# Patient Record
Sex: FEMALE | Race: ASIAN | NOT HISPANIC OR LATINO
[De-identification: names, ages, dates, MRNs, and addresses within clinical notes are randomized per-mention and may not be internally consistent; named-entity substitution may affect disease eponyms.]

---

## 2016-02-07 RX ORDER — ATORVASTATIN CALCIUM 80 MG/1
1 TABLET, FILM COATED ORAL
Qty: 0 | Refills: 0 | DISCHARGE
Start: 2016-02-07

## 2016-02-07 RX ORDER — VERAPAMIL HCL 240 MG
1 CAPSULE, EXTENDED RELEASE PELLETS 24 HR ORAL
Qty: 0 | Refills: 0 | COMMUNITY
Start: 2016-02-07

## 2016-02-07 RX ORDER — WARFARIN SODIUM 2.5 MG/1
0 TABLET ORAL
Qty: 0 | Refills: 0 | COMMUNITY
Start: 2016-02-07

## 2017-01-03 ENCOUNTER — MEDICATION RENEWAL (OUTPATIENT)
Age: 47
End: 2017-01-03

## 2017-01-03 ENCOUNTER — RX RENEWAL (OUTPATIENT)
Age: 47
End: 2017-01-03

## 2017-01-04 ENCOUNTER — OUTPATIENT (OUTPATIENT)
Dept: OUTPATIENT SERVICES | Facility: HOSPITAL | Age: 47
LOS: 1 days | End: 2017-01-04

## 2017-01-04 ENCOUNTER — LABORATORY RESULT (OUTPATIENT)
Age: 47
End: 2017-01-04

## 2017-01-04 ENCOUNTER — APPOINTMENT (OUTPATIENT)
Dept: INTERNAL MEDICINE | Facility: HOSPITAL | Age: 47
End: 2017-01-04

## 2017-01-04 DIAGNOSIS — Z98.89 OTHER SPECIFIED POSTPROCEDURAL STATES: Chronic | ICD-10-CM

## 2017-01-04 DIAGNOSIS — Z95.0 PRESENCE OF CARDIAC PACEMAKER: Chronic | ICD-10-CM

## 2017-01-04 LAB
APTT BLD: 46.6 SEC — HIGH (ref 27.5–37.4)
INR BLD: 3.51 — HIGH (ref 0.87–1.18)
PROTHROM AB SERPL-ACNC: 40.5 SEC — HIGH (ref 10–13.1)

## 2017-01-05 ENCOUNTER — OTHER (OUTPATIENT)
Age: 47
End: 2017-01-05

## 2017-01-09 ENCOUNTER — OTHER (OUTPATIENT)
Age: 47
End: 2017-01-09

## 2017-01-09 DIAGNOSIS — Z79.01 LONG TERM (CURRENT) USE OF ANTICOAGULANTS: ICD-10-CM

## 2017-01-10 ENCOUNTER — APPOINTMENT (OUTPATIENT)
Dept: CARDIOLOGY | Facility: HOSPITAL | Age: 47
End: 2017-01-10

## 2017-01-11 ENCOUNTER — APPOINTMENT (OUTPATIENT)
Dept: INTERNAL MEDICINE | Facility: HOSPITAL | Age: 47
End: 2017-01-11

## 2017-01-11 ENCOUNTER — OUTPATIENT (OUTPATIENT)
Dept: OUTPATIENT SERVICES | Facility: HOSPITAL | Age: 47
LOS: 1 days | End: 2017-01-11

## 2017-01-11 DIAGNOSIS — Z98.89 OTHER SPECIFIED POSTPROCEDURAL STATES: Chronic | ICD-10-CM

## 2017-01-11 DIAGNOSIS — Z95.0 PRESENCE OF CARDIAC PACEMAKER: Chronic | ICD-10-CM

## 2017-01-11 LAB — INR PPP: 1.2 RATIO

## 2017-01-12 DIAGNOSIS — Z79.01 LONG TERM (CURRENT) USE OF ANTICOAGULANTS: ICD-10-CM

## 2017-01-12 DIAGNOSIS — I48.91 UNSPECIFIED ATRIAL FIBRILLATION: ICD-10-CM

## 2017-01-18 ENCOUNTER — OUTPATIENT (OUTPATIENT)
Dept: OUTPATIENT SERVICES | Facility: HOSPITAL | Age: 47
LOS: 1 days | End: 2017-01-18

## 2017-01-18 ENCOUNTER — LABORATORY RESULT (OUTPATIENT)
Age: 47
End: 2017-01-18

## 2017-01-18 ENCOUNTER — APPOINTMENT (OUTPATIENT)
Dept: INTERNAL MEDICINE | Facility: HOSPITAL | Age: 47
End: 2017-01-18

## 2017-01-18 DIAGNOSIS — Z95.0 PRESENCE OF CARDIAC PACEMAKER: Chronic | ICD-10-CM

## 2017-01-18 DIAGNOSIS — Z98.89 OTHER SPECIFIED POSTPROCEDURAL STATES: Chronic | ICD-10-CM

## 2017-01-18 DIAGNOSIS — Z79.01 LONG TERM (CURRENT) USE OF ANTICOAGULANTS: ICD-10-CM

## 2017-01-18 LAB
INR BLD: 1.19 — HIGH (ref 0.87–1.18)
PROTHROM AB SERPL-ACNC: 13.6 SEC — HIGH (ref 10–13.1)

## 2017-01-19 ENCOUNTER — MEDICATION RENEWAL (OUTPATIENT)
Age: 47
End: 2017-01-19

## 2017-01-20 DIAGNOSIS — I48.91 UNSPECIFIED ATRIAL FIBRILLATION: ICD-10-CM

## 2017-01-25 ENCOUNTER — APPOINTMENT (OUTPATIENT)
Dept: INTERNAL MEDICINE | Facility: HOSPITAL | Age: 47
End: 2017-01-25

## 2017-01-25 ENCOUNTER — OUTPATIENT (OUTPATIENT)
Dept: OUTPATIENT SERVICES | Facility: HOSPITAL | Age: 47
LOS: 1 days | End: 2017-01-25

## 2017-01-25 DIAGNOSIS — Z79.01 LONG TERM (CURRENT) USE OF ANTICOAGULANTS: ICD-10-CM

## 2017-01-25 DIAGNOSIS — Z98.89 OTHER SPECIFIED POSTPROCEDURAL STATES: Chronic | ICD-10-CM

## 2017-01-25 DIAGNOSIS — Z95.0 PRESENCE OF CARDIAC PACEMAKER: Chronic | ICD-10-CM

## 2017-01-30 ENCOUNTER — APPOINTMENT (OUTPATIENT)
Dept: INTERNAL MEDICINE | Facility: HOSPITAL | Age: 47
End: 2017-01-30

## 2017-01-30 ENCOUNTER — OUTPATIENT (OUTPATIENT)
Dept: OUTPATIENT SERVICES | Facility: HOSPITAL | Age: 47
LOS: 1 days | End: 2017-01-30

## 2017-01-30 ENCOUNTER — APPOINTMENT (OUTPATIENT)
Dept: RHEUMATOLOGY | Facility: HOSPITAL | Age: 47
End: 2017-01-30

## 2017-01-30 VITALS
HEIGHT: 62 IN | WEIGHT: 106 LBS | SYSTOLIC BLOOD PRESSURE: 108 MMHG | BODY MASS INDEX: 19.51 KG/M2 | DIASTOLIC BLOOD PRESSURE: 43 MMHG | HEART RATE: 50 BPM

## 2017-01-30 DIAGNOSIS — Z95.0 PRESENCE OF CARDIAC PACEMAKER: Chronic | ICD-10-CM

## 2017-01-30 DIAGNOSIS — Z98.89 OTHER SPECIFIED POSTPROCEDURAL STATES: Chronic | ICD-10-CM

## 2017-01-31 ENCOUNTER — MEDICATION RENEWAL (OUTPATIENT)
Age: 47
End: 2017-01-31

## 2017-01-31 ENCOUNTER — RX RENEWAL (OUTPATIENT)
Age: 47
End: 2017-01-31

## 2017-01-31 DIAGNOSIS — Z79.01 LONG TERM (CURRENT) USE OF ANTICOAGULANTS: ICD-10-CM

## 2017-01-31 LAB
INR PPP: 1.8 RATIO
QUALITY CONTROL: YES

## 2017-02-01 DIAGNOSIS — M31.4 AORTIC ARCH SYNDROME [TAKAYASU]: ICD-10-CM

## 2017-02-01 DIAGNOSIS — I48.91 UNSPECIFIED ATRIAL FIBRILLATION: ICD-10-CM

## 2017-02-01 LAB — INR PPP: 1.7 RATIO

## 2017-02-06 ENCOUNTER — APPOINTMENT (OUTPATIENT)
Dept: INTERNAL MEDICINE | Facility: HOSPITAL | Age: 47
End: 2017-02-06

## 2017-02-08 ENCOUNTER — OUTPATIENT (OUTPATIENT)
Dept: OUTPATIENT SERVICES | Facility: HOSPITAL | Age: 47
LOS: 1 days | End: 2017-02-08

## 2017-02-08 ENCOUNTER — APPOINTMENT (OUTPATIENT)
Dept: INTERNAL MEDICINE | Facility: HOSPITAL | Age: 47
End: 2017-02-08

## 2017-02-08 DIAGNOSIS — Z98.89 OTHER SPECIFIED POSTPROCEDURAL STATES: Chronic | ICD-10-CM

## 2017-02-08 DIAGNOSIS — Z95.0 PRESENCE OF CARDIAC PACEMAKER: Chronic | ICD-10-CM

## 2017-02-08 DIAGNOSIS — Z79.899 OTHER LONG TERM (CURRENT) DRUG THERAPY: ICD-10-CM

## 2017-02-08 DIAGNOSIS — I48.91 UNSPECIFIED ATRIAL FIBRILLATION: ICD-10-CM

## 2017-02-08 LAB — INR PPP: 3.2 RATIO

## 2017-02-14 ENCOUNTER — OTHER (OUTPATIENT)
Age: 47
End: 2017-02-14

## 2017-02-15 ENCOUNTER — APPOINTMENT (OUTPATIENT)
Dept: INTERNAL MEDICINE | Facility: HOSPITAL | Age: 47
End: 2017-02-15

## 2017-02-15 ENCOUNTER — OUTPATIENT (OUTPATIENT)
Dept: OUTPATIENT SERVICES | Facility: HOSPITAL | Age: 47
LOS: 1 days | End: 2017-02-15

## 2017-02-15 DIAGNOSIS — Z98.89 OTHER SPECIFIED POSTPROCEDURAL STATES: Chronic | ICD-10-CM

## 2017-02-15 DIAGNOSIS — Z79.01 LONG TERM (CURRENT) USE OF ANTICOAGULANTS: ICD-10-CM

## 2017-02-15 DIAGNOSIS — Z95.0 PRESENCE OF CARDIAC PACEMAKER: Chronic | ICD-10-CM

## 2017-02-15 DIAGNOSIS — I48.91 UNSPECIFIED ATRIAL FIBRILLATION: ICD-10-CM

## 2017-02-16 LAB — POCT-PROTHROMBIN TIME: 3.2 SECS

## 2017-02-22 ENCOUNTER — APPOINTMENT (OUTPATIENT)
Dept: INTERNAL MEDICINE | Facility: HOSPITAL | Age: 47
End: 2017-02-22

## 2017-02-22 ENCOUNTER — OUTPATIENT (OUTPATIENT)
Dept: OUTPATIENT SERVICES | Facility: HOSPITAL | Age: 47
LOS: 1 days | End: 2017-02-22

## 2017-02-22 DIAGNOSIS — Z95.0 PRESENCE OF CARDIAC PACEMAKER: Chronic | ICD-10-CM

## 2017-02-22 DIAGNOSIS — Z98.89 OTHER SPECIFIED POSTPROCEDURAL STATES: Chronic | ICD-10-CM

## 2017-02-22 DIAGNOSIS — Z79.01 LONG TERM (CURRENT) USE OF ANTICOAGULANTS: ICD-10-CM

## 2017-02-24 LAB — INR PPP: 2.8 RATIO

## 2017-02-27 ENCOUNTER — OTHER (OUTPATIENT)
Age: 47
End: 2017-02-27

## 2017-03-01 ENCOUNTER — RX RENEWAL (OUTPATIENT)
Age: 47
End: 2017-03-01

## 2017-03-08 ENCOUNTER — RESULT CHARGE (OUTPATIENT)
Age: 47
End: 2017-03-08

## 2017-03-08 ENCOUNTER — APPOINTMENT (OUTPATIENT)
Dept: CARDIOLOGY | Facility: HOSPITAL | Age: 47
End: 2017-03-08

## 2017-03-08 ENCOUNTER — NON-APPOINTMENT (OUTPATIENT)
Age: 47
End: 2017-03-08

## 2017-03-08 ENCOUNTER — RX RENEWAL (OUTPATIENT)
Age: 47
End: 2017-03-08

## 2017-03-08 ENCOUNTER — APPOINTMENT (OUTPATIENT)
Dept: INTERNAL MEDICINE | Facility: HOSPITAL | Age: 47
End: 2017-03-08

## 2017-03-08 ENCOUNTER — OUTPATIENT (OUTPATIENT)
Dept: OUTPATIENT SERVICES | Facility: HOSPITAL | Age: 47
LOS: 1 days | End: 2017-03-08

## 2017-03-08 VITALS
SYSTOLIC BLOOD PRESSURE: 124 MMHG | HEART RATE: 57 BPM | WEIGHT: 114 LBS | OXYGEN SATURATION: 99 % | DIASTOLIC BLOOD PRESSURE: 69 MMHG | RESPIRATION RATE: 14 BRPM | BODY MASS INDEX: 20.85 KG/M2

## 2017-03-08 DIAGNOSIS — Z98.89 OTHER SPECIFIED POSTPROCEDURAL STATES: Chronic | ICD-10-CM

## 2017-03-08 DIAGNOSIS — Z95.0 PRESENCE OF CARDIAC PACEMAKER: Chronic | ICD-10-CM

## 2017-03-09 ENCOUNTER — RX RENEWAL (OUTPATIENT)
Age: 47
End: 2017-03-09

## 2017-03-09 LAB — INR PPP: 5.4 RATIO

## 2017-03-15 DIAGNOSIS — Z79.01 LONG TERM (CURRENT) USE OF ANTICOAGULANTS: ICD-10-CM

## 2017-03-22 ENCOUNTER — RX RENEWAL (OUTPATIENT)
Age: 47
End: 2017-03-22

## 2017-03-27 ENCOUNTER — APPOINTMENT (OUTPATIENT)
Dept: RHEUMATOLOGY | Facility: HOSPITAL | Age: 47
End: 2017-03-27

## 2017-03-28 ENCOUNTER — OUTPATIENT (OUTPATIENT)
Dept: OUTPATIENT SERVICES | Facility: HOSPITAL | Age: 47
LOS: 1 days | End: 2017-03-28

## 2017-03-28 ENCOUNTER — APPOINTMENT (OUTPATIENT)
Dept: INTERNAL MEDICINE | Facility: HOSPITAL | Age: 47
End: 2017-03-28

## 2017-03-28 ENCOUNTER — APPOINTMENT (OUTPATIENT)
Dept: CV DIAGNOSITCS | Facility: HOSPITAL | Age: 47
End: 2017-03-28

## 2017-03-28 ENCOUNTER — RESULT CHARGE (OUTPATIENT)
Age: 47
End: 2017-03-28

## 2017-03-28 DIAGNOSIS — Z98.89 OTHER SPECIFIED POSTPROCEDURAL STATES: Chronic | ICD-10-CM

## 2017-03-28 DIAGNOSIS — I34.0 NONRHEUMATIC MITRAL (VALVE) INSUFFICIENCY: ICD-10-CM

## 2017-03-28 DIAGNOSIS — Z95.0 PRESENCE OF CARDIAC PACEMAKER: Chronic | ICD-10-CM

## 2017-03-28 DIAGNOSIS — I42.2 OTHER HYPERTROPHIC CARDIOMYOPATHY: ICD-10-CM

## 2017-03-30 DIAGNOSIS — Z79.01 LONG TERM (CURRENT) USE OF ANTICOAGULANTS: ICD-10-CM

## 2017-04-05 ENCOUNTER — APPOINTMENT (OUTPATIENT)
Dept: INTERNAL MEDICINE | Facility: HOSPITAL | Age: 47
End: 2017-04-05

## 2017-04-05 ENCOUNTER — LABORATORY RESULT (OUTPATIENT)
Age: 47
End: 2017-04-05

## 2017-04-05 ENCOUNTER — OUTPATIENT (OUTPATIENT)
Dept: OUTPATIENT SERVICES | Facility: HOSPITAL | Age: 47
LOS: 1 days | End: 2017-04-05

## 2017-04-05 ENCOUNTER — APPOINTMENT (OUTPATIENT)
Dept: ELECTROPHYSIOLOGY | Facility: CLINIC | Age: 47
End: 2017-04-05

## 2017-04-05 DIAGNOSIS — Z95.0 PRESENCE OF CARDIAC PACEMAKER: Chronic | ICD-10-CM

## 2017-04-05 DIAGNOSIS — Z98.89 OTHER SPECIFIED POSTPROCEDURAL STATES: Chronic | ICD-10-CM

## 2017-04-05 LAB
INR BLD: 2.27 — HIGH (ref 0.88–1.17)
PROTHROM AB SERPL-ACNC: 25.8 SEC — HIGH (ref 9.8–13.1)

## 2017-04-10 DIAGNOSIS — Z79.01 LONG TERM (CURRENT) USE OF ANTICOAGULANTS: ICD-10-CM

## 2017-04-12 ENCOUNTER — APPOINTMENT (OUTPATIENT)
Dept: CARDIOLOGY | Facility: HOSPITAL | Age: 47
End: 2017-04-12

## 2017-04-12 ENCOUNTER — APPOINTMENT (OUTPATIENT)
Dept: INTERNAL MEDICINE | Facility: HOSPITAL | Age: 47
End: 2017-04-12

## 2017-04-12 ENCOUNTER — OUTPATIENT (OUTPATIENT)
Dept: OUTPATIENT SERVICES | Facility: HOSPITAL | Age: 47
LOS: 1 days | End: 2017-04-12

## 2017-04-12 VITALS
RESPIRATION RATE: 15 BRPM | SYSTOLIC BLOOD PRESSURE: 143 MMHG | HEART RATE: 54 BPM | OXYGEN SATURATION: 99 % | WEIGHT: 114 LBS | BODY MASS INDEX: 20.85 KG/M2 | DIASTOLIC BLOOD PRESSURE: 67 MMHG

## 2017-04-12 DIAGNOSIS — Z79.01 LONG TERM (CURRENT) USE OF ANTICOAGULANTS: ICD-10-CM

## 2017-04-12 DIAGNOSIS — Z98.89 OTHER SPECIFIED POSTPROCEDURAL STATES: Chronic | ICD-10-CM

## 2017-04-12 DIAGNOSIS — Z95.0 PRESENCE OF CARDIAC PACEMAKER: Chronic | ICD-10-CM

## 2017-04-12 DIAGNOSIS — Z00.00 ENCOUNTER FOR GENERAL ADULT MEDICAL EXAMINATION WITHOUT ABNORMAL FINDINGS: ICD-10-CM

## 2017-04-14 LAB — INR PPP: 1.9 RATIO

## 2017-04-17 ENCOUNTER — APPOINTMENT (OUTPATIENT)
Dept: INTERNAL MEDICINE | Facility: HOSPITAL | Age: 47
End: 2017-04-17

## 2017-04-17 ENCOUNTER — OUTPATIENT (OUTPATIENT)
Dept: OUTPATIENT SERVICES | Facility: HOSPITAL | Age: 47
LOS: 1 days | End: 2017-04-17

## 2017-04-17 DIAGNOSIS — Z95.0 PRESENCE OF CARDIAC PACEMAKER: Chronic | ICD-10-CM

## 2017-04-17 DIAGNOSIS — Z98.89 OTHER SPECIFIED POSTPROCEDURAL STATES: Chronic | ICD-10-CM

## 2017-04-17 LAB
INR PPP: 1.6 RATIO
QUALITY CONTROL: YES

## 2017-04-19 ENCOUNTER — APPOINTMENT (OUTPATIENT)
Dept: INTERNAL MEDICINE | Facility: HOSPITAL | Age: 47
End: 2017-04-19

## 2017-04-19 DIAGNOSIS — I48.91 UNSPECIFIED ATRIAL FIBRILLATION: ICD-10-CM

## 2017-04-19 DIAGNOSIS — Z79.01 LONG TERM (CURRENT) USE OF ANTICOAGULANTS: ICD-10-CM

## 2017-04-24 ENCOUNTER — APPOINTMENT (OUTPATIENT)
Dept: INTERNAL MEDICINE | Facility: HOSPITAL | Age: 47
End: 2017-04-24

## 2017-04-24 ENCOUNTER — APPOINTMENT (OUTPATIENT)
Dept: CV DIAGNOSITCS | Facility: HOSPITAL | Age: 47
End: 2017-04-24

## 2017-04-27 ENCOUNTER — RX RENEWAL (OUTPATIENT)
Age: 47
End: 2017-04-27

## 2017-05-02 ENCOUNTER — APPOINTMENT (OUTPATIENT)
Dept: INTERNAL MEDICINE | Facility: HOSPITAL | Age: 47
End: 2017-05-02

## 2017-05-02 ENCOUNTER — OUTPATIENT (OUTPATIENT)
Dept: OUTPATIENT SERVICES | Facility: HOSPITAL | Age: 47
LOS: 1 days | End: 2017-05-02

## 2017-05-02 DIAGNOSIS — Z79.01 LONG TERM (CURRENT) USE OF ANTICOAGULANTS: ICD-10-CM

## 2017-05-02 DIAGNOSIS — Z98.89 OTHER SPECIFIED POSTPROCEDURAL STATES: Chronic | ICD-10-CM

## 2017-05-02 DIAGNOSIS — Z95.0 PRESENCE OF CARDIAC PACEMAKER: Chronic | ICD-10-CM

## 2017-05-02 DIAGNOSIS — Z00.00 ENCOUNTER FOR GENERAL ADULT MEDICAL EXAMINATION WITHOUT ABNORMAL FINDINGS: ICD-10-CM

## 2017-05-03 LAB — INR PPP: 1.9 RATIO

## 2017-05-04 ENCOUNTER — RX RENEWAL (OUTPATIENT)
Age: 47
End: 2017-05-04

## 2017-05-09 ENCOUNTER — APPOINTMENT (OUTPATIENT)
Dept: INTERNAL MEDICINE | Facility: HOSPITAL | Age: 47
End: 2017-05-09

## 2017-05-10 ENCOUNTER — APPOINTMENT (OUTPATIENT)
Dept: CARDIOLOGY | Facility: HOSPITAL | Age: 47
End: 2017-05-10

## 2017-05-10 VITALS
BODY MASS INDEX: 20.85 KG/M2 | DIASTOLIC BLOOD PRESSURE: 67 MMHG | WEIGHT: 114 LBS | RESPIRATION RATE: 16 BRPM | HEART RATE: 53 BPM | SYSTOLIC BLOOD PRESSURE: 136 MMHG | OXYGEN SATURATION: 99 %

## 2017-05-10 VITALS — DIASTOLIC BLOOD PRESSURE: 67 MMHG | SYSTOLIC BLOOD PRESSURE: 149 MMHG

## 2017-05-10 VITALS — SYSTOLIC BLOOD PRESSURE: 155 MMHG | DIASTOLIC BLOOD PRESSURE: 65 MMHG

## 2017-05-10 VITALS — DIASTOLIC BLOOD PRESSURE: 64 MMHG | SYSTOLIC BLOOD PRESSURE: 154 MMHG

## 2017-05-10 VITALS — SYSTOLIC BLOOD PRESSURE: 133 MMHG | DIASTOLIC BLOOD PRESSURE: 68 MMHG

## 2017-05-15 ENCOUNTER — OUTPATIENT (OUTPATIENT)
Dept: OUTPATIENT SERVICES | Facility: HOSPITAL | Age: 47
LOS: 1 days | End: 2017-05-15

## 2017-05-15 ENCOUNTER — INPATIENT (INPATIENT)
Facility: HOSPITAL | Age: 47
LOS: 0 days | Discharge: HOME | End: 2017-05-16
Attending: INTERNAL MEDICINE | Admitting: INTERNAL MEDICINE
Payer: MEDICAID

## 2017-05-15 ENCOUNTER — RESULT CHARGE (OUTPATIENT)
Age: 47
End: 2017-05-15

## 2017-05-15 ENCOUNTER — OTHER (OUTPATIENT)
Age: 47
End: 2017-05-15

## 2017-05-15 ENCOUNTER — APPOINTMENT (OUTPATIENT)
Dept: RHEUMATOLOGY | Facility: HOSPITAL | Age: 47
End: 2017-05-15

## 2017-05-15 VITALS — DIASTOLIC BLOOD PRESSURE: 55 MMHG | SYSTOLIC BLOOD PRESSURE: 125 MMHG

## 2017-05-15 VITALS
DIASTOLIC BLOOD PRESSURE: 49 MMHG | SYSTOLIC BLOOD PRESSURE: 136 MMHG | HEIGHT: 62 IN | WEIGHT: 112 LBS | HEART RATE: 50 BPM | BODY MASS INDEX: 20.61 KG/M2

## 2017-05-15 VITALS
DIASTOLIC BLOOD PRESSURE: 54 MMHG | TEMPERATURE: 98 F | HEART RATE: 50 BPM | SYSTOLIC BLOOD PRESSURE: 145 MMHG | RESPIRATION RATE: 15 BRPM | OXYGEN SATURATION: 100 %

## 2017-05-15 DIAGNOSIS — R55 SYNCOPE AND COLLAPSE: ICD-10-CM

## 2017-05-15 DIAGNOSIS — Z98.89 OTHER SPECIFIED POSTPROCEDURAL STATES: Chronic | ICD-10-CM

## 2017-05-15 DIAGNOSIS — I48.91 UNSPECIFIED ATRIAL FIBRILLATION: ICD-10-CM

## 2017-05-15 DIAGNOSIS — M31.4 AORTIC ARCH SYNDROME [TAKAYASU]: ICD-10-CM

## 2017-05-15 DIAGNOSIS — Z95.0 PRESENCE OF CARDIAC PACEMAKER: Chronic | ICD-10-CM

## 2017-05-15 DIAGNOSIS — R42 DIZZINESS AND GIDDINESS: ICD-10-CM

## 2017-05-15 LAB
ALBUMIN SERPL ELPH-MCNC: 4.1 G/DL — SIGNIFICANT CHANGE UP (ref 3.3–5)
ALP SERPL-CCNC: 36 U/L — LOW (ref 40–120)
ALT FLD-CCNC: 26 U/L — SIGNIFICANT CHANGE UP (ref 4–33)
APTT BLD: 37.7 SEC — HIGH (ref 27.5–37.4)
AST SERPL-CCNC: 36 U/L — HIGH (ref 4–32)
BASOPHILS # BLD AUTO: 0.05 K/UL — SIGNIFICANT CHANGE UP (ref 0–0.2)
BASOPHILS NFR BLD AUTO: 0.5 % — SIGNIFICANT CHANGE UP (ref 0–2)
BILIRUB SERPL-MCNC: 0.7 MG/DL — SIGNIFICANT CHANGE UP (ref 0.2–1.2)
BUN SERPL-MCNC: 12 MG/DL — SIGNIFICANT CHANGE UP (ref 7–23)
CALCIUM SERPL-MCNC: 9 MG/DL — SIGNIFICANT CHANGE UP (ref 8.4–10.5)
CHLORIDE SERPL-SCNC: 102 MMOL/L — SIGNIFICANT CHANGE UP (ref 98–107)
CK MB BLD-MCNC: 2.8 NG/ML — SIGNIFICANT CHANGE UP (ref 1–4.7)
CK MB BLD-MCNC: 3.46 NG/ML — SIGNIFICANT CHANGE UP (ref 1–4.7)
CK MB BLD-MCNC: SIGNIFICANT CHANGE UP (ref 0–2.5)
CK SERPL-CCNC: 50 U/L — SIGNIFICANT CHANGE UP (ref 25–170)
CK SERPL-CCNC: 63 U/L — SIGNIFICANT CHANGE UP (ref 25–170)
CO2 SERPL-SCNC: 20 MMOL/L — LOW (ref 22–31)
CREAT SERPL-MCNC: 0.88 MG/DL — SIGNIFICANT CHANGE UP (ref 0.5–1.3)
CRP SERPL-MCNC: 0.4 MG/L — SIGNIFICANT CHANGE UP (ref 0.3–5)
EOSINOPHIL # BLD AUTO: 0.07 K/UL — SIGNIFICANT CHANGE UP (ref 0–0.5)
EOSINOPHIL NFR BLD AUTO: 0.8 % — SIGNIFICANT CHANGE UP (ref 0–6)
ERYTHROCYTE [SEDIMENTATION RATE] IN BLOOD: 8 MM/HR — SIGNIFICANT CHANGE UP (ref 4–25)
GLUCOSE SERPL-MCNC: 92 MG/DL — SIGNIFICANT CHANGE UP (ref 70–99)
HCG SERPL-ACNC: < 5 MIU/ML — SIGNIFICANT CHANGE UP
HCT VFR BLD CALC: 40.9 % — SIGNIFICANT CHANGE UP (ref 34.5–45)
HGB BLD-MCNC: 13.6 G/DL — SIGNIFICANT CHANGE UP (ref 11.5–15.5)
IMM GRANULOCYTES NFR BLD AUTO: 0.2 % — SIGNIFICANT CHANGE UP (ref 0–1.5)
INR BLD: 1.94 — HIGH (ref 0.88–1.17)
LYMPHOCYTES # BLD AUTO: 2.45 K/UL — SIGNIFICANT CHANGE UP (ref 1–3.3)
LYMPHOCYTES # BLD AUTO: 26.7 % — SIGNIFICANT CHANGE UP (ref 13–44)
MAGNESIUM SERPL-MCNC: 2.2 MG/DL — SIGNIFICANT CHANGE UP (ref 1.6–2.6)
MCHC RBC-ENTMCNC: 33.3 % — SIGNIFICANT CHANGE UP (ref 32–36)
MCHC RBC-ENTMCNC: 35.2 PG — HIGH (ref 27–34)
MCV RBC AUTO: 106 FL — HIGH (ref 80–100)
MONOCYTES # BLD AUTO: 0.52 K/UL — SIGNIFICANT CHANGE UP (ref 0–0.9)
MONOCYTES NFR BLD AUTO: 5.7 % — SIGNIFICANT CHANGE UP (ref 2–14)
NEUTROPHILS # BLD AUTO: 6.05 K/UL — SIGNIFICANT CHANGE UP (ref 1.8–7.4)
NEUTROPHILS NFR BLD AUTO: 66.1 % — SIGNIFICANT CHANGE UP (ref 43–77)
PHOSPHATE SERPL-MCNC: 3.7 MG/DL — SIGNIFICANT CHANGE UP (ref 2.5–4.5)
PLATELET # BLD AUTO: 315 K/UL — SIGNIFICANT CHANGE UP (ref 150–400)
PMV BLD: 10.6 FL — SIGNIFICANT CHANGE UP (ref 7–13)
POTASSIUM SERPL-MCNC: 4.1 MMOL/L — SIGNIFICANT CHANGE UP (ref 3.5–5.3)
POTASSIUM SERPL-SCNC: 4.1 MMOL/L — SIGNIFICANT CHANGE UP (ref 3.5–5.3)
PROT SERPL-MCNC: 7 G/DL — SIGNIFICANT CHANGE UP (ref 6–8.3)
PROTHROM AB SERPL-ACNC: 22 SEC — HIGH (ref 9.8–13.1)
RBC # BLD: 3.86 M/UL — SIGNIFICANT CHANGE UP (ref 3.8–5.2)
RBC # FLD: 14.1 % — SIGNIFICANT CHANGE UP (ref 10.3–14.5)
SODIUM SERPL-SCNC: 140 MMOL/L — SIGNIFICANT CHANGE UP (ref 135–145)
TROPONIN T SERPL-MCNC: < 0.06 NG/ML — SIGNIFICANT CHANGE UP (ref 0–0.06)
TROPONIN T SERPL-MCNC: < 0.06 NG/ML — SIGNIFICANT CHANGE UP (ref 0–0.06)
WBC # BLD: 9.16 K/UL — SIGNIFICANT CHANGE UP (ref 3.8–10.5)
WBC # FLD AUTO: 9.16 K/UL — SIGNIFICANT CHANGE UP (ref 3.8–10.5)

## 2017-05-15 PROCEDURE — 71020: CPT | Mod: 26

## 2017-05-15 PROCEDURE — 74174 CTA ABD&PLVS W/CONTRAST: CPT | Mod: 26

## 2017-05-15 PROCEDURE — 93282 PRGRMG EVAL IMPLANTABLE DFB: CPT | Mod: 26

## 2017-05-15 PROCEDURE — 71275 CT ANGIOGRAPHY CHEST: CPT | Mod: 26

## 2017-05-15 RX ORDER — VERAPAMIL HCL 240 MG
40 CAPSULE, EXTENDED RELEASE PELLETS 24 HR ORAL THREE TIMES A DAY
Qty: 0 | Refills: 0 | Status: DISCONTINUED | OUTPATIENT
Start: 2017-05-15 | End: 2017-05-16

## 2017-05-15 RX ORDER — PANTOPRAZOLE SODIUM 20 MG/1
40 TABLET, DELAYED RELEASE ORAL
Qty: 0 | Refills: 0 | Status: DISCONTINUED | OUTPATIENT
Start: 2017-05-15 | End: 2017-05-16

## 2017-05-15 RX ORDER — METOPROLOL TARTRATE 50 MG
50 TABLET ORAL
Qty: 0 | Refills: 0 | Status: DISCONTINUED | OUTPATIENT
Start: 2017-05-15 | End: 2017-05-16

## 2017-05-15 RX ORDER — FOLIC ACID 0.8 MG
1 TABLET ORAL DAILY
Qty: 0 | Refills: 0 | Status: DISCONTINUED | OUTPATIENT
Start: 2017-05-15 | End: 2017-05-16

## 2017-05-15 RX ORDER — WARFARIN SODIUM 2.5 MG/1
3 TABLET ORAL ONCE
Qty: 0 | Refills: 0 | Status: DISCONTINUED | OUTPATIENT
Start: 2017-05-15 | End: 2017-05-16

## 2017-05-15 RX ORDER — ATORVASTATIN CALCIUM 80 MG/1
20 TABLET, FILM COATED ORAL AT BEDTIME
Qty: 0 | Refills: 0 | Status: DISCONTINUED | OUTPATIENT
Start: 2017-05-15 | End: 2017-05-16

## 2017-05-15 RX ORDER — FERROUS SULFATE 325(65) MG
325 TABLET ORAL DAILY
Qty: 0 | Refills: 0 | Status: DISCONTINUED | OUTPATIENT
Start: 2017-05-15 | End: 2017-05-16

## 2017-05-15 RX ORDER — LOSARTAN POTASSIUM 100 MG/1
25 TABLET, FILM COATED ORAL DAILY
Qty: 0 | Refills: 0 | Status: DISCONTINUED | OUTPATIENT
Start: 2017-05-15 | End: 2017-05-16

## 2017-05-15 RX ORDER — METHOTREXATE 2.5 MG/1
15 TABLET ORAL
Qty: 0 | Refills: 0 | Status: DISCONTINUED | OUTPATIENT
Start: 2017-05-15 | End: 2017-05-16

## 2017-05-15 RX ORDER — METHOTREXATE 2.5 MG/1
7 TABLET ORAL
Qty: 0 | Refills: 0 | COMMUNITY

## 2017-05-15 RX ORDER — SOTALOL HCL 120 MG
120 TABLET ORAL
Qty: 0 | Refills: 0 | Status: DISCONTINUED | OUTPATIENT
Start: 2017-05-15 | End: 2017-05-16

## 2017-05-15 NOTE — ED PROVIDER NOTE - PMH
AICD (automatic cardioverter/defibrillator) present    Atrial fibrillation, chronic    Hypertension    Hypertrophic cardiomyopathy    Non-sustained ventricular tachycardia    Takayasu's arteritis

## 2017-05-15 NOTE — ED PROVIDER NOTE - OBJECTIVE STATEMENT
[place]ian  #[id number]  46 yo woman w/ h/o htn, afib, hypertrophic cardiomyopathy (s/p aicd), Takayasu's arteritis p/w head fullness. Symptoms started 1 wk ago, worsening. Describes as feeling that she is upside down w/ blood rushing to head. Also states that she feels as though she has been shocked multiple times over the past week. Device placed 05/2015, does not know brand nor have card. States she went to rheumatologist today for routine appointment and was sent to ED for "emergency cat scan" because her arrythmia is getting worse. Has been told since January that she needed CT, but has been unable to because of insurance issues. Denies chest pain, sob, vertigo, visual changes, weakness/sensory deficits.  Card: Quinton Santana Albanian  #[id number]  48 yo woman w/ h/o htn, afib, severe diastolic CHF, sustained VT (s/p [employer] aicd), hypertrophic cardiomyopathy, Takayasu's arteritis p/w head fullness. Symptoms started 1 wk ago, worsening. Describes as feeling that she is upside down w/ blood rushing to head. Also states that she feels as though she has been shocked multiple times over the past week. Device placed 05/2015, does not know brand nor have card. States she went to rheumatologist today for routine appointment and was sent to ED for "emergency cat scan" because her arrythmia is getting worse. Has been told since January that she needed CT, but has been unable to because of insurance issues. Denies chest pain, sob, vertigo, visual changes, weakness/sensory deficits.  Card: Quinton Santana

## 2017-05-15 NOTE — ED ADULT TRIAGE NOTE - CHIEF COMPLAINT QUOTE
Frisian  #[id number]  seen by PMD, and sent in for eval of RA and CTpa  PMH arrythmia 2Y AGO, leaky heart valve  LMP 5/1/17

## 2017-05-15 NOTE — ED CLERICAL - NS ED CLERK NOTE PRE-ARRIVAL INFORMATION; ADDITIONAL PRE-ARRIVAL INFORMATION
takayasu arteritis with c/o fullness in chest and head needs CTA of chest abdomen & pelvis labs CBC CMP ESR and CRP

## 2017-05-15 NOTE — ED PROVIDER NOTE - PROGRESS NOTE DETAILS
Gabe Lee MD PGY3: Labs, imaging reviewed. Interrogated by EP w/ 20% pacing, sinus елена. Seen by cardiology, accepted to service. Telemetry PA signout @[id number]

## 2017-05-15 NOTE — ED PROVIDER NOTE - MEDICAL DECISION MAKING DETAILS
48 yo woman w/ head fullness and sensation of being shocked. 48 yo woman w/ head fullness and sensation of being shocked. Likely near syncope in setting of aicd dysfunction. Will check labs, cxr, consult EP and rheumatology. Josy: 46 yo woman w/ head fullness and sensation of being shocked. Likely near syncope in setting of aicd dysfunction. Will monitor, check labs, cxr, consult EP for possible AICD dysfunction and rheumatology requesting CTA of chest/abdomen/pelvis to assess for any dilatation of her large vessels, TBA to tele for further monitoring.

## 2017-05-16 ENCOUNTER — TRANSCRIPTION ENCOUNTER (OUTPATIENT)
Age: 47
End: 2017-05-16

## 2017-05-16 VITALS — WEIGHT: 130.07 LBS

## 2017-05-16 DIAGNOSIS — I10 ESSENTIAL (PRIMARY) HYPERTENSION: ICD-10-CM

## 2017-05-16 DIAGNOSIS — Z29.9 ENCOUNTER FOR PROPHYLACTIC MEASURES, UNSPECIFIED: ICD-10-CM

## 2017-05-16 DIAGNOSIS — Z95.810 PRESENCE OF AUTOMATIC (IMPLANTABLE) CARDIAC DEFIBRILLATOR: ICD-10-CM

## 2017-05-16 DIAGNOSIS — M31.4 AORTIC ARCH SYNDROME [TAKAYASU]: ICD-10-CM

## 2017-05-16 DIAGNOSIS — R55 SYNCOPE AND COLLAPSE: ICD-10-CM

## 2017-05-16 DIAGNOSIS — I48.2 CHRONIC ATRIAL FIBRILLATION: ICD-10-CM

## 2017-05-16 LAB
APTT BLD: 37.1 SEC — SIGNIFICANT CHANGE UP (ref 27.5–37.4)
BUN SERPL-MCNC: 12 MG/DL — SIGNIFICANT CHANGE UP (ref 7–23)
CALCIUM SERPL-MCNC: 8.6 MG/DL — SIGNIFICANT CHANGE UP (ref 8.4–10.5)
CHLORIDE SERPL-SCNC: 104 MMOL/L — SIGNIFICANT CHANGE UP (ref 98–107)
CHOLEST SERPL-MCNC: 188 MG/DL — SIGNIFICANT CHANGE UP (ref 120–199)
CO2 SERPL-SCNC: 23 MMOL/L — SIGNIFICANT CHANGE UP (ref 22–31)
CREAT SERPL-MCNC: 0.76 MG/DL — SIGNIFICANT CHANGE UP (ref 0.5–1.3)
GLUCOSE SERPL-MCNC: 74 MG/DL — SIGNIFICANT CHANGE UP (ref 70–99)
HBA1C BLD-MCNC: 5.7 % — HIGH (ref 4–5.6)
HCT VFR BLD CALC: 38.5 % — SIGNIFICANT CHANGE UP (ref 34.5–45)
HDLC SERPL-MCNC: 71 MG/DL — HIGH (ref 45–65)
HGB BLD-MCNC: 12.7 G/DL — SIGNIFICANT CHANGE UP (ref 11.5–15.5)
INR BLD: 1.96 — HIGH (ref 0.88–1.17)
INR PPP: 1.9 RATIO
LIPID PNL WITH DIRECT LDL SERPL: 98 MG/DL — SIGNIFICANT CHANGE UP
MAGNESIUM SERPL-MCNC: 2 MG/DL — SIGNIFICANT CHANGE UP (ref 1.6–2.6)
MCHC RBC-ENTMCNC: 33 % — SIGNIFICANT CHANGE UP (ref 32–36)
MCHC RBC-ENTMCNC: 35.1 PG — HIGH (ref 27–34)
MCV RBC AUTO: 106.4 FL — HIGH (ref 80–100)
PHOSPHATE SERPL-MCNC: 3.3 MG/DL — SIGNIFICANT CHANGE UP (ref 2.5–4.5)
PLATELET # BLD AUTO: 288 K/UL — SIGNIFICANT CHANGE UP (ref 150–400)
PMV BLD: 10.3 FL — SIGNIFICANT CHANGE UP (ref 7–13)
POTASSIUM SERPL-MCNC: 4 MMOL/L — SIGNIFICANT CHANGE UP (ref 3.5–5.3)
POTASSIUM SERPL-SCNC: 4 MMOL/L — SIGNIFICANT CHANGE UP (ref 3.5–5.3)
PROTHROM AB SERPL-ACNC: 22.2 SEC — HIGH (ref 9.8–13.1)
RBC # BLD: 3.62 M/UL — LOW (ref 3.8–5.2)
RBC # FLD: 14 % — SIGNIFICANT CHANGE UP (ref 10.3–14.5)
SODIUM SERPL-SCNC: 140 MMOL/L — SIGNIFICANT CHANGE UP (ref 135–145)
TRIGL SERPL-MCNC: 112 MG/DL — SIGNIFICANT CHANGE UP (ref 10–149)
TSH SERPL-MCNC: 1.66 UIU/ML — SIGNIFICANT CHANGE UP (ref 0.27–4.2)
WBC # BLD: 5 K/UL — SIGNIFICANT CHANGE UP (ref 3.8–10.5)
WBC # FLD AUTO: 5 K/UL — SIGNIFICANT CHANGE UP (ref 3.8–10.5)

## 2017-05-16 PROCEDURE — 99236 HOSP IP/OBS SAME DATE HI 85: CPT

## 2017-05-16 RX ORDER — WARFARIN SODIUM 2.5 MG/1
3 TABLET ORAL ONCE
Qty: 0 | Refills: 0 | Status: COMPLETED | OUTPATIENT
Start: 2017-05-16 | End: 2017-05-16

## 2017-05-16 RX ORDER — METHOTREXATE 2.5 MG/1
7 TABLET ORAL
Qty: 0 | Refills: 0 | COMMUNITY

## 2017-05-16 RX ORDER — METHOTREXATE 2.5 MG/1
17.5 TABLET ORAL
Qty: 0 | Refills: 0 | Status: DISCONTINUED | OUTPATIENT
Start: 2017-05-16 | End: 2017-05-16

## 2017-05-16 RX ADMIN — LOSARTAN POTASSIUM 25 MILLIGRAM(S): 100 TABLET, FILM COATED ORAL at 06:44

## 2017-05-16 RX ADMIN — WARFARIN SODIUM 3 MILLIGRAM(S): 2.5 TABLET ORAL at 01:17

## 2017-05-16 RX ADMIN — PANTOPRAZOLE SODIUM 40 MILLIGRAM(S): 20 TABLET, DELAYED RELEASE ORAL at 06:43

## 2017-05-16 RX ADMIN — Medication 40 MILLIGRAM(S): at 06:43

## 2017-05-16 RX ADMIN — Medication 5 MILLIGRAM(S): at 06:43

## 2017-05-16 NOTE — DISCHARGE NOTE ADULT - CARE PLAN
Principal Discharge DX:	Near syncope  Goal:	Prevent presyncopal episodes  Instructions for follow-up, activity and diet:	continue present medications  ct head and neck as an out patient  f/u with pmd in 1 week  Secondary Diagnosis:	Hypertension  Instructions for follow-up, activity and diet:	continue present medications  Secondary Diagnosis:	Atrial fibrillation, chronic  Instructions for follow-up, activity and diet:	continue present medications  Secondary Diagnosis:	AICD (automatic cardioverter/defibrillator) present  Instructions for follow-up, activity and diet:	continue present medications

## 2017-05-16 NOTE — H&P ADULT. - RS GEN PE MLT RESP DETAILS PC
respirations non-labored/good air movement/no wheezes/no rhonchi/normal/airway patent/breath sounds equal/no chest wall tenderness/no rales/clear to auscultation bilaterally

## 2017-05-16 NOTE — H&P ADULT. - GASTROINTESTINAL DETAILS
no masses palpable/no guarding/no rebound tenderness/normal/soft/no rigidity/nontender/bowel sounds normal/no distention

## 2017-05-16 NOTE — H&P ADULT. - NEGATIVE OPHTHALMOLOGIC SYMPTOMS
no pain R/no blurred vision R/no discharge R/no lacrimation L/no lacrimation R/no diplopia/no photophobia/no pain L/no irritation R/no loss of vision R/no loss of vision L/no discharge L/no irritation L/no scleral injection L/no scleral injection R/no blurred vision L

## 2017-05-16 NOTE — H&P ADULT. - NEGATIVE MUSCULOSKELETAL SYMPTOMS
no stiffness/no neck pain/no back pain/no joint swelling/no arthralgia/no muscle cramps/no arthritis/no myalgia/no muscle weakness/no leg pain L/no leg pain R/no arm pain L/no arm pain R

## 2017-05-16 NOTE — H&P ADULT. - HISTORY OF PRESENT ILLNESS
46 y/o F Wolof speaking with h/o of Takayasu arteritis, paroxysmal a. fib on sotalol and warfarin / sustain V tach on coumadin s/p ICD placement, nonobstructive CAD (20% LAC and RCA in 2015), HCM, GERD, H. pylori, mod-severe and bileaflet MVP with mod-severe MR presents to the ED for head fullness.  Pt is primarily Wolof speaking and  service # [id number]. Pt states she feels like the blood is rushing to her head when she is upside down. PT  reports she sometimes feel dizzy. Pt reports she felt her ICD fire a couple of time in the past week. Pt was seen by rheumatologist today and was sent to the ED due to her symptoms. Pt was instructed to have a CAT scan done which hasn't been done in the past due to insurance issues, Pt is currently asymptomatic and feeling much better. Pt denies LOC, syncope, fever, chills, chest pain, shortness of breath, N/V/D/C, numbness, tingling, dysuria, urinary/bowel incontinence or any other complaints.     In the ED, patient was seen by cardiology and ICD was interrogated. Vitals are currently stable. Pt is admitted to telemetry.

## 2017-05-16 NOTE — H&P ADULT. - NEGATIVE NEUROLOGICAL SYMPTOMS
no confusion/no syncope/no vertigo/no generalized seizures/no headache/no loss of sensation/no tremors/no hemiparesis/no loss of consciousness/no weakness/no paresthesias/no difficulty walking/no focal seizures

## 2017-05-16 NOTE — DISCHARGE NOTE ADULT - PATIENT PORTAL LINK FT
“You can access the [employer] Patient Portal, offered by [facility], by registering with the following website: [email or web address]”

## 2017-05-16 NOTE — H&P ADULT. - PROBLEM SELECTOR PLAN 1
Admit to telemetry.   Cards consult appreciated- Will discuss with rheum regarding need for head and neck CT, eventual CT surg eval, continue with home medications  check CBC, tsh, lipid, hemoglobin a1c, bmp with mag and phos.   Trend CE.   f/u MD note

## 2017-05-16 NOTE — DISCHARGE NOTE ADULT - HOSPITAL COURSE
48 y/o F Georgian speaking with h/o of Takayasu arteritis, paroxysmal a. fib on sotalol and warfarin / sustain V tach on coumadin s/p ICD placement, nonobstructive CAD (20% LAC and RCA in 2015), HCM, GERD, H. pylori, mod-severe and bileaflet MVP with mod-severe MR presents to the ED for head fullness.  Pt is primarily Georgian speaking and  service # [id number]. Pt states she feels like the blood is rushing to her head when she is upside down. PT  reports she sometimes feel dizzy. Pt reports she felt her ICD fire a couple of time in the past week. Pt was seen by rheumatologist today and was sent to the ED due to her symptoms. Pt was instructed to have a CAT scan done which hasn't been done in the past due to insurance issues, Pt is currently asymptomatic and feeling much better. Pt denies LOC, syncope, fever, chills, chest pain, shortness of breath, N/V/D/C, numbness, tingling, dysuria, urinary/bowel incontinence or any other complaints.     In the ED, patient was seen by cardiology and ICD was interrogated. Vitals are currently stable. Pt is admitted to telemetry.     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  CXR: clear lungs   Cards c/s: Will discuss with rheum regarding need for head and neck CT, eventual CT surg eval, continue with home medications  ICD interrogation: Normal sensing pacing. No reprogramming.

## 2017-05-16 NOTE — H&P ADULT. - PROBLEM SELECTOR PLAN 5
[id number] score of 3 . Patient is currently rate controlled. INR is subtherapeutic, patient didn't take the night dose. Will dose according and recheck in the AM.

## 2017-05-16 NOTE — H&P ADULT. - NEGATIVE CARDIOVASCULAR SYMPTOMS
no dyspnea on exertion/no peripheral edema/no paroxysmal nocturnal dyspnea/no chest pain/no orthopnea/no palpitations

## 2017-05-16 NOTE — DISCHARGE NOTE ADULT - PLAN OF CARE
Prevent presyncopal episodes continue present medications  ct head and neck as an out patient  f/u with pmd in 1 week continue present medications

## 2017-05-16 NOTE — DISCHARGE NOTE ADULT - MEDICATION SUMMARY - MEDICATIONS TO TAKE
I will START or STAY ON the medications listed below when I get home from the hospital:    predniSONE 5 mg oral tablet  -- 1 tab(s) by mouth once a day  -- Indication: For Takayasu's arteritis    losartan 25 mg oral tablet  -- 1 tab(s) by mouth once a day  -- Indication: For Hypertension    verapamil 40 mg oral tablet  -- 1 tab(s) by mouth 2 times a day  -- Indication: For Atrial fibrillation    sotalol 120 mg oral tablet  -- 1 tab(s) by mouth 2 times a day  -- Indication: For Atrial fibrillation    Coumadin 5 mg oral tablet  -- 3 mg on Mon 4.5 mg daily on Tuesday-Sun.   -- Indication: For Atrial fibrillation    atorvastatin 20 mg oral tablet  -- 1 tab(s) by mouth once a day (at bedtime)  -- Indication: For CAD    methotrexate 2.5 mg oral tablet  -- 7 tab(s) by mouth once a week,Friday  -- Indication: For Takayasu's arteritis    metoprolol tartrate 50 mg oral tablet  -- 1 tab(s) by mouth 2 times a day  -- Indication: For Hypertension    ferrous sulfate 325 mg (65 mg elemental iron) oral tablet  -- 1 tab(s) by mouth once a day  -- Indication: For ANEMIA    omeprazole 40 mg oral delayed release capsule  -- 1 cap(s) by mouth once a day  -- Indication: For GERD    folic acid 1 mg oral tablet  -- 1 tab(s) by mouth once a day  -- Indication: For Need for prophylactic measure I will START or STAY ON the medications listed below when I get home from the hospital:    predniSONE 5 mg oral tablet  -- 1 tab(s) by mouth once a day  -- Indication: For Takayasu's arteritis    losartan 25 mg oral tablet  -- 1 tab(s) by mouth once a day  -- Indication: For Hypertension    verapamil 40 mg oral tablet  -- 1 tab(s) by mouth 2 times a day  -- Indication: For Hypertension    sotalol 120 mg oral tablet  -- 1 tab(s) by mouth 2 times a day  -- Indication: For Atrial fibrillation    Coumadin 5 mg oral tablet  -- 3 mg on Mon 4.5 mg daily on Tuesday-Sun.   -- Indication: For Atrial fibrillation    atorvastatin 20 mg oral tablet  -- 1 tab(s) by mouth once a day (at bedtime)  -- Indication: For Atrial fibrillation    methotrexate 2.5 mg oral tablet  -- 7 tab(s) by mouth once a week,Friday  -- Indication: For Takayasu's arteritis    metoprolol tartrate 50 mg oral tablet  -- 1 tab(s) by mouth 2 times a day  -- Indication: For Hypertension    ferrous sulfate 325 mg (65 mg elemental iron) oral tablet  -- 1 tab(s) by mouth once a day  -- Indication: For Anemia    omeprazole 40 mg oral delayed release capsule  -- 1 cap(s) by mouth once a day  -- Indication: For gerd    folic acid 1 mg oral tablet  -- 1 tab(s) by mouth once a day  -- Indication: For Need for prophylactic measure

## 2017-05-23 ENCOUNTER — APPOINTMENT (OUTPATIENT)
Dept: INTERNAL MEDICINE | Facility: HOSPITAL | Age: 47
End: 2017-05-23

## 2017-05-23 ENCOUNTER — OUTPATIENT (OUTPATIENT)
Dept: OUTPATIENT SERVICES | Facility: HOSPITAL | Age: 47
LOS: 1 days | End: 2017-05-23

## 2017-05-23 VITALS
BODY MASS INDEX: 20.61 KG/M2 | HEIGHT: 62 IN | HEART RATE: 72 BPM | DIASTOLIC BLOOD PRESSURE: 53 MMHG | SYSTOLIC BLOOD PRESSURE: 132 MMHG | WEIGHT: 112 LBS

## 2017-05-23 DIAGNOSIS — M31.4 AORTIC ARCH SYNDROME [TAKAYASU]: ICD-10-CM

## 2017-05-23 DIAGNOSIS — Z98.89 OTHER SPECIFIED POSTPROCEDURAL STATES: Chronic | ICD-10-CM

## 2017-05-23 DIAGNOSIS — I15.9 SECONDARY HYPERTENSION, UNSPECIFIED: ICD-10-CM

## 2017-05-23 DIAGNOSIS — Z95.0 PRESENCE OF CARDIAC PACEMAKER: Chronic | ICD-10-CM

## 2017-05-23 DIAGNOSIS — I42.2 OTHER HYPERTROPHIC CARDIOMYOPATHY: ICD-10-CM

## 2017-05-23 DIAGNOSIS — I48.91 UNSPECIFIED ATRIAL FIBRILLATION: ICD-10-CM

## 2017-05-23 LAB — INR PPP: 3.3 RATIO

## 2017-05-31 ENCOUNTER — MEDICATION RENEWAL (OUTPATIENT)
Age: 47
End: 2017-05-31

## 2017-06-06 ENCOUNTER — OUTPATIENT (OUTPATIENT)
Dept: OUTPATIENT SERVICES | Facility: HOSPITAL | Age: 47
LOS: 1 days | End: 2017-06-06

## 2017-06-06 ENCOUNTER — APPOINTMENT (OUTPATIENT)
Dept: INTERNAL MEDICINE | Facility: HOSPITAL | Age: 47
End: 2017-06-06

## 2017-06-06 DIAGNOSIS — Z95.0 PRESENCE OF CARDIAC PACEMAKER: Chronic | ICD-10-CM

## 2017-06-06 DIAGNOSIS — Z98.89 OTHER SPECIFIED POSTPROCEDURAL STATES: Chronic | ICD-10-CM

## 2017-06-07 DIAGNOSIS — I48.91 UNSPECIFIED ATRIAL FIBRILLATION: ICD-10-CM

## 2017-06-07 DIAGNOSIS — Z79.01 LONG TERM (CURRENT) USE OF ANTICOAGULANTS: ICD-10-CM

## 2017-06-13 ENCOUNTER — LABORATORY RESULT (OUTPATIENT)
Age: 47
End: 2017-06-13

## 2017-06-13 ENCOUNTER — APPOINTMENT (OUTPATIENT)
Dept: CARDIOLOGY | Facility: HOSPITAL | Age: 47
End: 2017-06-13

## 2017-06-13 ENCOUNTER — OUTPATIENT (OUTPATIENT)
Dept: OUTPATIENT SERVICES | Facility: HOSPITAL | Age: 47
LOS: 1 days | End: 2017-06-13

## 2017-06-13 ENCOUNTER — APPOINTMENT (OUTPATIENT)
Dept: INTERNAL MEDICINE | Facility: HOSPITAL | Age: 47
End: 2017-06-13

## 2017-06-13 DIAGNOSIS — I48.91 UNSPECIFIED ATRIAL FIBRILLATION: ICD-10-CM

## 2017-06-13 DIAGNOSIS — Z79.01 LONG TERM (CURRENT) USE OF ANTICOAGULANTS: ICD-10-CM

## 2017-06-13 DIAGNOSIS — Z98.89 OTHER SPECIFIED POSTPROCEDURAL STATES: Chronic | ICD-10-CM

## 2017-06-13 DIAGNOSIS — Z95.0 PRESENCE OF CARDIAC PACEMAKER: Chronic | ICD-10-CM

## 2017-06-13 LAB
INR BLD: 4.61 — HIGH (ref 0.88–1.17)
PROTHROM AB SERPL-ACNC: 53.3 SEC — HIGH (ref 9.8–13.1)

## 2017-06-16 ENCOUNTER — RX RENEWAL (OUTPATIENT)
Age: 47
End: 2017-06-16

## 2017-06-19 ENCOUNTER — APPOINTMENT (OUTPATIENT)
Dept: INTERNAL MEDICINE | Facility: HOSPITAL | Age: 47
End: 2017-06-19

## 2017-06-19 ENCOUNTER — APPOINTMENT (OUTPATIENT)
Dept: RHEUMATOLOGY | Facility: HOSPITAL | Age: 47
End: 2017-06-19

## 2017-06-19 ENCOUNTER — OUTPATIENT (OUTPATIENT)
Dept: OUTPATIENT SERVICES | Facility: HOSPITAL | Age: 47
LOS: 1 days | End: 2017-06-19

## 2017-06-19 VITALS
HEART RATE: 51 BPM | BODY MASS INDEX: 20.61 KG/M2 | DIASTOLIC BLOOD PRESSURE: 40 MMHG | WEIGHT: 112 LBS | HEIGHT: 62 IN | SYSTOLIC BLOOD PRESSURE: 135 MMHG

## 2017-06-19 VITALS — SYSTOLIC BLOOD PRESSURE: 120 MMHG | DIASTOLIC BLOOD PRESSURE: 50 MMHG

## 2017-06-19 DIAGNOSIS — Z95.0 PRESENCE OF CARDIAC PACEMAKER: Chronic | ICD-10-CM

## 2017-06-19 DIAGNOSIS — M31.4 AORTIC ARCH SYNDROME [TAKAYASU]: ICD-10-CM

## 2017-06-19 DIAGNOSIS — I34.0 NONRHEUMATIC MITRAL (VALVE) INSUFFICIENCY: ICD-10-CM

## 2017-06-19 DIAGNOSIS — Z95.810 PRESENCE OF AUTOMATIC (IMPLANTABLE) CARDIAC DEFIBRILLATOR: ICD-10-CM

## 2017-06-19 DIAGNOSIS — Z98.89 OTHER SPECIFIED POSTPROCEDURAL STATES: Chronic | ICD-10-CM

## 2017-06-19 DIAGNOSIS — Z79.01 LONG TERM (CURRENT) USE OF ANTICOAGULANTS: ICD-10-CM

## 2017-06-20 DIAGNOSIS — Z95.810 PRESENCE OF AUTOMATIC (IMPLANTABLE) CARDIAC DEFIBRILLATOR: ICD-10-CM

## 2017-06-20 DIAGNOSIS — M31.4 AORTIC ARCH SYNDROME [TAKAYASU]: ICD-10-CM

## 2017-06-20 DIAGNOSIS — I34.0 NONRHEUMATIC MITRAL (VALVE) INSUFFICIENCY: ICD-10-CM

## 2017-06-22 ENCOUNTER — OUTPATIENT (OUTPATIENT)
Dept: OUTPATIENT SERVICES | Facility: HOSPITAL | Age: 47
LOS: 1 days | End: 2017-06-22

## 2017-06-22 ENCOUNTER — APPOINTMENT (OUTPATIENT)
Dept: INTERNAL MEDICINE | Facility: HOSPITAL | Age: 47
End: 2017-06-22

## 2017-06-22 ENCOUNTER — LABORATORY RESULT (OUTPATIENT)
Age: 47
End: 2017-06-22

## 2017-06-22 VITALS — SYSTOLIC BLOOD PRESSURE: 110 MMHG | DIASTOLIC BLOOD PRESSURE: 50 MMHG

## 2017-06-22 VITALS — HEIGHT: 62 IN | WEIGHT: 113 LBS | BODY MASS INDEX: 20.8 KG/M2

## 2017-06-22 DIAGNOSIS — Z98.89 OTHER SPECIFIED POSTPROCEDURAL STATES: Chronic | ICD-10-CM

## 2017-06-22 DIAGNOSIS — Z95.0 PRESENCE OF CARDIAC PACEMAKER: Chronic | ICD-10-CM

## 2017-06-22 DIAGNOSIS — Z79.899 OTHER LONG TERM (CURRENT) DRUG THERAPY: ICD-10-CM

## 2017-06-22 LAB
ALBUMIN SERPL ELPH-MCNC: 4 G/DL — SIGNIFICANT CHANGE UP (ref 3.3–5)
ALP SERPL-CCNC: 38 U/L — LOW (ref 40–120)
ALT FLD-CCNC: 17 U/L — SIGNIFICANT CHANGE UP (ref 4–33)
AST SERPL-CCNC: 22 U/L — SIGNIFICANT CHANGE UP (ref 4–32)
BILIRUB SERPL-MCNC: 0.5 MG/DL — SIGNIFICANT CHANGE UP (ref 0.2–1.2)
BUN SERPL-MCNC: 16 MG/DL — SIGNIFICANT CHANGE UP (ref 7–23)
CALCIUM SERPL-MCNC: 9 MG/DL — SIGNIFICANT CHANGE UP (ref 8.4–10.5)
CHLORIDE SERPL-SCNC: 104 MMOL/L — SIGNIFICANT CHANGE UP (ref 98–107)
CHOLEST SERPL-MCNC: 219 MG/DL — HIGH (ref 120–199)
CO2 SERPL-SCNC: 24 MMOL/L — SIGNIFICANT CHANGE UP (ref 22–31)
CREAT SERPL-MCNC: 0.99 MG/DL — SIGNIFICANT CHANGE UP (ref 0.5–1.3)
GLUCOSE SERPL-MCNC: 83 MG/DL — SIGNIFICANT CHANGE UP (ref 70–99)
HDLC SERPL-MCNC: 72 MG/DL — HIGH (ref 45–65)
LACTATE SERPL-SCNC: 1.4 MMOL/L — SIGNIFICANT CHANGE UP (ref 0.5–2)
LIPID PNL WITH DIRECT LDL SERPL: 122 MG/DL — SIGNIFICANT CHANGE UP
POTASSIUM SERPL-MCNC: 4.3 MMOL/L — SIGNIFICANT CHANGE UP (ref 3.5–5.3)
POTASSIUM SERPL-SCNC: 4.3 MMOL/L — SIGNIFICANT CHANGE UP (ref 3.5–5.3)
PROT SERPL-MCNC: 6.6 G/DL — SIGNIFICANT CHANGE UP (ref 6–8.3)
SODIUM SERPL-SCNC: 141 MMOL/L — SIGNIFICANT CHANGE UP (ref 135–145)
TRIGL SERPL-MCNC: 178 MG/DL — HIGH (ref 10–149)

## 2017-06-26 ENCOUNTER — CHART COPY (OUTPATIENT)
Age: 47
End: 2017-06-26

## 2017-06-29 DIAGNOSIS — I34.0 NONRHEUMATIC MITRAL (VALVE) INSUFFICIENCY: ICD-10-CM

## 2017-06-29 DIAGNOSIS — I42.2 OTHER HYPERTROPHIC CARDIOMYOPATHY: ICD-10-CM

## 2017-06-29 DIAGNOSIS — I48.91 UNSPECIFIED ATRIAL FIBRILLATION: ICD-10-CM

## 2017-06-29 DIAGNOSIS — M31.4 AORTIC ARCH SYNDROME [TAKAYASU]: ICD-10-CM

## 2017-06-29 DIAGNOSIS — R10.9 UNSPECIFIED ABDOMINAL PAIN: ICD-10-CM

## 2017-07-10 ENCOUNTER — APPOINTMENT (OUTPATIENT)
Dept: INTERNAL MEDICINE | Facility: HOSPITAL | Age: 47
End: 2017-07-10

## 2017-07-10 ENCOUNTER — APPOINTMENT (OUTPATIENT)
Dept: ELECTROPHYSIOLOGY | Facility: CLINIC | Age: 47
End: 2017-07-10

## 2017-07-10 ENCOUNTER — OUTPATIENT (OUTPATIENT)
Dept: OUTPATIENT SERVICES | Facility: HOSPITAL | Age: 47
LOS: 1 days | End: 2017-07-10

## 2017-07-10 DIAGNOSIS — Z95.0 PRESENCE OF CARDIAC PACEMAKER: Chronic | ICD-10-CM

## 2017-07-10 DIAGNOSIS — Z98.89 OTHER SPECIFIED POSTPROCEDURAL STATES: Chronic | ICD-10-CM

## 2017-07-11 ENCOUNTER — APPOINTMENT (OUTPATIENT)
Dept: CARDIOLOGY | Facility: HOSPITAL | Age: 47
End: 2017-07-11

## 2017-07-11 VITALS
HEART RATE: 54 BPM | BODY MASS INDEX: 20.85 KG/M2 | OXYGEN SATURATION: 99 % | DIASTOLIC BLOOD PRESSURE: 72 MMHG | WEIGHT: 114 LBS | SYSTOLIC BLOOD PRESSURE: 149 MMHG | RESPIRATION RATE: 15 BRPM

## 2017-07-11 DIAGNOSIS — I48.91 UNSPECIFIED ATRIAL FIBRILLATION: ICD-10-CM

## 2017-07-17 ENCOUNTER — APPOINTMENT (OUTPATIENT)
Dept: INTERNAL MEDICINE | Facility: HOSPITAL | Age: 47
End: 2017-07-17

## 2017-07-17 ENCOUNTER — RX RENEWAL (OUTPATIENT)
Age: 47
End: 2017-07-17

## 2017-07-28 ENCOUNTER — APPOINTMENT (OUTPATIENT)
Dept: CARDIOTHORACIC SURGERY | Facility: CLINIC | Age: 47
End: 2017-07-28
Payer: MEDICAID

## 2017-07-28 VITALS
SYSTOLIC BLOOD PRESSURE: 134 MMHG | OXYGEN SATURATION: 99 % | DIASTOLIC BLOOD PRESSURE: 75 MMHG | HEART RATE: 88 BPM | RESPIRATION RATE: 14 BRPM | WEIGHT: 112 LBS | HEIGHT: 62 IN | TEMPERATURE: 97.5 F | BODY MASS INDEX: 20.61 KG/M2

## 2017-07-28 PROCEDURE — 99205 OFFICE O/P NEW HI 60 MIN: CPT

## 2017-08-06 ENCOUNTER — INPATIENT (INPATIENT)
Facility: HOSPITAL | Age: 47
LOS: 13 days | Discharge: HOME | DRG: 217 | End: 2017-08-20
Attending: THORACIC SURGERY (CARDIOTHORACIC VASCULAR SURGERY) | Admitting: HOSPITALIST
Payer: MEDICAID

## 2017-08-06 VITALS
SYSTOLIC BLOOD PRESSURE: 146 MMHG | TEMPERATURE: 98 F | HEART RATE: 60 BPM | OXYGEN SATURATION: 97 % | DIASTOLIC BLOOD PRESSURE: 78 MMHG | RESPIRATION RATE: 20 BRPM

## 2017-08-06 DIAGNOSIS — I47.2 VENTRICULAR TACHYCARDIA: ICD-10-CM

## 2017-08-06 DIAGNOSIS — M31.4 AORTIC ARCH SYNDROME [TAKAYASU]: ICD-10-CM

## 2017-08-06 DIAGNOSIS — Z98.89 OTHER SPECIFIED POSTPROCEDURAL STATES: Chronic | ICD-10-CM

## 2017-08-06 DIAGNOSIS — Z98.891 HISTORY OF UTERINE SCAR FROM PREVIOUS SURGERY: Chronic | ICD-10-CM

## 2017-08-06 DIAGNOSIS — I50.9 HEART FAILURE, UNSPECIFIED: ICD-10-CM

## 2017-08-06 DIAGNOSIS — Z29.9 ENCOUNTER FOR PROPHYLACTIC MEASURES, UNSPECIFIED: ICD-10-CM

## 2017-08-06 DIAGNOSIS — I34.0 NONRHEUMATIC MITRAL (VALVE) INSUFFICIENCY: ICD-10-CM

## 2017-08-06 DIAGNOSIS — Z95.0 PRESENCE OF CARDIAC PACEMAKER: Chronic | ICD-10-CM

## 2017-08-06 DIAGNOSIS — I50.1 LEFT VENTRICULAR FAILURE, UNSPECIFIED: ICD-10-CM

## 2017-08-06 DIAGNOSIS — R10.9 UNSPECIFIED ABDOMINAL PAIN: ICD-10-CM

## 2017-08-06 LAB
ALBUMIN SERPL ELPH-MCNC: 3.9 G/DL — SIGNIFICANT CHANGE UP (ref 3.3–5)
ALP SERPL-CCNC: 58 U/L — SIGNIFICANT CHANGE UP (ref 40–120)
ALT FLD-CCNC: 46 U/L RC — HIGH (ref 10–45)
ANION GAP SERPL CALC-SCNC: 13 MMOL/L — SIGNIFICANT CHANGE UP (ref 5–17)
APPEARANCE UR: ABNORMAL
APTT BLD: 42.9 SEC — HIGH (ref 27.5–37.4)
AST SERPL-CCNC: 53 U/L — HIGH (ref 10–40)
BACTERIA # UR AUTO: ABNORMAL
BASE EXCESS BLDV CALC-SCNC: -2.7 MMOL/L — LOW (ref -2–2)
BASOPHILS # BLD AUTO: 0.1 K/UL — SIGNIFICANT CHANGE UP (ref 0–0.2)
BASOPHILS NFR BLD AUTO: 0.8 % — SIGNIFICANT CHANGE UP (ref 0–2)
BILIRUB SERPL-MCNC: 0.5 MG/DL — SIGNIFICANT CHANGE UP (ref 0.2–1.2)
BILIRUB UR-MCNC: NEGATIVE — SIGNIFICANT CHANGE UP
BUN SERPL-MCNC: 10 MG/DL — SIGNIFICANT CHANGE UP (ref 7–23)
CA-I SERPL-SCNC: 1.18 MMOL/L — SIGNIFICANT CHANGE UP (ref 1.12–1.3)
CALCIUM SERPL-MCNC: 8.3 MG/DL — LOW (ref 8.4–10.5)
CHLORIDE BLDV-SCNC: 111 MMOL/L — HIGH (ref 96–108)
CHLORIDE SERPL-SCNC: 107 MMOL/L — SIGNIFICANT CHANGE UP (ref 96–108)
CK MB BLD-MCNC: 2.9 % — SIGNIFICANT CHANGE UP (ref 0–3.5)
CK MB BLD-MCNC: 3.1 % — SIGNIFICANT CHANGE UP (ref 0–3.5)
CK MB CFR SERPL CALC: 2.4 NG/ML — SIGNIFICANT CHANGE UP (ref 0–3.8)
CK MB CFR SERPL CALC: 2.5 NG/ML — SIGNIFICANT CHANGE UP (ref 0–3.8)
CK MB CFR SERPL CALC: 2.6 NG/ML — SIGNIFICANT CHANGE UP (ref 0–3.8)
CK SERPL-CCNC: 81 U/L — SIGNIFICANT CHANGE UP (ref 25–170)
CK SERPL-CCNC: 81 U/L — SIGNIFICANT CHANGE UP (ref 25–170)
CK SERPL-CCNC: 91 U/L — SIGNIFICANT CHANGE UP (ref 25–170)
CO2 BLDV-SCNC: 24 MMOL/L — SIGNIFICANT CHANGE UP (ref 22–30)
CO2 SERPL-SCNC: 21 MMOL/L — LOW (ref 22–31)
COLOR SPEC: YELLOW — SIGNIFICANT CHANGE UP
CREAT SERPL-MCNC: 0.95 MG/DL — SIGNIFICANT CHANGE UP (ref 0.5–1.3)
DIFF PNL FLD: ABNORMAL
EOSINOPHIL # BLD AUTO: 0.1 K/UL — SIGNIFICANT CHANGE UP (ref 0–0.5)
EOSINOPHIL NFR BLD AUTO: 1.4 % — SIGNIFICANT CHANGE UP (ref 0–6)
EPI CELLS # UR: 1 /HPF — SIGNIFICANT CHANGE UP (ref 0–5)
GAS PNL BLDV: 136 MMOL/L — SIGNIFICANT CHANGE UP (ref 136–145)
GAS PNL BLDV: SIGNIFICANT CHANGE UP
GAS PNL BLDV: SIGNIFICANT CHANGE UP
GLUCOSE BLDV-MCNC: 127 MG/DL — HIGH (ref 70–99)
GLUCOSE SERPL-MCNC: 130 MG/DL — HIGH (ref 70–99)
GLUCOSE UR QL: NEGATIVE MG/DL — SIGNIFICANT CHANGE UP
HCG SERPL QL: NEGATIVE — SIGNIFICANT CHANGE UP
HCO3 BLDV-SCNC: 22 MMOL/L — SIGNIFICANT CHANGE UP (ref 21–29)
HCT VFR BLD CALC: 39.1 % — SIGNIFICANT CHANGE UP (ref 34.5–45)
HCT VFR BLDA CALC: 40 % — SIGNIFICANT CHANGE UP (ref 39–50)
HGB BLD CALC-MCNC: 13.2 G/DL — SIGNIFICANT CHANGE UP (ref 11.5–15.5)
HGB BLD-MCNC: 12.8 G/DL — SIGNIFICANT CHANGE UP (ref 11.5–15.5)
HYALINE CASTS # UR AUTO: 0 /LPF — SIGNIFICANT CHANGE UP (ref 0–7)
INR BLD: 2.69 RATIO — HIGH (ref 0.88–1.16)
KETONES UR-MCNC: NEGATIVE — SIGNIFICANT CHANGE UP
LACTATE BLDV-MCNC: 2.1 MMOL/L — HIGH (ref 0.7–2)
LEUKOCYTE ESTERASE UR-ACNC: ABNORMAL
LIDOCAIN IGE QN: 32 U/L — SIGNIFICANT CHANGE UP (ref 7–60)
LYMPHOCYTES # BLD AUTO: 1.8 K/UL — SIGNIFICANT CHANGE UP (ref 1–3.3)
LYMPHOCYTES # BLD AUTO: 19.2 % — SIGNIFICANT CHANGE UP (ref 13–44)
MACROCYTES BLD QL: SLIGHT — SIGNIFICANT CHANGE UP
MCHC RBC-ENTMCNC: 32.7 GM/DL — SIGNIFICANT CHANGE UP (ref 32–36)
MCHC RBC-ENTMCNC: 35.9 PG — HIGH (ref 27–34)
MCV RBC AUTO: 110 FL — HIGH (ref 80–100)
MONOCYTES # BLD AUTO: 0.5 K/UL — SIGNIFICANT CHANGE UP (ref 0–0.9)
MONOCYTES NFR BLD AUTO: 5.2 % — SIGNIFICANT CHANGE UP (ref 2–14)
NEUTROPHILS # BLD AUTO: 7.1 K/UL — SIGNIFICANT CHANGE UP (ref 1.8–7.4)
NEUTROPHILS NFR BLD AUTO: 73.5 % — SIGNIFICANT CHANGE UP (ref 43–77)
NITRITE UR-MCNC: POSITIVE
NT-PROBNP SERPL-SCNC: 4597 PG/ML — HIGH (ref 0–300)
PCO2 BLDV: 43 MMHG — SIGNIFICANT CHANGE UP (ref 35–50)
PH BLDV: 7.34 — LOW (ref 7.35–7.45)
PH UR: 6.5 — SIGNIFICANT CHANGE UP (ref 5–8)
PLAT MORPH BLD: NORMAL — SIGNIFICANT CHANGE UP
PLATELET # BLD AUTO: 292 K/UL — SIGNIFICANT CHANGE UP (ref 150–400)
PO2 BLDV: 18 MMHG — LOW (ref 25–45)
POLYCHROMASIA BLD QL SMEAR: SLIGHT — SIGNIFICANT CHANGE UP
POTASSIUM BLDV-SCNC: 4.5 MMOL/L — SIGNIFICANT CHANGE UP (ref 3.5–5)
POTASSIUM SERPL-MCNC: 4.9 MMOL/L — SIGNIFICANT CHANGE UP (ref 3.5–5.3)
POTASSIUM SERPL-SCNC: 4.9 MMOL/L — SIGNIFICANT CHANGE UP (ref 3.5–5.3)
PROT SERPL-MCNC: 6.6 G/DL — SIGNIFICANT CHANGE UP (ref 6–8.3)
PROT UR-MCNC: NEGATIVE MG/DL — SIGNIFICANT CHANGE UP
PROTHROM AB SERPL-ACNC: 29.7 SEC — HIGH (ref 9.8–12.7)
RBC # BLD: 3.56 M/UL — LOW (ref 3.8–5.2)
RBC # FLD: 13.9 % — SIGNIFICANT CHANGE UP (ref 10.3–14.5)
RBC BLD AUTO: ABNORMAL
RBC CASTS # UR COMP ASSIST: 4 /HPF — SIGNIFICANT CHANGE UP (ref 0–4)
SAO2 % BLDV: 17 % — LOW (ref 67–88)
SODIUM SERPL-SCNC: 141 MMOL/L — SIGNIFICANT CHANGE UP (ref 135–145)
SP GR SPEC: 1.01 — SIGNIFICANT CHANGE UP (ref 1.01–1.02)
SPHEROCYTES BLD QL SMEAR: SLIGHT — SIGNIFICANT CHANGE UP
TROPONIN T SERPL-MCNC: <0.01 NG/ML — SIGNIFICANT CHANGE UP (ref 0–0.06)
TROPONIN T SERPL-MCNC: <0.01 NG/ML — SIGNIFICANT CHANGE UP (ref 0–0.06)
UROBILINOGEN FLD QL: NEGATIVE MG/DL — SIGNIFICANT CHANGE UP
WBC # BLD: 9.7 K/UL — SIGNIFICANT CHANGE UP (ref 3.8–10.5)
WBC # FLD AUTO: 9.7 K/UL — SIGNIFICANT CHANGE UP (ref 3.8–10.5)
WBC UR QL: 188 /HPF — HIGH (ref 0–5)

## 2017-08-06 PROCEDURE — 71020: CPT | Mod: 26

## 2017-08-06 PROCEDURE — 71250 CT THORAX DX C-: CPT | Mod: 26

## 2017-08-06 PROCEDURE — 99223 1ST HOSP IP/OBS HIGH 75: CPT | Mod: GC

## 2017-08-06 PROCEDURE — 93308 TTE F-UP OR LMTD: CPT | Mod: 26

## 2017-08-06 PROCEDURE — 76705 ECHO EXAM OF ABDOMEN: CPT | Mod: 26

## 2017-08-06 PROCEDURE — 93010 ELECTROCARDIOGRAM REPORT: CPT

## 2017-08-06 PROCEDURE — 99285 EMERGENCY DEPT VISIT HI MDM: CPT | Mod: 25

## 2017-08-06 RX ORDER — FERROUS SULFATE 325(65) MG
325 TABLET ORAL DAILY
Qty: 0 | Refills: 0 | Status: DISCONTINUED | OUTPATIENT
Start: 2017-08-06 | End: 2017-08-11

## 2017-08-06 RX ORDER — FUROSEMIDE 40 MG
40 TABLET ORAL ONCE
Qty: 0 | Refills: 0 | Status: COMPLETED | OUTPATIENT
Start: 2017-08-06 | End: 2017-08-06

## 2017-08-06 RX ORDER — ATORVASTATIN CALCIUM 80 MG/1
20 TABLET, FILM COATED ORAL AT BEDTIME
Qty: 0 | Refills: 0 | Status: DISCONTINUED | OUTPATIENT
Start: 2017-08-06 | End: 2017-08-11

## 2017-08-06 RX ORDER — METOPROLOL TARTRATE 50 MG
50 TABLET ORAL
Qty: 0 | Refills: 0 | Status: DISCONTINUED | OUTPATIENT
Start: 2017-08-06 | End: 2017-08-11

## 2017-08-06 RX ORDER — VERAPAMIL HCL 240 MG
40 CAPSULE, EXTENDED RELEASE PELLETS 24 HR ORAL THREE TIMES A DAY
Qty: 0 | Refills: 0 | Status: DISCONTINUED | OUTPATIENT
Start: 2017-08-06 | End: 2017-08-11

## 2017-08-06 RX ORDER — SOTALOL HCL 120 MG
120 TABLET ORAL
Qty: 0 | Refills: 0 | Status: DISCONTINUED | OUTPATIENT
Start: 2017-08-06 | End: 2017-08-11

## 2017-08-06 RX ORDER — PANTOPRAZOLE SODIUM 20 MG/1
40 TABLET, DELAYED RELEASE ORAL
Qty: 0 | Refills: 0 | Status: DISCONTINUED | OUTPATIENT
Start: 2017-08-06 | End: 2017-08-11

## 2017-08-06 RX ORDER — ONDANSETRON 8 MG/1
4 TABLET, FILM COATED ORAL EVERY 6 HOURS
Qty: 0 | Refills: 0 | Status: DISCONTINUED | OUTPATIENT
Start: 2017-08-06 | End: 2017-08-11

## 2017-08-06 RX ORDER — FOLIC ACID 0.8 MG
1 TABLET ORAL DAILY
Qty: 0 | Refills: 0 | Status: DISCONTINUED | OUTPATIENT
Start: 2017-08-06 | End: 2017-08-11

## 2017-08-06 RX ORDER — FUROSEMIDE 40 MG
40 TABLET ORAL DAILY
Qty: 0 | Refills: 0 | Status: DISCONTINUED | OUTPATIENT
Start: 2017-08-07 | End: 2017-08-11

## 2017-08-06 RX ORDER — LOSARTAN POTASSIUM 100 MG/1
25 TABLET, FILM COATED ORAL DAILY
Qty: 0 | Refills: 0 | Status: DISCONTINUED | OUTPATIENT
Start: 2017-08-06 | End: 2017-08-11

## 2017-08-06 RX ADMIN — Medication 40 MILLIGRAM(S): at 18:35

## 2017-08-06 RX ADMIN — Medication 40 MILLIGRAM(S): at 06:58

## 2017-08-06 RX ADMIN — Medication 40 MILLIGRAM(S): at 21:00

## 2017-08-06 RX ADMIN — ATORVASTATIN CALCIUM 20 MILLIGRAM(S): 80 TABLET, FILM COATED ORAL at 21:04

## 2017-08-06 RX ADMIN — Medication 120 MILLIGRAM(S): at 21:00

## 2017-08-06 RX ADMIN — Medication 50 MILLIGRAM(S): at 18:34

## 2017-08-06 NOTE — H&P ADULT - FAMILY HISTORY
Child  Still living? Yes, Estimated age: 19  Family history of hypertension, Age at diagnosis: Age Unknown

## 2017-08-06 NOTE — H&P ADULT - NSHPPHYSICALEXAM_GEN_ALL_CORE
HEENT: Normocephalic, atraumatic, MMM, throat nonerythematous, PERRL, EOMI  CV: HR irregular. b/l JVD. L carotid bruit. Suprasternal pulsations visible. Murmurs not appreciated. 2+ R radial pulse, L radial pulse non-palpable. Dorsalis pedis and posterior tibialis nonpalpable b/l.  Pulm: Lungs clear to auscultation bilaterally  Abd: Normoactive bowel sounds, moderate distension, tenderness in epigastric region and RUQ, ?positive stanford's sign, nontympanitic, no guarding, no rigidity, no rebound tenderness. Spleen tip palpable. Liver not enlarged.   Neuro: PERRL, EOMI, CNII-XII intact grossly, strength 5/5 b/l UE and b/l LE General: NAD, lying in bed at 30 degrees  Head: Normocephalic, atraumatic  HEENT:: MMM, throat nonerythematous, PERRL, EOMI  Neck: trachea midline, arterial pulsation in central midline neck at manubrium  CV: HR irregular. b/l JVD. L carotid bruit. Suprasternal pulsations visible. systolic murmur  Pulm: normal work of breathing, Lungs clear to auscultation bilaterally  Abd: Normoactive bowel sounds, moderate distension, tenderness in epigastric region and RUQ, , nontympanitic, no guarding, no rigidity, no rebound tenderness. Spleen tip palpable. Liver not enlarged.   Neuro: A&Ox3,  PERRL, EOMI, CNII-XII intact grossly, strength 5/5 b/l UE and b/l LE  Derm: warm and dry, no visible rash  Extremities: no appreciable LE pitting edema & no cyanosis in the extremities. 2+ R radial pulse, L radial pulse non-palpable. Dorsalis pedis and posterior tibialis nonpalpable b/l. General: NAD, lying in bed at 30 degrees  Head: Normocephalic, atraumatic  HEENT:: MMM, throat nonerythematous, PERRL, EOMI  Neck: trachea midline, arterial pulsation in central midline neck at manubrium  CV: HR irregular. b/l JVD. L carotid bruit. Suprasternal pulsations visible. systolic murmur  Pulm: normal work of breathing, Lungs clear to auscultation bilaterally  Abd: Normoactive bowel sounds, moderate distension, tenderness in epigastric region and RUQ, , nontympanitic, no guarding, no rigidity, no rebound tenderness. Spleen tip palpable. Liver not enlarged.   Neuro: A&Ox3,  PERRL, EOMI, CNII-XII intact grossly, strength 5/5 b/l UE and b/l LE  Derm: warm and dry, no visible rashes  Extremities: no appreciable LE pitting edema & no cyanosis in the extremities. 2+ R radial pulse, L radial pulse non-palpable. Dorsalis pedis and posterior tibialis nonpalpable b/l.

## 2017-08-06 NOTE — H&P ADULT - HISTORY OF PRESENT ILLNESS
Mrs. Karimi is a 47 year old woman with a history of takayasu arteritis, afib (on coumadin and sotalol), CAD, HCOM, VTach (s/p IACD), MR/AR (planning for valve replacement), here for shortness of breath for the past 8 days. She says that the shortness of breath began 8 days ago, and has been progressive since then. She is now short of breath when walking room to room from her house, she reports inability to life flat, and occasional dry cough. She denies chest pain. She denies any lower extremity edema. Along with her shortness of breath she has been experiencing abdominal pain and fullness along with decreased appetite, but has not had any weight loss. She says the fullness has come on in the past week, and feels like a general bloating in her abdomen. She also reports epigastric, and RUQ pain. She describes the nature of the pain as indigestion type pain, not associated with food, came on about 8 days ago and has been increasing in severity. No clear exacerbating or relieving factors. She reports normal bowel movements with no diarrhea or constipation. Mrs. Karimi is a 47 year old woman with a history of takayasu arteritis (on weekly methotrexate, chronic prednisone 4mg/d), aortic insufficiency, mitral regurgitation, paroxysmal afib (on coumadin and sotalol), VTach (s/p IACD), severe concentric LVH (no obstruction), last cardiac catheterization in 2015 w/ 20% stenosis of pLAD and pRCA, normal LVEF in Mar 2017, who presents from home w/ complaints of progressive shortness of breath for the past 8 days that now limits her functional capacity, both on exertion and at rest, w/ associated orthopnea, dry cough, abdominal distention and discomfort. She denies chest pain and lower extremity swelling. She also reports 8 days of worsening abdominal pain (epigastric and RUQ pain, qualified as indigestion, not associated with food, no clear exacerbating or relieving factors) along with fullness and decreased appetite (but without weight loss). She reports normal bowel movements with no diarrhea or constipation.    Of note, pt had been scheduled for elective bioprosthetic AVR and MVR w/ pre-procedure cardiac catheterization this week, prior to symptom onset and subsequent progression.

## 2017-08-06 NOTE — ED ADULT NURSE NOTE - NS ED NURSE LEVEL OF CONSCIOUSNESS ORIENTATION
no chest pain, no cough, and no shortness of breath.
Oriented - self; Oriented - place; Oriented - time

## 2017-08-06 NOTE — H&P ADULT - NSHPREVIEWOFSYSTEMS_GEN_ALL_CORE
HEENT: Headache yesterday, improved now  Cardiac: +palpitations, not a new symptom, this is a chronic condition. Denies chest pain.  Pulm: Per HPI  Abd: Per HPI  : No change in urination, no dysuria  LE: No edema Head: Headache yesterday, improved now  Eyes: No change in vision  Throat: No throat pain  Cardiac: +palpitations, not a new symptom, this is a chronic condition. Denies chest pain.  Pulm: Dyspnea, nonproductive cough, not a new symptom  Abd: abdominal pain, epigastric and ruq, no change in BMs, no constipation, no diarrhea  : No change in urination, no dysuria  LE: No edema, no lower extremity pain  Endocrine: Decreased energy levels, decreased appetite General: No constitutional symptoms.  Head: Headache yesterday, improved now.  Eyes: No change in vision.  Throat: No throat pain.  CV: +palpitations, not a new symptom, this is a chronic condition. Denies chest pain.  Respi/Thorax: Dyspnea, nonproductive cough, not a new symptom.  GI: abdominal pain, epigastric and ruq, no change in BMs, no constipation, no diarrhea.  : No change in urination, no dysuria.  LE: No edema, no lower extremity pain.  Endocrine: Decreased energy levels, decreased appetite.

## 2017-08-06 NOTE — H&P ADULT - ASSESSMENT
Mrs. Karimi is a 47 year old woman with a history of takayasu arteritis, MR, AR (planned valvular replacement this week), hypertrophic obstructive cardiomyopathy, Afib (on coumadin), VT (AICD in place), with progressive shortness of breath for 8 days, elevated BNP, pulmonary edema, consistent with acute cardiac failure secondary to worsening aortic/mitral valvular insufficiency.

## 2017-08-06 NOTE — CONSULT NOTE ADULT - SUBJECTIVE AND OBJECTIVE BOX
[facility]iology Initial Consult  Consult Spectra [id number]    CHIEF COMPLAINT:  NORRIS, abd fullness    HISTORY OF PRESENT ILLNESS:  47F hx of Takayasu arteritis, pAF on coumadin, HCM, VT s/p AICD, MVP/MVR, AI, with 8 days of NORRIS, abdominal fullness. Pt was planned for elevtive bioprosthetic AVR/MVR on 2017. Patient has a prior history of this presentation in  but the patient does not remember the circumstances. No identifiable etiologies of this exacerbation. s/p IV lasix in the ED with some improvement.    Allergies  No Known Allergies  	  MEDICATIONS:  sotalol 120 milliGRAM(s) Oral two times a day  losartan 25 milliGRAM(s) Oral daily  verapamil 40 milliGRAM(s) Oral three times a day  metoprolol 50 milliGRAM(s) Oral two times a day  ondansetron Injectable 4 milliGRAM(s) IV Push every 6 hours PRN  pantoprazole    Tablet 40 milliGRAM(s) Oral before breakfast  predniSONE   Tablet 4 milliGRAM(s) Oral daily  atorvastatin 20 milliGRAM(s) Oral at bedtime  ferrous    sulfate 325 milliGRAM(s) Oral daily  folic acid 1 milliGRAM(s) Oral daily    PAST MEDICAL & SURGICAL HISTORY:  Hypertrophic cardiomyopathy  Takayasu's arteritis  AICD (automatic cardioverter/defibrillator) present  Hypertension  Non-sustained ventricular tachycardia  Atrial fibrillation, chronic  H/O  section: x2  H/O cardiac pacemaker  History of appendectomy    FAMILY HISTORY:  Family history of hypertension (Child)    SOCIAL HISTORY:    [x] Non-smoker  [ ] Smoker  [ ] Alcohol    Review of Systems:  Constitutional: [- ] Fever [- ] Chills [x] Fatigue [ ] Weight change   HEENT: [ ] Blurred vision [ ] Eye Pain [ ] Headache [ ] Runny nose [ ] Sore Throat   Respiratory: [ -] Cough [ ] Wheezing [x ] Shortness of breath  Cardiovascular: [- ] Chest Pain [- ] Palpitations [ x] NORRIS [ ] PND [x ] Orthopnea  Gastrointestinal: [x ] Abdominal Pain [ ] Diarrhea [ ] Constipation [ ] Hemorrhoids [ x] Nausea [- ] Vomiting  Genitourinary: [ ] Nocturia [- ] Dysuria [ ] Incontinence  Extremities: [ ] Swelling [ -] Joint Pain  Neurologic: [ ] Focal deficit [ ] Paresthesias [- ] Syncope  Skin: [- ] Rash [ ] Ecchymoses [ ] Wounds [ ] Lesions  Psychiatry: [- ] Depression [ ] Suicidal/Homicidal Ideation [ ] Anxiety [ ] Sleep Disturbances  [x ] 10 point review of systems is otherwise negative except as mentioned above            [ ]Unable to obtain    PHYSICAL EXAM:  T(C): 36.9 (17 @ 11:20), Max: 36.9 (17 @ 11:20)  HR: 56 (17 @ 11:20) (52 - 60)  BP: 105/51 (17 @ 11:20) (105/51 - 146/78)  RR: 16 (17 @ 11:20) (16 - 20)  SpO2: 99% (17 @ 11:20) (97% - 100%)  Wt(kg): --  I&O's Summary    Appearance: No distress  HEENT:   mmm. prominent suprasternal pulsation  Cardiovascular: soft s1, loud s2? RRR, pronounced elevated JVP with large V waves  Respiratory: Lungs clear to auscultation	  Psychiatry: A & O x 3, Mood & affect appropriate  Gastrointestinal:  soft nt nd normoactive bs	  Skin: No rashes, No ecchymoses, No cyanosis	  Neurologic: grossly non-focal  Extremities: no edema  Vascular: Peripheral pulses palpable 2+ bilaterally    LABS:	 	  CBC Full  -  ( 06 Aug 2017 03:34 )  WBC Count : 9.7 K/uL  Hemoglobin : 12.8 g/dL  Hematocrit : 39.1 %  Platelet Count - Automated : 292 K/uL      141  |  107  |  10  ----------------------------<  130<H>  4.9   |  21<L>  |  0.95    Ca    8.3<L>      06 Aug 2017 03:34    TPro  6.6  /  Alb  3.9  /  TBili  0.5  /  DBili  x   /  AST  53<H>  /  ALT  46<H>  /  AlkPhos  58      proBNP: Serum Pro-Brain Natriuretic Peptide: 4597 pg/mL ( @ 03:34)    Lipid Profile:   HgA1c: Hemoglobin A1C, Whole Blood: 5.7 % ( @ 06:00)    CARDIAC MARKERS:  Troponin T, Serum: <0.01 ng/mL ( @ 10:03)  Troponin T, Serum: <0.01 ng/mL ( @ 03:34)    ECG:  	SR 64 with demand V pacing, RBBB, LPFB, ?blocked PAC. ya cute ischemic ST changes, sgarbossa negative  RADIOLOGY: < from: CT Chest No Cont (17 @ 06:26) >  IMPRESSION:  1.  Pulmonary edema  2.  Small bilateral pleural effusions.  3.  Small pericardial effusion.    OTHER: 	    PREVIOUS DIAGNOSTIC TESTING:    [x] Echocardiogram: < from: ROBERTA w/o TTE (w/3D Echo) (17 @ 07:36) >  1. Bileaflet mitral valve prolapse. Moderate-severe mitral  regurgitation.  Vena contracta width about  0.6-0.7 cm.  Effective regurgitant orifice area about  0.3 cm2 (by the  PISA method).  2. Trileaflet aortic valve with mildly thickened leaflets  and normal opening. Moderate-severe aortic regurgitation.  Vena contracta width about  0.5 cm.  3. Normal aortic root (about  3.6 cm at the sinuses of  Valsalva).  Mild non-mobile atherosclerotic plaque in the  aortic arch and descending thoracic aorta.  4. Dilated left atrium.  No left atrium or left atrial  appendage thrombus.  5. Concentric left ventricular hypertrophy.  6. Normal left ventricular systolic function. No segmental  wall motion abnormalities.  7. Normal right ventricular size and function.  A device  wire is noted in the right heart.  8. Contrast injection demonstrates no evidence of a patent  foramen ovale.    < from: Transthoracic Echocardiogram (17 @ 11:18) >  CONCLUSIONS:  1. Bileaflet mitral valve prolapse. Moderate-severe mitral  regurgitation.  2. Normal trileaflet aortic valve. Moderate-severe aortic  regurgitation.  3. Severely dilated left atrium.  LA volume index = 88  cc/m2.  4. Severe concentric left ventricular hypertrophy.  5. Normal left ventricular systolic function. No segmental  wall motion abnormalities.  6. Normal right ventricular size and systolic function.  A  device wire is noted in the right heart.    [x] Catheterization: < from: Cardiac Cath Lab - Adult (04.20.15 @ 15:05) >  CORONARY VESSELS: The coronary circulation is right dominant.  LM:   --  LM: Normal.  LAD:   --  LAD: Angiography showed minor luminal irregularities with no  flow limiting lesions.  --  Proximal LAD: There was a discrete 20 % stenosis.  CX:   --  Circumflex: Normal.  RCA:   --  Proximal RCA: Angiography showed minor luminal irregularities  with no flow limiting lesions. There was a tubular 20 % stenosis.  COMPLICATIONS: There were no complications.    [ ] Stress Test:

## 2017-08-06 NOTE — H&P ADULT - ATTENDING COMMENTS
47yoF w/ PMHx significant for Takayasu Arteritis (on weekly Methotrexate, chronic Prednisone 4mg/d), aortic insufficiency, mitral regurgitation, paroxysmal Afib (on Coumadin and Sotalol), VTach (s/p IACD), severe concentric LVH (no obstruction), last cardiac catheterization in 2015 w/ 20% stenosis of pLAD and pRCA, normal LVEF in Mar 2017, who presents from home w/ complaints of progressive shortness of breath for the past 8 days, exertional and at rest, w/ associated orthopnea, dry cough, abdominal distention, worsening abdominal pain (epigastric and RUQ pain) along with fullness and decreased appetite (but without weight loss). S/P diuresis in ED. Will continue to monitor patient on telemetry. Carefully consider necessity for further diuresis in the setting of known severe concentric LVH. Cardiology consulted, their recommendations have been appreciated. AC on hold for operative procedure. Pending repeat TTE. Cardiothoracic surgery consulted, awaiting their recommendations. Continue to monitor abdominal symptoms.

## 2017-08-06 NOTE — ED ADULT NURSE NOTE - OBJECTIVE STATEMENT
48 yo Croatian speaking female presents to ed complaining of abd pain/sob for eight days. as per pt "I can't breath, my head hurts, and so does my stomach". pt states she has a cardiac cath scheduled for 8/9/17 and she is on coumadin for an arrythmia. no increased wob. no nasal flaring. no costal retractions. pt appears to be in no respiratory distress. breath sounds clear and equal bilaterally. skin warm dry and intact. abd nontender and nondistended. pt denies chest pain/n/v/left arm pain/back pain.

## 2017-08-06 NOTE — H&P ADULT - PROBLEM SELECTOR PLAN 1
Long history of AR and MR with acute SOB, pulmonary edema, elevated BNP, splenomegaly, gallbladder wall thickening, transaminitis, abdominal fullness consistent with acute heart failure secondary to valvular disease.  -TTE  -Received 40IV lasix in ED; holding further diuresis for now because of underlying HCOM and requirement for preload maintenance  -Trend LFTs  -Trend Lactate Acute HF due to Aortic insufficiency & Mitral regurgitation  -Long history of AR and MR with acute SOB, pulmonary edema, elevated BNP, splenomegaly, gallbladder wall thickening, transaminitis, abdominal fullness consistent with acute heart failure secondary to valvular disease.  -TTE  -Received 40IV lasix in ED; holding further diuresis for now because of underlying HCM and requirement for preload maintenance  -Trend LFTs  -Trend Lactate

## 2017-08-06 NOTE — ED PROVIDER NOTE - PROGRESS NOTE DETAILS
George Holloway MD (resident): called Cardiology fellow, awaiting consult George Holloway MD (resident): called Cardiology fellow again, still awaiting consult. Spoke with Cardiology, will see patient as a consult.  - Anthony Benítez MD

## 2017-08-06 NOTE — ED PROVIDER NOTE - PHYSICAL EXAMINATION
Physical Exam: NAD, AAOx3, NCAT, MMM, neck is supple, PERRL, CTAB, normal rate and regular rhythm, abdomen is soft and NTND, No edema, No deformity of extremities, No rashes, CN grossly intact, No focal motor or sensory deficits. ~ George Holloway MD

## 2017-08-06 NOTE — H&P ADULT - NSHPLABSRESULTS_GEN_ALL_CORE
Troponins: <0.01 x2, CK: 81, ESR: 8,  WBC: 9.7, Hgb: 12.8, Hct: 39.1, MCV: 110  INR: 2.69  AST/ALT: 53/46  BNP: 4597  Lactate: 2.1  ESR: 8  EKG: V paced, complete heart block  Chest CT: Pulmonary edema, small b/l pulmonary effusions, pericardial effusion  US Gallbladder: Severe gallbladder wall thickening to 1.2cm, no cholelithiasis 12.8   9.7   )-----------( 292      ( 06 Aug 2017 03:34 )             39.1       08-06    141  |  107  |  10  ----------------------------<  130<H>  4.9   |  21<L>  |  0.95    Ca    8.3<L>      06 Aug 2017 03:34    TPro  6.6  /  Alb  3.9  /  TBili  0.5  /  DBili  x   /  AST  53<H>  /  ALT  46<H>  /  AlkPhos  58  08-06        PT/INR - ( 06 Aug 2017 03:34 )   PT: 29.7 sec;   INR: 2.69 ratio    PTT - ( 06 Aug 2017 03:34 )  PTT:42.9 sec    Lactate Trend    CARDIAC MARKERS ( 06 Aug 2017 10:03 )  x     / <0.01 ng/mL / 81 U/L / x     / 2.4 ng/mL  CARDIAC MARKERS ( 06 Aug 2017 03:34 )  x     / <0.01 ng/mL / 91 U/L / x     / 2.6 ng/mL  CAPILLARY BLOOD GLUCOSE    Troponins: <0.01 x2, CK: 81, ESR: 8,  WBC: 9.7, Hgb: 12.8, Hct: 39.1, MCV: 110  INR: 2.69  AST/ALT: 53/46  BNP: 4597  Lactate: 2.1  ESR: 8  EKG appreciated and c/w sinus rhythm w/ Right axis deviation w/ intermittent sinus pause/ variable v-pacing w/ RBBB morphology w/o PEDRO LUIS or twave inversion appreciated  Chest CT reviewed and agree w/ Pulmonary edema, small b/l pulmonary effusions, pericardial effusion  US Gallbladder reviewed and agree w/ severe gallbladder wall thickening to 1.2cm, no cholelithiasis Labs and diagnostic data personally reviewed. Pertinent findings include:     12.8   9.7   )-----------( 292      ( 06 Aug 2017 03:34 )             39.1   08-06    141  |  107  |  10  ----------------------------<  130<H>  4.9   |  21<L>  |  0.95    Ca    8.3<L>      06 Aug 2017 03:34    TPro  6.6  /  Alb  3.9  /  TBili  0.5  /  DBili  x   /  AST  53<H>  /  ALT  46<H>  /  AlkPhos  58  08-06        PT/INR - ( 06 Aug 2017 03:34 )   PT: 29.7 sec;   INR: 2.69 ratio    PTT - ( 06 Aug 2017 03:34 )  PTT:42.9 sec    CARDIAC MARKERS ( 06 Aug 2017 10:03 )  x     / <0.01 ng/mL / 81 U/L / x     / 2.4 ng/mL  CARDIAC MARKERS ( 06 Aug 2017 03:34 )  x     / <0.01 ng/mL / 91 U/L / x     / 2.6 ng/mL  CAPILLARY BLOOD GLUCOSE    Troponins: <0.01 x2, CK: 81, ESR: 8,  WBC: 9.7, Hgb: 12.8, Hct: 39.1, MCV: 110  INR: 2.69  AST/ALT: 53/46  BNP: 4597  Lactate: 2.1  ESR: 8    EKG appreciated and c/w sinus rhythm w/ Right axis deviation w/ intermittent sinus pause/ variable v-pacing w/ RBBB morphology w/o PEDRO LUIS or twave inversion appreciated    Chest CT reviewed and agree w/ Pulmonary edema, small b/l pulmonary effusions, pericardial effusion    US Gallbladder reviewed and agree w/ severe gallbladder wall thickening to 1.2cm, no cholelithiasis

## 2017-08-06 NOTE — ED PROCEDURE NOTE - PROCEDURE ADDITIONAL DETAILS
POCUS: trace pericardial effusion, b/l pleural effusion, plethoric ivc, b/l B lines, dilated left atrium, MR and Aortic regurgitation seen

## 2017-08-06 NOTE — H&P ADULT - PROBLEM SELECTOR PLAN 4
-AICD; sinus rhythm, irregular, r. axis deviation, sinus pauses, IM demand v pacing, RBBB, L. posterior fascicular block, no ischemic changes  -Consult cardiology

## 2017-08-06 NOTE — ED ADULT NURSE REASSESSMENT NOTE - NS ED NURSE REASSESS COMMENT FT1
pt resting comfortably in bed. safety maintained. family at bedside. pt given blankets. waiting for ultrasound. will continue to monitor.

## 2017-08-06 NOTE — ED PROVIDER NOTE - OBJECTIVE STATEMENT
SOB for 8 days. Abd pain and H/A. AFib on coumadin. Scheduled for cath on 8/9/17. Abd pain that is generalized with bloating. SOB for 8 days that has been intermittent, worse w/ exertion and walking. AFib on coumadin. Scheduled for cath on 8/9/17.    PMD: [place]  Card: Anay Hx of Takayasu arteritis, paroxysmal AFib on sotalol and coumadin, VT s/p AICD, CAD, MVP and MR. Abd pain that is generalized with bloating. SOB for 8 days that has been intermittent, worse w/ exertion and walking. AFib on coumadin. Scheduled for cath on 8/9/17.    PMD: [place]  Card: Anay

## 2017-08-06 NOTE — ED PROVIDER NOTE - ATTENDING CONTRIBUTION TO CARE
47 year old female taku-tsubo w aicd / atrial fibrillation now w week of chest pain and abdominal pain. abdominal exam significant for ruq ttp, otherwise exam benign. will admit for acs workup, and check us gallbladder, labs, ekg. 47 year old female Takayasu arteritis w aicd / atrial fibrillation now w week of chest pain and abdominal pain. abdominal exam significant for ruq ttp, otherwise exam benign. will admit for acs workup, and check us gallbladder, labs, ekg.

## 2017-08-06 NOTE — ED ADULT NURSE REASSESSMENT NOTE - NS ED NURSE REASSESS COMMENT FT1
pt resting comfortably in bed. safety maintained. family at bedside. pt complaining of abd pain. md patel aware. pt denies sob/chest pain/n/v/diaphoresis/numbness/tingling at this time. waiting for result of ct and ultrasound for dispo. will continue to monitor.

## 2017-08-06 NOTE — ED ADULT NURSE NOTE - CHPI ED SYMPTOMS NEG
no syncope/no fever/no chest pain/no back pain/no diaphoresis/no nausea/no chills/no cough/no vomiting/no dizziness

## 2017-08-06 NOTE — H&P ADULT - PROBLEM SELECTOR PLAN 5
-RUQ abd pain, negative murphys sign, RUQ US shows gallbladder thickening without gallstones. Bilirubin WNL, alk phos WNL  -Likely edema secondary to cardiac congestion  -Continue to monitor as cardiac congestion improves

## 2017-08-06 NOTE — ED PROVIDER NOTE - NS ED ROS FT
No fever, no chills, no change in vision, no change in hearing, no chest pain, + shortness of breath, + abdominal pain, no vomiting, no dysuria, no muscle pain, no rashes, no loss of consciousness. ~ George Holloway MD

## 2017-08-06 NOTE — H&P ADULT - NSHPSOCIALHISTORY_GEN_ALL_CORE
Lives in [place] with daughter. Son and  live in [place] currently.   No current or past history of cigarette smoking. No history of alcohol use. No illicit drug use. Lives in [place] with daughter. Son and  live in [place] currently.   No current or past history of cigarette smoking. No history of alcohol use. No illicit drug use.

## 2017-08-06 NOTE — CONSULT NOTE ADULT - ASSESSMENT
47F hx of Takayasu arteritis, pAF on coumadin, HCM, VT s/p AICD, known severe MVP/MVR/AI, with evidence of elevated left and right sided pressures in the setting of known valvular disease. Patient currently comfortable and in no distress.  -repeat TTE  -diurese cautiously given severe concentic LVH (however, no evidence of obstruction)  -recommend CTS eval given pt was scheduled for double valve replacement  -pt planned to have LHC prior to CT surgery. will arrange when INR is lower    pAF  -currently in SR. cont sotalol, verapamil, metoprolol  -hold coumadin. start hep gtt when INR <2 as pt is planned for procedures 47F hx of Takayasu arteritis, pAF on coumadin, HCM, VT s/p AICD, known severe MVP/MVR/AI, with evidence of elevated left and right sided pressures in the setting of known valvular disease. Patient currently comfortable and in no distress.  repeat TTE  diurese cautiously given severe concentic LVH (however, no evidence of obstruction)  CTS plan for double valve replacement  pAF, currently in SR. cont sotalol, verapamil, metoprolol  hold coumadin. start hep gtt when INR <2 as pt is planned for procedures

## 2017-08-06 NOTE — H&P ADULT - PROBLEM SELECTOR PLAN 3
-Discussed with rheumatology  -Low suspicion for arteritis flare because ESR 8. Recent CTA 5/2017 showed stable disease. Plan to monitor throughout the day for improvement of shortness of breath. If diuresis does not improve SOB consider CTA chest/abd/pelvis to check for acute worsening of vessel disease.  -Chronic prednisone treatment at 4mg daily. Continue with 4mg daily in hospital  -Concern for adrenal insufficiency if surgical intervention is required; may need to provide stress dosing intraoperatively

## 2017-08-07 DIAGNOSIS — I35.1 NONRHEUMATIC AORTIC (VALVE) INSUFFICIENCY: ICD-10-CM

## 2017-08-07 LAB
ANION GAP SERPL CALC-SCNC: 11 MMOL/L — SIGNIFICANT CHANGE UP (ref 5–17)
APTT BLD: 106.6 SEC — HIGH (ref 27.5–37.4)
APTT BLD: 34.6 SEC — SIGNIFICANT CHANGE UP (ref 27.5–37.4)
BLD GP AB SCN SERPL QL: NEGATIVE — SIGNIFICANT CHANGE UP
BUN SERPL-MCNC: 13 MG/DL — SIGNIFICANT CHANGE UP (ref 7–23)
CALCIUM SERPL-MCNC: 8.6 MG/DL — SIGNIFICANT CHANGE UP (ref 8.4–10.5)
CHLORIDE SERPL-SCNC: 105 MMOL/L — SIGNIFICANT CHANGE UP (ref 96–108)
CO2 SERPL-SCNC: 25 MMOL/L — SIGNIFICANT CHANGE UP (ref 22–31)
CREAT SERPL-MCNC: 0.94 MG/DL — SIGNIFICANT CHANGE UP (ref 0.5–1.3)
GLUCOSE SERPL-MCNC: 88 MG/DL — SIGNIFICANT CHANGE UP (ref 70–99)
HCT VFR BLD CALC: 39.5 % — SIGNIFICANT CHANGE UP (ref 34.5–45)
HGB BLD-MCNC: 13.2 G/DL — SIGNIFICANT CHANGE UP (ref 11.5–15.5)
INR BLD: 1.84 RATIO — HIGH (ref 0.88–1.16)
MCHC RBC-ENTMCNC: 33.5 GM/DL — SIGNIFICANT CHANGE UP (ref 32–36)
MCHC RBC-ENTMCNC: 36.2 PG — HIGH (ref 27–34)
MCV RBC AUTO: 108 FL — HIGH (ref 80–100)
PLATELET # BLD AUTO: 263 K/UL — SIGNIFICANT CHANGE UP (ref 150–400)
POTASSIUM SERPL-MCNC: 3.6 MMOL/L — SIGNIFICANT CHANGE UP (ref 3.5–5.3)
POTASSIUM SERPL-SCNC: 3.6 MMOL/L — SIGNIFICANT CHANGE UP (ref 3.5–5.3)
PROTHROM AB SERPL-ACNC: 20.3 SEC — HIGH (ref 9.8–12.7)
RBC # BLD: 3.66 M/UL — LOW (ref 3.8–5.2)
RBC # FLD: 13.7 % — SIGNIFICANT CHANGE UP (ref 10.3–14.5)
RH IG SCN BLD-IMP: POSITIVE — SIGNIFICANT CHANGE UP
SODIUM SERPL-SCNC: 141 MMOL/L — SIGNIFICANT CHANGE UP (ref 135–145)
WBC # BLD: 5.5 K/UL — SIGNIFICANT CHANGE UP (ref 3.8–10.5)
WBC # FLD AUTO: 5.5 K/UL — SIGNIFICANT CHANGE UP (ref 3.8–10.5)

## 2017-08-07 PROCEDURE — 99223 1ST HOSP IP/OBS HIGH 75: CPT | Mod: GC

## 2017-08-07 PROCEDURE — 93306 TTE W/DOPPLER COMPLETE: CPT | Mod: 26

## 2017-08-07 RX ORDER — HEPARIN SODIUM 5000 [USP'U]/ML
900 INJECTION INTRAVENOUS; SUBCUTANEOUS
Qty: 25000 | Refills: 0 | Status: DISCONTINUED | OUTPATIENT
Start: 2017-08-07 | End: 2017-08-07

## 2017-08-07 RX ORDER — PHYTONADIONE (VIT K1) 5 MG
2.5 TABLET ORAL ONCE
Qty: 0 | Refills: 0 | Status: COMPLETED | OUTPATIENT
Start: 2017-08-07 | End: 2017-08-07

## 2017-08-07 RX ORDER — HEPARIN SODIUM 5000 [USP'U]/ML
700 INJECTION INTRAVENOUS; SUBCUTANEOUS
Qty: 25000 | Refills: 0 | Status: DISCONTINUED | OUTPATIENT
Start: 2017-08-07 | End: 2017-08-08

## 2017-08-07 RX ADMIN — Medication 1 MILLIGRAM(S): at 12:01

## 2017-08-07 RX ADMIN — Medication 2.5 MILLIGRAM(S): at 10:37

## 2017-08-07 RX ADMIN — Medication 40 MILLIGRAM(S): at 16:08

## 2017-08-07 RX ADMIN — Medication 40 MILLIGRAM(S): at 05:43

## 2017-08-07 RX ADMIN — Medication 120 MILLIGRAM(S): at 17:40

## 2017-08-07 RX ADMIN — LOSARTAN POTASSIUM 25 MILLIGRAM(S): 100 TABLET, FILM COATED ORAL at 05:42

## 2017-08-07 RX ADMIN — Medication 40 MILLIGRAM(S): at 05:42

## 2017-08-07 RX ADMIN — HEPARIN SODIUM 9 UNIT(S)/HR: 5000 INJECTION INTRAVENOUS; SUBCUTANEOUS at 12:03

## 2017-08-07 RX ADMIN — Medication 50 MILLIGRAM(S): at 05:43

## 2017-08-07 RX ADMIN — Medication 4 MILLIGRAM(S): at 05:42

## 2017-08-07 RX ADMIN — PANTOPRAZOLE SODIUM 40 MILLIGRAM(S): 20 TABLET, DELAYED RELEASE ORAL at 05:43

## 2017-08-07 RX ADMIN — Medication 325 MILLIGRAM(S): at 12:01

## 2017-08-07 RX ADMIN — HEPARIN SODIUM 7 UNIT(S)/HR: 5000 INJECTION INTRAVENOUS; SUBCUTANEOUS at 20:40

## 2017-08-07 RX ADMIN — Medication 120 MILLIGRAM(S): at 05:42

## 2017-08-07 RX ADMIN — Medication 50 MILLIGRAM(S): at 17:41

## 2017-08-07 RX ADMIN — Medication 40 MILLIGRAM(S): at 22:40

## 2017-08-07 RX ADMIN — ATORVASTATIN CALCIUM 20 MILLIGRAM(S): 80 TABLET, FILM COATED ORAL at 22:40

## 2017-08-07 NOTE — PROGRESS NOTE ADULT - PROBLEM SELECTOR PLAN 1
NPO after midnight for coronary cath  Coumadin held. Continue with heparin gtt  Vit K 2.5mg given  c/w diuresis, bblocker, ppi  Anticipate MVR/AVR this Wednesday NPO after midnight for coronary cath  Coumadin held. Continue with heparin gtt  Vit K 2.5mg given  c/w diuresis, bblocker, ppi, statin  Anticipate MVR/AVR this Wednesday

## 2017-08-07 NOTE — PROGRESS NOTE ADULT - ASSESSMENT
47F h/o of Takayasu arteritis, pAF on coumadin, HCM, VT s/p AICD, known severe MVP/MVR/AI admited for SOB

## 2017-08-07 NOTE — PROGRESS NOTE ADULT - SUBJECTIVE AND OBJECTIVE BOX
VITAL SIGNS    Telemetry:  VPaced 50-70  Vital Signs Last 24 Hrs  T(C): 36.6      HR: 57     BP: 95/43      RR: 18        SpO2: 100%               IN: 645 mL / OUT: 0 mL / NET: 645 mL    Daily Weight in k.5   Admit Wt: 51.4kG    PHYSICAL EXAM    Subjective: no complaints offered  # [id number]   Neurology: alert and oriented x 3, nonfocal, no gross deficits  CV : S1S2  Lungs: CTA b/l  Abdomen: soft, NT,ND, (+ )BM  :  voiding  Extremities: -c/c/e      LABS      141  |  105  |  13  ----------------------------<  88  3.6   |  25  |  0.94    Ca    8.6      07 Aug 2017 05:40    TPro  6.6  /  Alb  3.9  /  TBili  0.5  /  DBili  x   /  AST  53<H>  /  ALT  46<H>  /  AlkPhos  58                                   13.2   5.5   )-----------( 263      ( 07 Aug 2017 05:41 )             39.5          PT/INR - ( 07 Aug 2017 05:41 )   PT: 20.3 sec;   INR: 1.84 ratio         PTT - ( 07 Aug 2017 05:41 )  PTT:34.6 sec VITAL SIGNS    Telemetry:  VPaced 50-70  Vital Signs Last 24 Hrs  T(C): 36.6      HR: 57     BP: 95/43      RR: 18        SpO2: 100%               IN: 645 mL / OUT: 0 mL / NET: 645 mL    Daily Weight in k.5   Admit Wt: 51.4kG    PHYSICAL EXAM    Subjective: no complaints offered  # [id number]   Neurology: alert and oriented x 3, nonfocal, no gross deficits  CV : S1S2, prominent JVP  Lungs: CTA b/l  Abdomen: soft, NT,ND, (+ )BM  :  voiding  Extremities: -c/c/e      LABS      141  |  105  |  13  ----------------------------<  88  3.6   |  25  |  0.94    Ca    8.6      07 Aug 2017 05:40    TPro  6.6  /  Alb  3.9  /  TBili  0.5  /  DBili  x   /  AST  53<H>  /  ALT  46<H>  /  AlkPhos  58                                   13.2   5.5   )-----------( 263      ( 07 Aug 2017 05:41 )             39.5          PT/INR - ( 07 Aug 2017 05:41 )   PT: 20.3 sec;   INR: 1.84 ratio         PTT - ( 07 Aug 2017 05:41 )  PTT:34.6 sec

## 2017-08-08 DIAGNOSIS — N39.0 URINARY TRACT INFECTION, SITE NOT SPECIFIED: ICD-10-CM

## 2017-08-08 LAB
ANION GAP SERPL CALC-SCNC: 14 MMOL/L — SIGNIFICANT CHANGE UP (ref 5–17)
APTT BLD: 114.9 SEC — HIGH (ref 27.5–37.4)
APTT BLD: 61.3 SEC — HIGH (ref 27.5–37.4)
APTT BLD: 78.3 SEC — HIGH (ref 27.5–37.4)
BUN SERPL-MCNC: 17 MG/DL — SIGNIFICANT CHANGE UP (ref 7–23)
CALCIUM SERPL-MCNC: 8.3 MG/DL — LOW (ref 8.4–10.5)
CHLORIDE SERPL-SCNC: 106 MMOL/L — SIGNIFICANT CHANGE UP (ref 96–108)
CO2 SERPL-SCNC: 22 MMOL/L — SIGNIFICANT CHANGE UP (ref 22–31)
CREAT SERPL-MCNC: 0.79 MG/DL — SIGNIFICANT CHANGE UP (ref 0.5–1.3)
GLUCOSE SERPL-MCNC: 90 MG/DL — SIGNIFICANT CHANGE UP (ref 70–99)
HCT VFR BLD CALC: 38 % — SIGNIFICANT CHANGE UP (ref 34.5–45)
HGB BLD-MCNC: 12.7 G/DL — SIGNIFICANT CHANGE UP (ref 11.5–15.5)
MCHC RBC-ENTMCNC: 33.5 GM/DL — SIGNIFICANT CHANGE UP (ref 32–36)
MCHC RBC-ENTMCNC: 36.4 PG — HIGH (ref 27–34)
MCV RBC AUTO: 109 FL — HIGH (ref 80–100)
PLATELET # BLD AUTO: 261 K/UL — SIGNIFICANT CHANGE UP (ref 150–400)
POTASSIUM SERPL-MCNC: 3.7 MMOL/L — SIGNIFICANT CHANGE UP (ref 3.5–5.3)
POTASSIUM SERPL-SCNC: 3.7 MMOL/L — SIGNIFICANT CHANGE UP (ref 3.5–5.3)
RBC # BLD: 3.5 M/UL — LOW (ref 3.8–5.2)
RBC # FLD: 13.7 % — SIGNIFICANT CHANGE UP (ref 10.3–14.5)
SODIUM SERPL-SCNC: 142 MMOL/L — SIGNIFICANT CHANGE UP (ref 135–145)
WBC # BLD: 5.6 K/UL — SIGNIFICANT CHANGE UP (ref 3.8–10.5)
WBC # FLD AUTO: 5.6 K/UL — SIGNIFICANT CHANGE UP (ref 3.8–10.5)

## 2017-08-08 PROCEDURE — 99232 SBSQ HOSP IP/OBS MODERATE 35: CPT | Mod: GC

## 2017-08-08 RX ORDER — CEFEPIME 1 G/1
1000 INJECTION, POWDER, FOR SOLUTION INTRAMUSCULAR; INTRAVENOUS ONCE
Qty: 0 | Refills: 0 | Status: COMPLETED | OUTPATIENT
Start: 2017-08-08 | End: 2017-08-08

## 2017-08-08 RX ORDER — CIPROFLOXACIN LACTATE 400MG/40ML
500 VIAL (ML) INTRAVENOUS EVERY 12 HOURS
Qty: 0 | Refills: 0 | Status: DISCONTINUED | OUTPATIENT
Start: 2017-08-08 | End: 2017-08-08

## 2017-08-08 RX ORDER — CEFEPIME 1 G/1
1000 INJECTION, POWDER, FOR SOLUTION INTRAMUSCULAR; INTRAVENOUS EVERY 12 HOURS
Qty: 0 | Refills: 0 | Status: DISCONTINUED | OUTPATIENT
Start: 2017-08-09 | End: 2017-08-11

## 2017-08-08 RX ORDER — HEPARIN SODIUM 5000 [USP'U]/ML
600 INJECTION INTRAVENOUS; SUBCUTANEOUS
Qty: 25000 | Refills: 0 | Status: DISCONTINUED | OUTPATIENT
Start: 2017-08-08 | End: 2017-08-11

## 2017-08-08 RX ORDER — CEFEPIME 1 G/1
INJECTION, POWDER, FOR SOLUTION INTRAMUSCULAR; INTRAVENOUS
Qty: 0 | Refills: 0 | Status: DISCONTINUED | OUTPATIENT
Start: 2017-08-08 | End: 2017-08-11

## 2017-08-08 RX ADMIN — Medication 1 MILLIGRAM(S): at 12:59

## 2017-08-08 RX ADMIN — Medication 120 MILLIGRAM(S): at 17:38

## 2017-08-08 RX ADMIN — Medication 40 MILLIGRAM(S): at 06:02

## 2017-08-08 RX ADMIN — HEPARIN SODIUM 6 UNIT(S)/HR: 5000 INJECTION INTRAVENOUS; SUBCUTANEOUS at 14:47

## 2017-08-08 RX ADMIN — LOSARTAN POTASSIUM 25 MILLIGRAM(S): 100 TABLET, FILM COATED ORAL at 06:01

## 2017-08-08 RX ADMIN — ATORVASTATIN CALCIUM 20 MILLIGRAM(S): 80 TABLET, FILM COATED ORAL at 22:57

## 2017-08-08 RX ADMIN — HEPARIN SODIUM 6 UNIT(S)/HR: 5000 INJECTION INTRAVENOUS; SUBCUTANEOUS at 06:03

## 2017-08-08 RX ADMIN — HEPARIN SODIUM 6 UNIT(S)/HR: 5000 INJECTION INTRAVENOUS; SUBCUTANEOUS at 22:57

## 2017-08-08 RX ADMIN — Medication 325 MILLIGRAM(S): at 13:00

## 2017-08-08 RX ADMIN — Medication 40 MILLIGRAM(S): at 23:26

## 2017-08-08 RX ADMIN — Medication 120 MILLIGRAM(S): at 06:01

## 2017-08-08 RX ADMIN — CEFEPIME 100 MILLIGRAM(S): 1 INJECTION, POWDER, FOR SOLUTION INTRAMUSCULAR; INTRAVENOUS at 14:08

## 2017-08-08 RX ADMIN — PANTOPRAZOLE SODIUM 40 MILLIGRAM(S): 20 TABLET, DELAYED RELEASE ORAL at 06:02

## 2017-08-08 RX ADMIN — Medication 4 MILLIGRAM(S): at 06:02

## 2017-08-08 NOTE — PROGRESS NOTE ADULT - PROBLEM SELECTOR PLAN 1
Light breakfast for Cardiac cath in am Wednesday 8/9 D/W booking office  Coumadin held. Continue with heparin gtt  Vit K 2.5mg given  c/w diuresis, bblocker, ppi, statin  Anticipate MVR/AVR this Friday

## 2017-08-08 NOTE — CONSULT NOTE ADULT - ASSESSMENT
pt preop for valves replacements  no symptoms of UTI but positive urine culture  would treat for 3 days with cefepime 1 q 12 pending culture. discussed with NP.

## 2017-08-08 NOTE — PROGRESS NOTE ADULT - ASSESSMENT
47F h/o of Takayasu arteritis, pAF on coumadin, HCM, VT s/p AICD, known severe MVP/MVR/AI admited for SOB.  8/7 Vit k 2.5  given   heparin gtt    8/8 INR 1.8 this am on heparin gtt  Cardiac cath scheduled for  Wednesday 8/9

## 2017-08-08 NOTE — PROGRESS NOTE ADULT - SUBJECTIVE AND OBJECTIVE BOX
Interval Events: No overnight complaints. No CP, SOB, or palpitations.   Translation: Kazakh translation services utilized: [id number]    ROS: Denies HA/dizziness/CP/SOB/PND/orthopnea/LE edema/abd pain    MEDICATIONS:  sotalol 120 milliGRAM(s) Oral two times a day  losartan 25 milliGRAM(s) Oral daily  verapamil 40 milliGRAM(s) Oral three times a day  metoprolol 50 milliGRAM(s) Oral two times a day  furosemide    Tablet 40 milliGRAM(s) Oral daily  heparin  Infusion 600 Unit(s)/Hr IV Continuous <Continuous>        ondansetron Injectable 4 milliGRAM(s) IV Push every 6 hours PRN    pantoprazole    Tablet 40 milliGRAM(s) Oral before breakfast    predniSONE   Tablet 4 milliGRAM(s) Oral daily  atorvastatin 20 milliGRAM(s) Oral at bedtime    ferrous    sulfate 325 milliGRAM(s) Oral daily  folic acid 1 milliGRAM(s) Oral daily      PHYSICAL EXAM:  T(C): 36.6 (08-08-17 @ 05:45), Max: 37.1 (08-07-17 @ 20:23)  HR: 51 (08-08-17 @ 05:45) (51 - 61)  BP: 124/58 (08-08-17 @ 05:45) (95/43 - 124/58)  RR: 17 (08-08-17 @ 05:45) (17 - 18)  SpO2: 100% (08-08-17 @ 05:45) (99% - 100%)  Wt(kg): --  I&O's Summary    07 Aug 2017 07:01  -  08 Aug 2017 07:00  --------------------------------------------------------  IN: 1127 mL / OUT: 800 mL / NET: 327 mL        Appearance: No Acute Distress  Cardiovascular: Normal S1 S2, RRR, No murmurs/rubs/gallops. Prominent JVP present  Respiratory: Clear to auscultation bilaterally  Gastrointestinal:  Soft, Non-tender	  Skin: No cyanosis	  Neurologic: Non-focal  Extremities: No clubbing, cyanosis or edema  Psychiatry: A & O x 3, Mood & affect appropriate    LABS:	 	    CBC Full  -  ( 08 Aug 2017 03:43 )  WBC Count : 5.6 K/uL  Hemoglobin : 12.7 g/dL  Hematocrit : 38.0 %  Platelet Count - Automated : 261 K/uL  Mean Cell Volume : 109.0 fl  Mean Cell Hemoglobin : 36.4 pg  Mean Cell Hemoglobin Concentration : 33.5 gm/dL  Auto Neutrophil # : x  Auto Lymphocyte # : x  Auto Monocyte # : x  Auto Eosinophil # : x  Auto Basophil # : x  Auto Neutrophil % : x  Auto Lymphocyte % : x  Auto Monocyte % : x  Auto Eosinophil % : x  Auto Basophil % : x    08-08    142  |  106  |  17  ----------------------------<  90  3.7   |  22  |  0.79  08-07    141  |  105  |  13  ----------------------------<  88  3.6   |  25  |  0.94    Ca    8.3<L>      08 Aug 2017 05:08  Ca    8.6      07 Aug 2017 05:40        proBNP:   Lipid Profile:   HgA1c:     CARDIAC MARKERS:            TELEMETRY:   ECG:      PREVIOUS DIAGNOSTIC TESTING:    [X ] Echocardiogram:  Conclusions:  1. The interventricular septum is severly hypertrophied  (1.6cm).  2. Normal left ventricular systolic function. No segmental  wall motion abnormalities.  3. Moderate diastolic dysfunction (Stage II).  4. Normal right ventricular size and function. A device  wire is noted in the right heart.  5. Estimated pulmonary artery systolic pressure equals 42  mm Hg, assuming right atrial pressure equals 8 mm Hg,  consistent with mild pulmonary pressures.  6. Normal pericardium with trace pericardial effusion.  *** Compared with echocardiogram of 2/3/2016, no  significant changes noted.  [ ] Catheterization: Interval Events: No overnight complaints. No CP, SOB, or palpitations.   Translation: Frisian translation services utilized: [id number]    ROS: Denies HA/dizziness/CP/SOB/PND/orthopnea/LE edema/abd pain    MEDICATIONS:  sotalol 120 milliGRAM(s) Oral two times a day  losartan 25 milliGRAM(s) Oral daily  verapamil 40 milliGRAM(s) Oral three times a day  metoprolol 50 milliGRAM(s) Oral two times a day  furosemide    Tablet 40 milliGRAM(s) Oral daily  heparin  Infusion 600 Unit(s)/Hr IV Continuous <Continuous>    ondansetron Injectable 4 milliGRAM(s) IV Push every 6 hours PRN    pantoprazole    Tablet 40 milliGRAM(s) Oral before breakfast    predniSONE   Tablet 4 milliGRAM(s) Oral daily  atorvastatin 20 milliGRAM(s) Oral at bedtime    ferrous    sulfate 325 milliGRAM(s) Oral daily  folic acid 1 milliGRAM(s) Oral daily      PHYSICAL EXAM:  T(C): 36.6 (08-08-17 @ 05:45), Max: 37.1 (08-07-17 @ 20:23)  HR: 51 (08-08-17 @ 05:45) (51 - 61)  BP: 124/58 (08-08-17 @ 05:45) (95/43 - 124/58)  RR: 17 (08-08-17 @ 05:45) (17 - 18)  SpO2: 100% (08-08-17 @ 05:45) (99% - 100%)  Wt(kg): --  I&O's Summary    07 Aug 2017 07:01  -  08 Aug 2017 07:00  --------------------------------------------------------  IN: 1127 mL / OUT: 800 mL / NET: 327 mL        Appearance: No Acute Distress  Cardiovascular: Normal S1 S2, RRR, No murmurs/rubs/gallops. Prominent JVP present  Respiratory: Clear to auscultation bilaterally  Gastrointestinal:  Soft, Non-tender	  Skin: No cyanosis	  Neurologic: Non-focal  Extremities: No clubbing, cyanosis or edema  Psychiatry: A & O x 3, Mood & affect appropriate    LABS:	 	    CBC Full  -  ( 08 Aug 2017 03:43 )  WBC Count : 5.6 K/uL  Hemoglobin : 12.7 g/dL  Hematocrit : 38.0 %  Platelet Count - Automated : 261 K/uL  Mean Cell Volume : 109.0 fl  Mean Cell Hemoglobin : 36.4 pg  Mean Cell Hemoglobin Concentration : 33.5 gm/dL  Auto Neutrophil # : x  Auto Lymphocyte # : x  Auto Monocyte # : x  Auto Eosinophil # : x  Auto Basophil # : x  Auto Neutrophil % : x  Auto Lymphocyte % : x  Auto Monocyte % : x  Auto Eosinophil % : x  Auto Basophil % : x    08-08    142  |  106  |  17  ----------------------------<  90  3.7   |  22  |  0.79  08-07    141  |  105  |  13  ----------------------------<  88  3.6   |  25  |  0.94    Ca    8.3<L>      08 Aug 2017 05:08  Ca    8.6      07 Aug 2017 05:40            TELEMETRY:   ECG:      PREVIOUS DIAGNOSTIC TESTING:    [X ] Echocardiogram:  Conclusions:  1. The interventricular septum is severly hypertrophied  (1.6cm).  2. Normal left ventricular systolic function. No segmental  wall motion abnormalities.  3. Moderate diastolic dysfunction (Stage II).  4. Normal right ventricular size and function. A device  wire is noted in the right heart.  5. Estimated pulmonary artery systolic pressure equals 42  mm Hg, assuming right atrial pressure equals 8 mm Hg,  consistent with mild pulmonary pressures.  6. Normal pericardium with trace pericardial effusion.  *** Compared with echocardiogram of 2/3/2016, no  significant changes noted.  [ ] Catheterization:

## 2017-08-08 NOTE — PROGRESS NOTE ADULT - ASSESSMENT
47F hx of Takayasu arteritis, pAF on coumadin, HCM, VT s/p AICD, known severe MVP/MVR/AI, with evidence of elevated left and right sided pressures in the setting of known valvular disease. Patient currently comfortable and in no distress.     Problem: Bileaflet MVP with moderate MVR, Moderate aortic regurgitation  Plan: CTS plan for double valve replacement (bioprosthetic) this Friday    Problem: pAF (currently in SR)  Plan: c/w sotalol, verapamil, metoprolol. hold coumadin. start hep gtt when INR <2 as pt is planned for procedures    Problem: severe concentric LVH (however, no evidence of obstruction)  Plan: diurese cautiously

## 2017-08-08 NOTE — PROGRESS NOTE ADULT - SUBJECTIVE AND OBJECTIVE BOX
Subjective Hello Welsh speaking no acute distress    VITAL SIGNS    Telemetry:  SB/SR 45-60    Vital Signs Last 24 Hrs  T(C): 36.6 (17 @ 12:12), Max: 37.1 (17 @ 20:23)  HR: 53 (17 @ 12:12) (51 - 61)  BP: 133/71 (17 @ 12:12) (104/68 - 133/71)  RR: 17 (17 @ 12:12) (17 - 18)  SpO2: 99% (17 @ 12:12) (99% - 100%)            @ 07:01  -   @ 07:00  --------------------------------------------------------  IN: 1127 mL / OUT: 800 mL / NET: 327 mL     @ 07:  -   @ 12:24  --------------------------------------------------------  IN: 120 mL / OUT: 0 mL / NET: 120 mL    Daily Weight in k.5 (08 Aug 2017 08:14)    PHYSICAL EXAM        Neurology: alert and oriented x 3, nonfocal, no gross deficits  CV :RRR S1S2 2/6 murmur  Lungs:CTA  Abdomen: soft, nontender, nondistended, positive bowel sounds, last bowel movement   : voiding            Extremities:  +DP warm no edema no calf tenderness       Discussed with Cardiothoracic Team at AM rounds.

## 2017-08-08 NOTE — CONSULT NOTE ADULT - SUBJECTIVE AND OBJECTIVE BOX
Patient is a 47y old  Female who presents with a chief complaint of SOB (06 Aug 2017 11:09)    HPI:  Mrs. Karimi is a 47 year old woman with a history of takayasu arteritis (on weekly methotrexate, chronic prednisone 4mg/d), aortic insufficiency, mitral regurgitation, paroxysmal afib (on coumadin and sotalol), VTach (s/p IACD), severe concentric LVH (no obstruction), last cardiac catheterization in  w/ 20% stenosis of pLAD and pRCA, normal LVEF in Mar 2017, who presents from home w/ complaints of progressive shortness of breath for the past 8 days that now limits her functional capacity, both on exertion and at rest, w/ associated orthopnea, dry cough, abdominal distention and discomfort. She denies chest pain and lower extremity swelling. She also reports 8 days of worsening abdominal pain (epigastric and RUQ pain, qualified as indigestion, not associated with food, no clear exacerbating or relieving factors) along with fullness and decreased appetite (but without weight loss). She reports normal bowel movements with no diarrhea or constipation.    Of note, pt had been scheduled for elective bioprosthetic AVR and MVR w/ pre-procedure cardiac catheterization this week, prior to symptom onset and subsequent progression. (06 Aug 2017 11:09)  no fever no dysuria or frequency     PAST MEDICAL & SURGICAL HISTORY:  Hypertrophic cardiomyopathy  Takayasu's arteritis  AICD (automatic cardioverter/defibrillator) present  Hypertension  Non-sustained ventricular tachycardia  Atrial fibrillation, chronic  H/O  section: x2  H/O cardiac pacemaker  History of appendectomy      Social history:    FAMILY HISTORY:  Family history of hypertension (Child)    REVIEW OF SYSTEMS  General:	Denies any malaise fatigue or chills. Fevers absent        Neurological:No confusion,diziness.No extremity weakness.No bladder or bowel incontinence	    Psychiatric:No delusions or hallucinations	    Hematology/Lymphatics:	No LN swelling.No gum bleeding     Endocrine:	No recent weight gain or loss.No abnormal heat/cold intolerance    Allergic/Immunologic:	No hives or rash   Allergies    No Known Allergies    Intolerances        Antimicrobials:          Vital Signs Last 24 Hrs  T(C): 36.6 (08 Aug 2017 12:12), Max: 37.1 (07 Aug 2017 20:23)  T(F): 97.8 (08 Aug 2017 12:12), Max: 98.7 (07 Aug 2017 20:23)  HR: 53 (08 Aug 2017 12:12) (51 - 61)  BP: 133/71 (08 Aug 2017 12:12) (104/68 - 133/71)  BP(mean): --  RR: 17 (08 Aug 2017 12:12) (17 - 18)  SpO2: 99% (08 Aug 2017 12:12) (99% - 100%)    PHYSICAL EXAM:Pleasant patient in no acute distress.      Constitutional:Comfortable.Awake and alert  No cachexia     Eyes:PERRL EOMI.NO discharge or conjunctival injection    ENMT:No sinus tenderness.No thrush.No pharyngeal exudate or erythema.Fair dental hygiene    Neck:Supple,No LN,no JVD      Gastrointestinal:Soft BS(+) no tenderness no masses ,No rebound or guarding    Genitourinary:No CVA tendereness     Rectal:    Extremities:No cyanosis,clubbing or edema.    Vascular:peripheral pulses felt    Neurological:AAO X 3,No grossly focal deficits    Skin:No rash     Lymph Nodes:No palpable LNs    Musculoskeletal:No joint swelling or LOM    Psychiatric:Affect normal.                                12.7   5.6   )-----------( 261      ( 08 Aug 2017 03:43 )             38.0         08-08    142  |  106  |  17  ----------------------------<  90  3.7   |  22  |  0.79    Ca    8.3<L>      08 Aug 2017 05:08        RECENT CULTURES:      MICROBIOLOGY:          Radiology:      Assessment:        Recommendations and Plan:    Pager [phone]  After 5 pm/weekends or if no response :[phone]

## 2017-08-08 NOTE — PROGRESS NOTE ADULT - ATTENDING COMMENTS
Mitral valve bileaflet prolapse, plan for bioprosthetic mitral valve replacement. Off warfarin and on Heparin with paroxysmal atrial fibrillation. ICD for known ventricular tachycardia and has paroxysmal atrial fibrillation. Normal coronaries demonstrated 2015, but obtaining repeat coronary angiogram prior to surgery.

## 2017-08-09 LAB
-  AMIKACIN: SIGNIFICANT CHANGE UP
-  AMPICILLIN/SULBACTAM: SIGNIFICANT CHANGE UP
-  AMPICILLIN: SIGNIFICANT CHANGE UP
-  AZTREONAM: SIGNIFICANT CHANGE UP
-  CEFAZOLIN: SIGNIFICANT CHANGE UP
-  CEFEPIME: SIGNIFICANT CHANGE UP
-  CEFOXITIN: SIGNIFICANT CHANGE UP
-  CEFTAZIDIME: SIGNIFICANT CHANGE UP
-  CEFTRIAXONE: SIGNIFICANT CHANGE UP
-  CIPROFLOXACIN: SIGNIFICANT CHANGE UP
-  ERTAPENEM: SIGNIFICANT CHANGE UP
-  GENTAMICIN: SIGNIFICANT CHANGE UP
-  IMIPENEM: SIGNIFICANT CHANGE UP
-  LEVOFLOXACIN: SIGNIFICANT CHANGE UP
-  MEROPENEM: SIGNIFICANT CHANGE UP
-  NITROFURANTOIN: SIGNIFICANT CHANGE UP
-  PIPERACILLIN/TAZOBACTAM: SIGNIFICANT CHANGE UP
-  TOBRAMYCIN: SIGNIFICANT CHANGE UP
-  TRIMETHOPRIM/SULFAMETHOXAZOLE: SIGNIFICANT CHANGE UP
ANION GAP SERPL CALC-SCNC: 11 MMOL/L — SIGNIFICANT CHANGE UP (ref 5–17)
APTT BLD: 66.9 SEC — HIGH (ref 27.5–37.4)
BUN SERPL-MCNC: 13 MG/DL — SIGNIFICANT CHANGE UP (ref 7–23)
CALCIUM SERPL-MCNC: 8.9 MG/DL — SIGNIFICANT CHANGE UP (ref 8.4–10.5)
CHLORIDE SERPL-SCNC: 105 MMOL/L — SIGNIFICANT CHANGE UP (ref 96–108)
CO2 SERPL-SCNC: 27 MMOL/L — SIGNIFICANT CHANGE UP (ref 22–31)
CREAT SERPL-MCNC: 0.85 MG/DL — SIGNIFICANT CHANGE UP (ref 0.5–1.3)
CULTURE RESULTS: SIGNIFICANT CHANGE UP
GLUCOSE SERPL-MCNC: 92 MG/DL — SIGNIFICANT CHANGE UP (ref 70–99)
HCG SERPL-ACNC: <2 MIU/ML — SIGNIFICANT CHANGE UP
HCT VFR BLD CALC: 41.5 % — SIGNIFICANT CHANGE UP (ref 34.5–45)
HGB BLD-MCNC: 13.6 G/DL — SIGNIFICANT CHANGE UP (ref 11.5–15.5)
INR BLD: 1.07 RATIO — SIGNIFICANT CHANGE UP (ref 0.88–1.16)
MCHC RBC-ENTMCNC: 32.8 GM/DL — SIGNIFICANT CHANGE UP (ref 32–36)
MCHC RBC-ENTMCNC: 35.5 PG — HIGH (ref 27–34)
MCV RBC AUTO: 108 FL — HIGH (ref 80–100)
METHOD TYPE: SIGNIFICANT CHANGE UP
MRSA PCR RESULT.: SIGNIFICANT CHANGE UP
ORGANISM # SPEC MICROSCOPIC CNT: SIGNIFICANT CHANGE UP
ORGANISM # SPEC MICROSCOPIC CNT: SIGNIFICANT CHANGE UP
PLATELET # BLD AUTO: 288 K/UL — SIGNIFICANT CHANGE UP (ref 150–400)
POTASSIUM SERPL-MCNC: 3.4 MMOL/L — LOW (ref 3.5–5.3)
POTASSIUM SERPL-SCNC: 3.4 MMOL/L — LOW (ref 3.5–5.3)
PROTHROM AB SERPL-ACNC: 11.7 SEC — SIGNIFICANT CHANGE UP (ref 9.8–12.7)
RBC # BLD: 3.83 M/UL — SIGNIFICANT CHANGE UP (ref 3.8–5.2)
RBC # FLD: 13.8 % — SIGNIFICANT CHANGE UP (ref 10.3–14.5)
S AUREUS DNA NOSE QL NAA+PROBE: SIGNIFICANT CHANGE UP
SODIUM SERPL-SCNC: 143 MMOL/L — SIGNIFICANT CHANGE UP (ref 135–145)
SPECIMEN SOURCE: SIGNIFICANT CHANGE UP
WBC # BLD: 5.2 K/UL — SIGNIFICANT CHANGE UP (ref 3.8–10.5)
WBC # FLD AUTO: 5.2 K/UL — SIGNIFICANT CHANGE UP (ref 3.8–10.5)

## 2017-08-09 PROCEDURE — 93460 R&L HRT ART/VENTRICLE ANGIO: CPT | Mod: 26,GC

## 2017-08-09 PROCEDURE — 99152 MOD SED SAME PHYS/QHP 5/>YRS: CPT | Mod: GC

## 2017-08-09 RX ORDER — POTASSIUM BICARBONATE 978 MG/1
20 TABLET, EFFERVESCENT ORAL
Qty: 0 | Refills: 0 | Status: COMPLETED | OUTPATIENT
Start: 2017-08-09 | End: 2017-08-09

## 2017-08-09 RX ADMIN — LOSARTAN POTASSIUM 25 MILLIGRAM(S): 100 TABLET, FILM COATED ORAL at 06:44

## 2017-08-09 RX ADMIN — HEPARIN SODIUM 6 UNIT(S)/HR: 5000 INJECTION INTRAVENOUS; SUBCUTANEOUS at 20:02

## 2017-08-09 RX ADMIN — Medication 325 MILLIGRAM(S): at 15:09

## 2017-08-09 RX ADMIN — ATORVASTATIN CALCIUM 20 MILLIGRAM(S): 80 TABLET, FILM COATED ORAL at 21:41

## 2017-08-09 RX ADMIN — Medication 120 MILLIGRAM(S): at 18:49

## 2017-08-09 RX ADMIN — CEFEPIME 100 MILLIGRAM(S): 1 INJECTION, POWDER, FOR SOLUTION INTRAMUSCULAR; INTRAVENOUS at 18:42

## 2017-08-09 RX ADMIN — POTASSIUM BICARBONATE 20 MILLIEQUIVALENT(S): 978 TABLET, EFFERVESCENT ORAL at 15:09

## 2017-08-09 RX ADMIN — PANTOPRAZOLE SODIUM 40 MILLIGRAM(S): 20 TABLET, DELAYED RELEASE ORAL at 06:44

## 2017-08-09 RX ADMIN — Medication 40 MILLIGRAM(S): at 21:41

## 2017-08-09 RX ADMIN — Medication 40 MILLIGRAM(S): at 06:44

## 2017-08-09 RX ADMIN — Medication 120 MILLIGRAM(S): at 06:44

## 2017-08-09 RX ADMIN — HEPARIN SODIUM 6 UNIT(S)/HR: 5000 INJECTION INTRAVENOUS; SUBCUTANEOUS at 06:44

## 2017-08-09 RX ADMIN — Medication 1 MILLIGRAM(S): at 15:09

## 2017-08-09 RX ADMIN — CEFEPIME 100 MILLIGRAM(S): 1 INJECTION, POWDER, FOR SOLUTION INTRAMUSCULAR; INTRAVENOUS at 06:45

## 2017-08-09 RX ADMIN — Medication 4 MILLIGRAM(S): at 06:44

## 2017-08-09 RX ADMIN — POTASSIUM BICARBONATE 20 MILLIEQUIVALENT(S): 978 TABLET, EFFERVESCENT ORAL at 18:41

## 2017-08-09 NOTE — PROGRESS NOTE ADULT - PROBLEM SELECTOR PLAN 1
Cardiac cath 8/9  Coumadin held. Continue with heparin gtt  Vit K 2.5mg given  c/w diuresis, bblocker, ppi, statin  Anticipate MVR/AVR this Friday

## 2017-08-09 NOTE — PROGRESS NOTE ADULT - SUBJECTIVE AND OBJECTIVE BOX
Subjective  Hello no acute distress s/p cardiac cath    VITAL SIGNS    Telemetry:  SB/SR 48-60 5beats    Vital Signs Last 24 Hrs  T(C): 36.8 (17 @ 16:04), Max: 36.8 (17 @ 04:44)  HR: 56 (17 @ 17:00) (50 - 56)  BP: 117/66 (17 @ 17:00) (111/51 - 151/73)  RR: 18 (17 @ 17:00) (18 - 18)  SpO2: 97% (17 @ 17:00) (97% - 99%)            @ 07:01  -   @ 07:00  --------------------------------------------------------  IN: 1134 mL / OUT: 1100 mL / NET: 34 mL     @ 07:01  -   @ 17:55  --------------------------------------------------------  IN: 135 mL / OUT: 0 mL / NET: 135 mL     Daily Weight in k (09 Aug 2017 08:39)    PHYSICAL EXAM    Neurology: alert and oriented x 3, nonfocal, no gross deficits    CV :RRR S1 S2 2/6  Lungs:  CTA   Abdomen: soft, nontender, nondistended, positive bowel sounds,   :    Voiding         Extremities:+ DP no calf tenderness                     Discussed with Cardiothoracic Team at AM rounds.

## 2017-08-09 NOTE — PROGRESS NOTE ADULT - ASSESSMENT
47F h/o of Takayasu arteritis, pAF on coumadin, HCM, VT s/p AICD, known severe MVP/MVR/AI admited for SOB.  8/7 Vit k 2.5  given   heparin gtt    8/8 INR 1.8 this am on heparin gtt  Cardiac cath scheduled for  Wednesday 8/9 8/p S/P cardiac cath via right groin hypokalemia k supplemented

## 2017-08-10 LAB
ANION GAP SERPL CALC-SCNC: 12 MMOL/L — SIGNIFICANT CHANGE UP (ref 5–17)
APTT BLD: 52 SEC — HIGH (ref 27.5–37.4)
APTT BLD: 56.2 SEC — HIGH (ref 27.5–37.4)
BLD GP AB SCN SERPL QL: NEGATIVE — SIGNIFICANT CHANGE UP
BUN SERPL-MCNC: 21 MG/DL — SIGNIFICANT CHANGE UP (ref 7–23)
CALCIUM SERPL-MCNC: 8.8 MG/DL — SIGNIFICANT CHANGE UP (ref 8.4–10.5)
CHLORIDE SERPL-SCNC: 101 MMOL/L — SIGNIFICANT CHANGE UP (ref 96–108)
CO2 SERPL-SCNC: 25 MMOL/L — SIGNIFICANT CHANGE UP (ref 22–31)
CREAT SERPL-MCNC: 0.86 MG/DL — SIGNIFICANT CHANGE UP (ref 0.5–1.3)
GLUCOSE SERPL-MCNC: 129 MG/DL — HIGH (ref 70–99)
HCT VFR BLD CALC: 38.4 % — SIGNIFICANT CHANGE UP (ref 34.5–45)
HGB BLD-MCNC: 13.3 G/DL — SIGNIFICANT CHANGE UP (ref 11.5–15.5)
MCHC RBC-ENTMCNC: 34.6 GM/DL — SIGNIFICANT CHANGE UP (ref 32–36)
MCHC RBC-ENTMCNC: 37.3 PG — HIGH (ref 27–34)
MCV RBC AUTO: 108 FL — HIGH (ref 80–100)
PLATELET # BLD AUTO: 260 K/UL — SIGNIFICANT CHANGE UP (ref 150–400)
POTASSIUM SERPL-MCNC: 3.6 MMOL/L — SIGNIFICANT CHANGE UP (ref 3.5–5.3)
POTASSIUM SERPL-SCNC: 3.6 MMOL/L — SIGNIFICANT CHANGE UP (ref 3.5–5.3)
RBC # BLD: 3.56 M/UL — LOW (ref 3.8–5.2)
RBC # FLD: 13.6 % — SIGNIFICANT CHANGE UP (ref 10.3–14.5)
RH IG SCN BLD-IMP: POSITIVE — SIGNIFICANT CHANGE UP
SODIUM SERPL-SCNC: 138 MMOL/L — SIGNIFICANT CHANGE UP (ref 135–145)
WBC # BLD: 6.1 K/UL — SIGNIFICANT CHANGE UP (ref 3.8–10.5)
WBC # FLD AUTO: 6.1 K/UL — SIGNIFICANT CHANGE UP (ref 3.8–10.5)

## 2017-08-10 PROCEDURE — 99231 SBSQ HOSP IP/OBS SF/LOW 25: CPT

## 2017-08-10 RX ORDER — CHLORHEXIDINE GLUCONATE 213 G/1000ML
30 SOLUTION TOPICAL ONCE
Qty: 0 | Refills: 0 | Status: COMPLETED | OUTPATIENT
Start: 2017-08-10 | End: 2017-08-11

## 2017-08-10 RX ORDER — DIPHENHYDRAMINE HCL 50 MG
25 CAPSULE ORAL EVERY 6 HOURS
Qty: 0 | Refills: 0 | Status: DISCONTINUED | OUTPATIENT
Start: 2017-08-10 | End: 2017-08-11

## 2017-08-10 RX ORDER — POTASSIUM CHLORIDE 20 MEQ
20 PACKET (EA) ORAL
Qty: 0 | Refills: 0 | Status: COMPLETED | OUTPATIENT
Start: 2017-08-10 | End: 2017-08-10

## 2017-08-10 RX ORDER — CHLORHEXIDINE GLUCONATE 213 G/1000ML
1 SOLUTION TOPICAL ONCE
Qty: 0 | Refills: 0 | Status: COMPLETED | OUTPATIENT
Start: 2017-08-10 | End: 2017-08-10

## 2017-08-10 RX ORDER — CEFUROXIME AXETIL 250 MG
1500 TABLET ORAL ONCE
Qty: 0 | Refills: 0 | Status: DISCONTINUED | OUTPATIENT
Start: 2017-08-10 | End: 2017-08-11

## 2017-08-10 RX ADMIN — Medication 4 MILLIGRAM(S): at 06:24

## 2017-08-10 RX ADMIN — CEFEPIME 100 MILLIGRAM(S): 1 INJECTION, POWDER, FOR SOLUTION INTRAMUSCULAR; INTRAVENOUS at 17:39

## 2017-08-10 RX ADMIN — CHLORHEXIDINE GLUCONATE 1 APPLICATION(S): 213 SOLUTION TOPICAL at 19:15

## 2017-08-10 RX ADMIN — HEPARIN SODIUM 6 UNIT(S)/HR: 5000 INJECTION INTRAVENOUS; SUBCUTANEOUS at 10:21

## 2017-08-10 RX ADMIN — Medication 20 MILLIEQUIVALENT(S): at 10:23

## 2017-08-10 RX ADMIN — ATORVASTATIN CALCIUM 20 MILLIGRAM(S): 80 TABLET, FILM COATED ORAL at 22:23

## 2017-08-10 RX ADMIN — Medication 325 MILLIGRAM(S): at 12:27

## 2017-08-10 RX ADMIN — Medication 40 MILLIGRAM(S): at 06:23

## 2017-08-10 RX ADMIN — Medication 120 MILLIGRAM(S): at 06:23

## 2017-08-10 RX ADMIN — LOSARTAN POTASSIUM 25 MILLIGRAM(S): 100 TABLET, FILM COATED ORAL at 06:23

## 2017-08-10 RX ADMIN — PANTOPRAZOLE SODIUM 40 MILLIGRAM(S): 20 TABLET, DELAYED RELEASE ORAL at 06:23

## 2017-08-10 RX ADMIN — HEPARIN SODIUM 6 UNIT(S)/HR: 5000 INJECTION INTRAVENOUS; SUBCUTANEOUS at 22:23

## 2017-08-10 RX ADMIN — Medication 20 MILLIEQUIVALENT(S): at 12:27

## 2017-08-10 RX ADMIN — Medication 120 MILLIGRAM(S): at 17:39

## 2017-08-10 RX ADMIN — CEFEPIME 100 MILLIGRAM(S): 1 INJECTION, POWDER, FOR SOLUTION INTRAMUSCULAR; INTRAVENOUS at 06:24

## 2017-08-10 RX ADMIN — Medication 50 MILLIGRAM(S): at 06:23

## 2017-08-10 RX ADMIN — HEPARIN SODIUM 6 UNIT(S)/HR: 5000 INJECTION INTRAVENOUS; SUBCUTANEOUS at 03:00

## 2017-08-10 RX ADMIN — Medication 40 MILLIGRAM(S): at 22:23

## 2017-08-10 RX ADMIN — Medication 1 MILLIGRAM(S): at 12:27

## 2017-08-10 NOTE — PROGRESS NOTE ADULT - ASSESSMENT
47F h/o of Takayasu arteritis, pAF on coumadin, HCM, VT s/p AICD, known severe MVP/MVR/AI admited for SOB.  8/7 Vit k 2.5  given   heparin gtt    8/8 INR 1.8 this am on heparin gtt  Cardiac cath scheduled for  Wednesday 8/9 8/9p S/P cardiac cath via right groin hypokalemia k supplemented

## 2017-08-10 NOTE — PROGRESS NOTE ADULT - SUBJECTIVE AND OBJECTIVE BOX
Cardiac Surgery Pre-op Note:    CC: Patient is a 47y old  Female who presents with a chief complaint of SOB (06 Aug 2017 11:09)      Referring Physician:  Dr SUNNY Ibrahim                                                                                                            Surgeon: Dr Yang Cueva   Procedure: avr/mvr/septal myemectomy    Allergies    No Known Allergies    Intolerances      HPI:  Mrs. Karimi is a 47 year old woman with a history of takayasu arteritis (on weekly methotrexate, chronic prednisone 4mg/d), aortic insufficiency, mitral regurgitation, paroxysmal afib (on coumadin and sotalol), VTach (s/p IACD), severe concentric LVH (no obstruction), last cardiac catheterization in  w/ 20% stenosis of pLAD and pRCA, normal LVEF in Mar 2017, who presents from home w/ complaints of progressive shortness of breath for the past 8 days that now limits her functional capacity, both on exertion and at rest, w/ associated orthopnea, dry cough, abdominal distention and discomfort.   Of note, pt had been scheduled for elective bioprosthetic AVR and MVR w/ pre-procedure cardiac catheterization this week, prior to symptom onset and subsequent progression. (06 Aug 2017 11:09)      PAST MEDICAL & SURGICAL HISTORY:  Hypertrophic cardiomyopathy  Takayasu's arteritis  AICD (automatic cardioverter/defibrillator) present  Hypertension  Non-sustained ventricular tachycardia  Atrial fibrillation, chronic  H/O  section: x2  H/O cardiac pacemaker  History of appendectomy      MEDICATIONS  (STANDING):  sotalol 120 milliGRAM(s) Oral two times a day  losartan 25 milliGRAM(s) Oral daily  verapamil 40 milliGRAM(s) Oral three times a day  predniSONE   Tablet 4 milliGRAM(s) Oral daily  atorvastatin 20 milliGRAM(s) Oral at bedtime  metoprolol 50 milliGRAM(s) Oral two times a day  ferrous    sulfate 325 milliGRAM(s) Oral daily  pantoprazole    Tablet 40 milliGRAM(s) Oral before breakfast  folic acid 1 milliGRAM(s) Oral daily  furosemide    Tablet 40 milliGRAM(s) Oral daily  heparin  Infusion 600 Unit(s)/Hr (6 mL/Hr) IV Continuous <Continuous>  cefepime  IVPB   IV Intermittent   cefepime  IVPB 1000 milliGRAM(s) IV Intermittent every 12 hours  potassium chloride    Tablet ER 20 milliEquivalent(s) Oral every 2 hours  chlorhexidine 4% Liquid 1 Application(s) Topical once  chlorhexidine 0.12% Liquid 30 milliLiter(s) Swish and Spit once  cefuroxime  IVPB 1500 milliGRAM(s) IV Intermittent once    MEDICATIONS  (PRN):  ondansetron Injectable 4 milliGRAM(s) IV Push every 6 hours PRN Nausea        Labs:                        13.3   6.1   )-----------( 260      ( 10 Aug 2017 02:11 )             38.4     08-10    138  |  101  |  21  ----------------------------<  129<H>  3.6   |  25  |  0.86    Ca    8.8      10 Aug 2017 02:11      PT/INR - ( 09 Aug 2017 06:11 )   PT: 11.7 sec;   INR: 1.07 ratio         PTT - ( 10 Aug 2017 09:37 )  PTT:56.2 sec    Blood Type: ABO Interpretation: A (08-10 @ 10:03)    HGB A1C:   Prealbumin:   Pro-BNP: Serum Pro-Brain Natriuretic Peptide: 4597 pg/mL ( @ 03:34)  <Thyroid Panel:   MRSA: MRSA PCR Result.: NotDetec ( @ 22:04)   / MSSA: MSSA PCR Result.: NotDetec ( @ 22:04)        CXR:  from: Xray Chest 2 Views PA/Lat (17 @ 04:13) >  IMPRESSION:   AICD in unchanged position over the left chest    Cardiac size is within normal limits.   Bilateral perihilar opacities with peripheral reticular markings   consistent with interstitial pulmonary edema. This is more confluent in   the right lower lobe suggest the possibility of a superimposed pneumonia.  There is no acute osseous abnormality.     PFT's: pending     Echocardiogram:  < from: Transthoracic Echocardiogram (17 @ 10:19) >  Conclusions:  1. The interventricular septum is severly hypertrophied  (1.6cm).  2. Normal left ventricular systolic function. No segmental  wall motion abnormalities.  3. Moderate diastolic dysfunction (Stage II).  4. Normal right ventricular size and function. A device  wire is noted in the right heart.  5. Estimated pulmonary artery systolic pressure equals 42  mm Hg, assuming right atrial pressure equals 8 mm Hg,  consistent with mild pulmonary pressures.  6. Normal pericardium with trace pericardial effusion.  *** Compared with echocardiogram of 2/3/2016, no  significant changes noted.    < end of copied text >      Cardiac catheterization:17 normal coronaries    Telemetry SR/ 50  T(C): 36.6 (08-10-17 @ 04:59), Max: 36.8 (17 @ 14:33)  HR: 57 (08-10-17 @ 04:59) (50 - 87)  BP: 132/54 (08-10-17 @ 04:59) (105/42 - 133/55)  RR: 18 (08-10-17 @ 04:59) (18 - 18)  SpO2: 98% (08-10-17 @ 04:59) (97% - 99%)  Wt(kg): --    Gen: WN/WD NAD  Neuro: AAOx3, nonfocal  Pulm: CTA B/L  CV: RRR, S1S2  Abd: Soft, NT, ND +BS  Ext: No edema, + peripheral pulses cath site Rt groin no hematoma       Pt has AICD/PPM [ x] Yes  [ ] No             Brand Name: medtronic  eps called to be present to OR am to deactivate defibrillation function for operation   Pre-op Beta Blocker ordered within 24 hrs of surgery (CABG ONLY)?  [x ] Yes  [ ] No  If not, Why?  Type & Cross  [x ] Yes  [ ] No  NPO after Midnight [x ] Yes  [ ] No  Pre-op ABX ordered, to be taped on chart:  [x ] Yes  [ ] No     Hibiclens/Peridex ordered [x ] Yes  [ ] No  Consent  to be obtained   by surgeon

## 2017-08-10 NOTE — PROGRESS NOTE ADULT - PROBLEM SELECTOR PLAN 1
Coumadin held. Continue with heparin gtt  c/w diuresis, bblocker, ppi, statin   MVR/AVR with septal myeomectomy 8/11/17 with Dr Cueva  EPS aware of device to turn offf aicd mode am  npo after midnight  pre op prep ongoing

## 2017-08-11 ENCOUNTER — RESULT REVIEW (OUTPATIENT)
Age: 47
End: 2017-08-11

## 2017-08-11 ENCOUNTER — APPOINTMENT (OUTPATIENT)
Dept: CARDIOTHORACIC SURGERY | Facility: HOSPITAL | Age: 47
End: 2017-08-11
Payer: MEDICAID

## 2017-08-11 LAB
ALBUMIN SERPL ELPH-MCNC: 2.9 G/DL — LOW (ref 3.3–5)
ALP SERPL-CCNC: 26 U/L — LOW (ref 40–120)
ALT FLD-CCNC: 25 U/L RC — SIGNIFICANT CHANGE UP (ref 10–45)
ANION GAP SERPL CALC-SCNC: 15 MMOL/L — SIGNIFICANT CHANGE UP (ref 5–17)
APTT BLD: 37.8 SEC — HIGH (ref 27.5–37.4)
AST SERPL-CCNC: 82 U/L — HIGH (ref 10–40)
BASE EXCESS BLDMV CALC-SCNC: -0.1 MMOL/L — SIGNIFICANT CHANGE UP (ref -3–3)
BASE EXCESS BLDMV CALC-SCNC: 0.4 MMOL/L — SIGNIFICANT CHANGE UP (ref -3–3)
BASE EXCESS BLDMV CALC-SCNC: 0.8 MMOL/L — SIGNIFICANT CHANGE UP (ref -3–3)
BASE EXCESS BLDV CALC-SCNC: 0.6 MMOL/L — SIGNIFICANT CHANGE UP (ref -2–2)
BASOPHILS # BLD AUTO: 0 K/UL — SIGNIFICANT CHANGE UP (ref 0–0.2)
BASOPHILS NFR BLD AUTO: 0.3 % — SIGNIFICANT CHANGE UP (ref 0–2)
BILIRUB SERPL-MCNC: 2.1 MG/DL — HIGH (ref 0.2–1.2)
BLOOD GAS VENOUS - CREATININE: SIGNIFICANT CHANGE UP MG/DL (ref 0.5–1.3)
BUN SERPL-MCNC: 12 MG/DL — SIGNIFICANT CHANGE UP (ref 7–23)
CA-I SERPL-SCNC: 0.64 MMOL/L — CRITICAL LOW (ref 1.12–1.3)
CALCIUM SERPL-MCNC: 8.1 MG/DL — LOW (ref 8.4–10.5)
CHLORIDE BLDV-SCNC: SIGNIFICANT CHANGE UP MMOL/L (ref 96–108)
CHLORIDE SERPL-SCNC: 114 MMOL/L — HIGH (ref 96–108)
CK MB BLD-MCNC: 11.8 % — HIGH (ref 0–3.5)
CK MB CFR SERPL CALC: 50 NG/ML — HIGH (ref 0–3.8)
CK SERPL-CCNC: 424 U/L — HIGH (ref 25–170)
CO2 BLDMV-SCNC: 26 MMOL/L — SIGNIFICANT CHANGE UP (ref 21–29)
CO2 BLDMV-SCNC: 27 MMOL/L — SIGNIFICANT CHANGE UP (ref 21–29)
CO2 BLDMV-SCNC: 27 MMOL/L — SIGNIFICANT CHANGE UP (ref 21–29)
CO2 SERPL-SCNC: 23 MMOL/L — SIGNIFICANT CHANGE UP (ref 22–31)
CREAT SERPL-MCNC: 0.65 MG/DL — SIGNIFICANT CHANGE UP (ref 0.5–1.3)
EOSINOPHIL # BLD AUTO: 0 K/UL — SIGNIFICANT CHANGE UP (ref 0–0.5)
EOSINOPHIL NFR BLD AUTO: 0.2 % — SIGNIFICANT CHANGE UP (ref 0–6)
FIBRINOGEN PPP-MCNC: 314 MG/DL — SIGNIFICANT CHANGE UP (ref 310–510)
GAS PNL BLDA: SIGNIFICANT CHANGE UP
GAS PNL BLDMV: SIGNIFICANT CHANGE UP
GAS PNL BLDV: 138 MMOL/L — SIGNIFICANT CHANGE UP (ref 136–145)
GAS PNL BLDV: SIGNIFICANT CHANGE UP
GAS PNL BLDV: SIGNIFICANT CHANGE UP
GLUCOSE BLDV-MCNC: 104 MG/DL — HIGH (ref 70–99)
GLUCOSE SERPL-MCNC: 131 MG/DL — HIGH (ref 70–99)
HCO3 BLDMV-SCNC: 25 MMOL/L — SIGNIFICANT CHANGE UP (ref 20–28)
HCO3 BLDMV-SCNC: 25 MMOL/L — SIGNIFICANT CHANGE UP (ref 20–28)
HCO3 BLDMV-SCNC: 26 MMOL/L — SIGNIFICANT CHANGE UP (ref 20–28)
HCO3 BLDV-SCNC: 24 MMOL/L — SIGNIFICANT CHANGE UP (ref 21–29)
HCT VFR BLD CALC: 34.8 % — SIGNIFICANT CHANGE UP (ref 34.5–45)
HCT VFR BLDA CALC: 25 % — LOW (ref 39–50)
HGB BLD CALC-MCNC: 8.2 G/DL — LOW (ref 11.5–15.5)
HGB BLD-MCNC: 12 G/DL — SIGNIFICANT CHANGE UP (ref 11.5–15.5)
HOROWITZ INDEX BLDMV+IHG-RTO: 21 — SIGNIFICANT CHANGE UP
HOROWITZ INDEX BLDMV+IHG-RTO: 50 — SIGNIFICANT CHANGE UP
HOROWITZ INDEX BLDMV+IHG-RTO: 60 — SIGNIFICANT CHANGE UP
HOROWITZ INDEX BLDV+IHG-RTO: 0 — SIGNIFICANT CHANGE UP
INR BLD: 1.04 RATIO — SIGNIFICANT CHANGE UP (ref 0.88–1.16)
LACTATE BLDV-MCNC: 0.6 MMOL/L — LOW (ref 0.7–2)
LYMPHOCYTES # BLD AUTO: 0.8 K/UL — LOW (ref 1–3.3)
LYMPHOCYTES # BLD AUTO: 7.9 % — LOW (ref 13–44)
MCHC RBC-ENTMCNC: 33.5 PG — SIGNIFICANT CHANGE UP (ref 27–34)
MCHC RBC-ENTMCNC: 34.6 GM/DL — SIGNIFICANT CHANGE UP (ref 32–36)
MCV RBC AUTO: 96.7 FL — SIGNIFICANT CHANGE UP (ref 80–100)
MONOCYTES # BLD AUTO: 0.8 K/UL — SIGNIFICANT CHANGE UP (ref 0–0.9)
MONOCYTES NFR BLD AUTO: 7.8 % — SIGNIFICANT CHANGE UP (ref 2–14)
NEUTROPHILS # BLD AUTO: 8.4 K/UL — HIGH (ref 1.8–7.4)
NEUTROPHILS NFR BLD AUTO: 83.8 % — HIGH (ref 43–77)
O2 CT VFR BLD CALC: 36 MMHG — SIGNIFICANT CHANGE UP (ref 30–65)
O2 CT VFR BLD CALC: 40 MMHG — SIGNIFICANT CHANGE UP (ref 30–65)
O2 CT VFR BLD CALC: 42 MMHG — SIGNIFICANT CHANGE UP (ref 30–65)
PCO2 BLDMV: 42 MMHG — SIGNIFICANT CHANGE UP (ref 30–65)
PCO2 BLDMV: 46 MMHG — SIGNIFICANT CHANGE UP (ref 30–65)
PCO2 BLDMV: 47 MMHG — SIGNIFICANT CHANGE UP (ref 30–65)
PCO2 BLDV: 38 MMHG — SIGNIFICANT CHANGE UP (ref 35–50)
PH BLDMV: 7.35 — SIGNIFICANT CHANGE UP (ref 7.32–7.45)
PH BLDMV: 7.37 — SIGNIFICANT CHANGE UP (ref 7.32–7.45)
PH BLDMV: 7.39 — SIGNIFICANT CHANGE UP (ref 7.32–7.45)
PH BLDV: 7.42 — SIGNIFICANT CHANGE UP (ref 7.35–7.45)
PLATELET # BLD AUTO: 178 K/UL — SIGNIFICANT CHANGE UP (ref 150–400)
PO2 BLDV: 128 MMHG — HIGH (ref 25–45)
POTASSIUM BLDV-SCNC: 7.9 MMOL/L — CRITICAL HIGH (ref 3.5–5)
POTASSIUM SERPL-MCNC: 4.1 MMOL/L — SIGNIFICANT CHANGE UP (ref 3.5–5.3)
POTASSIUM SERPL-SCNC: 4.1 MMOL/L — SIGNIFICANT CHANGE UP (ref 3.5–5.3)
PROT SERPL-MCNC: 4.2 G/DL — LOW (ref 6–8.3)
PROTHROM AB SERPL-ACNC: 11.4 SEC — SIGNIFICANT CHANGE UP (ref 9.8–12.7)
RBC # BLD: 3.6 M/UL — LOW (ref 3.8–5.2)
RBC # FLD: 17.7 % — HIGH (ref 10.3–14.5)
SAO2 % BLDMV: 63 % — SIGNIFICANT CHANGE UP (ref 60–90)
SAO2 % BLDMV: 68 % — SIGNIFICANT CHANGE UP (ref 60–90)
SAO2 % BLDMV: 75 % — SIGNIFICANT CHANGE UP (ref 60–90)
SAO2 % BLDV: 99 % — HIGH (ref 67–88)
SODIUM SERPL-SCNC: 152 MMOL/L — HIGH (ref 135–145)
TROPONIN T SERPL-MCNC: 3.23 NG/ML — HIGH (ref 0–0.06)
WBC # BLD: 10 K/UL — SIGNIFICANT CHANGE UP (ref 3.8–10.5)
WBC # FLD AUTO: 10 K/UL — SIGNIFICANT CHANGE UP (ref 3.8–10.5)

## 2017-08-11 PROCEDURE — 33863 ASCENDING AORTIC GRAFT: CPT | Mod: AS

## 2017-08-11 PROCEDURE — 88304 TISSUE EXAM BY PATHOLOGIST: CPT | Mod: 26

## 2017-08-11 PROCEDURE — 33430 REPLACEMENT OF MITRAL VALVE: CPT

## 2017-08-11 PROCEDURE — 93325 DOPPLER ECHO COLOR FLOW MAPG: CPT | Mod: 26

## 2017-08-11 PROCEDURE — 93312 ECHO TRANSESOPHAGEAL: CPT | Mod: 26

## 2017-08-11 PROCEDURE — 88305 TISSUE EXAM BY PATHOLOGIST: CPT | Mod: 26

## 2017-08-11 PROCEDURE — 33510 CABG VEIN SINGLE: CPT

## 2017-08-11 PROCEDURE — 33863 ASCENDING AORTIC GRAFT: CPT

## 2017-08-11 PROCEDURE — 93320 DOPPLER ECHO COMPLETE: CPT | Mod: 26

## 2017-08-11 PROCEDURE — 71010: CPT | Mod: 26

## 2017-08-11 PROCEDURE — 93010 ELECTROCARDIOGRAM REPORT: CPT

## 2017-08-11 PROCEDURE — 33430 REPLACEMENT OF MITRAL VALVE: CPT | Mod: AS

## 2017-08-11 PROCEDURE — 33510 CABG VEIN SINGLE: CPT | Mod: AS

## 2017-08-11 PROCEDURE — 88311 DECALCIFY TISSUE: CPT | Mod: 26

## 2017-08-11 RX ORDER — CEFUROXIME AXETIL 250 MG
1500 TABLET ORAL EVERY 8 HOURS
Qty: 0 | Refills: 0 | Status: COMPLETED | OUTPATIENT
Start: 2017-08-11 | End: 2017-08-13

## 2017-08-11 RX ORDER — DOCUSATE SODIUM 100 MG
100 CAPSULE ORAL THREE TIMES A DAY
Qty: 0 | Refills: 0 | Status: DISCONTINUED | OUTPATIENT
Start: 2017-08-11 | End: 2017-08-20

## 2017-08-11 RX ORDER — NICARDIPINE HYDROCHLORIDE 30 MG/1
3 CAPSULE, EXTENDED RELEASE ORAL
Qty: 40 | Refills: 0 | Status: DISCONTINUED | OUTPATIENT
Start: 2017-08-11 | End: 2017-08-12

## 2017-08-11 RX ORDER — OXYCODONE AND ACETAMINOPHEN 5; 325 MG/1; MG/1
2 TABLET ORAL EVERY 6 HOURS
Qty: 0 | Refills: 0 | Status: DISCONTINUED | OUTPATIENT
Start: 2017-08-11 | End: 2017-08-17

## 2017-08-11 RX ORDER — SODIUM CHLORIDE 9 MG/ML
1000 INJECTION INTRAMUSCULAR; INTRAVENOUS; SUBCUTANEOUS
Qty: 0 | Refills: 0 | Status: DISCONTINUED | OUTPATIENT
Start: 2017-08-11 | End: 2017-08-15

## 2017-08-11 RX ORDER — ASPIRIN/CALCIUM CARB/MAGNESIUM 324 MG
325 TABLET ORAL DAILY
Qty: 0 | Refills: 0 | Status: DISCONTINUED | OUTPATIENT
Start: 2017-08-11 | End: 2017-08-11

## 2017-08-11 RX ORDER — POTASSIUM CHLORIDE 20 MEQ
10 PACKET (EA) ORAL ONCE
Qty: 0 | Refills: 0 | Status: DISCONTINUED | OUTPATIENT
Start: 2017-08-11 | End: 2017-08-11

## 2017-08-11 RX ORDER — POTASSIUM CHLORIDE 20 MEQ
10 PACKET (EA) ORAL
Qty: 0 | Refills: 0 | Status: DISCONTINUED | OUTPATIENT
Start: 2017-08-11 | End: 2017-08-11

## 2017-08-11 RX ORDER — PANTOPRAZOLE SODIUM 20 MG/1
40 TABLET, DELAYED RELEASE ORAL DAILY
Qty: 0 | Refills: 0 | Status: DISCONTINUED | OUTPATIENT
Start: 2017-08-11 | End: 2017-08-12

## 2017-08-11 RX ORDER — MEPERIDINE HYDROCHLORIDE 50 MG/ML
25 INJECTION INTRAMUSCULAR; INTRAVENOUS; SUBCUTANEOUS ONCE
Qty: 0 | Refills: 0 | Status: DISCONTINUED | OUTPATIENT
Start: 2017-08-11 | End: 2017-08-11

## 2017-08-11 RX ORDER — OXYCODONE AND ACETAMINOPHEN 5; 325 MG/1; MG/1
1 TABLET ORAL EVERY 6 HOURS
Qty: 0 | Refills: 0 | Status: DISCONTINUED | OUTPATIENT
Start: 2017-08-11 | End: 2017-08-17

## 2017-08-11 RX ORDER — DEXMEDETOMIDINE HYDROCHLORIDE IN 0.9% SODIUM CHLORIDE 4 UG/ML
0.2 INJECTION INTRAVENOUS
Qty: 200 | Refills: 0 | Status: DISCONTINUED | OUTPATIENT
Start: 2017-08-11 | End: 2017-08-12

## 2017-08-11 RX ORDER — POTASSIUM CHLORIDE 20 MEQ
10 PACKET (EA) ORAL
Qty: 0 | Refills: 0 | Status: COMPLETED | OUTPATIENT
Start: 2017-08-11 | End: 2017-08-11

## 2017-08-11 RX ORDER — MILRINONE LACTATE 1 MG/ML
0.2 INJECTION, SOLUTION INTRAVENOUS
Qty: 20 | Refills: 0 | Status: DISCONTINUED | OUTPATIENT
Start: 2017-08-11 | End: 2017-08-14

## 2017-08-11 RX ORDER — SODIUM CHLORIDE 9 MG/ML
1000 INJECTION, SOLUTION INTRAVENOUS
Qty: 0 | Refills: 0 | Status: DISCONTINUED | OUTPATIENT
Start: 2017-08-11 | End: 2017-08-13

## 2017-08-11 RX ORDER — INSULIN HUMAN 100 [IU]/ML
1 INJECTION, SOLUTION SUBCUTANEOUS
Qty: 100 | Refills: 0 | Status: DISCONTINUED | OUTPATIENT
Start: 2017-08-11 | End: 2017-08-12

## 2017-08-11 RX ADMIN — CHLORHEXIDINE GLUCONATE 1 APPLICATION(S): 213 SOLUTION TOPICAL at 06:00

## 2017-08-11 RX ADMIN — Medication 100 MILLIEQUIVALENT(S): at 20:00

## 2017-08-11 RX ADMIN — Medication 4 MILLIGRAM(S): at 06:19

## 2017-08-11 RX ADMIN — LOSARTAN POTASSIUM 25 MILLIGRAM(S): 100 TABLET, FILM COATED ORAL at 06:20

## 2017-08-11 RX ADMIN — CHLORHEXIDINE GLUCONATE 30 MILLILITER(S): 213 SOLUTION TOPICAL at 06:23

## 2017-08-11 RX ADMIN — Medication 40 MILLIGRAM(S): at 06:20

## 2017-08-11 RX ADMIN — Medication 100 MILLIGRAM(S): at 21:30

## 2017-08-11 RX ADMIN — Medication 50 MILLIGRAM(S): at 06:23

## 2017-08-11 RX ADMIN — Medication 100 MILLIEQUIVALENT(S): at 19:15

## 2017-08-11 RX ADMIN — NICARDIPINE HYDROCHLORIDE 15 MG/HR: 30 CAPSULE, EXTENDED RELEASE ORAL at 23:30

## 2017-08-11 RX ADMIN — Medication 100 MILLIEQUIVALENT(S): at 20:30

## 2017-08-11 RX ADMIN — Medication 120 MILLIGRAM(S): at 06:19

## 2017-08-11 RX ADMIN — PANTOPRAZOLE SODIUM 40 MILLIGRAM(S): 20 TABLET, DELAYED RELEASE ORAL at 22:38

## 2017-08-11 RX ADMIN — CEFEPIME 100 MILLIGRAM(S): 1 INJECTION, POWDER, FOR SOLUTION INTRAMUSCULAR; INTRAVENOUS at 05:10

## 2017-08-11 RX ADMIN — MILRINONE LACTATE 5.66 MICROGRAM(S)/KG/MIN: 1 INJECTION, SOLUTION INTRAVENOUS at 19:30

## 2017-08-11 RX ADMIN — PANTOPRAZOLE SODIUM 40 MILLIGRAM(S): 20 TABLET, DELAYED RELEASE ORAL at 06:20

## 2017-08-11 NOTE — BRIEF OPERATIVE NOTE - COMMENTS
EBL not accurate due to cell saver, EBL not accurate due to cell saver,  Milrinone  Cross Clamp Time: 310min

## 2017-08-11 NOTE — PROCEDURE NOTE - ADDITIONAL PROCEDURE DETAILS
Interrogation Note: Post- op  1. Measured data WNL. Pt is not PM dependent.   2. ICD therapy reactivated.   3. Parameters set to original settings.
Interrogation Note: Pre- op  1. Measured data WNL, R2 pads on patient. Pt is not PM dependent. ICD therapy deactivated.   2. Call EP post op for reprogramming.

## 2017-08-11 NOTE — BRIEF OPERATIVE NOTE - POST-OP DX
Aortic aneurysm without rupture, unspecified portion of aorta  08/11/2017  ascending aneurysm  Active  Emmanuel Reyes  Aortic valve regurgitation, unspecified etiology  08/11/2017    Active  Emmanuel Reyes  Atrial fibrillation, unspecified type  08/11/2017    Active  Emmanuel Reyes  Coronary artery disease with other form of angina pectoris  08/11/2017  ostial LM disease  Active  Emmanuel Reyes  Mitral valve insufficiency, unspecified etiology  08/11/2017    Active  Emmanuel Reyes

## 2017-08-11 NOTE — BRIEF OPERATIVE NOTE - PROCEDURE
CABG x 1  08/11/2017  svg to LAD  Active  JMCGRADE  [place] procedure  08/11/2017  25 pericardial AVR  Active  JMCGRADE  Mitral valve replacement  08/11/2017  29 pericardial valve  Active  JMCGRADE  Radiofrequency ablation  08/11/2017    Active  JMCGRADE

## 2017-08-11 NOTE — BRIEF OPERATIVE NOTE - PRE-OP DX
Aortic aneurysm without rupture, unspecified portion of aorta  08/11/2017  ascending aneurysm  Active  Emmanuel Reyes  Aortic valve regurgitation, unspecified etiology  08/11/2017    Active  Emmanuel Reyes  Atrial fibrillation, unspecified type  08/11/2017    Active  Emmanuel Reyes  Coronary artery disease with other form of angina pectoris  08/11/2017    Emmanuel Ryan  Mitral valve insufficiency, unspecified etiology  08/11/2017    Active  Emmanuel Reyes

## 2017-08-12 LAB
ALBUMIN SERPL ELPH-MCNC: 3.7 G/DL — SIGNIFICANT CHANGE UP (ref 3.3–5)
ALP SERPL-CCNC: 36 U/L — LOW (ref 40–120)
ALT FLD-CCNC: 37 U/L RC — SIGNIFICANT CHANGE UP (ref 10–45)
ANION GAP SERPL CALC-SCNC: 15 MMOL/L — SIGNIFICANT CHANGE UP (ref 5–17)
APTT BLD: 35.2 SEC — SIGNIFICANT CHANGE UP (ref 27.5–37.4)
AST SERPL-CCNC: 131 U/L — HIGH (ref 10–40)
BASE EXCESS BLDMV CALC-SCNC: 0.4 MMOL/L — SIGNIFICANT CHANGE UP (ref -3–3)
BASE EXCESS BLDMV CALC-SCNC: 1.1 MMOL/L — SIGNIFICANT CHANGE UP (ref -3–3)
BILIRUB SERPL-MCNC: 4.6 MG/DL — HIGH (ref 0.2–1.2)
BUN SERPL-MCNC: 16 MG/DL — SIGNIFICANT CHANGE UP (ref 7–23)
CALCIUM SERPL-MCNC: 8.3 MG/DL — LOW (ref 8.4–10.5)
CHLORIDE SERPL-SCNC: 115 MMOL/L — HIGH (ref 96–108)
CO2 BLDMV-SCNC: 28 MMOL/L — SIGNIFICANT CHANGE UP (ref 21–29)
CO2 BLDMV-SCNC: 28 MMOL/L — SIGNIFICANT CHANGE UP (ref 21–29)
CO2 SERPL-SCNC: 23 MMOL/L — SIGNIFICANT CHANGE UP (ref 22–31)
CREAT SERPL-MCNC: 0.94 MG/DL — SIGNIFICANT CHANGE UP (ref 0.5–1.3)
GAS PNL BLDA: SIGNIFICANT CHANGE UP
GAS PNL BLDMV: SIGNIFICANT CHANGE UP
GAS PNL BLDMV: SIGNIFICANT CHANGE UP
GLUCOSE SERPL-MCNC: 147 MG/DL — HIGH (ref 70–99)
HCO3 BLDMV-SCNC: 26 MMOL/L — SIGNIFICANT CHANGE UP (ref 20–28)
HCO3 BLDMV-SCNC: 26 MMOL/L — SIGNIFICANT CHANGE UP (ref 20–28)
HCT VFR BLD CALC: 43.5 % — SIGNIFICANT CHANGE UP (ref 34.5–45)
HGB BLD-MCNC: 13.8 G/DL — SIGNIFICANT CHANGE UP (ref 11.5–15.5)
HOROWITZ INDEX BLDMV+IHG-RTO: 32 — SIGNIFICANT CHANGE UP
HOROWITZ INDEX BLDMV+IHG-RTO: 40 — SIGNIFICANT CHANGE UP
INR BLD: 1.02 RATIO — SIGNIFICANT CHANGE UP (ref 0.88–1.16)
INR BLD: 1.16 RATIO — SIGNIFICANT CHANGE UP (ref 0.88–1.16)
MCHC RBC-ENTMCNC: 30.8 PG — SIGNIFICANT CHANGE UP (ref 27–34)
MCHC RBC-ENTMCNC: 31.7 GM/DL — LOW (ref 32–36)
MCV RBC AUTO: 97.2 FL — SIGNIFICANT CHANGE UP (ref 80–100)
O2 CT VFR BLD CALC: 41 MMHG — SIGNIFICANT CHANGE UP (ref 30–65)
O2 CT VFR BLD CALC: 41 MMHG — SIGNIFICANT CHANGE UP (ref 30–65)
PCO2 BLDMV: 46 MMHG — SIGNIFICANT CHANGE UP (ref 30–65)
PCO2 BLDMV: 50 MMHG — SIGNIFICANT CHANGE UP (ref 30–65)
PH BLDMV: 7.34 — SIGNIFICANT CHANGE UP (ref 7.32–7.45)
PH BLDMV: 7.38 — SIGNIFICANT CHANGE UP (ref 7.32–7.45)
PHOSPHATE SERPL-MCNC: 4.4 MG/DL — SIGNIFICANT CHANGE UP (ref 2.5–4.5)
PLATELET # BLD AUTO: 211 K/UL — SIGNIFICANT CHANGE UP (ref 150–400)
POTASSIUM SERPL-MCNC: 4.3 MMOL/L — SIGNIFICANT CHANGE UP (ref 3.5–5.3)
POTASSIUM SERPL-SCNC: 4.3 MMOL/L — SIGNIFICANT CHANGE UP (ref 3.5–5.3)
PROT SERPL-MCNC: 5.6 G/DL — LOW (ref 6–8.3)
PROTHROM AB SERPL-ACNC: 11.1 SEC — SIGNIFICANT CHANGE UP (ref 9.8–12.7)
PROTHROM AB SERPL-ACNC: 12.7 SEC — SIGNIFICANT CHANGE UP (ref 9.8–12.7)
RBC # BLD: 4.48 M/UL — SIGNIFICANT CHANGE UP (ref 3.8–5.2)
RBC # FLD: 18.6 % — HIGH (ref 10.3–14.5)
SAO2 % BLDMV: 68 % — SIGNIFICANT CHANGE UP (ref 60–90)
SAO2 % BLDMV: 73 % — SIGNIFICANT CHANGE UP (ref 60–90)
SODIUM SERPL-SCNC: 153 MMOL/L — HIGH (ref 135–145)
WBC # BLD: 14.5 K/UL — HIGH (ref 3.8–10.5)
WBC # FLD AUTO: 14.5 K/UL — HIGH (ref 3.8–10.5)

## 2017-08-12 PROCEDURE — 71010: CPT | Mod: 26

## 2017-08-12 PROCEDURE — 93010 ELECTROCARDIOGRAM REPORT: CPT

## 2017-08-12 RX ORDER — SOTALOL HCL 120 MG
120 TABLET ORAL EVERY 12 HOURS
Qty: 0 | Refills: 0 | Status: DISCONTINUED | OUTPATIENT
Start: 2017-08-12 | End: 2017-08-13

## 2017-08-12 RX ORDER — AMLODIPINE BESYLATE 2.5 MG/1
10 TABLET ORAL DAILY
Qty: 0 | Refills: 0 | Status: DISCONTINUED | OUTPATIENT
Start: 2017-08-12 | End: 2017-08-13

## 2017-08-12 RX ORDER — DEXTROSE 50 % IN WATER 50 %
12.5 SYRINGE (ML) INTRAVENOUS ONCE
Qty: 0 | Refills: 0 | Status: DISCONTINUED | OUTPATIENT
Start: 2017-08-12 | End: 2017-08-13

## 2017-08-12 RX ORDER — METOCLOPRAMIDE HCL 10 MG
10 TABLET ORAL EVERY 8 HOURS
Qty: 0 | Refills: 0 | Status: COMPLETED | OUTPATIENT
Start: 2017-08-12 | End: 2017-08-14

## 2017-08-12 RX ORDER — FENTANYL CITRATE 50 UG/ML
50 INJECTION INTRAVENOUS ONCE
Qty: 0 | Refills: 0 | Status: DISCONTINUED | OUTPATIENT
Start: 2017-08-12 | End: 2017-08-12

## 2017-08-12 RX ORDER — FUROSEMIDE 40 MG
10 TABLET ORAL ONCE
Qty: 0 | Refills: 0 | Status: COMPLETED | OUTPATIENT
Start: 2017-08-12 | End: 2017-08-12

## 2017-08-12 RX ORDER — WARFARIN SODIUM 2.5 MG/1
2.5 TABLET ORAL ONCE
Qty: 0 | Refills: 0 | Status: COMPLETED | OUTPATIENT
Start: 2017-08-12 | End: 2017-08-12

## 2017-08-12 RX ORDER — ATORVASTATIN CALCIUM 80 MG/1
40 TABLET, FILM COATED ORAL AT BEDTIME
Qty: 0 | Refills: 0 | Status: DISCONTINUED | OUTPATIENT
Start: 2017-08-12 | End: 2017-08-20

## 2017-08-12 RX ORDER — GLUCAGON INJECTION, SOLUTION 0.5 MG/.1ML
1 INJECTION, SOLUTION SUBCUTANEOUS ONCE
Qty: 0 | Refills: 0 | Status: DISCONTINUED | OUTPATIENT
Start: 2017-08-12 | End: 2017-08-13

## 2017-08-12 RX ORDER — HYDROMORPHONE HYDROCHLORIDE 2 MG/ML
0.5 INJECTION INTRAMUSCULAR; INTRAVENOUS; SUBCUTANEOUS ONCE
Qty: 0 | Refills: 0 | Status: DISCONTINUED | OUTPATIENT
Start: 2017-08-12 | End: 2017-08-12

## 2017-08-12 RX ORDER — DEXTROSE 50 % IN WATER 50 %
1 SYRINGE (ML) INTRAVENOUS ONCE
Qty: 0 | Refills: 0 | Status: DISCONTINUED | OUTPATIENT
Start: 2017-08-12 | End: 2017-08-13

## 2017-08-12 RX ORDER — KETOROLAC TROMETHAMINE 30 MG/ML
30 SYRINGE (ML) INJECTION EVERY 8 HOURS
Qty: 0 | Refills: 0 | Status: DISCONTINUED | OUTPATIENT
Start: 2017-08-12 | End: 2017-08-12

## 2017-08-12 RX ORDER — PANTOPRAZOLE SODIUM 20 MG/1
40 TABLET, DELAYED RELEASE ORAL ONCE
Qty: 0 | Refills: 0 | Status: COMPLETED | OUTPATIENT
Start: 2017-08-12 | End: 2017-08-12

## 2017-08-12 RX ORDER — KETOROLAC TROMETHAMINE 30 MG/ML
15 SYRINGE (ML) INJECTION EVERY 6 HOURS
Qty: 0 | Refills: 0 | Status: DISCONTINUED | OUTPATIENT
Start: 2017-08-12 | End: 2017-08-14

## 2017-08-12 RX ORDER — DEXTROSE 50 % IN WATER 50 %
25 SYRINGE (ML) INTRAVENOUS ONCE
Qty: 0 | Refills: 0 | Status: DISCONTINUED | OUTPATIENT
Start: 2017-08-12 | End: 2017-08-13

## 2017-08-12 RX ORDER — INSULIN LISPRO 100/ML
VIAL (ML) SUBCUTANEOUS
Qty: 0 | Refills: 0 | Status: DISCONTINUED | OUTPATIENT
Start: 2017-08-12 | End: 2017-08-13

## 2017-08-12 RX ORDER — ASPIRIN/CALCIUM CARB/MAGNESIUM 324 MG
81 TABLET ORAL DAILY
Qty: 0 | Refills: 0 | Status: DISCONTINUED | OUTPATIENT
Start: 2017-08-12 | End: 2017-08-20

## 2017-08-12 RX ORDER — ONDANSETRON 8 MG/1
4 TABLET, FILM COATED ORAL EVERY 4 HOURS
Qty: 0 | Refills: 0 | Status: DISCONTINUED | OUTPATIENT
Start: 2017-08-12 | End: 2017-08-14

## 2017-08-12 RX ORDER — SODIUM CHLORIDE 9 MG/ML
1000 INJECTION, SOLUTION INTRAVENOUS
Qty: 0 | Refills: 0 | Status: DISCONTINUED | OUTPATIENT
Start: 2017-08-12 | End: 2017-08-13

## 2017-08-12 RX ORDER — SODIUM CHLORIDE 9 MG/ML
500 INJECTION, SOLUTION INTRAVENOUS ONCE
Qty: 0 | Refills: 0 | Status: COMPLETED | OUTPATIENT
Start: 2017-08-12 | End: 2017-08-12

## 2017-08-12 RX ORDER — INSULIN LISPRO 100/ML
VIAL (ML) SUBCUTANEOUS AT BEDTIME
Qty: 0 | Refills: 0 | Status: DISCONTINUED | OUTPATIENT
Start: 2017-08-12 | End: 2017-08-13

## 2017-08-12 RX ORDER — PANTOPRAZOLE SODIUM 20 MG/1
40 TABLET, DELAYED RELEASE ORAL
Qty: 0 | Refills: 0 | Status: DISCONTINUED | OUTPATIENT
Start: 2017-08-13 | End: 2017-08-20

## 2017-08-12 RX ADMIN — Medication 15 MILLIGRAM(S): at 13:20

## 2017-08-12 RX ADMIN — AMLODIPINE BESYLATE 10 MILLIGRAM(S): 2.5 TABLET ORAL at 11:55

## 2017-08-12 RX ADMIN — SODIUM CHLORIDE 3000 MILLILITER(S): 9 INJECTION, SOLUTION INTRAVENOUS at 02:05

## 2017-08-12 RX ADMIN — Medication 30 MILLIGRAM(S): at 07:00

## 2017-08-12 RX ADMIN — MILRINONE LACTATE 5.66 MICROGRAM(S)/KG/MIN: 1 INJECTION, SOLUTION INTRAVENOUS at 07:15

## 2017-08-12 RX ADMIN — Medication 81 MILLIGRAM(S): at 13:20

## 2017-08-12 RX ADMIN — ONDANSETRON 4 MILLIGRAM(S): 8 TABLET, FILM COATED ORAL at 13:30

## 2017-08-12 RX ADMIN — Medication 15 MILLIGRAM(S): at 20:21

## 2017-08-12 RX ADMIN — Medication 50 MILLIEQUIVALENT(S): at 22:30

## 2017-08-12 RX ADMIN — ATORVASTATIN CALCIUM 40 MILLIGRAM(S): 80 TABLET, FILM COATED ORAL at 21:53

## 2017-08-12 RX ADMIN — OXYCODONE AND ACETAMINOPHEN 1 TABLET(S): 5; 325 TABLET ORAL at 21:56

## 2017-08-12 RX ADMIN — Medication 15 MILLIGRAM(S): at 13:50

## 2017-08-12 RX ADMIN — Medication 30 MILLIGRAM(S): at 07:30

## 2017-08-12 RX ADMIN — NICARDIPINE HYDROCHLORIDE 15 MG/HR: 30 CAPSULE, EXTENDED RELEASE ORAL at 07:00

## 2017-08-12 RX ADMIN — Medication 15 MILLIGRAM(S): at 20:36

## 2017-08-12 RX ADMIN — Medication 10 MILLIGRAM(S): at 11:55

## 2017-08-12 RX ADMIN — INSULIN HUMAN 1 UNIT(S)/HR: 100 INJECTION, SOLUTION SUBCUTANEOUS at 07:00

## 2017-08-12 RX ADMIN — HYDROMORPHONE HYDROCHLORIDE 0.5 MILLIGRAM(S): 2 INJECTION INTRAMUSCULAR; INTRAVENOUS; SUBCUTANEOUS at 08:45

## 2017-08-12 RX ADMIN — Medication 120 MILLIGRAM(S): at 12:27

## 2017-08-12 RX ADMIN — Medication 10 MILLIGRAM(S): at 17:35

## 2017-08-12 RX ADMIN — Medication 100 MILLIGRAM(S): at 05:48

## 2017-08-12 RX ADMIN — Medication 100 MILLIGRAM(S): at 13:19

## 2017-08-12 RX ADMIN — Medication 100 MILLIGRAM(S): at 20:28

## 2017-08-12 RX ADMIN — Medication 10 MILLIGRAM(S): at 15:04

## 2017-08-12 RX ADMIN — Medication 100 MILLIGRAM(S): at 13:20

## 2017-08-12 RX ADMIN — OXYCODONE AND ACETAMINOPHEN 1 TABLET(S): 5; 325 TABLET ORAL at 22:30

## 2017-08-12 RX ADMIN — SODIUM CHLORIDE 10 MILLILITER(S): 9 INJECTION INTRAMUSCULAR; INTRAVENOUS; SUBCUTANEOUS at 07:00

## 2017-08-12 RX ADMIN — Medication 50 MILLIEQUIVALENT(S): at 21:40

## 2017-08-12 RX ADMIN — PANTOPRAZOLE SODIUM 40 MILLIGRAM(S): 20 TABLET, DELAYED RELEASE ORAL at 12:50

## 2017-08-12 RX ADMIN — Medication 4 MILLIGRAM(S): at 12:27

## 2017-08-12 RX ADMIN — WARFARIN SODIUM 2.5 MILLIGRAM(S): 2.5 TABLET ORAL at 21:53

## 2017-08-12 RX ADMIN — Medication 120 MILLIGRAM(S): at 21:53

## 2017-08-12 RX ADMIN — Medication 10 MILLIGRAM(S): at 21:53

## 2017-08-12 RX ADMIN — HYDROMORPHONE HYDROCHLORIDE 0.5 MILLIGRAM(S): 2 INJECTION INTRAMUSCULAR; INTRAVENOUS; SUBCUTANEOUS at 08:30

## 2017-08-12 NOTE — AIRWAY REMOVAL NOTE  ADULT & PEDS - ARTIFICAL AIRWAY REMOVAL COMMENTS
Written order for extubation verified. The patient was identified by full name and birth date compared to the identification band. Present during the procedure was Serena Smith RN.

## 2017-08-12 NOTE — PROGRESS NOTE ADULT - SUBJECTIVE AND OBJECTIVE BOX
Operation  CABG 1, AVR (T), MVR(T), RFA  POD 1  Narrative pt extubated  , on primacor gtt, cardene gtt    Vital Signs Last 24 Hrs  T(C): 36.2 (12 Aug 2017 03:15), Max: 37 (11 Aug 2017 20:00)  T(F): 97.2 (12 Aug 2017 03:15), Max: 98.6 (11 Aug 2017 20:00)  HR: 73 (12 Aug 2017 06:45) (61 - 80)  BP: --  BP(mean): --  RR: 20 (12 Aug 2017 06:45) (10 - 53)  SpO2: 100% (12 Aug 2017 06:45) (100% - 100%)  I&O's Detail    11 Aug 2017 07:01  -  12 Aug 2017 07:00  --------------------------------------------------------  IN:    dexmedetomidine Infusion: 5 mL    insulin Infusion: 29.5 mL    IV PiggyBack: 350 mL    Lactated Ringers IV Bolus: 500 mL    milrinone  Infusion: 46.1 mL    milrinone  Infusion: 75.6 mL    niCARdipine Infusion: 150 mL    sodium chloride 0.9%.: 120 mL  Total IN: 1276.2 mL    OUT:    Bulb: 130 mL    Chest Tube: 150 mL    Indwelling Catheter - Urethral: 970 mL  Total OUT: 1250 mL    Total NET: 26.2 mL      I&O's Summary    11 Aug 2017 07:01  -  12 Aug 2017 07:00  --------------------------------------------------------  IN: 1276.2 mL / OUT: 1250 mL / NET: 26.2 mL        LABS:  CBC Full  -  ( 12 Aug 2017 01:17 )  WBC Count : 14.5 K/uL  Hemoglobin : 13.8 g/dL  Hematocrit : 43.5 %  Platelet Count - Automated : 211 K/uL  Mean Cell Volume : 97.2 fl  Mean Cell Hemoglobin : 30.8 pg  Mean Cell Hemoglobin Concentration : 31.7 gm/dL  Auto Neutrophil # : x  Auto Lymphocyte # : x  Auto Monocyte # : x  Auto Eosinophil # : x  Auto Basophil # : x  Auto Neutrophil % : x  Auto Lymphocyte % : x  Auto Monocyte % : x  Auto Eosinophil % : x  Auto Basophil % : x        153<H>  |  115<H>  |  16  ----------------------------<  147<H>  4.3   |  23  |  0.94    Ca    8.3<L>      12 Aug 2017 01:17  Phos  4.4         TPro  5.6<L>  /  Alb  3.7  /  TBili  4.6<H>  /  DBili  x   /  AST  131<H>  /  ALT  37  /  AlkPhos  36<L>      PT/INR - ( 12 Aug 2017 01:17 )   PT: 11.1 sec;   INR: 1.02 ratio         PTT - ( 12 Aug 2017 01:17 )  PTT:35.2 sec    Daily     Daily Weight in k.9 (12 Aug 2017 00:00)    MEDICATIONS  (STANDING):  sodium chloride 0.9%. 1000 milliLiter(s) (10 mL/Hr) IV Continuous <Continuous>  dextrose 5%. 1000 milliLiter(s) (15 mL/Hr) IV Continuous <Continuous>  pantoprazole  Injectable 40 milliGRAM(s) IV Push daily  docusate sodium 100 milliGRAM(s) Oral three times a day  insulin Infusion 1 Unit(s)/Hr (1 mL/Hr) IV Continuous <Continuous>  milrinone Infusion 0.375 MICROgram(s)/kG/Min (5.659 mL/Hr) IV Continuous <Continuous>  cefuroxime  IVPB 1500 milliGRAM(s) IV Intermittent every 8 hours  niCARdipine Infusion 3 mG/Hr (15 mL/Hr) IV Continuous <Continuous>  ketorolac   Injectable 30 milliGRAM(s) IV Push every 8 hours    MEDICATIONS  (PRN):  oxyCODONE    5 mG/acetaminophen 325 mG 1 Tablet(s) Oral every 6 hours PRN Mild Pain  oxyCODONE    5 mG/acetaminophen 325 mG 2 Tablet(s) Oral every 6 hours PRN Moderate Pain      General: A+Ox3, in NAD  Chest: sternal dressing C/D/I  Heart: S1, S2, RRR  Lungs: CTA B/L, without W/R/R  Abdomen: Soft, ND, NT, positive BS  Extremeties: without edema B/L LE, positive DP pulses B/L    47 y female s/p Bental , MVR(T), RFA, C1L     GI / DVT prophylaxis. keep K>4, mag >2.0 -wean pressors - asa, statin - glycemic control -cont post op ABX -d/c chest tube- pain management.   A/C for mitral valve per DR Cueva     Does the patient have a history of CHF:yes   -If yes, systolic or diastolic: DD   -pre-operative EF:  35    Extubation within 24 hours:  yes    Does the patient have a history of kidney disease: no  -give CKD stage if applicable:  -what is patient's baseline Creatinine:  0.9    Beta Blockers contraindicated for the first 24 hours due to vasopressor/inotrpic medication: pt is on primacor  -If on pressors, indication:    Did the patient receive transfusion of blood and/or products:  -If yes, indication: yes acute blood loss post op course     DVT PPX:  pas stocking    Gloria-operative antibiotics discontinued within 48 hours of CTU admission:  -name/date/time of discontinue  nac      RADIOLOGY & ADDITIONAL TESTS:    Patient care discussed on morning interdisciplinary rounds with CTS team.

## 2017-08-13 LAB
ALBUMIN SERPL ELPH-MCNC: 3.4 G/DL — SIGNIFICANT CHANGE UP (ref 3.3–5)
ALP SERPL-CCNC: 38 U/L — LOW (ref 40–120)
ALT FLD-CCNC: 28 U/L RC — SIGNIFICANT CHANGE UP (ref 10–45)
ANION GAP SERPL CALC-SCNC: 14 MMOL/L — SIGNIFICANT CHANGE UP (ref 5–17)
APTT BLD: 28.4 SEC — SIGNIFICANT CHANGE UP (ref 27.5–37.4)
AST SERPL-CCNC: 86 U/L — HIGH (ref 10–40)
BILIRUB SERPL-MCNC: 5.2 MG/DL — HIGH (ref 0.2–1.2)
BUN SERPL-MCNC: 21 MG/DL — SIGNIFICANT CHANGE UP (ref 7–23)
CALCIUM SERPL-MCNC: 8.4 MG/DL — SIGNIFICANT CHANGE UP (ref 8.4–10.5)
CHLORIDE SERPL-SCNC: 102 MMOL/L — SIGNIFICANT CHANGE UP (ref 96–108)
CO2 SERPL-SCNC: 21 MMOL/L — LOW (ref 22–31)
CREAT SERPL-MCNC: 0.95 MG/DL — SIGNIFICANT CHANGE UP (ref 0.5–1.3)
GAS PNL BLDA: SIGNIFICANT CHANGE UP
GAS PNL BLDA: SIGNIFICANT CHANGE UP
GLUCOSE SERPL-MCNC: 156 MG/DL — HIGH (ref 70–99)
HCT VFR BLD CALC: 35.4 % — SIGNIFICANT CHANGE UP (ref 34.5–45)
HGB BLD-MCNC: 12 G/DL — SIGNIFICANT CHANGE UP (ref 11.5–15.5)
INR BLD: 1.28 RATIO — HIGH (ref 0.88–1.16)
MCHC RBC-ENTMCNC: 33.8 GM/DL — SIGNIFICANT CHANGE UP (ref 32–36)
MCHC RBC-ENTMCNC: 33.8 PG — SIGNIFICANT CHANGE UP (ref 27–34)
MCV RBC AUTO: 100 FL — SIGNIFICANT CHANGE UP (ref 80–100)
PLATELET # BLD AUTO: 126 K/UL — LOW (ref 150–400)
POTASSIUM SERPL-MCNC: 5.1 MMOL/L — SIGNIFICANT CHANGE UP (ref 3.5–5.3)
POTASSIUM SERPL-SCNC: 5.1 MMOL/L — SIGNIFICANT CHANGE UP (ref 3.5–5.3)
PROT SERPL-MCNC: 5.2 G/DL — LOW (ref 6–8.3)
PROTHROM AB SERPL-ACNC: 14 SEC — HIGH (ref 9.8–12.7)
RBC # BLD: 3.54 M/UL — LOW (ref 3.8–5.2)
RBC # FLD: 18.5 % — HIGH (ref 10.3–14.5)
SODIUM SERPL-SCNC: 137 MMOL/L — SIGNIFICANT CHANGE UP (ref 135–145)
WBC # BLD: 18.2 K/UL — HIGH (ref 3.8–10.5)
WBC # FLD AUTO: 18.2 K/UL — HIGH (ref 3.8–10.5)

## 2017-08-13 PROCEDURE — 93010 ELECTROCARDIOGRAM REPORT: CPT

## 2017-08-13 PROCEDURE — 71010: CPT | Mod: 26

## 2017-08-13 RX ORDER — FUROSEMIDE 40 MG
10 TABLET ORAL DAILY
Qty: 0 | Refills: 0 | Status: DISCONTINUED | OUTPATIENT
Start: 2017-08-13 | End: 2017-08-13

## 2017-08-13 RX ORDER — WARFARIN SODIUM 2.5 MG/1
4 TABLET ORAL ONCE
Qty: 0 | Refills: 0 | Status: COMPLETED | OUTPATIENT
Start: 2017-08-13 | End: 2017-08-13

## 2017-08-13 RX ORDER — POTASSIUM CHLORIDE 20 MEQ
10 PACKET (EA) ORAL
Qty: 0 | Refills: 0 | Status: COMPLETED | OUTPATIENT
Start: 2017-08-13 | End: 2017-08-12

## 2017-08-13 RX ORDER — FUROSEMIDE 40 MG
20 TABLET ORAL ONCE
Qty: 0 | Refills: 0 | Status: COMPLETED | OUTPATIENT
Start: 2017-08-13 | End: 2017-08-13

## 2017-08-13 RX ORDER — FUROSEMIDE 40 MG
10 TABLET ORAL ONCE
Qty: 0 | Refills: 0 | Status: COMPLETED | OUTPATIENT
Start: 2017-08-13 | End: 2017-08-13

## 2017-08-13 RX ORDER — HYDROMORPHONE HYDROCHLORIDE 2 MG/ML
0.25 INJECTION INTRAMUSCULAR; INTRAVENOUS; SUBCUTANEOUS ONCE
Qty: 0 | Refills: 0 | Status: DISCONTINUED | OUTPATIENT
Start: 2017-08-13 | End: 2017-08-13

## 2017-08-13 RX ORDER — MAGNESIUM SULFATE 500 MG/ML
2 VIAL (ML) INJECTION ONCE
Qty: 0 | Refills: 0 | Status: COMPLETED | OUTPATIENT
Start: 2017-08-13 | End: 2017-08-13

## 2017-08-13 RX ADMIN — Medication 100 MILLIGRAM(S): at 21:14

## 2017-08-13 RX ADMIN — Medication 10 MILLIGRAM(S): at 21:14

## 2017-08-13 RX ADMIN — HYDROMORPHONE HYDROCHLORIDE 0.25 MILLIGRAM(S): 2 INJECTION INTRAMUSCULAR; INTRAVENOUS; SUBCUTANEOUS at 23:40

## 2017-08-13 RX ADMIN — Medication 4 MILLIGRAM(S): at 05:03

## 2017-08-13 RX ADMIN — Medication 10 MILLIGRAM(S): at 05:04

## 2017-08-13 RX ADMIN — Medication 120 MILLIGRAM(S): at 10:16

## 2017-08-13 RX ADMIN — Medication 15 MILLIGRAM(S): at 04:08

## 2017-08-13 RX ADMIN — Medication 100 MILLIGRAM(S): at 04:31

## 2017-08-13 RX ADMIN — PANTOPRAZOLE SODIUM 40 MILLIGRAM(S): 20 TABLET, DELAYED RELEASE ORAL at 08:05

## 2017-08-13 RX ADMIN — Medication 15 MILLIGRAM(S): at 17:05

## 2017-08-13 RX ADMIN — Medication 15 MILLIGRAM(S): at 17:33

## 2017-08-13 RX ADMIN — Medication 30 MILLILITER(S): at 14:08

## 2017-08-13 RX ADMIN — Medication 10 MILLIGRAM(S): at 10:15

## 2017-08-13 RX ADMIN — Medication 20 MILLIGRAM(S): at 04:25

## 2017-08-13 RX ADMIN — SODIUM CHLORIDE 10 MILLILITER(S): 9 INJECTION INTRAMUSCULAR; INTRAVENOUS; SUBCUTANEOUS at 06:19

## 2017-08-13 RX ADMIN — Medication 81 MILLIGRAM(S): at 13:13

## 2017-08-13 RX ADMIN — OXYCODONE AND ACETAMINOPHEN 1 TABLET(S): 5; 325 TABLET ORAL at 05:03

## 2017-08-13 RX ADMIN — Medication 50 GRAM(S): at 14:07

## 2017-08-13 RX ADMIN — Medication 15 MILLIGRAM(S): at 21:45

## 2017-08-13 RX ADMIN — Medication 15 MILLIGRAM(S): at 21:14

## 2017-08-13 RX ADMIN — Medication 15 MILLIGRAM(S): at 03:53

## 2017-08-13 RX ADMIN — ATORVASTATIN CALCIUM 40 MILLIGRAM(S): 80 TABLET, FILM COATED ORAL at 21:14

## 2017-08-13 RX ADMIN — OXYCODONE AND ACETAMINOPHEN 1 TABLET(S): 5; 325 TABLET ORAL at 05:40

## 2017-08-13 RX ADMIN — Medication 100 MILLIGRAM(S): at 13:13

## 2017-08-13 RX ADMIN — Medication 15 MILLIGRAM(S): at 11:09

## 2017-08-13 RX ADMIN — Medication 15 MILLIGRAM(S): at 10:16

## 2017-08-13 RX ADMIN — WARFARIN SODIUM 4 MILLIGRAM(S): 2.5 TABLET ORAL at 21:14

## 2017-08-13 RX ADMIN — Medication 10 MILLIGRAM(S): at 13:13

## 2017-08-13 RX ADMIN — MILRINONE LACTATE 3.02 MICROGRAM(S)/KG/MIN: 1 INJECTION, SOLUTION INTRAVENOUS at 21:14

## 2017-08-13 RX ADMIN — HYDROMORPHONE HYDROCHLORIDE 0.25 MILLIGRAM(S): 2 INJECTION INTRAMUSCULAR; INTRAVENOUS; SUBCUTANEOUS at 23:26

## 2017-08-13 RX ADMIN — MILRINONE LACTATE 3.02 MICROGRAM(S)/KG/MIN: 1 INJECTION, SOLUTION INTRAVENOUS at 06:19

## 2017-08-13 NOTE — CHART NOTE - NSCHARTNOTEFT_GEN_A_CORE
I spoke with patient with the help of  #[id number]. I asked the patient about how she was feeling, her pain status, her needs/wants, and discussed with her the process of removing her chest tubes. I then removed her 2 mediastinal tubes without issue, and I left the MICHELLE bulb intact.

## 2017-08-13 NOTE — PHYSICAL THERAPY INITIAL EVALUATION ADULT - PERTINENT HX OF CURRENT PROBLEM, REHAB EVAL
47 year old woman with a history of takayasu arteritis, MR, AR (planned valvular replacement this week), hypertrophic obstructive cardiomyopathy, Afib (on coumadin), VT (AICD in place), with progressive shortness of breath for 8 days, elevated BNP, pulmonary edema, consistent with acute cardiac failure secondary to worsening aortic/mitral valvular insufficiency.

## 2017-08-13 NOTE — DOWNTIME INTERRUPTION NOTE - WHICH MANUAL FORMS INITIATED?
[place] downtime today; system up and running again now. No paper orders were filled out. No new medications were ordered during downtime. Paperwork filled out according to protocol.
No forms used.

## 2017-08-13 NOTE — PHYSICAL THERAPY INITIAL EVALUATION ADULT - ADDITIONAL COMMENTS
8/13 XR chest:  Increased left-sided effusion. Interval removal of [place] catheter. Right lung is clear.

## 2017-08-13 NOTE — PROGRESS NOTE ADULT - ASSESSMENT
47 yrs old female PMH of tTakayasu arteritis, AI, MR,VT  S/P AICD, LVH on methitrexate & prednisone extubated OOB to chair  ASA,coumadin   GI ; Protonix/ reglan  DVT prophiolaxis; Lovenox  cont Primacor drip  I/O  pain management

## 2017-08-14 DIAGNOSIS — Z98.890 OTHER SPECIFIED POSTPROCEDURAL STATES: ICD-10-CM

## 2017-08-14 DIAGNOSIS — J98.11 ATELECTASIS: ICD-10-CM

## 2017-08-14 DIAGNOSIS — Z95.1 PRESENCE OF AORTOCORONARY BYPASS GRAFT: ICD-10-CM

## 2017-08-14 DIAGNOSIS — Z95.2 PRESENCE OF PROSTHETIC HEART VALVE: ICD-10-CM

## 2017-08-14 LAB
ALBUMIN SERPL ELPH-MCNC: 3.1 G/DL — LOW (ref 3.3–5)
ALP SERPL-CCNC: 63 U/L — SIGNIFICANT CHANGE UP (ref 40–120)
ALT FLD-CCNC: 30 U/L RC — SIGNIFICANT CHANGE UP (ref 10–45)
ANION GAP SERPL CALC-SCNC: 8 MMOL/L — SIGNIFICANT CHANGE UP (ref 5–17)
AST SERPL-CCNC: 60 U/L — HIGH (ref 10–40)
BILIRUB SERPL-MCNC: 3 MG/DL — HIGH (ref 0.2–1.2)
BUN SERPL-MCNC: 19 MG/DL — SIGNIFICANT CHANGE UP (ref 7–23)
CA-I BLD-SCNC: 1.08 MMOL/L — LOW (ref 1.12–1.3)
CALCIUM SERPL-MCNC: 8 MG/DL — LOW (ref 8.4–10.5)
CHLORIDE SERPL-SCNC: 100 MMOL/L — SIGNIFICANT CHANGE UP (ref 96–108)
CO2 SERPL-SCNC: 28 MMOL/L — SIGNIFICANT CHANGE UP (ref 22–31)
CREAT SERPL-MCNC: 0.72 MG/DL — SIGNIFICANT CHANGE UP (ref 0.5–1.3)
GLUCOSE SERPL-MCNC: 124 MG/DL — HIGH (ref 70–99)
HCT VFR BLD CALC: 35.6 % — SIGNIFICANT CHANGE UP (ref 34.5–45)
HGB BLD-MCNC: 11.9 G/DL — SIGNIFICANT CHANGE UP (ref 11.5–15.5)
INR BLD: 1.22 RATIO — HIGH (ref 0.88–1.16)
MCHC RBC-ENTMCNC: 33.4 PG — SIGNIFICANT CHANGE UP (ref 27–34)
MCHC RBC-ENTMCNC: 33.5 GM/DL — SIGNIFICANT CHANGE UP (ref 32–36)
MCV RBC AUTO: 99.9 FL — SIGNIFICANT CHANGE UP (ref 80–100)
PLATELET # BLD AUTO: 98 K/UL — LOW (ref 150–400)
POTASSIUM SERPL-MCNC: 4.1 MMOL/L — SIGNIFICANT CHANGE UP (ref 3.5–5.3)
POTASSIUM SERPL-SCNC: 4.1 MMOL/L — SIGNIFICANT CHANGE UP (ref 3.5–5.3)
PROT SERPL-MCNC: 5.2 G/DL — LOW (ref 6–8.3)
PROTHROM AB SERPL-ACNC: 13.4 SEC — HIGH (ref 9.8–12.7)
RBC # BLD: 3.56 M/UL — LOW (ref 3.8–5.2)
RBC # FLD: 17.4 % — HIGH (ref 10.3–14.5)
SODIUM SERPL-SCNC: 136 MMOL/L — SIGNIFICANT CHANGE UP (ref 135–145)
WBC # BLD: 14.4 K/UL — HIGH (ref 3.8–10.5)
WBC # FLD AUTO: 14.4 K/UL — HIGH (ref 3.8–10.5)

## 2017-08-14 PROCEDURE — 71010: CPT | Mod: 26

## 2017-08-14 PROCEDURE — 71010: CPT | Mod: 26,77

## 2017-08-14 PROCEDURE — 93010 ELECTROCARDIOGRAM REPORT: CPT

## 2017-08-14 RX ORDER — ENOXAPARIN SODIUM 100 MG/ML
40 INJECTION SUBCUTANEOUS DAILY
Qty: 0 | Refills: 0 | Status: DISCONTINUED | OUTPATIENT
Start: 2017-08-14 | End: 2017-08-15

## 2017-08-14 RX ORDER — METOPROLOL TARTRATE 50 MG
25 TABLET ORAL EVERY 8 HOURS
Qty: 0 | Refills: 0 | Status: DISCONTINUED | OUTPATIENT
Start: 2017-08-14 | End: 2017-08-15

## 2017-08-14 RX ORDER — KETOROLAC TROMETHAMINE 30 MG/ML
15 SYRINGE (ML) INJECTION EVERY 6 HOURS
Qty: 0 | Refills: 0 | Status: DISCONTINUED | OUTPATIENT
Start: 2017-08-14 | End: 2017-08-16

## 2017-08-14 RX ORDER — IPRATROPIUM/ALBUTEROL SULFATE 18-103MCG
3 AEROSOL WITH ADAPTER (GRAM) INHALATION EVERY 6 HOURS
Qty: 0 | Refills: 0 | Status: DISCONTINUED | OUTPATIENT
Start: 2017-08-14 | End: 2017-08-15

## 2017-08-14 RX ORDER — HYDRALAZINE HCL 50 MG
25 TABLET ORAL EVERY 8 HOURS
Qty: 0 | Refills: 0 | Status: DISCONTINUED | OUTPATIENT
Start: 2017-08-14 | End: 2017-08-15

## 2017-08-14 RX ORDER — HYDRALAZINE HCL 50 MG
5 TABLET ORAL ONCE
Qty: 0 | Refills: 0 | Status: COMPLETED | OUTPATIENT
Start: 2017-08-14 | End: 2017-08-14

## 2017-08-14 RX ORDER — FUROSEMIDE 40 MG
20 TABLET ORAL DAILY
Qty: 0 | Refills: 0 | Status: DISCONTINUED | OUTPATIENT
Start: 2017-08-14 | End: 2017-08-14

## 2017-08-14 RX ORDER — WARFARIN SODIUM 2.5 MG/1
4 TABLET ORAL ONCE
Qty: 0 | Refills: 0 | Status: DISCONTINUED | OUTPATIENT
Start: 2017-08-14 | End: 2017-08-14

## 2017-08-14 RX ORDER — HYDRALAZINE HCL 50 MG
10 TABLET ORAL EVERY 8 HOURS
Qty: 0 | Refills: 0 | Status: DISCONTINUED | OUTPATIENT
Start: 2017-08-14 | End: 2017-08-14

## 2017-08-14 RX ORDER — BUDESONIDE, MICRONIZED 100 %
0.25 POWDER (GRAM) MISCELLANEOUS DAILY
Qty: 0 | Refills: 0 | Status: DISCONTINUED | OUTPATIENT
Start: 2017-08-14 | End: 2017-08-20

## 2017-08-14 RX ORDER — WARFARIN SODIUM 2.5 MG/1
4 TABLET ORAL ONCE
Qty: 0 | Refills: 0 | Status: COMPLETED | OUTPATIENT
Start: 2017-08-14 | End: 2017-08-14

## 2017-08-14 RX ADMIN — Medication 15 MILLIGRAM(S): at 04:50

## 2017-08-14 RX ADMIN — Medication 15 MILLIGRAM(S): at 20:45

## 2017-08-14 RX ADMIN — Medication 15 MILLIGRAM(S): at 13:44

## 2017-08-14 RX ADMIN — ENOXAPARIN SODIUM 40 MILLIGRAM(S): 100 INJECTION SUBCUTANEOUS at 11:37

## 2017-08-14 RX ADMIN — Medication 0.25 MILLIGRAM(S): at 11:42

## 2017-08-14 RX ADMIN — WARFARIN SODIUM 4 MILLIGRAM(S): 2.5 TABLET ORAL at 21:45

## 2017-08-14 RX ADMIN — Medication 100 MILLIGRAM(S): at 21:45

## 2017-08-14 RX ADMIN — Medication 25 MILLIGRAM(S): at 21:45

## 2017-08-14 RX ADMIN — Medication 5 MILLIGRAM(S): at 04:50

## 2017-08-14 RX ADMIN — Medication 15 MILLIGRAM(S): at 09:05

## 2017-08-14 RX ADMIN — ATORVASTATIN CALCIUM 40 MILLIGRAM(S): 80 TABLET, FILM COATED ORAL at 21:45

## 2017-08-14 RX ADMIN — Medication 15 MILLIGRAM(S): at 14:14

## 2017-08-14 RX ADMIN — Medication 4 MILLIGRAM(S): at 05:36

## 2017-08-14 RX ADMIN — Medication 81 MILLIGRAM(S): at 11:37

## 2017-08-14 RX ADMIN — Medication 10 MILLIGRAM(S): at 08:50

## 2017-08-14 RX ADMIN — Medication 10 MILLIGRAM(S): at 05:36

## 2017-08-14 RX ADMIN — Medication 15 MILLIGRAM(S): at 08:50

## 2017-08-14 RX ADMIN — PANTOPRAZOLE SODIUM 40 MILLIGRAM(S): 20 TABLET, DELAYED RELEASE ORAL at 07:49

## 2017-08-14 RX ADMIN — Medication 3 MILLILITER(S): at 17:50

## 2017-08-14 RX ADMIN — Medication 25 MILLIGRAM(S): at 12:54

## 2017-08-14 RX ADMIN — Medication 3 MILLILITER(S): at 23:49

## 2017-08-14 RX ADMIN — Medication 15 MILLIGRAM(S): at 20:20

## 2017-08-14 RX ADMIN — Medication 20 MILLIGRAM(S): at 06:10

## 2017-08-14 RX ADMIN — Medication 100 MILLIGRAM(S): at 05:36

## 2017-08-14 RX ADMIN — Medication 3 MILLILITER(S): at 11:41

## 2017-08-14 NOTE — PROGRESS NOTE ADULT - ASSESSMENT
Mrs. 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  On milrinone, d/c yesterday.  Prolonged QTC and pr interval, NO BETA BLOCKERS.  HTN , hydralazine started.  Atelectasis, Duoneb and Pulmicort started.   Anticoagulated for MVR and afib.  Transferred to sdu 8/14

## 2017-08-14 NOTE — PROGRESS NOTE ADULT - ASSESSMENT
47 F POD # 3 s/p CABG x 1 with SVG, Bental, AVR(t), MVR (T), Ablation doing well.  Pt hemodynamically stable on low dose primacor for inotropic support, extubated, comfortable.  Pt was on preop Sotalol preop for pAF however all av magalie blocking agents currently being held for qTc >600.  Pt recieved Magnesium infusion yesterday for prolonged qT.  Currently stable

## 2017-08-14 NOTE — PROGRESS NOTE ADULT - PROBLEM SELECTOR PLAN 1
s/p CABG x 1 with SVG, Bental, AVR(t), MVR (T), Ablation  - Coumadin for MVR  - Gentle Diuresis  - ASA, Statin  - Hold AV magalie blocking agents for prolonged qTc.  Daily EKG  - d/c Christian drain  - Glucose Control  - Pain Control  - Physical Therapy  - Possible transfer to SDU

## 2017-08-14 NOTE — PROGRESS NOTE ADULT - SUBJECTIVE AND OBJECTIVE BOX
Operation/Date: 8/11/17 CABG x 1 with SVG, Bental, AVR(t), MVR (T), Ablation  POD: 3  Narrative:  pt seen and examined. no complaints     Vital Signs Last 24 Hrs  T(C): 37 (14 Aug 2017 00:00), Max: 37.4 (13 Aug 2017 04:00)  T(F): 98.6 (14 Aug 2017 00:00), Max: 99.3 (13 Aug 2017 04:00)  HR: 71 (14 Aug 2017 00:00) (59 - 72)  BP: 145/68 (14 Aug 2017 00:00) (127/60 - 170/72)  BP(mean): 98 (14 Aug 2017 00:00) (85 - 105)  RR: 26 (14 Aug 2017 00:00) (12 - 35)  SpO2: 100% (14 Aug 2017 00:00) (97% - 100%)    PHYSICAL EXAM:    General: awake, alert, extubated, comfortable  Neurological: no focal neurologic deficits, following commands  Cardiovascular: +S1, S2, regular rate and rhythm, no murmurs appreciated  Respiratory:  clear to auscultation bilaterally, no wheeze, no rales  Gastrointestinal:  + bowel sounds, soft, non-tender, non-distended  Extremities:  warm, dry, no edema, + b/l palpable lower extremity pulses, + SCDs in place  Incision Sites: Chest dressing clean, dry and intact.  RLE dressing over thigh clean, dry and intact    Extubation within 24 hours?  Yes    Chest Tube?  No. AIR LEAKS: No   Christian Drain?  Yes (Mediastinal)  Epicardial Wires? Yes.  Herrera? Yes      Does the patient have a history of kidney disease? No  -give CKD stage if applicable:  -what is patient's baseline Creatinine: 0.88  Does the patient currently have renal failure? No    Beta Blockers contraindicated for the first 24 hours due to vasopressor/inotropic medication:  -If on pressors, indication:     Did the patient receive transfusion of blood and/or blood products? Yes  -If yes, indication: Acute blood loss anemia     DVT Prophylaxis: SCDs/Coumadin  GI Prophylaxis: Protonix    Gloria-operative antibiotics discontinued within 48 hours of CTU admission? Yes  -name/date/time of discontinue:  Nba 8/13 @ 0431    Radiology and labs reviewed.    Patient care discussed on morning interdisciplinary rounds with CTS team.

## 2017-08-14 NOTE — PROGRESS NOTE ADULT - SUBJECTIVE AND OBJECTIVE BOX
VITAL SIGNS    Telemetry:  S1 S2 , RRR     Vital Signs Last 24 Hrs  T(C): 36.5 (17 @ 15:11), Max: 37 (17 @ 00:00)  T(F): 97.7 (17 @ 15:11), Max: 98.6 (17 @ 00:00)  HR: 89 (17 @ 15:11) (61 - 91)  BP: 176/79 (17 @ 15:11) (122/65 - 176/79)  RR: 19 (17 @ 15:11) (13 - 28)  SpO2: 100% (17 @ 15:11) (96% - 100%)                    @ 07:01  -   @ 07:00  --------------------------------------------------------  IN: 317 mL / OUT: 2125 mL / NET: -1808 mL     @ 07:01  -   @ 15:19  --------------------------------------------------------  IN: 340 mL / OUT: 1200 mL / NET: -860 mL          Daily     Daily Weight in k.2 (14 Aug 2017 00:00)        CAPILLARY BLOOD GLUCOSE                Pacing Wires        [ x ]   Settings:                                  Isolated  [  ]    Coumadin    [x ] YES          [  ]      NO         Reason:   mvr t                            PHYSICAL EXAM        Neurology: alert and oriented x 3, nonfocal, no gross deficits    CV : S1 S2 , RRR     Sternal Wound :  CDI , Stable    Lungs: diminished bases    Abdomen: soft, nontender, nondistended, positive bowel sounds,    :           voiding    Extremities:       -  edema, negative calve tenderness,   leg incision cdi           sodium chloride 0.9%. 1000 milliLiter(s) IV Continuous <Continuous>  oxyCODONE    5 mG/acetaminophen 325 mG 1 Tablet(s) Oral every 6 hours PRN  oxyCODONE    5 mG/acetaminophen 325 mG 2 Tablet(s) Oral every 6 hours PRN  docusate sodium 100 milliGRAM(s) Oral three times a day  predniSONE   Tablet 4 milliGRAM(s) Oral daily  atorvastatin 40 milliGRAM(s) Oral at bedtime  aspirin  chewable 81 milliGRAM(s) Oral daily  ondansetron Injectable 4 milliGRAM(s) IV Push every 4 hours PRN  pantoprazole    Tablet 40 milliGRAM(s) Oral before breakfast  ALBUTerol/ipratropium for Nebulization 3 milliLiter(s) Nebulizer every 6 hours  buDESOnide   0.25 milliGRAM(s) Respule 0.25 milliGRAM(s) Inhalation daily  enoxaparin Injectable 40 milliGRAM(s) SubCutaneous daily  hydrALAZINE 25 milliGRAM(s) Oral every 8 hours  ketorolac   Injectable 15 milliGRAM(s) IV Push every 6 hours                              Physical Therapy Rec:   Home  [  ]   Home w/ PT  [  ]  Rehab  [  ]    Discussed with Cardiothoracic Team at AM rounds.

## 2017-08-15 DIAGNOSIS — I48.0 PAROXYSMAL ATRIAL FIBRILLATION: ICD-10-CM

## 2017-08-15 LAB
ANION GAP SERPL CALC-SCNC: 12 MMOL/L — SIGNIFICANT CHANGE UP (ref 5–17)
ANION GAP SERPL CALC-SCNC: 16 MMOL/L — SIGNIFICANT CHANGE UP (ref 5–17)
BASOPHILS # BLD AUTO: 0 K/UL — SIGNIFICANT CHANGE UP (ref 0–0.2)
BASOPHILS NFR BLD AUTO: 0.2 % — SIGNIFICANT CHANGE UP (ref 0–2)
BUN SERPL-MCNC: 11 MG/DL — SIGNIFICANT CHANGE UP (ref 7–23)
BUN SERPL-MCNC: 11 MG/DL — SIGNIFICANT CHANGE UP (ref 7–23)
CALCIUM SERPL-MCNC: 8.1 MG/DL — LOW (ref 8.4–10.5)
CALCIUM SERPL-MCNC: 8.3 MG/DL — LOW (ref 8.4–10.5)
CHLORIDE SERPL-SCNC: 101 MMOL/L — SIGNIFICANT CHANGE UP (ref 96–108)
CHLORIDE SERPL-SCNC: 102 MMOL/L — SIGNIFICANT CHANGE UP (ref 96–108)
CO2 SERPL-SCNC: 20 MMOL/L — LOW (ref 22–31)
CO2 SERPL-SCNC: 22 MMOL/L — SIGNIFICANT CHANGE UP (ref 22–31)
CREAT SERPL-MCNC: 0.6 MG/DL — SIGNIFICANT CHANGE UP (ref 0.5–1.3)
CREAT SERPL-MCNC: 0.61 MG/DL — SIGNIFICANT CHANGE UP (ref 0.5–1.3)
EOSINOPHIL # BLD AUTO: 0 K/UL — SIGNIFICANT CHANGE UP (ref 0–0.5)
EOSINOPHIL NFR BLD AUTO: 0.3 % — SIGNIFICANT CHANGE UP (ref 0–6)
GLUCOSE SERPL-MCNC: 120 MG/DL — HIGH (ref 70–99)
GLUCOSE SERPL-MCNC: 145 MG/DL — HIGH (ref 70–99)
HCT VFR BLD CALC: 38.4 % — SIGNIFICANT CHANGE UP (ref 34.5–45)
HGB BLD-MCNC: 12.6 G/DL — SIGNIFICANT CHANGE UP (ref 11.5–15.5)
INR BLD: 1.31 RATIO — HIGH (ref 0.88–1.16)
INR BLD: 1.73 RATIO — HIGH (ref 0.88–1.16)
LYMPHOCYTES # BLD AUTO: 1.6 K/UL — SIGNIFICANT CHANGE UP (ref 1–3.3)
LYMPHOCYTES # BLD AUTO: 12.4 % — LOW (ref 13–44)
MAGNESIUM SERPL-MCNC: 2.1 MG/DL — SIGNIFICANT CHANGE UP (ref 1.6–2.6)
MCHC RBC-ENTMCNC: 32.7 PG — SIGNIFICANT CHANGE UP (ref 27–34)
MCHC RBC-ENTMCNC: 32.9 GM/DL — SIGNIFICANT CHANGE UP (ref 32–36)
MCV RBC AUTO: 99.3 FL — SIGNIFICANT CHANGE UP (ref 80–100)
MONOCYTES # BLD AUTO: 1 K/UL — HIGH (ref 0–0.9)
MONOCYTES NFR BLD AUTO: 8.1 % — SIGNIFICANT CHANGE UP (ref 2–14)
NEUTROPHILS # BLD AUTO: 10.1 K/UL — HIGH (ref 1.8–7.4)
NEUTROPHILS NFR BLD AUTO: 79.1 % — HIGH (ref 43–77)
PLATELET # BLD AUTO: 127 K/UL — LOW (ref 150–400)
POTASSIUM SERPL-MCNC: 3.8 MMOL/L — SIGNIFICANT CHANGE UP (ref 3.5–5.3)
POTASSIUM SERPL-MCNC: 4.3 MMOL/L — SIGNIFICANT CHANGE UP (ref 3.5–5.3)
POTASSIUM SERPL-SCNC: 3.8 MMOL/L — SIGNIFICANT CHANGE UP (ref 3.5–5.3)
POTASSIUM SERPL-SCNC: 4.3 MMOL/L — SIGNIFICANT CHANGE UP (ref 3.5–5.3)
PROTHROM AB SERPL-ACNC: 14.4 SEC — HIGH (ref 9.8–12.7)
PROTHROM AB SERPL-ACNC: 19.1 SEC — HIGH (ref 9.8–12.7)
RBC # BLD: 3.87 M/UL — SIGNIFICANT CHANGE UP (ref 3.8–5.2)
RBC # FLD: 17.1 % — HIGH (ref 10.3–14.5)
SODIUM SERPL-SCNC: 136 MMOL/L — SIGNIFICANT CHANGE UP (ref 135–145)
SODIUM SERPL-SCNC: 137 MMOL/L — SIGNIFICANT CHANGE UP (ref 135–145)
WBC # BLD: 12.7 K/UL — HIGH (ref 3.8–10.5)
WBC # FLD AUTO: 12.7 K/UL — HIGH (ref 3.8–10.5)

## 2017-08-15 PROCEDURE — 93010 ELECTROCARDIOGRAM REPORT: CPT

## 2017-08-15 PROCEDURE — 71010: CPT | Mod: 26

## 2017-08-15 PROCEDURE — 99223 1ST HOSP IP/OBS HIGH 75: CPT | Mod: GC

## 2017-08-15 RX ORDER — HYDRALAZINE HCL 50 MG
5 TABLET ORAL ONCE
Qty: 0 | Refills: 0 | Status: COMPLETED | OUTPATIENT
Start: 2017-08-15 | End: 2017-08-15

## 2017-08-15 RX ORDER — MAGNESIUM SULFATE 500 MG/ML
1 VIAL (ML) INJECTION ONCE
Qty: 0 | Refills: 0 | Status: COMPLETED | OUTPATIENT
Start: 2017-08-15 | End: 2017-08-15

## 2017-08-15 RX ORDER — POTASSIUM CHLORIDE 20 MEQ
40 PACKET (EA) ORAL ONCE
Qty: 0 | Refills: 0 | Status: COMPLETED | OUTPATIENT
Start: 2017-08-15 | End: 2017-08-15

## 2017-08-15 RX ORDER — METOPROLOL TARTRATE 50 MG
5 TABLET ORAL
Qty: 0 | Refills: 0 | Status: COMPLETED | OUTPATIENT
Start: 2017-08-15 | End: 2017-08-15

## 2017-08-15 RX ORDER — METOPROLOL TARTRATE 50 MG
5 TABLET ORAL ONCE
Qty: 0 | Refills: 0 | Status: COMPLETED | OUTPATIENT
Start: 2017-08-15 | End: 2017-08-15

## 2017-08-15 RX ORDER — METOPROLOL TARTRATE 50 MG
25 TABLET ORAL ONCE
Qty: 0 | Refills: 0 | Status: COMPLETED | OUTPATIENT
Start: 2017-08-15 | End: 2017-08-15

## 2017-08-15 RX ORDER — METOPROLOL TARTRATE 50 MG
25 TABLET ORAL EVERY 8 HOURS
Qty: 0 | Refills: 0 | Status: DISCONTINUED | OUTPATIENT
Start: 2017-08-15 | End: 2017-08-19

## 2017-08-15 RX ORDER — WARFARIN SODIUM 2.5 MG/1
4 TABLET ORAL ONCE
Qty: 0 | Refills: 0 | Status: DISCONTINUED | OUTPATIENT
Start: 2017-08-15 | End: 2017-08-15

## 2017-08-15 RX ORDER — METOPROLOL TARTRATE 50 MG
75 TABLET ORAL EVERY 8 HOURS
Qty: 0 | Refills: 0 | Status: DISCONTINUED | OUTPATIENT
Start: 2017-08-15 | End: 2017-08-15

## 2017-08-15 RX ORDER — METOPROLOL TARTRATE 50 MG
50 TABLET ORAL EVERY 8 HOURS
Qty: 0 | Refills: 0 | Status: DISCONTINUED | OUTPATIENT
Start: 2017-08-15 | End: 2017-08-15

## 2017-08-15 RX ORDER — METOPROLOL TARTRATE 50 MG
100 TABLET ORAL EVERY 8 HOURS
Qty: 0 | Refills: 0 | Status: DISCONTINUED | OUTPATIENT
Start: 2017-08-15 | End: 2017-08-15

## 2017-08-15 RX ORDER — ENOXAPARIN SODIUM 100 MG/ML
30 INJECTION SUBCUTANEOUS EVERY 12 HOURS
Qty: 0 | Refills: 0 | Status: DISCONTINUED | OUTPATIENT
Start: 2017-08-15 | End: 2017-08-16

## 2017-08-15 RX ORDER — WARFARIN SODIUM 2.5 MG/1
1 TABLET ORAL ONCE
Qty: 0 | Refills: 0 | Status: COMPLETED | OUTPATIENT
Start: 2017-08-15 | End: 2017-08-15

## 2017-08-15 RX ADMIN — Medication 40 MILLIEQUIVALENT(S): at 02:42

## 2017-08-15 RX ADMIN — Medication 15 MILLIGRAM(S): at 07:53

## 2017-08-15 RX ADMIN — Medication 100 MILLIGRAM(S): at 14:26

## 2017-08-15 RX ADMIN — Medication 25 MILLIGRAM(S): at 06:22

## 2017-08-15 RX ADMIN — Medication 5 MILLIGRAM(S): at 02:41

## 2017-08-15 RX ADMIN — Medication 0.25 MILLIGRAM(S): at 13:22

## 2017-08-15 RX ADMIN — Medication 15 MILLIGRAM(S): at 23:24

## 2017-08-15 RX ADMIN — Medication 100 MILLIGRAM(S): at 22:53

## 2017-08-15 RX ADMIN — Medication 100 MILLIGRAM(S): at 06:19

## 2017-08-15 RX ADMIN — Medication 5 MILLIGRAM(S): at 10:15

## 2017-08-15 RX ADMIN — Medication 100 GRAM(S): at 02:09

## 2017-08-15 RX ADMIN — Medication 25 MILLIGRAM(S): at 06:33

## 2017-08-15 RX ADMIN — Medication 25 MILLIGRAM(S): at 10:16

## 2017-08-15 RX ADMIN — Medication 15 MILLIGRAM(S): at 13:30

## 2017-08-15 RX ADMIN — Medication 15 MILLIGRAM(S): at 08:10

## 2017-08-15 RX ADMIN — OXYCODONE AND ACETAMINOPHEN 1 TABLET(S): 5; 325 TABLET ORAL at 18:17

## 2017-08-15 RX ADMIN — Medication 100 MILLIGRAM(S): at 13:22

## 2017-08-15 RX ADMIN — Medication 5 MILLIGRAM(S): at 06:20

## 2017-08-15 RX ADMIN — ATORVASTATIN CALCIUM 40 MILLIGRAM(S): 80 TABLET, FILM COATED ORAL at 22:53

## 2017-08-15 RX ADMIN — Medication 5 MILLIGRAM(S): at 01:29

## 2017-08-15 RX ADMIN — Medication 100 GRAM(S): at 10:16

## 2017-08-15 RX ADMIN — OXYCODONE AND ACETAMINOPHEN 2 TABLET(S): 5; 325 TABLET ORAL at 22:54

## 2017-08-15 RX ADMIN — Medication 15 MILLIGRAM(S): at 03:30

## 2017-08-15 RX ADMIN — PANTOPRAZOLE SODIUM 40 MILLIGRAM(S): 20 TABLET, DELAYED RELEASE ORAL at 06:20

## 2017-08-15 RX ADMIN — Medication 4 MILLIGRAM(S): at 06:19

## 2017-08-15 RX ADMIN — ENOXAPARIN SODIUM 30 MILLIGRAM(S): 100 INJECTION SUBCUTANEOUS at 17:56

## 2017-08-15 RX ADMIN — Medication 5 MILLIGRAM(S): at 12:00

## 2017-08-15 RX ADMIN — WARFARIN SODIUM 1 MILLIGRAM(S): 2.5 TABLET ORAL at 22:53

## 2017-08-15 RX ADMIN — OXYCODONE AND ACETAMINOPHEN 1 TABLET(S): 5; 325 TABLET ORAL at 17:56

## 2017-08-15 RX ADMIN — Medication 15 MILLIGRAM(S): at 13:22

## 2017-08-15 RX ADMIN — Medication 5 MILLIGRAM(S): at 02:09

## 2017-08-15 RX ADMIN — Medication 5 MILLIGRAM(S): at 04:12

## 2017-08-15 RX ADMIN — ENOXAPARIN SODIUM 40 MILLIGRAM(S): 100 INJECTION SUBCUTANEOUS at 10:16

## 2017-08-15 RX ADMIN — Medication 5 MILLIGRAM(S): at 12:15

## 2017-08-15 RX ADMIN — Medication 5 MILLIGRAM(S): at 01:09

## 2017-08-15 RX ADMIN — Medication 81 MILLIGRAM(S): at 10:16

## 2017-08-15 RX ADMIN — Medication 25 MILLIGRAM(S): at 22:53

## 2017-08-15 RX ADMIN — Medication 15 MILLIGRAM(S): at 02:59

## 2017-08-15 RX ADMIN — OXYCODONE AND ACETAMINOPHEN 2 TABLET(S): 5; 325 TABLET ORAL at 23:24

## 2017-08-15 RX ADMIN — Medication 15 MILLIGRAM(S): at 22:52

## 2017-08-15 NOTE — PROVIDER CONTACT NOTE (CHANGE IN STATUS NOTIFICATION) - BACKGROUND
8/9 Cath 8/11 C1, Bental, AVR, MVR, radiofrequency ablation  Hx: Takayasu arteritis, Aortic insuff, MR, PAF, Vtach , AICD, LVH

## 2017-08-15 NOTE — CONSULT NOTE ADULT - SUBJECTIVE AND OBJECTIVE BOX
Date of Admission:    Patient is a 47y old  Female who presents with a chief complaint of SOB (06 Aug 2017 11:09)      HISTORY OF PRESENT ILLNESS:     47F hx of Takayasu arteritis, pAF on coumadin, HCM, VT s/p AICD, MVP/MVR, AI, p/w 8 days of NORRIS now s/p CABG x1, Bentall procedure with MVR/AVR with RFA on  post op course with afib. and prolonged QTc EP consulted for further management.         Allergies    No Known Allergies    Intolerances    	    MEDICATIONS:  aspirin  chewable 81 milliGRAM(s) Oral daily  enoxaparin Injectable 40 milliGRAM(s) SubCutaneous daily  hydrALAZINE 25 milliGRAM(s) Oral every 8 hours  metoprolol 50 milliGRAM(s) Oral every 8 hours      buDESOnide   0.25 milliGRAM(s) Respule 0.25 milliGRAM(s) Inhalation daily    oxyCODONE    5 mG/acetaminophen 325 mG 1 Tablet(s) Oral every 6 hours PRN  oxyCODONE    5 mG/acetaminophen 325 mG 2 Tablet(s) Oral every 6 hours PRN  ketorolac   Injectable 15 milliGRAM(s) IV Push every 6 hours    docusate sodium 100 milliGRAM(s) Oral three times a day  pantoprazole    Tablet 40 milliGRAM(s) Oral before breakfast    predniSONE   Tablet 4 milliGRAM(s) Oral daily  atorvastatin 40 milliGRAM(s) Oral at bedtime        PAST MEDICAL & SURGICAL HISTORY:  Hypertrophic cardiomyopathy  Takayasu's arteritis  AICD (automatic cardioverter/defibrillator) present  Hypertension  Non-sustained ventricular tachycardia  Atrial fibrillation, chronic  H/O  section: x2  H/O cardiac pacemaker  History of appendectomy      FAMILY HISTORY:  Family history of hypertension (Child)      SOCIAL HISTORY:     Non-smoker        REVIEW OF SYSTEMS:  See HPI. Otherwise, 10 point ROS done and otherwise negative.    PHYSICAL EXAM:  T(C): 36.7 (08-15-17 @ 06:54), Max: 36.8 (17 @ 11:00)  HR: 130 (08-15-17 @ 06:54) (81 - 140)  BP: 104/67 (08-15-17 @ 06:54) (104/67 - 182/81)  RR: 18 (08-15-17 @ 06:54) (18 - 28)  SpO2: 96% (08-15-17 @ 06:54) (96% - 100%)  Wt(kg): --  I&O's Summary    14 Aug 2017 07:01  -  15 Aug 2017 07:00  --------------------------------------------------------  IN: 640 mL / OUT: 1850 mL / NET: -1210 mL        Appearance: Normal	  HEENT:   Normal oral mucosa, PERRL, EOMI	  Lymphatic: No lymphadenopathy  Cardiovascular: Normal S1 S2, No JVD, No murmurs, No edema  Respiratory: Lungs clear to auscultation	  Psychiatry: A & O x 3, Mood & affect appropriate  Gastrointestinal:  Soft, Non-tender, + BS	  Skin: No rashes, No ecchymoses, No cyanosis	  Neurologic: Non-focal  Extremities: Normal range of motion, No clubbing, cyanosis or edema  Vascular: Peripheral pulses palpable 2+ bilaterally        LABS:	 	    CBC Full  -  ( 15 Aug 2017 01:42 )  WBC Count : 12.7 K/uL  Hemoglobin : 12.6 g/dL  Hematocrit : 38.4 %  Platelet Count - Automated : 127 K/uL  Mean Cell Volume : 99.3 fl  Mean Cell Hemoglobin : 32.7 pg  Mean Cell Hemoglobin Concentration : 32.9 gm/dL  Auto Neutrophil # : 10.1 K/uL  Auto Lymphocyte # : 1.6 K/uL  Auto Monocyte # : 1.0 K/uL  Auto Eosinophil # : 0.0 K/uL  Auto Basophil # : 0.0 K/uL  Auto Neutrophil % : 79.1 %  Auto Lymphocyte % : 12.4 %  Auto Monocyte % : 8.1 %  Auto Eosinophil % : 0.3 %  Auto Basophil % : 0.2 %    08-15    137  |  101  |  11  ----------------------------<  145<H>  3.8   |  20<L>  |  0.61      136  |  100  |  19  ----------------------------<  124<H>  4.1   |  28  |  0.72    Ca    8.1<L>      15 Aug 2017 01:42  Ca    8.0<L>      14 Aug 2017 01:28  Mg     2.1     08-15    TPro  5.2<L>  /  Alb  3.1<L>  /  TBili  3.0<H>  /  DBili  x   /  AST  60<H>  /  ALT  30  /  AlkPhos  63      TELEMETRY: 	      ECG:  	sinus w/1st degree block, RBBB    PREVIOUS DIAGNOSTIC TESTING:    [ ] Echocardiogram: < from: Transthoracic Echocardiogram (17 @ 10:19) >  LA:     5.0    2.0 - 4.0 cm  Ao:            2.0 - 3.8 cm  SEPTUM: 1.6    0.6 - 1.2 cm  PWT:    1.4    0.6 - 1.1 cm  LVIDd:  4.2    3.0 - 5.6 cm  LVIDs:  2.7    1.8 - 4.0 cm  Derived variables:  LVMI: 157 g/m2  RWT: 0.66  Fractional short: 36 %  EF (Visual Estimate): 60 %    Conclusions:  1. The interventricular septum is severly hypertrophied  (1.6cm).  2. Normal left ventricular systolic function. No segmental  wall motion abnormalities.  3. Moderate diastolic dysfunction (Stage II).  4. Normal right ventricular size and function. A device  wire is noted in the right heart.  5. Estimated pulmonary artery systolic pressure equals 42  mm Hg, assuming right atrial pressure equals 8 mm Hg,  consistent with mild pulmonary pressures.  6. Normal pericardium with trace pericardial effusion.    [ ]  Catheterization: 04.20.15 @ 15:05)   CORONARY VESSELS: The coronary circulation is right dominant.  LM:   --  LM: Normal.  LAD:   --  LAD: Angiography showed minor luminal irregularities with no  flow limiting lesions.  --  Proximal LAD: There was a discrete 20 % stenosis.  CX:   --  Circumflex: Normal.  RCA:   --  Proximal RCA: Angiography showed minor luminal irregularities  with no flow limiting lesions. There was a tubular 20 % stenosis.  COMPLICATIONS: There were no complications.    ASSESSMENT/PLAN: Date of Admission:    Patient is a 47y old  Female who presents with a chief complaint of SOB (06 Aug 2017 11:09)      HISTORY OF PRESENT ILLNESS:     47F hx of Takayasu arteritis, pAF on coumadin, HCM, VT s/p AICD, MVP/MVR, AI, p/w 8 days of NORRIS now s/p CABG x1, Bentall procedure with MVR/AVR with RFA on  post op course with afib. and prolonged QTc EP consulted for further management.         Allergies    No Known Allergies    Intolerances    	    MEDICATIONS:  aspirin  chewable 81 milliGRAM(s) Oral daily  enoxaparin Injectable 40 milliGRAM(s) SubCutaneous daily  hydrALAZINE 25 milliGRAM(s) Oral every 8 hours  metoprolol 50 milliGRAM(s) Oral every 8 hours      buDESOnide   0.25 milliGRAM(s) Respule 0.25 milliGRAM(s) Inhalation daily    oxyCODONE    5 mG/acetaminophen 325 mG 1 Tablet(s) Oral every 6 hours PRN  oxyCODONE    5 mG/acetaminophen 325 mG 2 Tablet(s) Oral every 6 hours PRN  ketorolac   Injectable 15 milliGRAM(s) IV Push every 6 hours    docusate sodium 100 milliGRAM(s) Oral three times a day  pantoprazole    Tablet 40 milliGRAM(s) Oral before breakfast    predniSONE   Tablet 4 milliGRAM(s) Oral daily  atorvastatin 40 milliGRAM(s) Oral at bedtime        PAST MEDICAL & SURGICAL HISTORY:  Hypertrophic cardiomyopathy  Takayasu's arteritis  AICD (automatic cardioverter/defibrillator) present  Hypertension  Non-sustained ventricular tachycardia  Atrial fibrillation, chronic  H/O  section: x2  H/O cardiac pacemaker  History of appendectomy      FAMILY HISTORY:  Family history of hypertension (Child)      SOCIAL HISTORY:     Non-smoker        REVIEW OF SYSTEMS:  See HPI. Otherwise, 10 point ROS done and otherwise negative.    PHYSICAL EXAM:  T(C): 36.7 (08-15-17 @ 06:54), Max: 36.8 (17 @ 11:00)  HR: 130 (08-15-17 @ 06:54) (81 - 140)  BP: 104/67 (08-15-17 @ 06:54) (104/67 - 182/81)  RR: 18 (08-15-17 @ 06:54) (18 - 28)  SpO2: 96% (08-15-17 @ 06:54) (96% - 100%)  Wt(kg): --  I&O's Summary    14 Aug 2017 07:01  -  15 Aug 2017 07:00  --------------------------------------------------------  IN: 640 mL / OUT: 1850 mL / NET: -1210 mL        Appearance: Normal	  HEENT:   Normal oral mucosa, PERRL, EOMI	  Lymphatic: No lymphadenopathy  Cardiovascular: Normal S1 S2, No JVD, No murmurs, No edema  Respiratory: Lungs clear to auscultation	  Psychiatry: A & O x 3, Mood & affect appropriate  Gastrointestinal:  Soft, Non-tender, + BS	  Skin: No rashes, No ecchymoses, No cyanosis	  Neurologic: Non-focal  Extremities: Normal range of motion, No clubbing, cyanosis or edema  Vascular: Peripheral pulses palpable 2+ bilaterally        LABS:	 	    CBC Full  -  ( 15 Aug 2017 01:42 )  WBC Count : 12.7 K/uL  Hemoglobin : 12.6 g/dL  Hematocrit : 38.4 %  Platelet Count - Automated : 127 K/uL  Mean Cell Volume : 99.3 fl  Mean Cell Hemoglobin : 32.7 pg  Mean Cell Hemoglobin Concentration : 32.9 gm/dL  Auto Neutrophil # : 10.1 K/uL  Auto Lymphocyte # : 1.6 K/uL  Auto Monocyte # : 1.0 K/uL  Auto Eosinophil # : 0.0 K/uL  Auto Basophil # : 0.0 K/uL  Auto Neutrophil % : 79.1 %  Auto Lymphocyte % : 12.4 %  Auto Monocyte % : 8.1 %  Auto Eosinophil % : 0.3 %  Auto Basophil % : 0.2 %    08-15    137  |  101  |  11  ----------------------------<  145<H>  3.8   |  20<L>  |  0.61      136  |  100  |  19  ----------------------------<  124<H>  4.1   |  28  |  0.72    Ca    8.1<L>      15 Aug 2017 01:42  Ca    8.0<L>      14 Aug 2017 01:28  Mg     2.1     08-15    TPro  5.2<L>  /  Alb  3.1<L>  /  TBili  3.0<H>  /  DBili  x   /  AST  60<H>  /  ALT  30  /  AlkPhos  63      TELEMETRY: 	  -150s    ECG:  	sinus w/1st degree block, RBBB    PREVIOUS DIAGNOSTIC TESTING:    [ ] Echocardiogram: < from: Transthoracic Echocardiogram (17 @ 10:19) >  LA:     5.0    2.0 - 4.0 cm  Ao:            2.0 - 3.8 cm  SEPTUM: 1.6    0.6 - 1.2 cm  PWT:    1.4    0.6 - 1.1 cm  LVIDd:  4.2    3.0 - 5.6 cm  LVIDs:  2.7    1.8 - 4.0 cm  Derived variables:  LVMI: 157 g/m2  RWT: 0.66  Fractional short: 36 %  EF (Visual Estimate): 60 %    Conclusions:  1. The interventricular septum is severly hypertrophied  (1.6cm).  2. Normal left ventricular systolic function. No segmental  wall motion abnormalities.  3. Moderate diastolic dysfunction (Stage II).  4. Normal right ventricular size and function. A device  wire is noted in the right heart.  5. Estimated pulmonary artery systolic pressure equals 42  mm Hg, assuming right atrial pressure equals 8 mm Hg,  consistent with mild pulmonary pressures.  6. Normal pericardium with trace pericardial effusion.    [ ]  Catheterization: 04.20.15 @ 15:05)   CORONARY VESSELS: The coronary circulation is right dominant.  LM:   --  LM: Normal.  LAD:   --  LAD: Angiography showed minor luminal irregularities with no  flow limiting lesions.  --  Proximal LAD: There was a discrete 20 % stenosis.  CX:   --  Circumflex: Normal.  RCA:   --  Proximal RCA: Angiography showed minor luminal irregularities  with no flow limiting lesions. There was a tubular 20 % stenosis.  COMPLICATIONS: There were no complications.    ASSESSMENT/PLAN: Date of Admission:    Patient is a 47y old  Female who presents with a chief complaint of SOB (06 Aug 2017 11:09)      HISTORY OF PRESENT ILLNESS:     47F hx of Takayasu arteritis, pAF on coumadin, HCM, VT s/p AICD, MVP/MVR, AI, p/w 8 days of NORRIS now s/p CABG x1, Bentall procedure with MVR/AVR with RFA on  post op course with afib. and prolonged QTc EP consulted for further management.  She reports ongoing SOB but denies heart racing/palpitations, nausea/vomiting, lightheadedness, or CP.     Tele -150s    Allergies    No Known Allergies    Intolerances    	    MEDICATIONS:  aspirin  chewable 81 milliGRAM(s) Oral daily  enoxaparin Injectable 40 milliGRAM(s) SubCutaneous daily  hydrALAZINE 25 milliGRAM(s) Oral every 8 hours  metoprolol 50 milliGRAM(s) Oral every 8 hours      buDESOnide   0.25 milliGRAM(s) Respule 0.25 milliGRAM(s) Inhalation daily    oxyCODONE    5 mG/acetaminophen 325 mG 1 Tablet(s) Oral every 6 hours PRN  oxyCODONE    5 mG/acetaminophen 325 mG 2 Tablet(s) Oral every 6 hours PRN  ketorolac   Injectable 15 milliGRAM(s) IV Push every 6 hours    docusate sodium 100 milliGRAM(s) Oral three times a day  pantoprazole    Tablet 40 milliGRAM(s) Oral before breakfast    predniSONE   Tablet 4 milliGRAM(s) Oral daily  atorvastatin 40 milliGRAM(s) Oral at bedtime        PAST MEDICAL & SURGICAL HISTORY:  Hypertrophic cardiomyopathy  Takayasu's arteritis  AICD (automatic cardioverter/defibrillator) present  Hypertension  Non-sustained ventricular tachycardia  Atrial fibrillation, chronic  H/O  section: x2  H/O cardiac pacemaker  History of appendectomy      FAMILY HISTORY:  Family history of hypertension (Child)      SOCIAL HISTORY:     Non-smoker        REVIEW OF SYSTEMS:  See HPI. Otherwise, 10 point ROS done and otherwise negative.    PHYSICAL EXAM:  T(C): 36.7 (08-15-17 @ 06:54), Max: 36.8 (17 @ 11:00)  HR: 130 (08-15-17 @ 06:54) (81 - 140)  BP: 104/67 (08-15-17 @ 06:54) (104/67 - 182/81)  RR: 18 (08-15-17 @ 06:54) (18 - 28)  SpO2: 96% (08-15-17 @ 06:54) (96% - 100%)  Wt(kg): --  I&O's Summary    14 Aug 2017 07:01  -  15 Aug 2017 07:00  --------------------------------------------------------  IN: 640 mL / OUT: 1850 mL / NET: -1210 mL        Appearance: Well appearing, sitting in a chair at bedside, NAD  HEENT:   Normal oral mucosa, PERRL, EOMI	  Cardiovascular: Tachycardic Normal S1 S2, No JVD, No murmurs appreciated, sternal wound dressing c/d/i  Respiratory: Lungs clear to auscultation  Psychiatry: Mood & affect appropriate  Gastrointestinal:  Soft, Non-tender, + BS	  Skin: No rashes, No ecchymoses, No cyanosis	  Neurologic: Non-focal  Extremities: No clubbing, cyanosis or edema  Vascular: Peripheral pulses palpable 2+ bilaterally      LABS:	 	    CBC Full  -  ( 15 Aug 2017 01:42 )  WBC Count : 12.7 K/uL  Hemoglobin : 12.6 g/dL  Hematocrit : 38.4 %  Platelet Count - Automated : 127 K/uL  Mean Cell Volume : 99.3 fl  Mean Cell Hemoglobin : 32.7 pg  Mean Cell Hemoglobin Concentration : 32.9 gm/dL  Auto Neutrophil # : 10.1 K/uL  Auto Lymphocyte # : 1.6 K/uL  Auto Monocyte # : 1.0 K/uL  Auto Eosinophil # : 0.0 K/uL  Auto Basophil # : 0.0 K/uL  Auto Neutrophil % : 79.1 %  Auto Lymphocyte % : 12.4 %  Auto Monocyte % : 8.1 %  Auto Eosinophil % : 0.3 %  Auto Basophil % : 0.2 %    08-15    137  |  101  |  11  ----------------------------<  145<H>  3.8   |  20<L>  |  0.61      136  |  100  |  19  ----------------------------<  124<H>  4.1   |  28  |  0.72    Ca    8.1<L>      15 Aug 2017 01:42  Ca    8.0<L>      14 Aug 2017 01:28  Mg     2.1     08-15    TPro  5.2<L>  /  Alb  3.1<L>  /  TBili  3.0<H>  /  DBili  x   /  AST  60<H>  /  ALT  30  /  AlkPhos  63      TELEMETRY: 	  -150s    ECG:  	sinus w/1st degree block, RBBB    PREVIOUS DIAGNOSTIC TESTING:    [ ] Echocardiogram: < from: Transthoracic Echocardiogram (17 @ 10:19) >  LA:     5.0    2.0 - 4.0 cm  Ao:            2.0 - 3.8 cm  SEPTUM: 1.6    0.6 - 1.2 cm  PWT:    1.4    0.6 - 1.1 cm  LVIDd:  4.2    3.0 - 5.6 cm  LVIDs:  2.7    1.8 - 4.0 cm  Derived variables:  LVMI: 157 g/m2  RWT: 0.66  Fractional short: 36 %  EF (Visual Estimate): 60 %    Conclusions:  1. The interventricular septum is severly hypertrophied  (1.6cm).  2. Normal left ventricular systolic function. No segmental  wall motion abnormalities.  3. Moderate diastolic dysfunction (Stage II).  4. Normal right ventricular size and function. A device  wire is noted in the right heart.  5. Estimated pulmonary artery systolic pressure equals 42  mm Hg, assuming right atrial pressure equals 8 mm Hg,  consistent with mild pulmonary pressures.  6. Normal pericardium with trace pericardial effusion.    [ ]  Catheterization: 04.20.15 @ 15:05)   CORONARY VESSELS: The coronary circulation is right dominant.  LM:   --  LM: Normal.  LAD:   --  LAD: Angiography showed minor luminal irregularities with no  flow limiting lesions.  --  Proximal LAD: There was a discrete 20 % stenosis.  CX:   --  Circumflex: Normal.  RCA:   --  Proximal RCA: Angiography showed minor luminal irregularities  with no flow limiting lesions. There was a tubular 20 % stenosis.  COMPLICATIONS: There were no complications.    ASSESSMENT/PLAN: 	    47F hx of Takayasu arteritis, pAF on coumadin, HCM, VT s/p AICD, MVP/MVR, AI, p/w 8 days of NORRIS now s/p CABG x1, Bentall procedure with MVR/AVR with RFA on  post op course with afib. and prolonged QTc now with AFib w/RVR ~150.    #Afib with RVR; Otherwise hemodynamically stable; On coumadin  - c/w Lopressor 75mg q8 and uptitrate as BP tolerates for rate control  -  Monitor  telemetry    #Prolonged Qtc: possibly in setting of hypocalcemia  - Avoiding QTc prolonging agents  - Monitor daily EKG     Will d/w Dr Kennedy and f/u with primary CTSx team. Date of Admission:    Patient is a 47y old  Female who presents with a chief complaint of SOB (06 Aug 2017 11:09)      HISTORY OF PRESENT ILLNESS:     47F hx of Takayasu arteritis, pAF on coumadin, HCM, VT s/p AICD, MVP/MVR, AI, p/w 8 days of NORRIS now s/p CABG x1, Bentall procedure with MVR/AVR with RFA on  post op course with afib. and prolonged QTc EP consulted for further management.  She reports ongoing SOB but denies heart racing/palpitations, nausea/vomiting, lightheadedness, or CP.     Tele -150s    Allergies    No Known Allergies    Intolerances    	    MEDICATIONS:  aspirin  chewable 81 milliGRAM(s) Oral daily  enoxaparin Injectable 40 milliGRAM(s) SubCutaneous daily  hydrALAZINE 25 milliGRAM(s) Oral every 8 hours  metoprolol 50 milliGRAM(s) Oral every 8 hours      buDESOnide   0.25 milliGRAM(s) Respule 0.25 milliGRAM(s) Inhalation daily    oxyCODONE    5 mG/acetaminophen 325 mG 1 Tablet(s) Oral every 6 hours PRN  oxyCODONE    5 mG/acetaminophen 325 mG 2 Tablet(s) Oral every 6 hours PRN  ketorolac   Injectable 15 milliGRAM(s) IV Push every 6 hours    docusate sodium 100 milliGRAM(s) Oral three times a day  pantoprazole    Tablet 40 milliGRAM(s) Oral before breakfast    predniSONE   Tablet 4 milliGRAM(s) Oral daily  atorvastatin 40 milliGRAM(s) Oral at bedtime        PAST MEDICAL & SURGICAL HISTORY:  Hypertrophic cardiomyopathy  Takayasu's arteritis  AICD (automatic cardioverter/defibrillator) present  Hypertension  Non-sustained ventricular tachycardia  Atrial fibrillation, chronic  H/O  section: x2  H/O cardiac pacemaker  History of appendectomy      FAMILY HISTORY:  Family history of hypertension (Child)      SOCIAL HISTORY:     Non-smoker        REVIEW OF SYSTEMS:  See HPI. Otherwise, 10 point ROS done and otherwise negative.    PHYSICAL EXAM:  T(C): 36.7 (08-15-17 @ 06:54), Max: 36.8 (17 @ 11:00)  HR: 130 (08-15-17 @ 06:54) (81 - 140)  BP: 104/67 (08-15-17 @ 06:54) (104/67 - 182/81)  RR: 18 (08-15-17 @ 06:54) (18 - 28)  SpO2: 96% (08-15-17 @ 06:54) (96% - 100%)  Wt(kg): --  I&O's Summary    14 Aug 2017 07:01  -  15 Aug 2017 07:00  --------------------------------------------------------  IN: 640 mL / OUT: 1850 mL / NET: -1210 mL        Appearance: Well appearing, sitting in a chair at bedside, NAD  HEENT:   Normal oral mucosa, PERRL, EOMI	  Cardiovascular: Tachycardic Normal S1 S2, No JVD, No murmurs appreciated, sternal wound dressing c/d/i  Respiratory: Lungs clear to auscultation  Psychiatry: Mood & affect appropriate  Gastrointestinal:  Soft, Non-tender, + BS	  Skin: No rashes, No ecchymoses, No cyanosis	  Neurologic: Non-focal  Extremities: No clubbing, cyanosis or edema  Vascular: Peripheral pulses palpable 2+ bilaterally      LABS:	 	    CBC Full  -  ( 15 Aug 2017 01:42 )  WBC Count : 12.7 K/uL  Hemoglobin : 12.6 g/dL  Hematocrit : 38.4 %  Platelet Count - Automated : 127 K/uL  Mean Cell Volume : 99.3 fl  Mean Cell Hemoglobin : 32.7 pg  Mean Cell Hemoglobin Concentration : 32.9 gm/dL  Auto Neutrophil # : 10.1 K/uL  Auto Lymphocyte # : 1.6 K/uL  Auto Monocyte # : 1.0 K/uL  Auto Eosinophil # : 0.0 K/uL  Auto Basophil # : 0.0 K/uL  Auto Neutrophil % : 79.1 %  Auto Lymphocyte % : 12.4 %  Auto Monocyte % : 8.1 %  Auto Eosinophil % : 0.3 %  Auto Basophil % : 0.2 %    08-15    137  |  101  |  11  ----------------------------<  145<H>  3.8   |  20<L>  |  0.61      136  |  100  |  19  ----------------------------<  124<H>  4.1   |  28  |  0.72    Ca    8.1<L>      15 Aug 2017 01:42  Ca    8.0<L>      14 Aug 2017 01:28  Mg     2.1     08-15    TPro  5.2<L>  /  Alb  3.1<L>  /  TBili  3.0<H>  /  DBili  x   /  AST  60<H>  /  ALT  30  /  AlkPhos  63      TELEMETRY: 	  -150s    ECG:  	sinus w/1st degree block, RBBB    PREVIOUS DIAGNOSTIC TESTING:    [ ] Echocardiogram: < from: Transthoracic Echocardiogram (17 @ 10:19) >  LA:     5.0    2.0 - 4.0 cm  Ao:            2.0 - 3.8 cm  SEPTUM: 1.6    0.6 - 1.2 cm  PWT:    1.4    0.6 - 1.1 cm  LVIDd:  4.2    3.0 - 5.6 cm  LVIDs:  2.7    1.8 - 4.0 cm  Derived variables:  LVMI: 157 g/m2  RWT: 0.66  Fractional short: 36 %  EF (Visual Estimate): 60 %    Conclusions:  1. The interventricular septum is severly hypertrophied  (1.6cm).  2. Normal left ventricular systolic function. No segmental  wall motion abnormalities.  3. Moderate diastolic dysfunction (Stage II).  4. Normal right ventricular size and function. A device  wire is noted in the right heart.  5. Estimated pulmonary artery systolic pressure equals 42  mm Hg, assuming right atrial pressure equals 8 mm Hg,  consistent with mild pulmonary pressures.  6. Normal pericardium with trace pericardial effusion.    [ ]  Catheterization: 04.20.15 @ 15:05)   CORONARY VESSELS: The coronary circulation is right dominant.  LM:   --  LM: Normal.  LAD:   --  LAD: Angiography showed minor luminal irregularities with no  flow limiting lesions.  --  Proximal LAD: There was a discrete 20 % stenosis.  CX:   --  Circumflex: Normal.  RCA:   --  Proximal RCA: Angiography showed minor luminal irregularities  with no flow limiting lesions. There was a tubular 20 % stenosis.  COMPLICATIONS: There were no complications.    ASSESSMENT/PLAN: 	    47F hx of Takayasu arteritis, pAF on coumadin, HCM, VT s/p AICD, MVP/MVR, AI, p/w 8 days of NORRIS now s/p CABG x1, Bentall procedure with MVR/AVR with RFA on  post op course with afib. and prolonged QTc now with AFib w/RVR ~150.    #Afib with RVR; Otherwise hemodynamically stable; On coumadin  - -150s uptitrating Lopressor however rate difficult to control; Recommend dig load and if still difficult to control then would dc BB and try Diltiazem for rate control.    -  Monitor  telemetry    #Prolonged Qtc: possibly in setting of hypocalcemia  - Avoiding QTc prolonging agents  - Monitor daily EKG     d/w Dr. Kennedy l and f/u with primary CTSx team.

## 2017-08-15 NOTE — PROGRESS NOTE ADULT - SUBJECTIVE AND OBJECTIVE BOX
VITAL SIGNS    Telemetry:  NSR 80    Vital Signs Last 24 Hrs  T(C): 36.7 (08-15-17 @ 06:54), Max: 36.8 (08-15-17 @ 04:08)  T(F): 98 (08-15-17 @ 06:54), Max: 98.2 (08-15-17 @ 04:08)  HR: 140 (08-15-17 @ 11:13) (89 - 140)  BP: 105/63 (08-15-17 @ 11:13) (104/67 - 182/81)  RR: 18 (08-15-17 @ 11:13) (18 - 28)  SpO2: 99% (08-15-17 @ 11:13) (96% - 100%)                    @ 07:01  -  08-15 @ 07:00  --------------------------------------------------------  IN: 640 mL / OUT: 1850 mL / NET: -1210 mL    08-15 @ 07:01  -  08-15 @ 12:10  --------------------------------------------------------  IN: 0 mL / OUT: 500 mL / NET: -500 mL          Daily     Daily Weight in k.9 (15 Aug 2017 05:42)        CAPILLARY BLOOD GLUCOSE              Drains:     MS         [  ] Drainage:                 L Pleural  [  ]  Drainage:                R Pleural  [  ]  Drainage:    Pacing Wires        [  ]   Settings:                                  Isolated  [  ]    Coumadin    [ ] YES          [  ]      NO         Reason:                               PHYSICAL EXAM        Neurology: alert and oriented x 3, nonfocal, no gross deficits    CV : IRREG IRREg    Sternal Wound :  CDI , Stable    Lungs: diminished, poor inspiration, cta    Abdomen: soft, nontender, nondistended, positive bowel sounds, last bowel movement  +_    :           voiding    Extremities:      - edema, negative calve tenderness,   leg incision cdi            oxyCODONE    5 mG/acetaminophen 325 mG 1 Tablet(s) Oral every 6 hours PRN  oxyCODONE    5 mG/acetaminophen 325 mG 2 Tablet(s) Oral every 6 hours PRN  docusate sodium 100 milliGRAM(s) Oral three times a day  predniSONE   Tablet 4 milliGRAM(s) Oral daily  atorvastatin 40 milliGRAM(s) Oral at bedtime  aspirin  chewable 81 milliGRAM(s) Oral daily  pantoprazole    Tablet 40 milliGRAM(s) Oral before breakfast  buDESOnide   0.25 milliGRAM(s) Respule 0.25 milliGRAM(s) Inhalation daily  enoxaparin Injectable 40 milliGRAM(s) SubCutaneous daily  hydrALAZINE 25 milliGRAM(s) Oral every 8 hours  ketorolac   Injectable 15 milliGRAM(s) IV Push every 6 hours  metoprolol 75 milliGRAM(s) Oral every 8 hours  metoprolol Injectable 5 milliGRAM(s) IV Push <User Schedule>                              Physical Therapy Rec:   Home  [  ]   Home w/ PT  [  ]  Rehab  [  ]    Discussed with Cardiothoracic Team at AM rounds.

## 2017-08-15 NOTE — PROGRESS NOTE ADULT - ASSESSMENT
Mrs. 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  On milrinone, d/c yesterday.  Prolonged QTC and pr interval, NO amio or sotalol  HTN , hydralazine started.  Atelectasis, Duoneb and Pulmicort started.   Anticoagulated for MVR and afib.  Transferred to sdu 8/14  Rapid afib overnight, converted to NSR with several dose of IV lopressor.  Now returned to rapid a fib/flutter.

## 2017-08-16 LAB
ALBUMIN SERPL ELPH-MCNC: 3.4 G/DL — SIGNIFICANT CHANGE UP (ref 3.3–5)
ALP SERPL-CCNC: 74 U/L — SIGNIFICANT CHANGE UP (ref 40–120)
ALT FLD-CCNC: 28 U/L RC — SIGNIFICANT CHANGE UP (ref 10–45)
AMYLASE P1 CFR SERPL: 68 U/L — SIGNIFICANT CHANGE UP (ref 25–125)
ANION GAP SERPL CALC-SCNC: 11 MMOL/L — SIGNIFICANT CHANGE UP (ref 5–17)
AST SERPL-CCNC: 42 U/L — HIGH (ref 10–40)
BASOPHILS # BLD AUTO: 0.1 K/UL — SIGNIFICANT CHANGE UP (ref 0–0.2)
BASOPHILS NFR BLD AUTO: 1 % — SIGNIFICANT CHANGE UP (ref 0–2)
BILIRUB SERPL-MCNC: 1.8 MG/DL — HIGH (ref 0.2–1.2)
BUN SERPL-MCNC: 16 MG/DL — SIGNIFICANT CHANGE UP (ref 7–23)
CALCIUM SERPL-MCNC: 8.5 MG/DL — SIGNIFICANT CHANGE UP (ref 8.4–10.5)
CHLORIDE SERPL-SCNC: 94 MMOL/L — LOW (ref 96–108)
CO2 SERPL-SCNC: 25 MMOL/L — SIGNIFICANT CHANGE UP (ref 22–31)
CREAT SERPL-MCNC: 0.89 MG/DL — SIGNIFICANT CHANGE UP (ref 0.5–1.3)
EOSINOPHIL # BLD AUTO: 0.2 K/UL — SIGNIFICANT CHANGE UP (ref 0–0.5)
EOSINOPHIL NFR BLD AUTO: 1.6 % — SIGNIFICANT CHANGE UP (ref 0–6)
GLUCOSE SERPL-MCNC: 182 MG/DL — HIGH (ref 70–99)
HCT VFR BLD CALC: 35 % — SIGNIFICANT CHANGE UP (ref 34.5–45)
HGB BLD-MCNC: 11.5 G/DL — SIGNIFICANT CHANGE UP (ref 11.5–15.5)
INR BLD: 1.95 RATIO — HIGH (ref 0.88–1.16)
INR BLD: 1.98 RATIO — HIGH (ref 0.88–1.16)
LIDOCAIN IGE QN: 94 U/L — HIGH (ref 7–60)
LYMPHOCYTES # BLD AUTO: 1.8 K/UL — SIGNIFICANT CHANGE UP (ref 1–3.3)
LYMPHOCYTES # BLD AUTO: 18.8 % — SIGNIFICANT CHANGE UP (ref 13–44)
MCHC RBC-ENTMCNC: 32.9 GM/DL — SIGNIFICANT CHANGE UP (ref 32–36)
MCHC RBC-ENTMCNC: 33 PG — SIGNIFICANT CHANGE UP (ref 27–34)
MCV RBC AUTO: 100 FL — SIGNIFICANT CHANGE UP (ref 80–100)
MONOCYTES # BLD AUTO: 1.4 K/UL — HIGH (ref 0–0.9)
MONOCYTES NFR BLD AUTO: 14.5 % — HIGH (ref 2–14)
NEUTROPHILS # BLD AUTO: 6.3 K/UL — SIGNIFICANT CHANGE UP (ref 1.8–7.4)
NEUTROPHILS NFR BLD AUTO: 64 % — SIGNIFICANT CHANGE UP (ref 43–77)
PLATELET # BLD AUTO: 140 K/UL — LOW (ref 150–400)
POTASSIUM SERPL-MCNC: 4.8 MMOL/L — SIGNIFICANT CHANGE UP (ref 3.5–5.3)
POTASSIUM SERPL-SCNC: 4.8 MMOL/L — SIGNIFICANT CHANGE UP (ref 3.5–5.3)
PROT SERPL-MCNC: 5.8 G/DL — LOW (ref 6–8.3)
PROTHROM AB SERPL-ACNC: 21.6 SEC — HIGH (ref 9.8–12.7)
PROTHROM AB SERPL-ACNC: 21.9 SEC — HIGH (ref 9.8–12.7)
RBC # BLD: 3.48 M/UL — LOW (ref 3.8–5.2)
RBC # FLD: 17.8 % — HIGH (ref 10.3–14.5)
SODIUM SERPL-SCNC: 130 MMOL/L — LOW (ref 135–145)
SURGICAL PATHOLOGY STUDY: SIGNIFICANT CHANGE UP
WBC # BLD: 9.8 K/UL — SIGNIFICANT CHANGE UP (ref 3.8–10.5)
WBC # FLD AUTO: 9.8 K/UL — SIGNIFICANT CHANGE UP (ref 3.8–10.5)

## 2017-08-16 PROCEDURE — 93010 ELECTROCARDIOGRAM REPORT: CPT

## 2017-08-16 PROCEDURE — 71010: CPT | Mod: 26

## 2017-08-16 PROCEDURE — 93306 TTE W/DOPPLER COMPLETE: CPT | Mod: 26

## 2017-08-16 PROCEDURE — 99233 SBSQ HOSP IP/OBS HIGH 50: CPT | Mod: GC

## 2017-08-16 RX ORDER — WARFARIN SODIUM 2.5 MG/1
2.5 TABLET ORAL ONCE
Qty: 0 | Refills: 0 | Status: COMPLETED | OUTPATIENT
Start: 2017-08-16 | End: 2017-08-16

## 2017-08-16 RX ORDER — SIMETHICONE 80 MG/1
80 TABLET, CHEWABLE ORAL
Qty: 0 | Refills: 0 | Status: DISCONTINUED | OUTPATIENT
Start: 2017-08-16 | End: 2017-08-20

## 2017-08-16 RX ADMIN — Medication 0.25 MILLIGRAM(S): at 12:30

## 2017-08-16 RX ADMIN — Medication 100 MILLIGRAM(S): at 22:27

## 2017-08-16 RX ADMIN — ATORVASTATIN CALCIUM 40 MILLIGRAM(S): 80 TABLET, FILM COATED ORAL at 22:28

## 2017-08-16 RX ADMIN — Medication 81 MILLIGRAM(S): at 12:30

## 2017-08-16 RX ADMIN — Medication 25 MILLIGRAM(S): at 17:29

## 2017-08-16 RX ADMIN — Medication 25 MILLIGRAM(S): at 05:40

## 2017-08-16 RX ADMIN — WARFARIN SODIUM 2.5 MILLIGRAM(S): 2.5 TABLET ORAL at 22:28

## 2017-08-16 RX ADMIN — Medication 4 MILLIGRAM(S): at 05:40

## 2017-08-16 RX ADMIN — SIMETHICONE 80 MILLIGRAM(S): 80 TABLET, CHEWABLE ORAL at 22:27

## 2017-08-16 RX ADMIN — ENOXAPARIN SODIUM 30 MILLIGRAM(S): 100 INJECTION SUBCUTANEOUS at 05:40

## 2017-08-16 RX ADMIN — Medication 25 MILLIGRAM(S): at 23:34

## 2017-08-16 RX ADMIN — Medication 15 MILLIGRAM(S): at 02:08

## 2017-08-16 RX ADMIN — Medication 15 MILLIGRAM(S): at 08:57

## 2017-08-16 RX ADMIN — Medication 15 MILLIGRAM(S): at 09:20

## 2017-08-16 RX ADMIN — Medication 100 MILLIGRAM(S): at 16:02

## 2017-08-16 RX ADMIN — OXYCODONE AND ACETAMINOPHEN 1 TABLET(S): 5; 325 TABLET ORAL at 23:34

## 2017-08-16 RX ADMIN — Medication 100 MILLIGRAM(S): at 05:40

## 2017-08-16 RX ADMIN — Medication 15 MILLIGRAM(S): at 02:48

## 2017-08-16 RX ADMIN — OXYCODONE AND ACETAMINOPHEN 1 TABLET(S): 5; 325 TABLET ORAL at 12:29

## 2017-08-16 RX ADMIN — PANTOPRAZOLE SODIUM 40 MILLIGRAM(S): 20 TABLET, DELAYED RELEASE ORAL at 06:51

## 2017-08-16 RX ADMIN — OXYCODONE AND ACETAMINOPHEN 1 TABLET(S): 5; 325 TABLET ORAL at 13:15

## 2017-08-16 NOTE — PROGRESS NOTE ADULT - SUBJECTIVE AND OBJECTIVE BOX
24H hour events: no acute events    MEDICATIONS:  aspirin  chewable 81 milliGRAM(s) Oral daily  diltiazem Infusion 3 mG/Hr IV Continuous <Continuous>  metoprolol 25 milliGRAM(s) Oral every 8 hours  diltiazem    Tablet 60 milliGRAM(s) Oral every 6 hours      buDESOnide   0.25 milliGRAM(s) Respule 0.25 milliGRAM(s) Inhalation daily    oxyCODONE    5 mG/acetaminophen 325 mG 1 Tablet(s) Oral every 6 hours PRN  oxyCODONE    5 mG/acetaminophen 325 mG 2 Tablet(s) Oral every 6 hours PRN    docusate sodium 100 milliGRAM(s) Oral three times a day  pantoprazole    Tablet 40 milliGRAM(s) Oral before breakfast    predniSONE   Tablet 4 milliGRAM(s) Oral daily  atorvastatin 40 milliGRAM(s) Oral at bedtime        REVIEW OF SYSTEMS:  Complete 10point ROS negative.    PHYSICAL EXAM:  T(C): 36.5 (08-16-17 @ 07:14), Max: 36.8 (08-15-17 @ 15:33)  HR: 60 (08-16-17 @ 07:14) (60 - 146)  BP: 148/71 (08-16-17 @ 07:14) (84/62 - 148/71)  RR: 18 (08-16-17 @ 07:14) (18 - 18)  SpO2: 94% (08-16-17 @ 07:14) (94% - 99%)  Wt(kg): --  I&O's Summary    15 Aug 2017 07:01  -  16 Aug 2017 07:00  --------------------------------------------------------  IN: 453 mL / OUT: 1450 mL / NET: -997 mL    16 Aug 2017 07:01  -  16 Aug 2017 09:26  --------------------------------------------------------  IN: 400 mL / OUT: 0 mL / NET: 400 mL        Appearance: Normal	  HEENT:   Normal oral mucosa, PERRL, EOMI	  Lymphatic: No lymphadenopathy  Cardiovascular: Normal S1 S2, No JVD, No murmurs, No edema  Respiratory: Lungs clear to auscultation	  Psychiatry: A & O x 3, Mood & affect appropriate  Gastrointestinal:  Soft, Non-tender, + BS	  Skin: No rashes, No ecchymoses, No cyanosis	  Neurologic: Non-focal  Extremities: Normal range of motion, No clubbing, cyanosis or edema  Vascular: Peripheral pulses palpable 2+ bilaterally        LABS:	 	    CBC Full  -  ( 16 Aug 2017 04:19 )  WBC Count : 9.8 K/uL  Hemoglobin : 11.5 g/dL  Hematocrit : 35.0 %  Platelet Count - Automated : 140 K/uL  Mean Cell Volume : 100.0 fl  Mean Cell Hemoglobin : 33.0 pg  Mean Cell Hemoglobin Concentration : 32.9 gm/dL  Auto Neutrophil # : 6.3 K/uL  Auto Lymphocyte # : 1.8 K/uL  Auto Monocyte # : 1.4 K/uL  Auto Eosinophil # : 0.2 K/uL  Auto Basophil # : 0.1 K/uL  Auto Neutrophil % : 64.0 %  Auto Lymphocyte % : 18.8 %  Auto Monocyte % : 14.5 %  Auto Eosinophil % : 1.6 %  Auto Basophil % : 1.0 %    08-15    136  |  102  |  11  ----------------------------<  120<H>  4.3   |  22  |  0.60  08-15    137  |  101  |  11  ----------------------------<  145<H>  3.8   |  20<L>  |  0.61    Ca    8.3<L>      15 Aug 2017 08:08  Ca    8.1<L>      15 Aug 2017 01:42  Mg     2.1     08-15      TELEMETRY: 	atrial flutter       < from: Intra-Operative Transesophageal Echo (08.11.17 @ 13:36) >  Conclusions:  ***Intra-op ROBERTA (patient intubated and under anesthesia  during exam)  1. Thickened mitral valve with bileaflet billowing and  annular dilation. No ESTELLE seen.  Moderate mitral  regurgitation.  2. Trileaflet aortic valve. Moderate aortic regurgitation.  3. Mild aortic root dilatation  (Ao: 3.7 cm at the sinuses  of Valsalva) for BSA.  4. There is asymmetric septal hypertrophy seen.  5. Endocardium not well visualized; grossly normal left  ventricular systolic function.  Confirmedon  8/15/2017 - 14:41:14    < end of copied text >    	  ASSESSMENT/PLAN: 	    4 year old woman w/ hx of Takayasu arteritis, pAF on coumadin, HCM, VT s/p AICD, MVP/MVR, AI, p/w 8 days of NORRIS now s/p CABG x1, Bentall procedure with MVR/AVR with RFA on 8/11 post op course with afib. and prolonged QTc    Afib with RVR; hemodynamically stable; On coumadin; stable/hr at goal  - cont rate control w/ metoprolol and cardizem; titrate as needed for rate <110 monitor BP  -  Monitor  telemetry    Prolonged Qtc: possibly in setting of hypocalcemia  - Avoiding QTc prolonging agents  - Monitor daily EKG     [id number] 24H hour events: no acute events    Reports indigestion/abdominal discomfort. No CP/SOB.     MEDICATIONS:  aspirin  chewable 81 milliGRAM(s) Oral daily  diltiazem Infusion 3 mG/Hr IV Continuous <Continuous>  metoprolol 25 milliGRAM(s) Oral every 8 hours  diltiazem    Tablet 60 milliGRAM(s) Oral every 6 hours      buDESOnide   0.25 milliGRAM(s) Respule 0.25 milliGRAM(s) Inhalation daily    oxyCODONE    5 mG/acetaminophen 325 mG 1 Tablet(s) Oral every 6 hours PRN  oxyCODONE    5 mG/acetaminophen 325 mG 2 Tablet(s) Oral every 6 hours PRN    docusate sodium 100 milliGRAM(s) Oral three times a day  pantoprazole    Tablet 40 milliGRAM(s) Oral before breakfast    predniSONE   Tablet 4 milliGRAM(s) Oral daily  atorvastatin 40 milliGRAM(s) Oral at bedtime        REVIEW OF SYSTEMS:  Complete 10point ROS negative.    PHYSICAL EXAM:  T(C): 36.5 (08-16-17 @ 07:14), Max: 36.8 (08-15-17 @ 15:33)  HR: 60 (08-16-17 @ 07:14) (60 - 146)  BP: 148/71 (08-16-17 @ 07:14) (84/62 - 148/71)  RR: 18 (08-16-17 @ 07:14) (18 - 18)  SpO2: 94% (08-16-17 @ 07:14) (94% - 99%)  Wt(kg): --  I&O's Summary    15 Aug 2017 07:01  -  16 Aug 2017 07:00  --------------------------------------------------------  IN: 453 mL / OUT: 1450 mL / NET: -997 mL    16 Aug 2017 07:01  -  16 Aug 2017 09:26  --------------------------------------------------------  IN: 400 mL / OUT: 0 mL / NET: 400 mL        Appearance: Well appearing, sitting in a chair at bedside with her parents at bedside  HEENT:   Normal oral mucosa, PERRL, EOMI	  Cardiovascular:  Normal S1 S2, No JVD, sternal wound  healing well   Respiratory: Lungs clear to auscultation  Psychiatry: Mood & affect appropriate  Gastrointestinal:  Soft, Non-tender, + BS	  Skin: No rashes, No ecchymoses, No cyanosis	  Neurologic: Non-focal      LABS:	 	    CBC Full  -  ( 16 Aug 2017 04:19 )  WBC Count : 9.8 K/uL  Hemoglobin : 11.5 g/dL  Hematocrit : 35.0 %  Platelet Count - Automated : 140 K/uL  Mean Cell Volume : 100.0 fl  Mean Cell Hemoglobin : 33.0 pg  Mean Cell Hemoglobin Concentration : 32.9 gm/dL  Auto Neutrophil # : 6.3 K/uL  Auto Lymphocyte # : 1.8 K/uL  Auto Monocyte # : 1.4 K/uL  Auto Eosinophil # : 0.2 K/uL  Auto Basophil # : 0.1 K/uL  Auto Neutrophil % : 64.0 %  Auto Lymphocyte % : 18.8 %  Auto Monocyte % : 14.5 %  Auto Eosinophil % : 1.6 %  Auto Basophil % : 1.0 %    08-15    136  |  102  |  11  ----------------------------<  120<H>  4.3   |  22  |  0.60  08-15    137  |  101  |  11  ----------------------------<  145<H>  3.8   |  20<L>  |  0.61    Ca    8.3<L>      15 Aug 2017 08:08  Ca    8.1<L>      15 Aug 2017 01:42  Mg     2.1     08-15      TELEMETRY: 	Atrial flutter/fib 120-130s; Sinus 60-80s    < from: Intra-Operative Transesophageal Echo (08.11.17 @ 13:36) >    Conclusions:  ***Intra-op ROBERTA (patient intubated and under anesthesia  during exam)  1. Thickened mitral valve with bileaflet billowing and  annular dilation. No ESTELLE seen.  Moderate mitral  regurgitation.  2. Trileaflet aortic valve. Moderate aortic regurgitation.  3. Mild aortic root dilatation  (Ao: 3.7 cm at the sinuses  of Valsalva) for BSA.  4. There is asymmetric septal hypertrophy seen.  5. Endocardium not well visualized; grossly normal left  ventricular systolic function.  Confirmedon  8/15/2017 - 14:41:14    	  ASSESSMENT/PLAN: 	    41F w/Takayasu arteritis, pAF on coumadin, HCM, VT s/p AICD, MVP/MVR, AI, p/w 8 days of NORRIS now s/p CABG x1, Bentall procedure with MVR/AVR with RFA on 8/11 post op course with afib. and prolonged QTc    Afib with RVR; hemodynamically stable; On coumadin; tele with intermittent Aflutter/fib 130s  - cont rate control w/ metoprolol and cardizem; titrate as needed for rate <110 monitor BP  -  Monitor  telemetry    Prolonged Qtc: possibly in setting of hypocalcemia  - Avoiding QTc prolonging agents  - Monitor daily EKG     [id number]

## 2017-08-16 NOTE — PROGRESS NOTE ADULT - PROBLEM SELECTOR PLAN 1
Asa, Statin, Chest PT,  Incentive spirometry, wound care and assessment.  Ambulate   Shower pod #5  f/u echo to eval EF, r/o effusion

## 2017-08-16 NOTE — PROGRESS NOTE ADULT - ASSESSMENT
Mrs. 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  On milrinone, d/c yesterday.  Prolonged QTC and pr interval, NO amio or sotalol  HTN , hydralazine started.  Atelectasis, Duoneb and Pulmicort started.   Anticoagulated for MVR and afib.  Transferred to sdu 8/14  Rapid afib overnight, converted to NSR with several dose of IV lopressor.  Now returned to rapid a fib/flutter.  8/15 Rapid a flutter 140's which did not respond to lopressor 100mg po or iv lopressor, cardizem infusion started, pt v paced regular  8/16 Regular, vpaced, to trasition to short acting PO cardizem and cardizem cd tomorroe, transfer to floor.

## 2017-08-16 NOTE — PROGRESS NOTE ADULT - SUBJECTIVE AND OBJECTIVE BOX
VITAL SIGNS    Telemetry:  vpaced/NSR     Vital Signs Last 24 Hrs  T(C): 36.5 (17 @ 07:14), Max: 36.8 (08-15-17 @ 15:33)  T(F): 97.7 (17 @ 07:14), Max: 98.2 (08-15-17 @ 15:33)  HR: 60 (17 @ 07:14) (60 - 146)  BP: 148/71 (17 @ 07:14) (84/62 - 148/71)  RR: 18 (17 @ 07:14) (18 - 18)  SpO2: 94% (17 @ 07:14) (94% - 99%)                   08-15 @ 07:01  -   @ 07:00  --------------------------------------------------------  IN: 453 mL / OUT: 1450 mL / NET: -997 mL          Daily     Daily Weight in k.5 (16 Aug 2017 08:40)        CAPILLARY BLOOD GLUCOSE              Drains:     MS         [  ] Drainage:                 L Pleural  [  ]  Drainage:                R Pleural  [  ]  Drainage:    Pacing Wires        [  ]   Settings:                                  Isolated  [  ]    Coumadin    [ ] YES          [  ]      NO         Reason:                               PHYSICAL EXAM        Neurology: alert and oriented x 3, nonfocal, no gross deficits    CV :    Sternal Wound :  CDI , Stable    Lungs:    Abdomen: soft, nontender, nondistended, positive bowel sounds, last bowel movement     :               Extremities:               oxyCODONE    5 mG/acetaminophen 325 mG 1 Tablet(s) Oral every 6 hours PRN  oxyCODONE    5 mG/acetaminophen 325 mG 2 Tablet(s) Oral every 6 hours PRN  docusate sodium 100 milliGRAM(s) Oral three times a day  predniSONE   Tablet 4 milliGRAM(s) Oral daily  atorvastatin 40 milliGRAM(s) Oral at bedtime  aspirin  chewable 81 milliGRAM(s) Oral daily  pantoprazole    Tablet 40 milliGRAM(s) Oral before breakfast  buDESOnide   0.25 milliGRAM(s) Respule 0.25 milliGRAM(s) Inhalation daily  ketorolac   Injectable 15 milliGRAM(s) IV Push every 6 hours  diltiazem Infusion 3 mG/Hr IV Continuous <Continuous>  metoprolol 25 milliGRAM(s) Oral every 8 hours  diltiazem    Tablet 60 milliGRAM(s) Oral every 6 hours                              Physical Therapy Rec:   Home  [  ]   Home w/ PT  [  ]  Rehab  [  ]    Discussed with Cardiothoracic Team at AM rounds. VITAL SIGNS    Telemetry:  vpaced/NSR     Vital Signs Last 24 Hrs  T(C): 36.5 (17 @ 07:14), Max: 36.8 (08-15-17 @ 15:33)  T(F): 97.7 (17 @ 07:14), Max: 98.2 (08-15-17 @ 15:33)  HR: 60 (17 @ 07:14) (60 - 146)  BP: 148/71 (17 @ 07:14) (84/62 - 148/71)  RR: 18 (17 @ 07:14) (18 - 18)  SpO2: 94% (17 @ 07:14) (94% - 99%)                   08-15 @ 07:01  -   @ 07:00  --------------------------------------------------------  IN: 453 mL / OUT: 1450 mL / NET: -997 mL          Daily     Daily Weight in k.5 (16 Aug 2017 08:40)        CAPILLARY BLOOD GLUCOSE                      Coumadin    [ x] YES          [  ]      NO         Reason:      mvr afib                          PHYSICAL EXAM        Neurology: alert and oriented x 3, nonfocal, no gross deficits    CV : S1 S2 , RRR     Sternal Wound :  CDI , Stable    Lungs: cta , diminished bilat    Abdomen: soft, nontender, nondistended, positive bowel sounds, last bowel movement  8/15    :           voiding    Extremities:       -  edema, negative calve tenderness,   leg incision cdi           oxyCODONE    5 mG/acetaminophen 325 mG 1 Tablet(s) Oral every 6 hours PRN  oxyCODONE    5 mG/acetaminophen 325 mG 2 Tablet(s) Oral every 6 hours PRN  docusate sodium 100 milliGRAM(s) Oral three times a day  predniSONE   Tablet 4 milliGRAM(s) Oral daily  atorvastatin 40 milliGRAM(s) Oral at bedtime  aspirin  chewable 81 milliGRAM(s) Oral daily  pantoprazole    Tablet 40 milliGRAM(s) Oral before breakfast  buDESOnide   0.25 milliGRAM(s) Respule 0.25 milliGRAM(s) Inhalation daily  ketorolac   Injectable 15 milliGRAM(s) IV Push every 6 hours  diltiazem Infusion 3 mG/Hr IV Continuous <Continuous>  metoprolol 25 milliGRAM(s) Oral every 8 hours  diltiazem    Tablet 60 milliGRAM(s) Oral every 6 hours                              Physical Therapy Rec:   Home  [  ]   Home w/ PT  [  ]  Rehab  [  ]    Discussed with Cardiothoracic Team at AM rounds.

## 2017-08-17 LAB
ANION GAP SERPL CALC-SCNC: 14 MMOL/L — SIGNIFICANT CHANGE UP (ref 5–17)
APTT BLD: 33.3 SEC — SIGNIFICANT CHANGE UP (ref 27.5–37.4)
BASOPHILS # BLD AUTO: 0 K/UL — SIGNIFICANT CHANGE UP (ref 0–0.2)
BASOPHILS NFR BLD AUTO: 0.1 % — SIGNIFICANT CHANGE UP (ref 0–2)
BUN SERPL-MCNC: 13 MG/DL — SIGNIFICANT CHANGE UP (ref 7–23)
CALCIUM SERPL-MCNC: 8.6 MG/DL — SIGNIFICANT CHANGE UP (ref 8.4–10.5)
CHLORIDE SERPL-SCNC: 99 MMOL/L — SIGNIFICANT CHANGE UP (ref 96–108)
CO2 SERPL-SCNC: 22 MMOL/L — SIGNIFICANT CHANGE UP (ref 22–31)
CREAT SERPL-MCNC: 0.84 MG/DL — SIGNIFICANT CHANGE UP (ref 0.5–1.3)
EOSINOPHIL # BLD AUTO: 0.1 K/UL — SIGNIFICANT CHANGE UP (ref 0–0.5)
EOSINOPHIL NFR BLD AUTO: 1.5 % — SIGNIFICANT CHANGE UP (ref 0–6)
GLUCOSE SERPL-MCNC: 96 MG/DL — SIGNIFICANT CHANGE UP (ref 70–99)
HCT VFR BLD CALC: 35.2 % — SIGNIFICANT CHANGE UP (ref 34.5–45)
HGB BLD-MCNC: 11.5 G/DL — SIGNIFICANT CHANGE UP (ref 11.5–15.5)
INR BLD: 2.04 RATIO — HIGH (ref 0.88–1.16)
LYMPHOCYTES # BLD AUTO: 1.4 K/UL — SIGNIFICANT CHANGE UP (ref 1–3.3)
LYMPHOCYTES # BLD AUTO: 16.1 % — SIGNIFICANT CHANGE UP (ref 13–44)
MCHC RBC-ENTMCNC: 32.5 GM/DL — SIGNIFICANT CHANGE UP (ref 32–36)
MCHC RBC-ENTMCNC: 32.6 PG — SIGNIFICANT CHANGE UP (ref 27–34)
MCV RBC AUTO: 100 FL — SIGNIFICANT CHANGE UP (ref 80–100)
MONOCYTES # BLD AUTO: 1.2 K/UL — HIGH (ref 0–0.9)
MONOCYTES NFR BLD AUTO: 13 % — SIGNIFICANT CHANGE UP (ref 2–14)
NEUTROPHILS # BLD AUTO: 6.3 K/UL — SIGNIFICANT CHANGE UP (ref 1.8–7.4)
NEUTROPHILS NFR BLD AUTO: 69.3 % — SIGNIFICANT CHANGE UP (ref 43–77)
PLATELET # BLD AUTO: 184 K/UL — SIGNIFICANT CHANGE UP (ref 150–400)
POTASSIUM SERPL-MCNC: 4.2 MMOL/L — SIGNIFICANT CHANGE UP (ref 3.5–5.3)
POTASSIUM SERPL-SCNC: 4.2 MMOL/L — SIGNIFICANT CHANGE UP (ref 3.5–5.3)
PROTHROM AB SERPL-ACNC: 22.6 SEC — HIGH (ref 9.8–12.7)
RBC # BLD: 3.51 M/UL — LOW (ref 3.8–5.2)
RBC # FLD: 17.6 % — HIGH (ref 10.3–14.5)
SODIUM SERPL-SCNC: 135 MMOL/L — SIGNIFICANT CHANGE UP (ref 135–145)
T PALLIDUM AB TITR SER: NEGATIVE — SIGNIFICANT CHANGE UP
WBC # BLD: 9 K/UL — SIGNIFICANT CHANGE UP (ref 3.8–10.5)
WBC # FLD AUTO: 9 K/UL — SIGNIFICANT CHANGE UP (ref 3.8–10.5)

## 2017-08-17 RX ORDER — ACETAMINOPHEN 500 MG
650 TABLET ORAL EVERY 6 HOURS
Qty: 0 | Refills: 0 | Status: DISCONTINUED | OUTPATIENT
Start: 2017-08-17 | End: 2017-08-20

## 2017-08-17 RX ORDER — ONDANSETRON 8 MG/1
4 TABLET, FILM COATED ORAL ONCE
Qty: 0 | Refills: 0 | Status: COMPLETED | OUTPATIENT
Start: 2017-08-17 | End: 2017-08-17

## 2017-08-17 RX ORDER — WARFARIN SODIUM 2.5 MG/1
3 TABLET ORAL ONCE
Qty: 0 | Refills: 0 | Status: COMPLETED | OUTPATIENT
Start: 2017-08-17 | End: 2017-08-17

## 2017-08-17 RX ADMIN — Medication 0.25 MILLIGRAM(S): at 12:13

## 2017-08-17 RX ADMIN — OXYCODONE AND ACETAMINOPHEN 1 TABLET(S): 5; 325 TABLET ORAL at 04:52

## 2017-08-17 RX ADMIN — Medication 100 MILLIGRAM(S): at 22:17

## 2017-08-17 RX ADMIN — Medication 25 MILLIGRAM(S): at 08:49

## 2017-08-17 RX ADMIN — Medication 100 MILLIGRAM(S): at 13:43

## 2017-08-17 RX ADMIN — ONDANSETRON 4 MILLIGRAM(S): 8 TABLET, FILM COATED ORAL at 07:51

## 2017-08-17 RX ADMIN — ATORVASTATIN CALCIUM 40 MILLIGRAM(S): 80 TABLET, FILM COATED ORAL at 22:17

## 2017-08-17 RX ADMIN — OXYCODONE AND ACETAMINOPHEN 1 TABLET(S): 5; 325 TABLET ORAL at 05:43

## 2017-08-17 RX ADMIN — WARFARIN SODIUM 3 MILLIGRAM(S): 2.5 TABLET ORAL at 22:17

## 2017-08-17 RX ADMIN — SIMETHICONE 80 MILLIGRAM(S): 80 TABLET, CHEWABLE ORAL at 20:08

## 2017-08-17 RX ADMIN — Medication 100 MILLIGRAM(S): at 05:45

## 2017-08-17 RX ADMIN — OXYCODONE AND ACETAMINOPHEN 1 TABLET(S): 5; 325 TABLET ORAL at 00:20

## 2017-08-17 RX ADMIN — Medication 4 MILLIGRAM(S): at 05:45

## 2017-08-17 RX ADMIN — PANTOPRAZOLE SODIUM 40 MILLIGRAM(S): 20 TABLET, DELAYED RELEASE ORAL at 05:45

## 2017-08-17 RX ADMIN — Medication 81 MILLIGRAM(S): at 12:12

## 2017-08-17 RX ADMIN — Medication 25 MILLIGRAM(S): at 16:50

## 2017-08-17 NOTE — PROGRESS NOTE ADULT - ASSESSMENT
Mrs. 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  On milrinone, d/c yesterday.  Prolonged QTC and pr interval, NO amio or sotalol  HTN , hydralazine started.  Atelectasis, Duoneb and Pulmicort started.   Anticoagulated for MVR and afib.  Transferred to sdu 8/14  Rapid afib overnight, converted to NSR with several dose of IV lopressor.  Now returned to rapid a fib/flutter.  8/15 Rapid a flutter 140's which did not respond to lopressor 100mg po or iv lopressor, cardizem infusion started, pt v paced regular  8/16 Regular, vpaced, to trasition to short acting PO cardizem and cardizem cd tomorroe, transfer to floor.  echo negative  8/17 rate controlled afib - pt. c/o dizziness - Normotensive - negative orthostatics

## 2017-08-17 NOTE — PROGRESS NOTE ADULT - SUBJECTIVE AND OBJECTIVE BOX
VITAL SIGNS-Tele:SR 1st deg. 60-80/  T(C): 36.8 (17 @ 04:49), Max: 36.9 (17 @ 21:51)  HR: 69 (17 @ 12:20) (69 - 79)  BP: 135/75 (17 @ 12:20) (114/75 - 178/77)  SpO2: 95% (17 @ 12:20) (92% - 96%)          @ 07:01  -   @ 07:00  --------------------------------------------------------  IN: 1240 mL / OUT: 1150 mL / NET: 90 mL     @ 07:01  -   @ 15:23  --------------------------------------------------------  IN: 780 mL / OUT: 250 mL / NET: 530 mL    Daily     Daily Weight in k.4 (17 Aug 2017 10:39)    Coumadin    [x] YES          [  ]      NO         Reason:     MVR-T  PHYSICAL EXAM:  Neurology: alert and oriented x 3, nonfocal, no gross deficits  CV : S1S2  Sternal Wound :  CDI , Stable  Lungs: CTA b/L  Abdomen: soft, nontender, nondistended, positive bowel sounds, last bowel movement  +BM        Extremities:     +EDEMA    docusate sodium 100 milliGRAM(s) Oral three times a day  predniSONE   Tablet 4 milliGRAM(s) Oral daily  atorvastatin 40 milliGRAM(s) Oral at bedtime  aspirin  chewable 81 milliGRAM(s) Oral daily  pantoprazole    Tablet 40 milliGRAM(s) Oral before breakfast  buDESOnide   0.25 milliGRAM(s) Respule 0.25 milliGRAM(s) Inhalation daily  diltiazem Infusion 3 mG/Hr IV Continuous <Continuous>  metoprolol 25 milliGRAM(s) Oral every 8 hours  diltiazem    Tablet 60 milliGRAM(s) Oral every 6 hours  simethicone 80 milliGRAM(s) Chew four times a day PRN    Physical Therapy Rec:   Home  [  ]   Home w/ PT  [x]  Rehab  [  ]  Discussed with Cardiothoracic Team at AM rounds.

## 2017-08-18 LAB
ANION GAP SERPL CALC-SCNC: 13 MMOL/L — SIGNIFICANT CHANGE UP (ref 5–17)
APTT BLD: 34.7 SEC — SIGNIFICANT CHANGE UP (ref 27.5–37.4)
BASOPHILS # BLD AUTO: 0.1 K/UL — SIGNIFICANT CHANGE UP (ref 0–0.2)
BASOPHILS NFR BLD AUTO: 0.6 % — SIGNIFICANT CHANGE UP (ref 0–2)
BUN SERPL-MCNC: 12 MG/DL — SIGNIFICANT CHANGE UP (ref 7–23)
CALCIUM SERPL-MCNC: 8.6 MG/DL — SIGNIFICANT CHANGE UP (ref 8.4–10.5)
CHLORIDE SERPL-SCNC: 99 MMOL/L — SIGNIFICANT CHANGE UP (ref 96–108)
CO2 SERPL-SCNC: 23 MMOL/L — SIGNIFICANT CHANGE UP (ref 22–31)
CREAT SERPL-MCNC: 0.9 MG/DL — SIGNIFICANT CHANGE UP (ref 0.5–1.3)
EOSINOPHIL # BLD AUTO: 0.1 K/UL — SIGNIFICANT CHANGE UP (ref 0–0.5)
EOSINOPHIL NFR BLD AUTO: 0.6 % — SIGNIFICANT CHANGE UP (ref 0–6)
GLUCOSE SERPL-MCNC: 109 MG/DL — HIGH (ref 70–99)
HCT VFR BLD CALC: 32.7 % — LOW (ref 34.5–45)
HGB BLD-MCNC: 10.9 G/DL — LOW (ref 11.5–15.5)
INR BLD: 2.38 RATIO — HIGH (ref 0.88–1.16)
LYMPHOCYTES # BLD AUTO: 1.5 K/UL — SIGNIFICANT CHANGE UP (ref 1–3.3)
LYMPHOCYTES # BLD AUTO: 14.9 % — SIGNIFICANT CHANGE UP (ref 13–44)
MCHC RBC-ENTMCNC: 32.6 PG — SIGNIFICANT CHANGE UP (ref 27–34)
MCHC RBC-ENTMCNC: 33.3 GM/DL — SIGNIFICANT CHANGE UP (ref 32–36)
MCV RBC AUTO: 97.9 FL — SIGNIFICANT CHANGE UP (ref 80–100)
MONOCYTES # BLD AUTO: 1.2 K/UL — HIGH (ref 0–0.9)
MONOCYTES NFR BLD AUTO: 12.6 % — SIGNIFICANT CHANGE UP (ref 2–14)
NEUTROPHILS # BLD AUTO: 7 K/UL — SIGNIFICANT CHANGE UP (ref 1.8–7.4)
NEUTROPHILS NFR BLD AUTO: 71.3 % — SIGNIFICANT CHANGE UP (ref 43–77)
PLATELET # BLD AUTO: 201 K/UL — SIGNIFICANT CHANGE UP (ref 150–400)
POTASSIUM SERPL-MCNC: 4.4 MMOL/L — SIGNIFICANT CHANGE UP (ref 3.5–5.3)
POTASSIUM SERPL-SCNC: 4.4 MMOL/L — SIGNIFICANT CHANGE UP (ref 3.5–5.3)
PROTHROM AB SERPL-ACNC: 26.2 SEC — HIGH (ref 9.8–12.7)
RBC # BLD: 3.34 M/UL — LOW (ref 3.8–5.2)
RBC # FLD: 17 % — HIGH (ref 10.3–14.5)
SODIUM SERPL-SCNC: 135 MMOL/L — SIGNIFICANT CHANGE UP (ref 135–145)
WBC # BLD: 9.8 K/UL — SIGNIFICANT CHANGE UP (ref 3.8–10.5)
WBC # FLD AUTO: 9.8 K/UL — SIGNIFICANT CHANGE UP (ref 3.8–10.5)

## 2017-08-18 PROCEDURE — 99233 SBSQ HOSP IP/OBS HIGH 50: CPT | Mod: GC

## 2017-08-18 PROCEDURE — 71020: CPT | Mod: 26

## 2017-08-18 RX ORDER — FUROSEMIDE 40 MG
40 TABLET ORAL DAILY
Qty: 0 | Refills: 0 | Status: DISCONTINUED | OUTPATIENT
Start: 2017-08-18 | End: 2017-08-20

## 2017-08-18 RX ORDER — MAGNESIUM SULFATE 500 MG/ML
2 VIAL (ML) INJECTION ONCE
Qty: 0 | Refills: 0 | Status: COMPLETED | OUTPATIENT
Start: 2017-08-18 | End: 2017-08-18

## 2017-08-18 RX ORDER — OXYCODONE AND ACETAMINOPHEN 5; 325 MG/1; MG/1
1 TABLET ORAL EVERY 6 HOURS
Qty: 0 | Refills: 0 | Status: DISCONTINUED | OUTPATIENT
Start: 2017-08-18 | End: 2017-08-20

## 2017-08-18 RX ORDER — SPIRONOLACTONE 25 MG/1
25 TABLET, FILM COATED ORAL DAILY
Qty: 0 | Refills: 0 | Status: DISCONTINUED | OUTPATIENT
Start: 2017-08-18 | End: 2017-08-20

## 2017-08-18 RX ADMIN — Medication 81 MILLIGRAM(S): at 12:22

## 2017-08-18 RX ADMIN — OXYCODONE AND ACETAMINOPHEN 1 TABLET(S): 5; 325 TABLET ORAL at 22:51

## 2017-08-18 RX ADMIN — Medication 650 MILLIGRAM(S): at 00:00

## 2017-08-18 RX ADMIN — Medication 25 MILLIGRAM(S): at 08:30

## 2017-08-18 RX ADMIN — Medication 25 MILLIGRAM(S): at 17:42

## 2017-08-18 RX ADMIN — PANTOPRAZOLE SODIUM 40 MILLIGRAM(S): 20 TABLET, DELAYED RELEASE ORAL at 06:56

## 2017-08-18 RX ADMIN — SPIRONOLACTONE 25 MILLIGRAM(S): 25 TABLET, FILM COATED ORAL at 12:22

## 2017-08-18 RX ADMIN — Medication 650 MILLIGRAM(S): at 00:45

## 2017-08-18 RX ADMIN — OXYCODONE AND ACETAMINOPHEN 1 TABLET(S): 5; 325 TABLET ORAL at 22:21

## 2017-08-18 RX ADMIN — OXYCODONE AND ACETAMINOPHEN 1 TABLET(S): 5; 325 TABLET ORAL at 03:48

## 2017-08-18 RX ADMIN — Medication 4 MILLIGRAM(S): at 06:55

## 2017-08-18 RX ADMIN — Medication 50 GRAM(S): at 02:18

## 2017-08-18 RX ADMIN — Medication 40 MILLIGRAM(S): at 12:22

## 2017-08-18 RX ADMIN — Medication 100 MILLIGRAM(S): at 22:19

## 2017-08-18 RX ADMIN — ATORVASTATIN CALCIUM 40 MILLIGRAM(S): 80 TABLET, FILM COATED ORAL at 22:19

## 2017-08-18 RX ADMIN — Medication 25 MILLIGRAM(S): at 00:01

## 2017-08-18 RX ADMIN — Medication 0.25 MILLIGRAM(S): at 12:22

## 2017-08-18 RX ADMIN — Medication 100 MILLIGRAM(S): at 06:56

## 2017-08-18 RX ADMIN — OXYCODONE AND ACETAMINOPHEN 1 TABLET(S): 5; 325 TABLET ORAL at 03:02

## 2017-08-18 RX ADMIN — Medication 100 MILLIGRAM(S): at 14:50

## 2017-08-18 NOTE — PROGRESS NOTE ADULT - SUBJECTIVE AND OBJECTIVE BOX
VITAL SIGNS    Telemetry: Afib        T(C): 36.6      HR: 73BP: 128/72        RR: 18         SpO2: 97%          IN: 720 mL / OUT: 0 mL / NET: 720 mL    Daily Weight in k.2   Weight (kg): 50.3    PHYSICAL EXAM    Subjective: NAD  Neurology: alert and oriented x 3, nonfocal, no gross deficits  CV :S1S2  Sternal Wound :  CDI , Stable  Lungs:CTA b/l  Abdomen: soft, NT,ND, (+ )BM  :  voiding  Extremities: -c/c/e       LABS      135  |  99  |  12  ----------------------------<  109<H>  4.4   |  23  |  0.90    Ca    8.6      18 Aug 2017 02:23                                   10.9   9.8   )-----------( 201      ( 18 Aug 2017 02:23 )             32.7          PT/INR - ( 18 Aug 2017 02:23 )   PT: 26.2 sec;   INR: 2.38 ratio         PTT - ( 18 Aug 2017 02:23 )  PTT:34.7 sec

## 2017-08-18 NOTE — PROGRESS NOTE ADULT - PROBLEM SELECTOR PLAN 1
Asa, Statin, Chest PT,  Incentive spirometry, wound care and assessment.  Ambulate   Shower pod #5  Initiate diuretics for b/l pleural effusions

## 2017-08-18 NOTE — PROGRESS NOTE ADULT - SUBJECTIVE AND OBJECTIVE BOX
Date of Admission:    24H hour events:   Reports feeling improved today without any lightheadedness or stomach discomfort.     MEDICATIONS:  aspirin  chewable 81 milliGRAM(s) Oral daily  metoprolol 25 milliGRAM(s) Oral every 8 hours  diltiazem    Tablet 60 milliGRAM(s) Oral every 6 hours  furosemide    Tablet 40 milliGRAM(s) Oral daily  spironolactone 25 milliGRAM(s) Oral daily      buDESOnide   0.25 milliGRAM(s) Respule 0.25 milliGRAM(s) Inhalation daily    acetaminophen   Tablet. 650 milliGRAM(s) Oral every 6 hours PRN  oxyCODONE    5 mG/acetaminophen 325 mG 1 Tablet(s) Oral every 6 hours PRN    docusate sodium 100 milliGRAM(s) Oral three times a day  pantoprazole    Tablet 40 milliGRAM(s) Oral before breakfast  simethicone 80 milliGRAM(s) Chew four times a day PRN    predniSONE   Tablet 4 milliGRAM(s) Oral daily  atorvastatin 40 milliGRAM(s) Oral at bedtime        REVIEW OF SYSTEMS:  Complete 10point ROS negative.    PHYSICAL EXAM:  T(C): 36.6 (08-18-17 @ 14:08), Max: 36.8 (08-17-17 @ 17:00)  HR: 73 (08-18-17 @ 14:08) (56 - 79)  BP: 128/72 (08-18-17 @ 14:08) (105/71 - 136/67)  RR: 18 (08-18-17 @ 14:08) (16 - 18)  SpO2: 97% (08-18-17 @ 14:08) (95% - 97%)  Wt(kg): --  I&O's Summary    17 Aug 2017 07:01  -  18 Aug 2017 07:00  --------------------------------------------------------  IN: 1260 mL / OUT: 650 mL / NET: 610 mL    18 Aug 2017 07:01  -  18 Aug 2017 15:00  --------------------------------------------------------  IN: 720 mL / OUT: 0 mL / NET: 720 mL      Appearance: Well appearing, Seen ambulating without any difficulty  HEENT:   Normal oral mucosa, PERRL, EOMI	  Cardiovascular:  Normal S1 S2, No JVD, sternal wound  healing well   Respiratory: Lungs clear to auscultation  Psychiatry: Mood & affect appropriate  Gastrointestinal:  Soft, Non-tender, + BS	  Skin: No rashes, No ecchymoses, No cyanosis	  Neurologic: Non-focal    LABS:	 	    CBC Full  -  ( 18 Aug 2017 02:23 )  WBC Count : 9.8 K/uL  Hemoglobin : 10.9 g/dL  Hematocrit : 32.7 %  Platelet Count - Automated : 201 K/uL  Mean Cell Volume : 97.9 fl  Mean Cell Hemoglobin : 32.6 pg  Mean Cell Hemoglobin Concentration : 33.3 gm/dL  Auto Neutrophil # : 7.0 K/uL  Auto Lymphocyte # : 1.5 K/uL  Auto Monocyte # : 1.2 K/uL  Auto Eosinophil # : 0.1 K/uL  Auto Basophil # : 0.1 K/uL  Auto Neutrophil % : 71.3 %  Auto Lymphocyte % : 14.9 %  Auto Monocyte % : 12.6 %  Auto Eosinophil % : 0.6 %  Auto Basophil % : 0.6 %    08-18    135  |  99  |  12  ----------------------------<  109<H>  4.4   |  23  |  0.90  08-17    135  |  99  |  13  ----------------------------<  96  4.2   |  22  |  0.84    Ca    8.6      18 Aug 2017 02:23  Ca    8.6      17 Aug 2017 06:45    TELEMETRY: 	  AF , intermittent 130s; junctional rhythm, AIVR     	  ASSESSMENT/PLAN: 	    41F w/Takayasu arteritis, pAF on coumadin, HCM, VT s/p AICD, MVP/MVR, AI, p/w 8 days of NORRIS now s/p CABG x1, Bentall procedure with MVR/AVR with RFA on 8/11 post op course with afib. and prolonged QTc    Afib with RVR; hemodynamically stable; On coumadin; tele with intermittent AF , intermittent 130s  - cont rate control w/ metoprolol and cardizem; titrate as needed for rate <110 monitor BP  -  Monitor  telemetry    Prolonged Qtc: possibly in setting of hypocalcemia  - Avoiding QTc prolonging agents  - Monitor daily EKG     Case d/w Dr. Kennedy     [id number]

## 2017-08-18 NOTE — PROVIDER CONTACT NOTE (OTHER) - BACKGROUND
8/11 CABGx1, AVR, MVR with ablation, Vpaced, acc junctional, with bursts of Afib/Aflutter on tele, pt has PPM
8/9 Cath 8/11 C1, Bental, AVR, MVR, radiofrequency ablation  Hx: Takayasu arteritis, Aortic insuff, MR, PAF, Vtach , AICD, LVH
8/9 Cath 8/11 C1, Bental, AVR, MVR, radiofrequency ablation  Hx: Takayasu arteritis, Aortic insuff, MR, PAF, Vtach , AICD, LVH

## 2017-08-18 NOTE — PROVIDER CONTACT NOTE (OTHER) - ASSESSMENT
/80, SR 96, Resp 18/min, O2 sat 97% RA.
Asymptomatic. /73, , Resp WNL, O2 sat 96% RA.
pt resting in bed during episode, asleep. A&Ox4. pt has no c/o CP or SOB. VS stable & charted in flowsheet.

## 2017-08-18 NOTE — PROVIDER CONTACT NOTE (OTHER) - ACTION/TREATMENT ORDERED:
draw AM labs now, administer 2g mag IV over 1 hr, administer 60 mg cardizem as ordered draw AM labs now, administer 2g mag IV over 1 hr, administer 60 mg cardizem as ordered, will continue to monitor.

## 2017-08-19 LAB
ANION GAP SERPL CALC-SCNC: 14 MMOL/L — SIGNIFICANT CHANGE UP (ref 5–17)
BUN SERPL-MCNC: 11 MG/DL — SIGNIFICANT CHANGE UP (ref 7–23)
CALCIUM SERPL-MCNC: 9 MG/DL — SIGNIFICANT CHANGE UP (ref 8.4–10.5)
CHLORIDE SERPL-SCNC: 96 MMOL/L — SIGNIFICANT CHANGE UP (ref 96–108)
CO2 SERPL-SCNC: 24 MMOL/L — SIGNIFICANT CHANGE UP (ref 22–31)
CREAT SERPL-MCNC: 0.7 MG/DL — SIGNIFICANT CHANGE UP (ref 0.5–1.3)
GLUCOSE SERPL-MCNC: 123 MG/DL — HIGH (ref 70–99)
HCT VFR BLD CALC: 34.2 % — LOW (ref 34.5–45)
HGB BLD-MCNC: 11.6 G/DL — SIGNIFICANT CHANGE UP (ref 11.5–15.5)
INR BLD: 2.25 RATIO — HIGH (ref 0.88–1.16)
MCHC RBC-ENTMCNC: 33.8 GM/DL — SIGNIFICANT CHANGE UP (ref 32–36)
MCHC RBC-ENTMCNC: 33.8 PG — SIGNIFICANT CHANGE UP (ref 27–34)
MCV RBC AUTO: 99.9 FL — SIGNIFICANT CHANGE UP (ref 80–100)
PLATELET # BLD AUTO: 287 K/UL — SIGNIFICANT CHANGE UP (ref 150–400)
POTASSIUM SERPL-MCNC: 4.3 MMOL/L — SIGNIFICANT CHANGE UP (ref 3.5–5.3)
POTASSIUM SERPL-SCNC: 4.3 MMOL/L — SIGNIFICANT CHANGE UP (ref 3.5–5.3)
PROTHROM AB SERPL-ACNC: 24.9 SEC — HIGH (ref 9.8–12.7)
RBC # BLD: 3.43 M/UL — LOW (ref 3.8–5.2)
RBC # FLD: 17 % — HIGH (ref 10.3–14.5)
SODIUM SERPL-SCNC: 134 MMOL/L — LOW (ref 135–145)
WBC # BLD: 10.2 K/UL — SIGNIFICANT CHANGE UP (ref 3.8–10.5)
WBC # FLD AUTO: 10.2 K/UL — SIGNIFICANT CHANGE UP (ref 3.8–10.5)

## 2017-08-19 RX ORDER — WARFARIN SODIUM 2.5 MG/1
3 TABLET ORAL ONCE
Qty: 0 | Refills: 0 | Status: COMPLETED | OUTPATIENT
Start: 2017-08-19 | End: 2017-08-19

## 2017-08-19 RX ORDER — METOPROLOL TARTRATE 50 MG
25 TABLET ORAL ONCE
Qty: 0 | Refills: 0 | Status: COMPLETED | OUTPATIENT
Start: 2017-08-19 | End: 2017-08-19

## 2017-08-19 RX ORDER — METOPROLOL TARTRATE 50 MG
50 TABLET ORAL THREE TIMES A DAY
Qty: 0 | Refills: 0 | Status: DISCONTINUED | OUTPATIENT
Start: 2017-08-19 | End: 2017-08-20

## 2017-08-19 RX ADMIN — ATORVASTATIN CALCIUM 40 MILLIGRAM(S): 80 TABLET, FILM COATED ORAL at 21:54

## 2017-08-19 RX ADMIN — WARFARIN SODIUM 3 MILLIGRAM(S): 2.5 TABLET ORAL at 21:53

## 2017-08-19 RX ADMIN — Medication 100 MILLIGRAM(S): at 14:27

## 2017-08-19 RX ADMIN — Medication 100 MILLIGRAM(S): at 05:02

## 2017-08-19 RX ADMIN — Medication 25 MILLIGRAM(S): at 02:48

## 2017-08-19 RX ADMIN — OXYCODONE AND ACETAMINOPHEN 1 TABLET(S): 5; 325 TABLET ORAL at 17:06

## 2017-08-19 RX ADMIN — Medication 40 MILLIGRAM(S): at 05:01

## 2017-08-19 RX ADMIN — Medication 50 MILLIGRAM(S): at 15:11

## 2017-08-19 RX ADMIN — PANTOPRAZOLE SODIUM 40 MILLIGRAM(S): 20 TABLET, DELAYED RELEASE ORAL at 05:02

## 2017-08-19 RX ADMIN — OXYCODONE AND ACETAMINOPHEN 1 TABLET(S): 5; 325 TABLET ORAL at 05:01

## 2017-08-19 RX ADMIN — Medication 100 MILLIGRAM(S): at 21:54

## 2017-08-19 RX ADMIN — Medication 4 MILLIGRAM(S): at 08:44

## 2017-08-19 RX ADMIN — SPIRONOLACTONE 25 MILLIGRAM(S): 25 TABLET, FILM COATED ORAL at 05:01

## 2017-08-19 RX ADMIN — Medication 650 MILLIGRAM(S): at 10:00

## 2017-08-19 RX ADMIN — Medication 0.25 MILLIGRAM(S): at 12:10

## 2017-08-19 RX ADMIN — OXYCODONE AND ACETAMINOPHEN 1 TABLET(S): 5; 325 TABLET ORAL at 15:13

## 2017-08-19 RX ADMIN — Medication 25 MILLIGRAM(S): at 10:00

## 2017-08-19 RX ADMIN — OXYCODONE AND ACETAMINOPHEN 1 TABLET(S): 5; 325 TABLET ORAL at 05:31

## 2017-08-19 RX ADMIN — Medication 50 MILLIGRAM(S): at 21:53

## 2017-08-19 RX ADMIN — Medication 81 MILLIGRAM(S): at 12:10

## 2017-08-19 RX ADMIN — Medication 650 MILLIGRAM(S): at 10:31

## 2017-08-19 NOTE — PROGRESS NOTE ADULT - PROBLEM SELECTOR PROBLEM 3
Urinary tract infection, site unspecified
Urinary tract infection, site unspecified
S/P ascending aortic aneurysm repair
Urinary tract infection, site unspecified
S/P ascending aortic aneurysm repair

## 2017-08-19 NOTE — PROGRESS NOTE ADULT - PROBLEM SELECTOR PLAN 2
Asa, Statin, Chest PT,  Incentive spirometry, wound care and assessment.  Ambulate   anticoagulation
Asa, Statin, Chest PT,  Incentive spirometry, wound care and assessment.  Ambulate   anticoagulation
Asa, Statin, Chest PT,  Incentive spirometry, wound care and assessment.  Ambulate   anticoagulation  Shower pod #5

## 2017-08-19 NOTE — PROGRESS NOTE ADULT - PROBLEM SELECTOR PLAN 4
bronchodilators , chest pt  cxr
bronchodilators , chest pt  cxr-atelectasis
bronchodilators , chest pt  cxr

## 2017-08-19 NOTE — PROGRESS NOTE ADULT - PROBLEM SELECTOR PROBLEM 1
Mitral regurgitation
S/P CABG x 1
Mitral regurgitation
Mitral regurgitation
S/P CABG x 1

## 2017-08-19 NOTE — PROGRESS NOTE ADULT - PROBLEM SELECTOR PLAN 5
Rate controlled afib on lopressor & cardizem  Echo 8/16 negative for effusion
rapid, increase beta blockers as tolerated, cardizem added  but will need f/u echo
rapid, increase beta blockers as tolerated

## 2017-08-19 NOTE — PROGRESS NOTE ADULT - PROBLEM SELECTOR PROBLEM 2
AI (aortic insufficiency)
S/P aortic valve and mitral valve replacement
AI (aortic insufficiency)
S/P aortic valve and mitral valve replacement

## 2017-08-19 NOTE — PROGRESS NOTE ADULT - SUBJECTIVE AND OBJECTIVE BOX
VITAL SIGNS    Telemetry: afib 100-140   T(C): 36.4    HR: 124   BP: 109/55   RR: 18   SpO2: 97%             IN: 960 mL / OUT: 700 mL / NET: 260 mL         Daily Weight in k.1   Weight (kg): 50.3    PHYSICAL EXAM    Subjective: "feel ok"  Neurology: alert and oriented x 3, nonfocal, no gross deficits  CV : S1S2  Sternal Wound :  CDI , Stable  Lungs: CTA b/l   Abdomen: soft, NT,ND, (+ )BM  :  voiding  Extremities:  trace edema       LABS      134<L>  |  96  |  11  ----------------------------<  123<H>  4.3   |  24  |  0.70    Ca    9.0      19 Aug 2017 05:29                                   11.6   10.2  )-----------( 287      ( 19 Aug 2017 05:29 )             34.2          PT/INR - ( 19 Aug 2017 05:29 )   PT: 24.9 sec;   INR: 2.25 ratio         PTT - ( 18 Aug 2017 02:23 )  PTT:34.7 sec

## 2017-08-19 NOTE — PROGRESS NOTE ADULT - ASSESSMENT
Mrs. 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  On milrinone, d/c yesterday.  Prolonged QTC and pr interval, NO amio or sotalol  HTN , hydralazine started.  Atelectasis, Duoneb and Pulmicort started.   Anticoagulated for MVR and afib.  Transferred to sdu 8/14  Rapid afib overnight, converted to NSR with several dose of IV lopressor.  Now returned to rapid a fib/flutter.  8/15 Rapid a flutter 140's which did not respond to lopressor 100mg po or iv lopressor, cardizem infusion started, pt v paced regular  8/16 Regular, vpaced, to trasition to short acting PO cardizem and cardizem cd tomorrow, transfer to floor.  echo negative  8/17 rate controlled afib - pt. c/o dizziness - Normotensive - negative orthostatics

## 2017-08-19 NOTE — PROGRESS NOTE ADULT - PROBLEM SELECTOR PLAN 3
Consult ID Dr webber
Consult ID Dr webber
BP control, hydralazine d/c , now on lopressor and cardizem
BP control, hydralazine added
BP control, hydralazine added
Consult ID Dr webber

## 2017-08-20 ENCOUNTER — TRANSCRIPTION ENCOUNTER (OUTPATIENT)
Age: 47
End: 2017-08-20

## 2017-08-20 VITALS — DIASTOLIC BLOOD PRESSURE: 75 MMHG | SYSTOLIC BLOOD PRESSURE: 135 MMHG | HEART RATE: 73 BPM | OXYGEN SATURATION: 98 %

## 2017-08-20 LAB
ANION GAP SERPL CALC-SCNC: 12 MMOL/L — SIGNIFICANT CHANGE UP (ref 5–17)
BUN SERPL-MCNC: 9 MG/DL — SIGNIFICANT CHANGE UP (ref 7–23)
CALCIUM SERPL-MCNC: 9.1 MG/DL — SIGNIFICANT CHANGE UP (ref 8.4–10.5)
CHLORIDE SERPL-SCNC: 97 MMOL/L — SIGNIFICANT CHANGE UP (ref 96–108)
CO2 SERPL-SCNC: 27 MMOL/L — SIGNIFICANT CHANGE UP (ref 22–31)
CREAT SERPL-MCNC: 0.71 MG/DL — SIGNIFICANT CHANGE UP (ref 0.5–1.3)
GLUCOSE SERPL-MCNC: 107 MG/DL — HIGH (ref 70–99)
HCT VFR BLD CALC: 37.1 % — SIGNIFICANT CHANGE UP (ref 34.5–45)
HGB BLD-MCNC: 12.3 G/DL — SIGNIFICANT CHANGE UP (ref 11.5–15.5)
INR BLD: 2.1 RATIO — HIGH (ref 0.88–1.16)
MCHC RBC-ENTMCNC: 33.1 GM/DL — SIGNIFICANT CHANGE UP (ref 32–36)
MCHC RBC-ENTMCNC: 33.7 PG — SIGNIFICANT CHANGE UP (ref 27–34)
MCV RBC AUTO: 102 FL — HIGH (ref 80–100)
PLATELET # BLD AUTO: 339 K/UL — SIGNIFICANT CHANGE UP (ref 150–400)
POTASSIUM SERPL-MCNC: 4.3 MMOL/L — SIGNIFICANT CHANGE UP (ref 3.5–5.3)
POTASSIUM SERPL-SCNC: 4.3 MMOL/L — SIGNIFICANT CHANGE UP (ref 3.5–5.3)
PROTHROM AB SERPL-ACNC: 23 SEC — HIGH (ref 9.8–12.7)
RBC # BLD: 3.66 M/UL — LOW (ref 3.8–5.2)
RBC # FLD: 17.6 % — HIGH (ref 10.3–14.5)
SODIUM SERPL-SCNC: 136 MMOL/L — SIGNIFICANT CHANGE UP (ref 135–145)
WBC # BLD: 9.1 K/UL — SIGNIFICANT CHANGE UP (ref 3.8–10.5)
WBC # FLD AUTO: 9.1 K/UL — SIGNIFICANT CHANGE UP (ref 3.8–10.5)

## 2017-08-20 PROCEDURE — P9012: CPT

## 2017-08-20 PROCEDURE — 97162 PT EVAL MOD COMPLEX 30 MIN: CPT

## 2017-08-20 PROCEDURE — P9047: CPT

## 2017-08-20 PROCEDURE — C1887: CPT

## 2017-08-20 PROCEDURE — 99152 MOD SED SAME PHYS/QHP 5/>YRS: CPT

## 2017-08-20 PROCEDURE — 88305 TISSUE EXAM BY PATHOLOGIST: CPT

## 2017-08-20 PROCEDURE — 81001 URINALYSIS AUTO W/SCOPE: CPT

## 2017-08-20 PROCEDURE — 76705 ECHO EXAM OF ABDOMEN: CPT

## 2017-08-20 PROCEDURE — 99238 HOSP IP/OBS DSCHRG MGMT 30/<: CPT

## 2017-08-20 PROCEDURE — 87186 SC STD MICRODIL/AGAR DIL: CPT

## 2017-08-20 PROCEDURE — 84702 CHORIONIC GONADOTROPIN TEST: CPT

## 2017-08-20 PROCEDURE — 71046 X-RAY EXAM CHEST 2 VIEWS: CPT

## 2017-08-20 PROCEDURE — 86901 BLOOD TYPING SEROLOGIC RH(D): CPT

## 2017-08-20 PROCEDURE — 84100 ASSAY OF PHOSPHORUS: CPT

## 2017-08-20 PROCEDURE — 71250 CT THORAX DX C-: CPT

## 2017-08-20 PROCEDURE — 84484 ASSAY OF TROPONIN QUANT: CPT

## 2017-08-20 PROCEDURE — 93320 DOPPLER ECHO COMPLETE: CPT

## 2017-08-20 PROCEDURE — 97116 GAIT TRAINING THERAPY: CPT

## 2017-08-20 PROCEDURE — 80053 COMPREHEN METABOLIC PANEL: CPT

## 2017-08-20 PROCEDURE — 99232 SBSQ HOSP IP/OBS MODERATE 35: CPT | Mod: GC

## 2017-08-20 PROCEDURE — 85730 THROMBOPLASTIN TIME PARTIAL: CPT

## 2017-08-20 PROCEDURE — 85610 PROTHROMBIN TIME: CPT

## 2017-08-20 PROCEDURE — C1889: CPT

## 2017-08-20 PROCEDURE — 36430 TRANSFUSION BLD/BLD COMPNT: CPT

## 2017-08-20 PROCEDURE — 85652 RBC SED RATE AUTOMATED: CPT

## 2017-08-20 PROCEDURE — 71045 X-RAY EXAM CHEST 1 VIEW: CPT

## 2017-08-20 PROCEDURE — 96374 THER/PROPH/DIAG INJ IV PUSH: CPT

## 2017-08-20 PROCEDURE — 86923 COMPATIBILITY TEST ELECTRIC: CPT

## 2017-08-20 PROCEDURE — 94640 AIRWAY INHALATION TREATMENT: CPT

## 2017-08-20 PROCEDURE — P9045: CPT

## 2017-08-20 PROCEDURE — 93005 ELECTROCARDIOGRAM TRACING: CPT

## 2017-08-20 PROCEDURE — 82550 ASSAY OF CK (CPK): CPT

## 2017-08-20 PROCEDURE — 85384 FIBRINOGEN ACTIVITY: CPT

## 2017-08-20 PROCEDURE — 99285 EMERGENCY DEPT VISIT HI MDM: CPT | Mod: 25

## 2017-08-20 PROCEDURE — 86900 BLOOD TYPING SEROLOGIC ABO: CPT

## 2017-08-20 PROCEDURE — 87641 MR-STAPH DNA AMP PROBE: CPT

## 2017-08-20 PROCEDURE — 82435 ASSAY OF BLOOD CHLORIDE: CPT

## 2017-08-20 PROCEDURE — 87640 STAPH A DNA AMP PROBE: CPT

## 2017-08-20 PROCEDURE — 84132 ASSAY OF SERUM POTASSIUM: CPT

## 2017-08-20 PROCEDURE — 82330 ASSAY OF CALCIUM: CPT

## 2017-08-20 PROCEDURE — 83880 ASSAY OF NATRIURETIC PEPTIDE: CPT

## 2017-08-20 PROCEDURE — 80048 BASIC METABOLIC PNL TOTAL CA: CPT

## 2017-08-20 PROCEDURE — 82553 CREATINE MB FRACTION: CPT

## 2017-08-20 PROCEDURE — 93312 ECHO TRANSESOPHAGEAL: CPT

## 2017-08-20 PROCEDURE — 71010: CPT | Mod: 26

## 2017-08-20 PROCEDURE — 88304 TISSUE EXAM BY PATHOLOGIST: CPT

## 2017-08-20 PROCEDURE — C1894: CPT

## 2017-08-20 PROCEDURE — 83690 ASSAY OF LIPASE: CPT

## 2017-08-20 PROCEDURE — 94002 VENT MGMT INPAT INIT DAY: CPT

## 2017-08-20 PROCEDURE — C1769: CPT

## 2017-08-20 PROCEDURE — 86780 TREPONEMA PALLIDUM: CPT

## 2017-08-20 PROCEDURE — C1751: CPT

## 2017-08-20 PROCEDURE — 83605 ASSAY OF LACTIC ACID: CPT

## 2017-08-20 PROCEDURE — 87086 URINE CULTURE/COLONY COUNT: CPT

## 2017-08-20 PROCEDURE — 93325 DOPPLER ECHO COLOR FLOW MAPG: CPT

## 2017-08-20 PROCEDURE — C1768: CPT

## 2017-08-20 PROCEDURE — 82947 ASSAY GLUCOSE BLOOD QUANT: CPT

## 2017-08-20 PROCEDURE — 93460 R&L HRT ART/VENTRICLE ANGIO: CPT

## 2017-08-20 PROCEDURE — P9037: CPT

## 2017-08-20 PROCEDURE — 82803 BLOOD GASES ANY COMBINATION: CPT

## 2017-08-20 PROCEDURE — 82565 ASSAY OF CREATININE: CPT

## 2017-08-20 PROCEDURE — 86891 AUTOLOGOUS BLOOD OP SALVAGE: CPT

## 2017-08-20 PROCEDURE — 88311 DECALCIFY TISSUE: CPT

## 2017-08-20 PROCEDURE — 82150 ASSAY OF AMYLASE: CPT

## 2017-08-20 PROCEDURE — 84295 ASSAY OF SERUM SODIUM: CPT

## 2017-08-20 PROCEDURE — 84703 CHORIONIC GONADOTROPIN ASSAY: CPT

## 2017-08-20 PROCEDURE — 86850 RBC ANTIBODY SCREEN: CPT

## 2017-08-20 PROCEDURE — 85014 HEMATOCRIT: CPT

## 2017-08-20 PROCEDURE — 85027 COMPLETE CBC AUTOMATED: CPT

## 2017-08-20 PROCEDURE — 83735 ASSAY OF MAGNESIUM: CPT

## 2017-08-20 PROCEDURE — 93308 TTE F-UP OR LMTD: CPT

## 2017-08-20 PROCEDURE — P9016: CPT

## 2017-08-20 PROCEDURE — 93306 TTE W/DOPPLER COMPLETE: CPT

## 2017-08-20 RX ORDER — SPIRONOLACTONE 25 MG/1
1 TABLET, FILM COATED ORAL
Qty: 10 | Refills: 0
Start: 2017-08-20 | End: 2017-08-30

## 2017-08-20 RX ORDER — ASPIRIN/CALCIUM CARB/MAGNESIUM 324 MG
1 TABLET ORAL
Qty: 0 | Refills: 0 | DISCHARGE
Start: 2017-08-20

## 2017-08-20 RX ORDER — DILTIAZEM HCL 120 MG
240 CAPSULE, EXT RELEASE 24 HR ORAL DAILY
Qty: 0 | Refills: 0 | Status: DISCONTINUED | OUTPATIENT
Start: 2017-08-20 | End: 2017-08-20

## 2017-08-20 RX ORDER — WARFARIN SODIUM 2.5 MG/1
1.5 TABLET ORAL
Qty: 0 | Refills: 0 | COMMUNITY

## 2017-08-20 RX ORDER — METOPROLOL TARTRATE 50 MG
1.5 TABLET ORAL
Qty: 90 | Refills: 0
Start: 2017-08-20 | End: 2017-09-19

## 2017-08-20 RX ORDER — METOPROLOL TARTRATE 50 MG
1 TABLET ORAL
Qty: 0 | Refills: 0 | COMMUNITY

## 2017-08-20 RX ORDER — FUROSEMIDE 40 MG
1 TABLET ORAL
Qty: 10 | Refills: 0
Start: 2017-08-20 | End: 2017-08-30

## 2017-08-20 RX ORDER — METOPROLOL TARTRATE 50 MG
75 TABLET ORAL
Qty: 0 | Refills: 0 | Status: DISCONTINUED | OUTPATIENT
Start: 2017-08-20 | End: 2017-08-20

## 2017-08-20 RX ORDER — WARFARIN SODIUM 2.5 MG/1
3 TABLET ORAL DAILY
Qty: 0 | Refills: 0 | Status: DISCONTINUED | OUTPATIENT
Start: 2017-08-20 | End: 2017-08-20

## 2017-08-20 RX ORDER — ACETAMINOPHEN 500 MG
2 TABLET ORAL
Qty: 0 | Refills: 0 | DISCHARGE
Start: 2017-08-20

## 2017-08-20 RX ORDER — OXYCODONE HYDROCHLORIDE 5 MG/1
1 TABLET ORAL
Qty: 30 | Refills: 0
Start: 2017-08-20 | End: 2017-08-25

## 2017-08-20 RX ORDER — SIMETHICONE 80 MG/1
1 TABLET, CHEWABLE ORAL
Qty: 0 | Refills: 0 | DISCHARGE
Start: 2017-08-20

## 2017-08-20 RX ORDER — LOSARTAN POTASSIUM 100 MG/1
1 TABLET, FILM COATED ORAL
Qty: 0 | Refills: 0 | COMMUNITY

## 2017-08-20 RX ORDER — WARFARIN SODIUM 2.5 MG/1
1 TABLET ORAL
Qty: 0 | Refills: 0 | COMMUNITY

## 2017-08-20 RX ORDER — DOCUSATE SODIUM 100 MG
1 CAPSULE ORAL
Qty: 0 | Refills: 0 | DISCHARGE
Start: 2017-08-20

## 2017-08-20 RX ORDER — DILTIAZEM HCL 120 MG
1 CAPSULE, EXT RELEASE 24 HR ORAL
Qty: 30 | Refills: 0
Start: 2017-08-20 | End: 2017-09-19

## 2017-08-20 RX ORDER — WARFARIN SODIUM 2.5 MG/1
1 TABLET ORAL
Qty: 0 | Refills: 0 | DISCHARGE
Start: 2017-08-20

## 2017-08-20 RX ADMIN — Medication 650 MILLIGRAM(S): at 08:21

## 2017-08-20 RX ADMIN — PANTOPRAZOLE SODIUM 40 MILLIGRAM(S): 20 TABLET, DELAYED RELEASE ORAL at 05:29

## 2017-08-20 RX ADMIN — Medication 4 MILLIGRAM(S): at 08:19

## 2017-08-20 RX ADMIN — Medication 650 MILLIGRAM(S): at 02:49

## 2017-08-20 RX ADMIN — Medication 50 MILLIGRAM(S): at 05:29

## 2017-08-20 RX ADMIN — Medication 100 MILLIGRAM(S): at 13:24

## 2017-08-20 RX ADMIN — Medication 100 MILLIGRAM(S): at 05:29

## 2017-08-20 RX ADMIN — Medication 81 MILLIGRAM(S): at 11:27

## 2017-08-20 RX ADMIN — Medication 650 MILLIGRAM(S): at 01:58

## 2017-08-20 RX ADMIN — SPIRONOLACTONE 25 MILLIGRAM(S): 25 TABLET, FILM COATED ORAL at 05:29

## 2017-08-20 RX ADMIN — Medication 240 MILLIGRAM(S): at 13:24

## 2017-08-20 RX ADMIN — Medication 40 MILLIGRAM(S): at 05:29

## 2017-08-20 RX ADMIN — Medication 650 MILLIGRAM(S): at 07:24

## 2017-08-20 NOTE — PROGRESS NOTE ADULT - SUBJECTIVE AND OBJECTIVE BOX
24H hour events: converted to NSR from afib overnight    MEDICATIONS:  aspirin  chewable 81 milliGRAM(s) Oral daily  furosemide    Tablet 40 milliGRAM(s) Oral daily  spironolactone 25 milliGRAM(s) Oral daily  metoprolol 75 milliGRAM(s) Oral two times a day  diltiazem    milliGRAM(s) Oral daily  warfarin 3 milliGRAM(s) Oral daily        acetaminophen   Tablet. 650 milliGRAM(s) Oral every 6 hours PRN  oxyCODONE    5 mG/acetaminophen 325 mG 1 Tablet(s) Oral every 6 hours PRN    docusate sodium 100 milliGRAM(s) Oral three times a day  pantoprazole    Tablet 40 milliGRAM(s) Oral before breakfast  simethicone 80 milliGRAM(s) Chew four times a day PRN    predniSONE   Tablet 4 milliGRAM(s) Oral daily  atorvastatin 40 milliGRAM(s) Oral at bedtime        REVIEW OF SYSTEMS:  Complete 10point ROS negative.    PHYSICAL EXAM:  T(C): 36.8 (08-20-17 @ 05:00), Max: 36.8 (08-20-17 @ 02:45)  HR: 61 (08-20-17 @ 05:00) (61 - 124)  BP: 113/51 (08-20-17 @ 05:00) (109/55 - 133/62)  RR: 17 (08-20-17 @ 05:00) (17 - 18)  SpO2: 96% (08-20-17 @ 05:00) (96% - 97%)  Wt(kg): --  I&O's Summary    19 Aug 2017 07:01  -  20 Aug 2017 07:00  --------------------------------------------------------  IN: 960 mL / OUT: 700 mL / NET: 260 mL        Appearance: Normal, NAD	  HEENT:   Normal oral mucosa, PERRL, EOMI	  Cardiovascular: Normal S1 S2, midline surgical scar, No JVD,  Respiratory: Lungs clear to auscultation	  Psychiatry: A & O x 3, Mood & affect appropriate  Gastrointestinal:  Soft, Non-tender, + BS	  Extremities: Normal range of motion, No clubbing, cyanosis or edema  Vascular: Peripheral pulses palpable 2+ bilaterally        LABS:	 	    CBC Full  -  ( 20 Aug 2017 06:21 )  WBC Count : 9.1 K/uL  Hemoglobin : 12.3 g/dL  Hematocrit : 37.1 %  Platelet Count - Automated : 339 K/uL  Mean Cell Volume : 102.0 fl  Mean Cell Hemoglobin : 33.7 pg  Mean Cell Hemoglobin Concentration : 33.1 gm/dL  Auto Neutrophil # : x  Auto Lymphocyte # : x  Auto Monocyte # : x  Auto Eosinophil # : x  Auto Basophil # : x  Auto Neutrophil % : x  Auto Lymphocyte % : x  Auto Monocyte % : x  Auto Eosinophil % : x  Auto Basophil % : x    08-20    136  |  97  |  9   ----------------------------<  107<H>  4.3   |  27  |  0.71  08-19    134<L>  |  96  |  11  ----------------------------<  123<H>  4.3   |  24  |  0.70    Ca    9.1      20 Aug 2017 06:21  Ca    9.0      19 Aug 2017 05:29        proBNP:   Lipid Profile:   HgA1c:   TSH:       CARDIAC MARKERS:            TELEMETRY: 	  afib converted to NSR 60s  	  	  ASSESSMENT/PLAN: 	    41F w/Takayasu arteritis, pAF on coumadin, HCM, VT s/p AICD, MVP/MVR, AI, p/w 8 days of NORRIS now s/p CABG x1, Bentall procedure with MVR/AVR with RFA on 8/11 post op course with afib. and prolonged QTc    Afib with RVR converted to NSR; hemodynamically stable; On coumadin;  - cont rate control w/ metoprolol and cardizem; titrate as needed for rate <110 monitor BP  -  Monitor  telemetry  -d/c planning    Prolonged Qtc: possibly in setting of hypocalcemia  - Avoiding QTc prolonging agents  - Monitor daily EKG    Jose Cummins MD  [phone]

## 2017-08-20 NOTE — DISCHARGE NOTE ADULT - CARE PLAN
Principal Discharge DX:	AI (aortic insufficiency)  Goal:	shower daily    INR check on wednesday 8/23/17  Instructions for follow-up, activity and diet:	reg diet    pain medication if needed  Secondary Diagnosis:	S/P CABG x 1  Goal:	recovery  Instructions for follow-up, activity and diet:	shower daily

## 2017-08-20 NOTE — DISCHARGE NOTE ADULT - HOSPITAL COURSE
S/P CABG x 1  08/11/2017  svg to LAD  ,Bentall procedure  25 pericardial AVR , Mitral valve replacement   29 pericardial valve,  Radiofrequency ablation    Post op.  On milrinone  Prolonged QTC and pr interval, NO amio or sotalol  HTN , hydralazine started.  Atelectasis, Duoneb and Pulmicort started.   Anticoagulated for MVR and afib.  Transferred to sdu 8/14  Rapid afib overnight, converted to NSR with several dose of IV lopressor.   returned to rapid a fib/flutter.  8/15 Rapid a flutter 140's which did not respond to lopressor 100mg po or iv lopressor, cardizem infusion started, pt v paced regular  8/16  short acting PO cardizem  transfer to floor.  echo negative  8/17 rate controlled afib - pt. c/o dizziness - Normotensive - negative orthostatics  8/19  Afib  converted to NSR  730 pm  8/20  remains NSR, VSS   d/c home

## 2017-08-20 NOTE — DISCHARGE NOTE ADULT - MEDICATION SUMMARY - MEDICATIONS TO TAKE
I will START or STAY ON the medications listed below when I get home from the hospital:    predniSONE 1 mg oral tablet  -- 4 tab(s) by mouth once a day  -- Indication: For Steroid    spironolactone 25 mg oral tablet  -- 1 tab(s) by mouth once a day  -- Indication: For diuretic    aspirin 81 mg oral tablet, chewable  -- 1 tab(s) by mouth once a day  -- Indication: For Heart health    acetaminophen 325 mg oral tablet  -- 2 tab(s) by mouth every 6 hours, As needed, Moderate Pain (4 - 6)  -- Indication: For Mild pain    acetaminophen-oxycodone 325 mg-5 mg oral tablet  -- 1 tab(s) by mouth every 4 hours, As Needed, Severe Pain (7 - 10) MDD:5  -- Indication: For Mod to severe pain    dilTIAZem 240 mg/24 hours oral capsule, extended release  -- 1 cap(s) by mouth once a day  -- Indication: For HR  BP    warfarin 3 mg oral tablet  -- 1 tab(s) by mouth once a day  -- Indication: For See nghia LANCASTER on ewednesday for wednesdays dose    atorvastatin 20 mg oral tablet  -- 1 tab(s) by mouth once a day (at lunch time)  -- Indication: For cholesterol    methotrexate 2.5 mg oral tablet  -- 7 tab(s) by mouth once a week on Friday  -- Indication: For As prior to hospital    metoprolol tartrate 50 mg oral tablet  -- 1.5 tab(s) by mouth 2 times a day     (75mg)  -- Indication: For HR    BP    furosemide 40 mg oral tablet  -- 1 tab(s) by mouth once a day  -- Indication: For diuretic    ferrous sulfate 325 mg (65 mg elemental iron) oral tablet  -- 1 tab(s) by mouth once a day  -- Indication: For Supplement    docusate sodium 100 mg oral capsule  -- 1 cap(s) by mouth 3 times a day  -- Indication: For Stool softener    simethicone 80 mg oral tablet, chewable  -- 1 tab(s) by mouth 4 times a day, As needed, Gas  -- Indication: For gas    omeprazole 40 mg oral delayed release capsule  -- 1 cap(s) by mouth once a day  -- Indication: For GI    folic acid 1 mg oral tablet  -- 1 tab(s) by mouth once a day  -- Indication: For Supplement

## 2017-08-20 NOTE — DISCHARGE NOTE ADULT - MEDICATION SUMMARY - MEDICATIONS TO STOP TAKING
I will STOP taking the medications listed below when I get home from the hospital:    sotalol 120 mg oral tablet  -- 1 tab(s) by mouth 2 times a day    losartan 25 mg oral tablet  -- 1 tab(s) by mouth once a day    verapamil 40 mg oral tablet  -- 1 tab(s) by mouth 3 times a day    verapamil 40 mg oral tablet  -- 1 tab(s) by mouth 2 times a day    sulfamethoxazole-trimethoprim DS  -- 1 tab(s) by mouth once a day (at night)

## 2017-08-20 NOTE — PROGRESS NOTE ADULT - NSHPATTENDINGPLANDISCUSS_GEN_ALL_CORE
fellow
CT surgery NP's. To reach Cardiology Attending call during weekdays [facility] [phone].
cts team this am
CTS team this am

## 2017-08-20 NOTE — DISCHARGE NOTE ADULT - PLAN OF CARE
shower daily    INR check on wednesday 8/23/17 reg diet    pain medication if needed recovery shower daily

## 2017-08-20 NOTE — DISCHARGE NOTE ADULT - ADDITIONAL INSTRUCTIONS
see Dr Cueva   1-2 weeks   call monday 8/21   to make appt  **  Have your INR checked By Ashlee LANCASTER  on Wednesday 8/23 for your coumadin dosing for wednesday night.

## 2017-08-20 NOTE — DISCHARGE NOTE ADULT - CARE PROVIDERS DIRECT ADDRESSES
,[email or web address],DirectAddress_Unknown

## 2017-08-20 NOTE — PROGRESS NOTE ADULT - PROVIDER SPECIALTY LIST ADULT
CT Surgery
Cardiology
Critical Care
Electrophysiology

## 2017-08-20 NOTE — DISCHARGE NOTE ADULT - PATIENT PORTAL LINK FT
“You can access the FollowHealth Patient Portal, offered by [facility], by registering with the following website: [email or web address]”

## 2017-08-20 NOTE — DISCHARGE NOTE ADULT - MEDICATION SUMMARY - MEDICATIONS TO CHANGE
I will SWITCH the dose or number of times a day I take the medications listed below when I get home from the hospital:    Coumadin 5 mg oral tablet  -- 3 mg on Mon 4.5 mg daily on Tuesday-Sun.    metoprolol tartrate 50 mg oral tablet  -- 1 tab(s) by mouth 2 times a day    predniSONE 5 mg oral tablet  -- 1 tab(s) by mouth once a day    predniSONE 2 mg oral delayed release tablet  -- 2 tab(s) by mouth once a day    Coumadin 3 mg oral tablet  -- 1 tab(s) by mouth Monday, Wednesday, and Friday    Coumadin 3 mg oral tablet  -- 1.5 tab(s) by mouth Tuesday, Thursday, Saturday, Sunday

## 2017-08-20 NOTE — DISCHARGE NOTE ADULT - CARE PROVIDER_API CALL
Yang Cueva), Surgery; Surgical Critical Care; Thoracic and Cardiac Surgery  [address] [place] [address]  Phone: [phone]  Fax: [phone]    Adriano Ibrahim), Adv Heart Fail Trnsplnt Cardio  [address] [place] [address]  Phone: [phone]  Fax: [phone]    Siobhan Rosado (NP; RN), NP in Primary Care AdultGerontology  [address] [place] [address]  Phone: [phone]  Fax: [phone]

## 2017-08-22 ENCOUNTER — RX RENEWAL (OUTPATIENT)
Age: 47
End: 2017-08-22

## 2017-08-24 ENCOUNTER — OUTPATIENT (OUTPATIENT)
Dept: OUTPATIENT SERVICES | Facility: HOSPITAL | Age: 47
LOS: 1 days | End: 2017-08-24

## 2017-08-24 ENCOUNTER — APPOINTMENT (OUTPATIENT)
Dept: INTERNAL MEDICINE | Facility: HOSPITAL | Age: 47
End: 2017-08-24

## 2017-08-24 DIAGNOSIS — Z98.891 HISTORY OF UTERINE SCAR FROM PREVIOUS SURGERY: Chronic | ICD-10-CM

## 2017-08-24 DIAGNOSIS — Z98.89 OTHER SPECIFIED POSTPROCEDURAL STATES: Chronic | ICD-10-CM

## 2017-08-24 DIAGNOSIS — Z95.0 PRESENCE OF CARDIAC PACEMAKER: Chronic | ICD-10-CM

## 2017-08-25 ENCOUNTER — RX RENEWAL (OUTPATIENT)
Age: 47
End: 2017-08-25

## 2017-08-28 ENCOUNTER — RX RENEWAL (OUTPATIENT)
Age: 47
End: 2017-08-28

## 2017-08-28 DIAGNOSIS — I48.91 UNSPECIFIED ATRIAL FIBRILLATION: ICD-10-CM

## 2017-08-28 DIAGNOSIS — Z79.01 LONG TERM (CURRENT) USE OF ANTICOAGULANTS: ICD-10-CM

## 2017-08-29 ENCOUNTER — OUTPATIENT (OUTPATIENT)
Dept: OUTPATIENT SERVICES | Facility: HOSPITAL | Age: 47
LOS: 1 days | End: 2017-08-29
Payer: MEDICAID

## 2017-08-29 ENCOUNTER — APPOINTMENT (OUTPATIENT)
Dept: CARDIOTHORACIC SURGERY | Facility: CLINIC | Age: 47
End: 2017-08-29
Payer: MEDICAID

## 2017-08-29 VITALS
TEMPERATURE: 97.7 F | BODY MASS INDEX: 19.88 KG/M2 | HEIGHT: 62 IN | HEART RATE: 70 BPM | RESPIRATION RATE: 16 BRPM | OXYGEN SATURATION: 100 % | SYSTOLIC BLOOD PRESSURE: 119 MMHG | WEIGHT: 108 LBS | DIASTOLIC BLOOD PRESSURE: 72 MMHG

## 2017-08-29 DIAGNOSIS — Z95.2 PRESENCE OF PROSTHETIC HEART VALVE: ICD-10-CM

## 2017-08-29 DIAGNOSIS — Z95.0 PRESENCE OF CARDIAC PACEMAKER: Chronic | ICD-10-CM

## 2017-08-29 DIAGNOSIS — Z98.891 HISTORY OF UTERINE SCAR FROM PREVIOUS SURGERY: Chronic | ICD-10-CM

## 2017-08-29 DIAGNOSIS — Z95.1 PRESENCE OF AORTOCORONARY BYPASS GRAFT: ICD-10-CM

## 2017-08-29 DIAGNOSIS — Z98.89 OTHER SPECIFIED POSTPROCEDURAL STATES: Chronic | ICD-10-CM

## 2017-08-29 PROCEDURE — 71046 X-RAY EXAM CHEST 2 VIEWS: CPT

## 2017-08-29 PROCEDURE — 99024 POSTOP FOLLOW-UP VISIT: CPT

## 2017-08-29 PROCEDURE — 71020: CPT | Mod: 26

## 2017-08-29 RX ORDER — SULFAMETHOXAZOLE AND TRIMETHOPRIM 800; 160 MG/1; MG/1
800-160 TABLET ORAL
Qty: 20 | Refills: 0 | Status: COMPLETED | COMMUNITY
Start: 2017-08-29 | End: 2017-08-29

## 2017-08-29 RX ORDER — LOSARTAN POTASSIUM 25 MG/1
25 TABLET, FILM COATED ORAL DAILY
Qty: 30 | Refills: 1 | Status: DISCONTINUED | COMMUNITY
Start: 2017-05-10 | End: 2017-08-29

## 2017-08-29 RX ORDER — SPIRONOLACTONE 25 MG/1
25 TABLET, FILM COATED ORAL DAILY
Refills: 0 | Status: DISCONTINUED | COMMUNITY
End: 2017-08-29

## 2017-08-29 RX ORDER — WARFARIN 3 MG/1
3 TABLET ORAL DAILY
Qty: 45 | Refills: 0 | Status: DISCONTINUED | COMMUNITY
Start: 2017-01-10 | End: 2017-08-29

## 2017-08-29 RX ORDER — METOPROLOL TARTRATE 50 MG/1
50 TABLET, FILM COATED ORAL
Refills: 0 | Status: DISCONTINUED | COMMUNITY
End: 2017-08-29

## 2017-08-29 RX ORDER — PREDNISONE 1 MG/1
1 TABLET ORAL
Qty: 120 | Refills: 1 | Status: DISCONTINUED | COMMUNITY
End: 2017-08-29

## 2017-08-29 RX ORDER — CLOBETASOL PROPIONATE 0.5 MG/G
0.05 GEL TOPICAL TWICE DAILY
Qty: 1 | Refills: 1 | Status: DISCONTINUED | COMMUNITY
Start: 2017-07-11 | End: 2017-08-29

## 2017-08-29 RX ORDER — HYDROCODONE BITARTRATE AND HOMATROPINE METHYLBROMIDE 5; 1.5 MG/5ML; MG/5ML
5-1.5 SYRUP ORAL
Qty: 30 | Refills: 0 | Status: COMPLETED | COMMUNITY
Start: 2017-08-29 | End: 2017-08-29

## 2017-09-01 ENCOUNTER — APPOINTMENT (OUTPATIENT)
Dept: INTERNAL MEDICINE | Facility: HOSPITAL | Age: 47
End: 2017-09-01
Payer: SELF-PAY

## 2017-09-01 ENCOUNTER — LABORATORY RESULT (OUTPATIENT)
Age: 47
End: 2017-09-01

## 2017-09-01 ENCOUNTER — RESULT CHARGE (OUTPATIENT)
Age: 47
End: 2017-09-01

## 2017-09-01 ENCOUNTER — OUTPATIENT (OUTPATIENT)
Dept: OUTPATIENT SERVICES | Facility: HOSPITAL | Age: 47
LOS: 1 days | End: 2017-09-01

## 2017-09-01 VITALS
SYSTOLIC BLOOD PRESSURE: 124 MMHG | DIASTOLIC BLOOD PRESSURE: 41 MMHG | HEART RATE: 84 BPM | HEIGHT: 62 IN | WEIGHT: 111.99 LBS | BODY MASS INDEX: 20.61 KG/M2

## 2017-09-01 DIAGNOSIS — Z98.891 HISTORY OF UTERINE SCAR FROM PREVIOUS SURGERY: Chronic | ICD-10-CM

## 2017-09-01 DIAGNOSIS — Z95.0 PRESENCE OF CARDIAC PACEMAKER: Chronic | ICD-10-CM

## 2017-09-01 DIAGNOSIS — Z98.89 OTHER SPECIFIED POSTPROCEDURAL STATES: Chronic | ICD-10-CM

## 2017-09-01 LAB
ALBUMIN SERPL ELPH-MCNC: 3.7 G/DL — SIGNIFICANT CHANGE UP (ref 3.3–5)
ALP SERPL-CCNC: 98 U/L — SIGNIFICANT CHANGE UP (ref 40–120)
ALT FLD-CCNC: 16 U/L — SIGNIFICANT CHANGE UP (ref 4–33)
AST SERPL-CCNC: 32 U/L — SIGNIFICANT CHANGE UP (ref 4–32)
BILIRUB SERPL-MCNC: 0.4 MG/DL — SIGNIFICANT CHANGE UP (ref 0.2–1.2)
BUN SERPL-MCNC: 8 MG/DL — SIGNIFICANT CHANGE UP (ref 7–23)
CALCIUM SERPL-MCNC: 9.1 MG/DL — SIGNIFICANT CHANGE UP (ref 8.4–10.5)
CHLORIDE SERPL-SCNC: 99 MMOL/L — SIGNIFICANT CHANGE UP (ref 98–107)
CO2 SERPL-SCNC: 22 MMOL/L — SIGNIFICANT CHANGE UP (ref 22–31)
CREAT SERPL-MCNC: 0.87 MG/DL — SIGNIFICANT CHANGE UP (ref 0.5–1.3)
GLUCOSE SERPL-MCNC: 70 MG/DL — SIGNIFICANT CHANGE UP (ref 70–99)
POTASSIUM SERPL-MCNC: 4.4 MMOL/L — SIGNIFICANT CHANGE UP (ref 3.5–5.3)
POTASSIUM SERPL-SCNC: 4.4 MMOL/L — SIGNIFICANT CHANGE UP (ref 3.5–5.3)
PROT SERPL-MCNC: 7.1 G/DL — SIGNIFICANT CHANGE UP (ref 6–8.3)
SODIUM SERPL-SCNC: 133 MMOL/L — LOW (ref 135–145)
TSH SERPL-MCNC: 2.85 UIU/ML — SIGNIFICANT CHANGE UP (ref 0.27–4.2)

## 2017-09-01 PROCEDURE — 99214 OFFICE O/P EST MOD 30 MIN: CPT | Mod: GC

## 2017-09-05 ENCOUNTER — APPOINTMENT (OUTPATIENT)
Dept: CARDIOTHORACIC SURGERY | Facility: CLINIC | Age: 47
End: 2017-09-05
Payer: SELF-PAY

## 2017-09-05 VITALS
WEIGHT: 110.23 LBS | SYSTOLIC BLOOD PRESSURE: 119 MMHG | BODY MASS INDEX: 20.28 KG/M2 | HEART RATE: 65 BPM | HEIGHT: 62 IN | TEMPERATURE: 97.6 F | RESPIRATION RATE: 15 BRPM | OXYGEN SATURATION: 100 % | DIASTOLIC BLOOD PRESSURE: 66 MMHG

## 2017-09-05 DIAGNOSIS — M31.4 AORTIC ARCH SYNDROME [TAKAYASU]: ICD-10-CM

## 2017-09-05 DIAGNOSIS — I34.0 NONRHEUMATIC MITRAL (VALVE) INSUFFICIENCY: ICD-10-CM

## 2017-09-05 DIAGNOSIS — I35.1 NONRHEUMATIC AORTIC (VALVE) INSUFFICIENCY: ICD-10-CM

## 2017-09-05 DIAGNOSIS — K21.9 GASTRO-ESOPHAGEAL REFLUX DISEASE WITHOUT ESOPHAGITIS: ICD-10-CM

## 2017-09-05 DIAGNOSIS — I48.91 UNSPECIFIED ATRIAL FIBRILLATION: ICD-10-CM

## 2017-09-05 PROCEDURE — 99024 POSTOP FOLLOW-UP VISIT: CPT

## 2017-09-05 RX ORDER — WARFARIN 4 MG/1
TABLET ORAL
Refills: 0 | Status: COMPLETED | COMMUNITY
End: 2017-09-05

## 2017-09-12 ENCOUNTER — APPOINTMENT (OUTPATIENT)
Dept: INTERNAL MEDICINE | Facility: HOSPITAL | Age: 47
End: 2017-09-12

## 2017-09-12 ENCOUNTER — APPOINTMENT (OUTPATIENT)
Dept: CARDIOLOGY | Facility: HOSPITAL | Age: 47
End: 2017-09-12

## 2017-09-19 ENCOUNTER — APPOINTMENT (OUTPATIENT)
Dept: CARDIOLOGY | Facility: HOSPITAL | Age: 47
End: 2017-09-19

## 2017-09-19 ENCOUNTER — APPOINTMENT (OUTPATIENT)
Dept: INTERNAL MEDICINE | Facility: HOSPITAL | Age: 47
End: 2017-09-19

## 2017-09-19 ENCOUNTER — OUTPATIENT (OUTPATIENT)
Dept: OUTPATIENT SERVICES | Facility: HOSPITAL | Age: 47
LOS: 1 days | End: 2017-09-19

## 2017-09-19 VITALS
HEART RATE: 63 BPM | BODY MASS INDEX: 20.12 KG/M2 | WEIGHT: 110 LBS | DIASTOLIC BLOOD PRESSURE: 71 MMHG | OXYGEN SATURATION: 100 % | SYSTOLIC BLOOD PRESSURE: 149 MMHG | RESPIRATION RATE: 16 BRPM

## 2017-09-19 DIAGNOSIS — Z98.891 HISTORY OF UTERINE SCAR FROM PREVIOUS SURGERY: Chronic | ICD-10-CM

## 2017-09-19 DIAGNOSIS — Z98.89 OTHER SPECIFIED POSTPROCEDURAL STATES: Chronic | ICD-10-CM

## 2017-09-19 DIAGNOSIS — Z95.0 PRESENCE OF CARDIAC PACEMAKER: Chronic | ICD-10-CM

## 2017-09-19 LAB
INR PPP: 2.3 RATIO
QUALITY CONTROL: YES

## 2017-09-20 DIAGNOSIS — Z79.01 LONG TERM (CURRENT) USE OF ANTICOAGULANTS: ICD-10-CM

## 2017-09-25 ENCOUNTER — APPOINTMENT (OUTPATIENT)
Dept: CARDIOTHORACIC SURGERY | Facility: CLINIC | Age: 47
End: 2017-09-25
Payer: SELF-PAY

## 2017-09-25 VITALS
BODY MASS INDEX: 19.88 KG/M2 | OXYGEN SATURATION: 99 % | HEIGHT: 62 IN | TEMPERATURE: 98.8 F | WEIGHT: 108.02 LBS | DIASTOLIC BLOOD PRESSURE: 73 MMHG | HEART RATE: 80 BPM | RESPIRATION RATE: 15 BRPM | SYSTOLIC BLOOD PRESSURE: 160 MMHG

## 2017-09-25 PROCEDURE — 99024 POSTOP FOLLOW-UP VISIT: CPT

## 2017-09-25 RX ORDER — SULFAMETHOXAZOLE AND TRIMETHOPRIM 800; 160 MG/1; MG/1
800-160 TABLET ORAL
Qty: 20 | Refills: 0 | Status: DISCONTINUED | COMMUNITY
Start: 2017-08-29 | End: 2017-09-25

## 2017-09-25 RX ORDER — HYDROCODONE BITARTRATE AND HOMATROPINE METHYLBROMIDE 5; 1.5 MG/5ML; MG/5ML
5-1.5 SYRUP ORAL
Qty: 240 | Refills: 0 | Status: DISCONTINUED | COMMUNITY
Start: 2017-08-29 | End: 2017-09-25

## 2017-09-25 RX ORDER — CYCLOBENZAPRINE HYDROCHLORIDE 5 MG/1
5 TABLET, FILM COATED ORAL TWICE DAILY
Qty: 14 | Refills: 0 | Status: DISCONTINUED | COMMUNITY
Start: 2017-09-05 | End: 2017-09-25

## 2017-09-25 RX ORDER — METOPROLOL TARTRATE 50 MG/1
50 TABLET, FILM COATED ORAL
Qty: 30 | Refills: 5 | Status: DISCONTINUED | COMMUNITY
End: 2017-09-25

## 2017-09-26 ENCOUNTER — APPOINTMENT (OUTPATIENT)
Dept: INTERNAL MEDICINE | Facility: HOSPITAL | Age: 47
End: 2017-09-26

## 2017-10-04 ENCOUNTER — APPOINTMENT (OUTPATIENT)
Age: 47
End: 2017-10-04

## 2017-10-04 ENCOUNTER — MED ADMIN CHARGE (OUTPATIENT)
Age: 47
End: 2017-10-04

## 2017-10-04 ENCOUNTER — OUTPATIENT (OUTPATIENT)
Dept: OUTPATIENT SERVICES | Facility: HOSPITAL | Age: 47
LOS: 1 days | End: 2017-10-04

## 2017-10-04 ENCOUNTER — APPOINTMENT (OUTPATIENT)
Dept: INTERNAL MEDICINE | Facility: HOSPITAL | Age: 47
End: 2017-10-04
Payer: SELF-PAY

## 2017-10-04 VITALS — HEIGHT: 62 IN | BODY MASS INDEX: 20.85 KG/M2 | WEIGHT: 113.32 LBS

## 2017-10-04 VITALS — SYSTOLIC BLOOD PRESSURE: 135 MMHG | DIASTOLIC BLOOD PRESSURE: 80 MMHG

## 2017-10-04 DIAGNOSIS — Z98.89 OTHER SPECIFIED POSTPROCEDURAL STATES: Chronic | ICD-10-CM

## 2017-10-04 DIAGNOSIS — Z95.0 PRESENCE OF CARDIAC PACEMAKER: Chronic | ICD-10-CM

## 2017-10-04 DIAGNOSIS — Z98.891 HISTORY OF UTERINE SCAR FROM PREVIOUS SURGERY: Chronic | ICD-10-CM

## 2017-10-04 PROCEDURE — 99214 OFFICE O/P EST MOD 30 MIN: CPT | Mod: GC

## 2017-10-05 DIAGNOSIS — Z79.01 LONG TERM (CURRENT) USE OF ANTICOAGULANTS: ICD-10-CM

## 2017-10-06 DIAGNOSIS — I48.91 UNSPECIFIED ATRIAL FIBRILLATION: ICD-10-CM

## 2017-10-06 DIAGNOSIS — I25.10 ATHEROSCLEROTIC HEART DISEASE OF NATIVE CORONARY ARTERY WITHOUT ANGINA PECTORIS: ICD-10-CM

## 2017-10-06 DIAGNOSIS — K21.9 GASTRO-ESOPHAGEAL REFLUX DISEASE WITHOUT ESOPHAGITIS: ICD-10-CM

## 2017-10-17 ENCOUNTER — OUTPATIENT (OUTPATIENT)
Dept: OUTPATIENT SERVICES | Facility: HOSPITAL | Age: 47
LOS: 1 days | End: 2017-10-17

## 2017-10-17 ENCOUNTER — APPOINTMENT (OUTPATIENT)
Dept: ELECTROPHYSIOLOGY | Facility: CLINIC | Age: 47
End: 2017-10-17
Payer: COMMERCIAL

## 2017-10-17 ENCOUNTER — APPOINTMENT (OUTPATIENT)
Dept: INTERNAL MEDICINE | Facility: HOSPITAL | Age: 47
End: 2017-10-17

## 2017-10-17 DIAGNOSIS — Z79.01 LONG TERM (CURRENT) USE OF ANTICOAGULANTS: ICD-10-CM

## 2017-10-17 DIAGNOSIS — I48.91 UNSPECIFIED ATRIAL FIBRILLATION: ICD-10-CM

## 2017-10-17 DIAGNOSIS — Z95.0 PRESENCE OF CARDIAC PACEMAKER: Chronic | ICD-10-CM

## 2017-10-17 DIAGNOSIS — Z98.89 OTHER SPECIFIED POSTPROCEDURAL STATES: Chronic | ICD-10-CM

## 2017-10-17 DIAGNOSIS — Z98.891 HISTORY OF UTERINE SCAR FROM PREVIOUS SURGERY: Chronic | ICD-10-CM

## 2017-10-17 PROCEDURE — 93290 INTERROG DEV EVAL ICPMS IP: CPT | Mod: 26

## 2017-10-17 PROCEDURE — 93282 PRGRMG EVAL IMPLANTABLE DFB: CPT | Mod: 26

## 2017-10-24 ENCOUNTER — APPOINTMENT (OUTPATIENT)
Dept: CARDIOLOGY | Facility: HOSPITAL | Age: 47
End: 2017-10-24

## 2017-10-24 ENCOUNTER — RX RENEWAL (OUTPATIENT)
Age: 47
End: 2017-10-24

## 2017-10-24 VITALS
DIASTOLIC BLOOD PRESSURE: 61 MMHG | RESPIRATION RATE: 14 BRPM | WEIGHT: 114 LBS | OXYGEN SATURATION: 98 % | SYSTOLIC BLOOD PRESSURE: 133 MMHG | BODY MASS INDEX: 20.85 KG/M2 | HEART RATE: 59 BPM

## 2017-10-26 ENCOUNTER — APPOINTMENT (OUTPATIENT)
Dept: CARDIOLOGY | Facility: CLINIC | Age: 47
End: 2017-10-26

## 2017-11-01 ENCOUNTER — OUTPATIENT (OUTPATIENT)
Dept: OUTPATIENT SERVICES | Facility: HOSPITAL | Age: 47
LOS: 1 days | End: 2017-11-01

## 2017-11-01 ENCOUNTER — APPOINTMENT (OUTPATIENT)
Dept: INTERNAL MEDICINE | Facility: HOSPITAL | Age: 47
End: 2017-11-01

## 2017-11-01 DIAGNOSIS — Z98.891 HISTORY OF UTERINE SCAR FROM PREVIOUS SURGERY: Chronic | ICD-10-CM

## 2017-11-01 DIAGNOSIS — Z95.0 PRESENCE OF CARDIAC PACEMAKER: Chronic | ICD-10-CM

## 2017-11-01 DIAGNOSIS — Z98.89 OTHER SPECIFIED POSTPROCEDURAL STATES: Chronic | ICD-10-CM

## 2017-11-02 DIAGNOSIS — I48.91 UNSPECIFIED ATRIAL FIBRILLATION: ICD-10-CM

## 2017-11-02 DIAGNOSIS — Z79.01 LONG TERM (CURRENT) USE OF ANTICOAGULANTS: ICD-10-CM

## 2017-11-02 DIAGNOSIS — Z00.00 ENCOUNTER FOR GENERAL ADULT MEDICAL EXAMINATION WITHOUT ABNORMAL FINDINGS: ICD-10-CM

## 2017-11-06 ENCOUNTER — APPOINTMENT (OUTPATIENT)
Dept: INTERNAL MEDICINE | Facility: HOSPITAL | Age: 47
End: 2017-11-06

## 2017-11-06 ENCOUNTER — LABORATORY RESULT (OUTPATIENT)
Age: 47
End: 2017-11-06

## 2017-11-06 ENCOUNTER — OTHER (OUTPATIENT)
Age: 47
End: 2017-11-06

## 2017-11-06 ENCOUNTER — OUTPATIENT (OUTPATIENT)
Dept: OUTPATIENT SERVICES | Facility: HOSPITAL | Age: 47
LOS: 1 days | End: 2017-11-06

## 2017-11-06 ENCOUNTER — APPOINTMENT (OUTPATIENT)
Dept: RHEUMATOLOGY | Facility: HOSPITAL | Age: 47
End: 2017-11-06

## 2017-11-06 VITALS — SYSTOLIC BLOOD PRESSURE: 124 MMHG | HEART RATE: 60 BPM | DIASTOLIC BLOOD PRESSURE: 45 MMHG

## 2017-11-06 VITALS — HEIGHT: 62 IN | BODY MASS INDEX: 20.61 KG/M2 | WEIGHT: 112 LBS

## 2017-11-06 DIAGNOSIS — Z95.0 PRESENCE OF CARDIAC PACEMAKER: Chronic | ICD-10-CM

## 2017-11-06 DIAGNOSIS — M31.4 AORTIC ARCH SYNDROME [TAKAYASU]: ICD-10-CM

## 2017-11-06 DIAGNOSIS — Z98.891 HISTORY OF UTERINE SCAR FROM PREVIOUS SURGERY: Chronic | ICD-10-CM

## 2017-11-06 DIAGNOSIS — D75.89 OTHER SPECIFIED DISEASES OF BLOOD AND BLOOD-FORMING ORGANS: ICD-10-CM

## 2017-11-06 DIAGNOSIS — I42.2 OTHER HYPERTROPHIC CARDIOMYOPATHY: ICD-10-CM

## 2017-11-06 DIAGNOSIS — Z98.89 OTHER SPECIFIED POSTPROCEDURAL STATES: Chronic | ICD-10-CM

## 2017-11-06 DIAGNOSIS — I48.91 UNSPECIFIED ATRIAL FIBRILLATION: ICD-10-CM

## 2017-11-06 LAB
BASOPHILS # BLD AUTO: 0.06 K/UL — SIGNIFICANT CHANGE UP (ref 0–0.2)
BASOPHILS NFR BLD AUTO: 1 % — SIGNIFICANT CHANGE UP (ref 0–2)
BUN SERPL-MCNC: 13 MG/DL — SIGNIFICANT CHANGE UP (ref 7–23)
CALCIUM SERPL-MCNC: 8.4 MG/DL — SIGNIFICANT CHANGE UP (ref 8.4–10.5)
CHLORIDE SERPL-SCNC: 103 MMOL/L — SIGNIFICANT CHANGE UP (ref 98–107)
CO2 SERPL-SCNC: 29 MMOL/L — SIGNIFICANT CHANGE UP (ref 22–31)
CREAT SERPL-MCNC: 0.92 MG/DL — SIGNIFICANT CHANGE UP (ref 0.5–1.3)
EOSINOPHIL # BLD AUTO: 0.41 K/UL — SIGNIFICANT CHANGE UP (ref 0–0.5)
EOSINOPHIL NFR BLD AUTO: 6.7 % — HIGH (ref 0–6)
FOLATE SERPL-MCNC: > 20 NG/ML — HIGH (ref 4.7–20)
GLUCOSE SERPL-MCNC: 89 MG/DL — SIGNIFICANT CHANGE UP (ref 70–99)
HCT VFR BLD CALC: 36.5 % — SIGNIFICANT CHANGE UP (ref 34.5–45)
HGB BLD-MCNC: 11.6 G/DL — SIGNIFICANT CHANGE UP (ref 11.5–15.5)
IMM GRANULOCYTES # BLD AUTO: 0.01 # — SIGNIFICANT CHANGE UP
IMM GRANULOCYTES NFR BLD AUTO: 0.2 % — SIGNIFICANT CHANGE UP (ref 0–1.5)
LYMPHOCYTES # BLD AUTO: 1.48 K/UL — SIGNIFICANT CHANGE UP (ref 1–3.3)
LYMPHOCYTES # BLD AUTO: 24.2 % — SIGNIFICANT CHANGE UP (ref 13–44)
MCHC RBC-ENTMCNC: 30.4 PG — SIGNIFICANT CHANGE UP (ref 27–34)
MCHC RBC-ENTMCNC: 31.8 % — LOW (ref 32–36)
MCV RBC AUTO: 95.5 FL — SIGNIFICANT CHANGE UP (ref 80–100)
MONOCYTES # BLD AUTO: 0.5 K/UL — SIGNIFICANT CHANGE UP (ref 0–0.9)
MONOCYTES NFR BLD AUTO: 8.2 % — SIGNIFICANT CHANGE UP (ref 2–14)
NEUTROPHILS # BLD AUTO: 3.66 K/UL — SIGNIFICANT CHANGE UP (ref 1.8–7.4)
NEUTROPHILS NFR BLD AUTO: 59.7 % — SIGNIFICANT CHANGE UP (ref 43–77)
NRBC # FLD: 0 — SIGNIFICANT CHANGE UP
PLATELET # BLD AUTO: 238 K/UL — SIGNIFICANT CHANGE UP (ref 150–400)
PMV BLD: 10.5 FL — SIGNIFICANT CHANGE UP (ref 7–13)
POTASSIUM SERPL-MCNC: 4.1 MMOL/L — SIGNIFICANT CHANGE UP (ref 3.5–5.3)
POTASSIUM SERPL-SCNC: 4.1 MMOL/L — SIGNIFICANT CHANGE UP (ref 3.5–5.3)
RBC # BLD: 3.82 M/UL — SIGNIFICANT CHANGE UP (ref 3.8–5.2)
RBC # FLD: 17.3 % — HIGH (ref 10.3–14.5)
SODIUM SERPL-SCNC: 141 MMOL/L — SIGNIFICANT CHANGE UP (ref 135–145)
VIT B12 SERPL-MCNC: 853 PG/ML — SIGNIFICANT CHANGE UP (ref 200–900)
WBC # BLD: 6.12 K/UL — SIGNIFICANT CHANGE UP (ref 3.8–10.5)
WBC # FLD AUTO: 6.12 K/UL — SIGNIFICANT CHANGE UP (ref 3.8–10.5)

## 2017-11-14 ENCOUNTER — APPOINTMENT (OUTPATIENT)
Dept: CARDIOLOGY | Facility: HOSPITAL | Age: 47
End: 2017-11-14

## 2017-11-14 ENCOUNTER — OUTPATIENT (OUTPATIENT)
Dept: OUTPATIENT SERVICES | Facility: HOSPITAL | Age: 47
LOS: 1 days | End: 2017-11-14

## 2017-11-14 ENCOUNTER — APPOINTMENT (OUTPATIENT)
Dept: INTERNAL MEDICINE | Facility: HOSPITAL | Age: 47
End: 2017-11-14

## 2017-11-14 VITALS
HEART RATE: 52 BPM | OXYGEN SATURATION: 99 % | DIASTOLIC BLOOD PRESSURE: 60 MMHG | RESPIRATION RATE: 14 BRPM | BODY MASS INDEX: 20.67 KG/M2 | WEIGHT: 113 LBS | SYSTOLIC BLOOD PRESSURE: 153 MMHG

## 2017-11-14 DIAGNOSIS — Z95.0 PRESENCE OF CARDIAC PACEMAKER: Chronic | ICD-10-CM

## 2017-11-14 DIAGNOSIS — Z98.89 OTHER SPECIFIED POSTPROCEDURAL STATES: Chronic | ICD-10-CM

## 2017-11-14 DIAGNOSIS — I48.91 UNSPECIFIED ATRIAL FIBRILLATION: ICD-10-CM

## 2017-11-14 DIAGNOSIS — Z98.891 HISTORY OF UTERINE SCAR FROM PREVIOUS SURGERY: Chronic | ICD-10-CM

## 2017-11-16 NOTE — CONSULT NOTE ADULT - ATTENDING COMMENTS
Mitral valve bileaflet prolapse, plan for bioprosthetic mitral valve replacement. Off warfarin and on Heparin with paroxysmal atrial fibrillation. ICD for known ventricular tachycardia and has paroxysmal atrial fibrillation. Normal coronaries demonstrated 2015. (4) rarely moist

## 2017-11-21 ENCOUNTER — APPOINTMENT (OUTPATIENT)
Dept: INTERNAL MEDICINE | Facility: HOSPITAL | Age: 47
End: 2017-11-21

## 2017-11-27 ENCOUNTER — OTHER (OUTPATIENT)
Age: 47
End: 2017-11-27

## 2017-11-27 ENCOUNTER — LABORATORY RESULT (OUTPATIENT)
Age: 47
End: 2017-11-27

## 2017-11-27 ENCOUNTER — OUTPATIENT (OUTPATIENT)
Dept: OUTPATIENT SERVICES | Facility: HOSPITAL | Age: 47
LOS: 1 days | End: 2017-11-27

## 2017-11-27 ENCOUNTER — APPOINTMENT (OUTPATIENT)
Dept: RHEUMATOLOGY | Facility: HOSPITAL | Age: 47
End: 2017-11-27

## 2017-11-27 ENCOUNTER — APPOINTMENT (OUTPATIENT)
Dept: INTERNAL MEDICINE | Facility: HOSPITAL | Age: 47
End: 2017-11-27

## 2017-11-27 VITALS
DIASTOLIC BLOOD PRESSURE: 57 MMHG | WEIGHT: 113.98 LBS | HEIGHT: 62 IN | BODY MASS INDEX: 20.97 KG/M2 | HEART RATE: 69 BPM | SYSTOLIC BLOOD PRESSURE: 138 MMHG

## 2017-11-27 DIAGNOSIS — Z98.89 OTHER SPECIFIED POSTPROCEDURAL STATES: Chronic | ICD-10-CM

## 2017-11-27 DIAGNOSIS — Z95.0 PRESENCE OF CARDIAC PACEMAKER: Chronic | ICD-10-CM

## 2017-11-27 DIAGNOSIS — Z98.891 HISTORY OF UTERINE SCAR FROM PREVIOUS SURGERY: Chronic | ICD-10-CM

## 2017-11-27 DIAGNOSIS — Z79.52 LONG TERM (CURRENT) USE OF SYSTEMIC STEROIDS: ICD-10-CM

## 2017-11-27 DIAGNOSIS — M31.4 AORTIC ARCH SYNDROME [TAKAYASU]: ICD-10-CM

## 2017-11-27 LAB
24R-OH-CALCIDIOL SERPL-MCNC: 15.6 NG/ML — LOW (ref 30–80)
CRP SERPL-MCNC: 6.5 MG/L — HIGH
ERYTHROCYTE [SEDIMENTATION RATE] IN BLOOD: 12 MM/HR — SIGNIFICANT CHANGE UP (ref 4–25)
HAV IGM SER-ACNC: NONREACTIVE — SIGNIFICANT CHANGE UP
HBV CORE AB SER-ACNC: NONREACTIVE — SIGNIFICANT CHANGE UP
HBV CORE IGM SER-ACNC: NONREACTIVE — SIGNIFICANT CHANGE UP
HBV SURFACE AG SER-ACNC: NONREACTIVE — SIGNIFICANT CHANGE UP
HCV AB S/CO SERPL IA: 0.21 S/CO — SIGNIFICANT CHANGE UP
HCV AB SERPL-IMP: SIGNIFICANT CHANGE UP
IGA FLD-MCNC: 178 MG/DL — SIGNIFICANT CHANGE UP (ref 70–400)
IGG FLD-MCNC: 1340 MG/DL — SIGNIFICANT CHANGE UP (ref 700–1600)
IGM SERPL-MCNC: 113 MG/DL — SIGNIFICANT CHANGE UP (ref 40–230)
INR BLD: 1.73 — HIGH (ref 0.88–1.17)
PROTHROM AB SERPL-ACNC: 19.6 SEC — HIGH (ref 9.8–13.1)

## 2017-11-28 DIAGNOSIS — I48.91 UNSPECIFIED ATRIAL FIBRILLATION: ICD-10-CM

## 2017-11-28 DIAGNOSIS — Z79.01 LONG TERM (CURRENT) USE OF ANTICOAGULANTS: ICD-10-CM

## 2017-11-29 LAB
M TB TUBERC IFN-G BLD QL: 0.03 IU/ML — SIGNIFICANT CHANGE UP
M TB TUBERC IFN-G BLD QL: 0.04 IU/ML — SIGNIFICANT CHANGE UP
M TB TUBERC IFN-G BLD QL: NEGATIVE — SIGNIFICANT CHANGE UP
MITOGEN IGNF BCKGRD COR BLD-ACNC: >10 IU/ML — SIGNIFICANT CHANGE UP

## 2017-12-01 LAB — MISCELLANEOUS - CHEM: SIGNIFICANT CHANGE UP

## 2017-12-04 ENCOUNTER — APPOINTMENT (OUTPATIENT)
Dept: INTERNAL MEDICINE | Facility: HOSPITAL | Age: 47
End: 2017-12-04

## 2017-12-11 ENCOUNTER — APPOINTMENT (OUTPATIENT)
Dept: INTERNAL MEDICINE | Facility: HOSPITAL | Age: 47
End: 2017-12-11

## 2017-12-11 ENCOUNTER — LABORATORY RESULT (OUTPATIENT)
Age: 47
End: 2017-12-11

## 2017-12-11 ENCOUNTER — OUTPATIENT (OUTPATIENT)
Dept: OUTPATIENT SERVICES | Facility: HOSPITAL | Age: 47
LOS: 1 days | End: 2017-12-11

## 2017-12-11 VITALS — HEIGHT: 62 IN | WEIGHT: 113 LBS | BODY MASS INDEX: 20.8 KG/M2

## 2017-12-11 VITALS — SYSTOLIC BLOOD PRESSURE: 115 MMHG | DIASTOLIC BLOOD PRESSURE: 50 MMHG

## 2017-12-11 DIAGNOSIS — I34.0 NONRHEUMATIC MITRAL (VALVE) INSUFFICIENCY: ICD-10-CM

## 2017-12-11 DIAGNOSIS — Z98.891 HISTORY OF UTERINE SCAR FROM PREVIOUS SURGERY: Chronic | ICD-10-CM

## 2017-12-11 DIAGNOSIS — Z98.89 OTHER SPECIFIED POSTPROCEDURAL STATES: Chronic | ICD-10-CM

## 2017-12-11 DIAGNOSIS — Z79.52 LONG TERM (CURRENT) USE OF SYSTEMIC STEROIDS: ICD-10-CM

## 2017-12-11 DIAGNOSIS — Z95.0 PRESENCE OF CARDIAC PACEMAKER: Chronic | ICD-10-CM

## 2017-12-11 LAB
ALBUMIN SERPL ELPH-MCNC: 3.8 G/DL — SIGNIFICANT CHANGE UP (ref 3.3–5)
ALP SERPL-CCNC: 107 U/L — SIGNIFICANT CHANGE UP (ref 40–120)
ALT FLD-CCNC: 35 U/L — HIGH (ref 4–33)
AST SERPL-CCNC: 54 U/L — HIGH (ref 4–32)
BASOPHILS # BLD AUTO: 0.07 K/UL — SIGNIFICANT CHANGE UP (ref 0–0.2)
BASOPHILS NFR BLD AUTO: 1 % — SIGNIFICANT CHANGE UP (ref 0–2)
BILIRUB SERPL-MCNC: 0.7 MG/DL — SIGNIFICANT CHANGE UP (ref 0.2–1.2)
BUN SERPL-MCNC: 14 MG/DL — SIGNIFICANT CHANGE UP (ref 7–23)
CALCIUM SERPL-MCNC: 8.8 MG/DL — SIGNIFICANT CHANGE UP (ref 8.4–10.5)
CHLORIDE SERPL-SCNC: 105 MMOL/L — SIGNIFICANT CHANGE UP (ref 98–107)
CO2 SERPL-SCNC: 28 MMOL/L — SIGNIFICANT CHANGE UP (ref 22–31)
CREAT SERPL-MCNC: 0.86 MG/DL — SIGNIFICANT CHANGE UP (ref 0.5–1.3)
EOSINOPHIL # BLD AUTO: 0.4 K/UL — SIGNIFICANT CHANGE UP (ref 0–0.5)
EOSINOPHIL NFR BLD AUTO: 6 % — SIGNIFICANT CHANGE UP (ref 0–6)
GLUCOSE SERPL-MCNC: 104 MG/DL — HIGH (ref 70–99)
HCT VFR BLD CALC: 36.1 % — SIGNIFICANT CHANGE UP (ref 34.5–45)
HGB BLD-MCNC: 11.9 G/DL — SIGNIFICANT CHANGE UP (ref 11.5–15.5)
IMM GRANULOCYTES # BLD AUTO: 0.02 # — SIGNIFICANT CHANGE UP
IMM GRANULOCYTES NFR BLD AUTO: 0.3 % — SIGNIFICANT CHANGE UP (ref 0–1.5)
LYMPHOCYTES # BLD AUTO: 1.13 K/UL — SIGNIFICANT CHANGE UP (ref 1–3.3)
LYMPHOCYTES # BLD AUTO: 16.9 % — SIGNIFICANT CHANGE UP (ref 13–44)
MCHC RBC-ENTMCNC: 32 PG — SIGNIFICANT CHANGE UP (ref 27–34)
MCHC RBC-ENTMCNC: 33 % — SIGNIFICANT CHANGE UP (ref 32–36)
MCV RBC AUTO: 97 FL — SIGNIFICANT CHANGE UP (ref 80–100)
MONOCYTES # BLD AUTO: 0.43 K/UL — SIGNIFICANT CHANGE UP (ref 0–0.9)
MONOCYTES NFR BLD AUTO: 6.4 % — SIGNIFICANT CHANGE UP (ref 2–14)
NEUTROPHILS # BLD AUTO: 4.64 K/UL — SIGNIFICANT CHANGE UP (ref 1.8–7.4)
NEUTROPHILS NFR BLD AUTO: 69.4 % — SIGNIFICANT CHANGE UP (ref 43–77)
NRBC # FLD: 0 — SIGNIFICANT CHANGE UP
PLATELET # BLD AUTO: 233 K/UL — SIGNIFICANT CHANGE UP (ref 150–400)
PMV BLD: 10.6 FL — SIGNIFICANT CHANGE UP (ref 7–13)
POTASSIUM SERPL-MCNC: 4.3 MMOL/L — SIGNIFICANT CHANGE UP (ref 3.5–5.3)
POTASSIUM SERPL-SCNC: 4.3 MMOL/L — SIGNIFICANT CHANGE UP (ref 3.5–5.3)
PROT SERPL-MCNC: 6.7 G/DL — SIGNIFICANT CHANGE UP (ref 6–8.3)
RBC # BLD: 3.72 M/UL — LOW (ref 3.8–5.2)
RBC # FLD: 18.2 % — HIGH (ref 10.3–14.5)
SODIUM SERPL-SCNC: 145 MMOL/L — SIGNIFICANT CHANGE UP (ref 135–145)
WBC # BLD: 6.69 K/UL — SIGNIFICANT CHANGE UP (ref 3.8–10.5)
WBC # FLD AUTO: 6.69 K/UL — SIGNIFICANT CHANGE UP (ref 3.8–10.5)

## 2017-12-12 ENCOUNTER — APPOINTMENT (OUTPATIENT)
Dept: CARDIOLOGY | Facility: HOSPITAL | Age: 47
End: 2017-12-12

## 2017-12-15 DIAGNOSIS — Z79.01 LONG TERM (CURRENT) USE OF ANTICOAGULANTS: ICD-10-CM

## 2017-12-18 ENCOUNTER — RX RENEWAL (OUTPATIENT)
Age: 47
End: 2017-12-18

## 2017-12-18 DIAGNOSIS — Z00.00 ENCOUNTER FOR GENERAL ADULT MEDICAL EXAMINATION WITHOUT ABNORMAL FINDINGS: ICD-10-CM

## 2017-12-18 DIAGNOSIS — Z79.52 LONG TERM (CURRENT) USE OF SYSTEMIC STEROIDS: ICD-10-CM

## 2017-12-18 DIAGNOSIS — M31.4 AORTIC ARCH SYNDROME [TAKAYASU]: ICD-10-CM

## 2017-12-18 DIAGNOSIS — I48.91 UNSPECIFIED ATRIAL FIBRILLATION: ICD-10-CM

## 2017-12-18 DIAGNOSIS — I34.0 NONRHEUMATIC MITRAL (VALVE) INSUFFICIENCY: ICD-10-CM

## 2017-12-19 ENCOUNTER — APPOINTMENT (OUTPATIENT)
Dept: INTERNAL MEDICINE | Facility: HOSPITAL | Age: 47
End: 2017-12-19

## 2017-12-19 ENCOUNTER — APPOINTMENT (OUTPATIENT)
Dept: CARDIOLOGY | Facility: HOSPITAL | Age: 47
End: 2017-12-19

## 2017-12-19 VITALS
SYSTOLIC BLOOD PRESSURE: 133 MMHG | DIASTOLIC BLOOD PRESSURE: 55 MMHG | BODY MASS INDEX: 20.67 KG/M2 | RESPIRATION RATE: 15 BRPM | HEART RATE: 78 BPM | OXYGEN SATURATION: 98 % | WEIGHT: 113 LBS

## 2018-01-16 ENCOUNTER — RX RENEWAL (OUTPATIENT)
Age: 48
End: 2018-01-16

## 2018-01-23 ENCOUNTER — OUTPATIENT (OUTPATIENT)
Dept: OUTPATIENT SERVICES | Facility: HOSPITAL | Age: 48
LOS: 1 days | End: 2018-01-23

## 2018-01-23 ENCOUNTER — APPOINTMENT (OUTPATIENT)
Dept: ELECTROPHYSIOLOGY | Facility: CLINIC | Age: 48
End: 2018-01-23
Payer: COMMERCIAL

## 2018-01-23 ENCOUNTER — APPOINTMENT (OUTPATIENT)
Dept: INTERNAL MEDICINE | Facility: HOSPITAL | Age: 48
End: 2018-01-23

## 2018-01-23 VITALS — HEART RATE: 66 BPM | SYSTOLIC BLOOD PRESSURE: 138 MMHG | DIASTOLIC BLOOD PRESSURE: 55 MMHG

## 2018-01-23 DIAGNOSIS — Z98.891 HISTORY OF UTERINE SCAR FROM PREVIOUS SURGERY: Chronic | ICD-10-CM

## 2018-01-23 DIAGNOSIS — Z79.01 LONG TERM (CURRENT) USE OF ANTICOAGULANTS: ICD-10-CM

## 2018-01-23 DIAGNOSIS — Z00.00 ENCOUNTER FOR GENERAL ADULT MEDICAL EXAMINATION WITHOUT ABNORMAL FINDINGS: ICD-10-CM

## 2018-01-23 DIAGNOSIS — Z98.89 OTHER SPECIFIED POSTPROCEDURAL STATES: Chronic | ICD-10-CM

## 2018-01-23 DIAGNOSIS — Z95.0 PRESENCE OF CARDIAC PACEMAKER: Chronic | ICD-10-CM

## 2018-01-23 LAB — INR PPP: 1.9 RATIO

## 2018-01-23 PROCEDURE — 93282 PRGRMG EVAL IMPLANTABLE DFB: CPT | Mod: 26

## 2018-01-23 PROCEDURE — 93290 INTERROG DEV EVAL ICPMS IP: CPT | Mod: 26

## 2018-02-02 NOTE — PROCEDURE NOTE - INTERROGATION NOTE: THRESHOLD (V)
Last ov: 1/3/18  Next ov: 7/5/18    Requesting refill on:    terazosin (HYTRIN) 10 MG capsule 90 capsule 0 12/1/2017     Sig: TAKE ONE CAPSULE BY MOUTH ONE TIME DAILY IN THE EVENING       simvastatin (ZOCOR) 20 MG tablet 90 tablet 0 12/1/2017     Sig: TAKE ONE TABLET BY MOUTH ONE TIME DAILY IN THE EVENING        Last LDL drawn on 9/1/17 and was 30.     Refilled simvastatin per protocol.   Please advise regarding terazosin. Okay to refill?  
Refill ×6  
0.62
0.62

## 2018-02-06 ENCOUNTER — APPOINTMENT (OUTPATIENT)
Dept: INTERNAL MEDICINE | Facility: HOSPITAL | Age: 48
End: 2018-02-06

## 2018-03-05 ENCOUNTER — APPOINTMENT (OUTPATIENT)
Dept: RHEUMATOLOGY | Facility: HOSPITAL | Age: 48
End: 2018-03-05

## 2018-03-20 ENCOUNTER — APPOINTMENT (OUTPATIENT)
Dept: INTERNAL MEDICINE | Facility: HOSPITAL | Age: 48
End: 2018-03-20

## 2018-03-20 ENCOUNTER — OUTPATIENT (OUTPATIENT)
Dept: OUTPATIENT SERVICES | Facility: HOSPITAL | Age: 48
LOS: 1 days | End: 2018-03-20

## 2018-03-20 ENCOUNTER — APPOINTMENT (OUTPATIENT)
Dept: CARDIOLOGY | Facility: HOSPITAL | Age: 48
End: 2018-03-20

## 2018-03-20 VITALS
SYSTOLIC BLOOD PRESSURE: 121 MMHG | WEIGHT: 116 LBS | OXYGEN SATURATION: 98 % | BODY MASS INDEX: 21.22 KG/M2 | RESPIRATION RATE: 16 BRPM | DIASTOLIC BLOOD PRESSURE: 53 MMHG | HEART RATE: 63 BPM

## 2018-03-20 DIAGNOSIS — Z95.0 PRESENCE OF CARDIAC PACEMAKER: Chronic | ICD-10-CM

## 2018-03-20 DIAGNOSIS — Z98.89 OTHER SPECIFIED POSTPROCEDURAL STATES: Chronic | ICD-10-CM

## 2018-03-20 DIAGNOSIS — Z98.891 HISTORY OF UTERINE SCAR FROM PREVIOUS SURGERY: Chronic | ICD-10-CM

## 2018-03-23 DIAGNOSIS — Z79.01 LONG TERM (CURRENT) USE OF ANTICOAGULANTS: ICD-10-CM

## 2018-04-09 ENCOUNTER — LABORATORY RESULT (OUTPATIENT)
Age: 48
End: 2018-04-09

## 2018-04-09 ENCOUNTER — OUTPATIENT (OUTPATIENT)
Dept: OUTPATIENT SERVICES | Facility: HOSPITAL | Age: 48
LOS: 1 days | End: 2018-04-09

## 2018-04-09 ENCOUNTER — APPOINTMENT (OUTPATIENT)
Dept: INTERNAL MEDICINE | Facility: HOSPITAL | Age: 48
End: 2018-04-09

## 2018-04-09 ENCOUNTER — APPOINTMENT (OUTPATIENT)
Dept: RHEUMATOLOGY | Facility: HOSPITAL | Age: 48
End: 2018-04-09
Payer: COMMERCIAL

## 2018-04-09 VITALS
BODY MASS INDEX: 21.92 KG/M2 | DIASTOLIC BLOOD PRESSURE: 50 MMHG | SYSTOLIC BLOOD PRESSURE: 135 MMHG | HEIGHT: 62 IN | HEART RATE: 69 BPM | WEIGHT: 119.1 LBS

## 2018-04-09 DIAGNOSIS — Z98.89 OTHER SPECIFIED POSTPROCEDURAL STATES: Chronic | ICD-10-CM

## 2018-04-09 DIAGNOSIS — Z98.891 HISTORY OF UTERINE SCAR FROM PREVIOUS SURGERY: Chronic | ICD-10-CM

## 2018-04-09 DIAGNOSIS — M31.4 AORTIC ARCH SYNDROME [TAKAYASU]: ICD-10-CM

## 2018-04-09 DIAGNOSIS — I51.9 HEART DISEASE, UNSPECIFIED: ICD-10-CM

## 2018-04-09 DIAGNOSIS — Z95.0 PRESENCE OF CARDIAC PACEMAKER: Chronic | ICD-10-CM

## 2018-04-09 LAB
ALBUMIN SERPL ELPH-MCNC: 3.9 G/DL — SIGNIFICANT CHANGE UP (ref 3.3–5)
ALP SERPL-CCNC: 82 U/L — SIGNIFICANT CHANGE UP (ref 40–120)
ALT FLD-CCNC: 31 U/L — SIGNIFICANT CHANGE UP (ref 4–33)
AST SERPL-CCNC: 29 U/L — SIGNIFICANT CHANGE UP (ref 4–32)
BASOPHILS # BLD AUTO: 0.05 K/UL — SIGNIFICANT CHANGE UP (ref 0–0.2)
BASOPHILS NFR BLD AUTO: 0.7 % — SIGNIFICANT CHANGE UP (ref 0–2)
BILIRUB SERPL-MCNC: 0.7 MG/DL — SIGNIFICANT CHANGE UP (ref 0.2–1.2)
BUN SERPL-MCNC: 12 MG/DL — SIGNIFICANT CHANGE UP (ref 7–23)
CALCIUM SERPL-MCNC: 8.5 MG/DL — SIGNIFICANT CHANGE UP (ref 8.4–10.5)
CHLORIDE SERPL-SCNC: 105 MMOL/L — SIGNIFICANT CHANGE UP (ref 98–107)
CHOLEST SERPL-MCNC: 147 MG/DL — SIGNIFICANT CHANGE UP (ref 120–199)
CO2 SERPL-SCNC: 24 MMOL/L — SIGNIFICANT CHANGE UP (ref 22–31)
CREAT SERPL-MCNC: 0.72 MG/DL — SIGNIFICANT CHANGE UP (ref 0.5–1.3)
CRP SERPL-MCNC: < 5 MG/L — SIGNIFICANT CHANGE UP
EOSINOPHIL # BLD AUTO: 0.15 K/UL — SIGNIFICANT CHANGE UP (ref 0–0.5)
EOSINOPHIL NFR BLD AUTO: 2.1 % — SIGNIFICANT CHANGE UP (ref 0–6)
ERYTHROCYTE [SEDIMENTATION RATE] IN BLOOD: 6 MM/HR — SIGNIFICANT CHANGE UP (ref 4–25)
GLUCOSE SERPL-MCNC: 70 MG/DL — SIGNIFICANT CHANGE UP (ref 70–99)
HCT VFR BLD CALC: 38.5 % — SIGNIFICANT CHANGE UP (ref 34.5–45)
HDLC SERPL-MCNC: 57 MG/DL — SIGNIFICANT CHANGE UP (ref 45–65)
HGB BLD-MCNC: 12 G/DL — SIGNIFICANT CHANGE UP (ref 11.5–15.5)
IMM GRANULOCYTES # BLD AUTO: 0.02 # — SIGNIFICANT CHANGE UP
IMM GRANULOCYTES NFR BLD AUTO: 0.3 % — SIGNIFICANT CHANGE UP (ref 0–1.5)
LIPID PNL WITH DIRECT LDL SERPL: 72 MG/DL — SIGNIFICANT CHANGE UP
LYMPHOCYTES # BLD AUTO: 1.41 K/UL — SIGNIFICANT CHANGE UP (ref 1–3.3)
LYMPHOCYTES # BLD AUTO: 19.4 % — SIGNIFICANT CHANGE UP (ref 13–44)
MCHC RBC-ENTMCNC: 31.2 % — LOW (ref 32–36)
MCHC RBC-ENTMCNC: 32.4 PG — SIGNIFICANT CHANGE UP (ref 27–34)
MCV RBC AUTO: 104.1 FL — HIGH (ref 80–100)
MONOCYTES # BLD AUTO: 0.65 K/UL — SIGNIFICANT CHANGE UP (ref 0–0.9)
MONOCYTES NFR BLD AUTO: 8.9 % — SIGNIFICANT CHANGE UP (ref 2–14)
NEUTROPHILS # BLD AUTO: 4.99 K/UL — SIGNIFICANT CHANGE UP (ref 1.8–7.4)
NEUTROPHILS NFR BLD AUTO: 68.6 % — SIGNIFICANT CHANGE UP (ref 43–77)
NRBC # FLD: 0 — SIGNIFICANT CHANGE UP
PLATELET # BLD AUTO: 215 K/UL — SIGNIFICANT CHANGE UP (ref 150–400)
PMV BLD: 10.9 FL — SIGNIFICANT CHANGE UP (ref 7–13)
POTASSIUM SERPL-MCNC: 4.2 MMOL/L — SIGNIFICANT CHANGE UP (ref 3.5–5.3)
POTASSIUM SERPL-SCNC: 4.2 MMOL/L — SIGNIFICANT CHANGE UP (ref 3.5–5.3)
PROT SERPL-MCNC: 7 G/DL — SIGNIFICANT CHANGE UP (ref 6–8.3)
RBC # BLD: 3.7 M/UL — LOW (ref 3.8–5.2)
RBC # FLD: 15.7 % — HIGH (ref 10.3–14.5)
SODIUM SERPL-SCNC: 141 MMOL/L — SIGNIFICANT CHANGE UP (ref 135–145)
TRIGL SERPL-MCNC: 90 MG/DL — SIGNIFICANT CHANGE UP (ref 10–149)
WBC # BLD: 7.27 K/UL — SIGNIFICANT CHANGE UP (ref 3.8–10.5)
WBC # FLD AUTO: 7.27 K/UL — SIGNIFICANT CHANGE UP (ref 3.8–10.5)

## 2018-04-09 PROCEDURE — 99214 OFFICE O/P EST MOD 30 MIN: CPT | Mod: GC

## 2018-04-10 ENCOUNTER — RESULT REVIEW (OUTPATIENT)
Age: 48
End: 2018-04-10

## 2018-04-10 DIAGNOSIS — I51.9 HEART DISEASE, UNSPECIFIED: ICD-10-CM

## 2018-04-10 DIAGNOSIS — Z00.00 ENCOUNTER FOR GENERAL ADULT MEDICAL EXAMINATION WITHOUT ABNORMAL FINDINGS: ICD-10-CM

## 2018-04-10 DIAGNOSIS — M31.4 AORTIC ARCH SYNDROME [TAKAYASU]: ICD-10-CM

## 2018-04-30 ENCOUNTER — OUTPATIENT (OUTPATIENT)
Dept: OUTPATIENT SERVICES | Facility: HOSPITAL | Age: 48
LOS: 1 days | End: 2018-04-30

## 2018-04-30 ENCOUNTER — APPOINTMENT (OUTPATIENT)
Dept: RHEUMATOLOGY | Facility: HOSPITAL | Age: 48
End: 2018-04-30

## 2018-04-30 DIAGNOSIS — Z95.0 PRESENCE OF CARDIAC PACEMAKER: Chronic | ICD-10-CM

## 2018-04-30 DIAGNOSIS — Z98.89 OTHER SPECIFIED POSTPROCEDURAL STATES: Chronic | ICD-10-CM

## 2018-04-30 DIAGNOSIS — Z98.891 HISTORY OF UTERINE SCAR FROM PREVIOUS SURGERY: Chronic | ICD-10-CM

## 2018-04-30 RX ORDER — TOCILIZUMAB 180 MG/ML
162 INJECTION, SOLUTION SUBCUTANEOUS
Refills: 0 | Status: COMPLETED | OUTPATIENT
Start: 2018-04-30

## 2018-04-30 RX ADMIN — TOCILIZUMAB 0 MG/0.9ML: 180 INJECTION, SOLUTION SUBCUTANEOUS at 00:00

## 2018-05-01 LAB — INR PPP: 2.8 RATIO

## 2018-05-02 ENCOUNTER — APPOINTMENT (OUTPATIENT)
Dept: INTERNAL MEDICINE | Facility: HOSPITAL | Age: 48
End: 2018-05-02

## 2018-05-02 ENCOUNTER — OUTPATIENT (OUTPATIENT)
Dept: OUTPATIENT SERVICES | Facility: HOSPITAL | Age: 48
LOS: 1 days | End: 2018-05-02

## 2018-05-02 VITALS — WEIGHT: 118 LBS | HEIGHT: 62 IN | BODY MASS INDEX: 21.71 KG/M2

## 2018-05-02 VITALS — DIASTOLIC BLOOD PRESSURE: 90 MMHG | SYSTOLIC BLOOD PRESSURE: 160 MMHG

## 2018-05-02 DIAGNOSIS — Z79.899 OTHER LONG TERM (CURRENT) DRUG THERAPY: ICD-10-CM

## 2018-05-02 DIAGNOSIS — Z98.89 OTHER SPECIFIED POSTPROCEDURAL STATES: Chronic | ICD-10-CM

## 2018-05-02 DIAGNOSIS — M31.4 AORTIC ARCH SYNDROME [TAKAYASU]: ICD-10-CM

## 2018-05-02 DIAGNOSIS — Z95.0 PRESENCE OF CARDIAC PACEMAKER: Chronic | ICD-10-CM

## 2018-05-02 DIAGNOSIS — Z98.891 HISTORY OF UTERINE SCAR FROM PREVIOUS SURGERY: Chronic | ICD-10-CM

## 2018-05-03 ENCOUNTER — RX RENEWAL (OUTPATIENT)
Age: 48
End: 2018-05-03

## 2018-05-08 DIAGNOSIS — I50.22 CHRONIC SYSTOLIC (CONGESTIVE) HEART FAILURE: ICD-10-CM

## 2018-05-08 DIAGNOSIS — I48.91 UNSPECIFIED ATRIAL FIBRILLATION: ICD-10-CM

## 2018-05-08 DIAGNOSIS — M31.4 AORTIC ARCH SYNDROME [TAKAYASU]: ICD-10-CM

## 2018-05-08 DIAGNOSIS — R10.9 UNSPECIFIED ABDOMINAL PAIN: ICD-10-CM

## 2018-05-08 DIAGNOSIS — I42.2 OTHER HYPERTROPHIC CARDIOMYOPATHY: ICD-10-CM

## 2018-05-08 DIAGNOSIS — Z00.00 ENCOUNTER FOR GENERAL ADULT MEDICAL EXAMINATION WITHOUT ABNORMAL FINDINGS: ICD-10-CM

## 2018-05-09 ENCOUNTER — OTHER (OUTPATIENT)
Age: 48
End: 2018-05-09

## 2018-05-14 ENCOUNTER — OUTPATIENT (OUTPATIENT)
Dept: OUTPATIENT SERVICES | Facility: HOSPITAL | Age: 48
LOS: 1 days | End: 2018-05-14

## 2018-05-14 ENCOUNTER — RESULT CHARGE (OUTPATIENT)
Age: 48
End: 2018-05-14

## 2018-05-14 ENCOUNTER — APPOINTMENT (OUTPATIENT)
Dept: RHEUMATOLOGY | Facility: HOSPITAL | Age: 48
End: 2018-05-14

## 2018-05-14 ENCOUNTER — APPOINTMENT (OUTPATIENT)
Dept: INTERNAL MEDICINE | Facility: HOSPITAL | Age: 48
End: 2018-05-14

## 2018-05-14 ENCOUNTER — OTHER (OUTPATIENT)
Age: 48
End: 2018-05-14

## 2018-05-14 VITALS
WEIGHT: 120.44 LBS | BODY MASS INDEX: 22.03 KG/M2 | DIASTOLIC BLOOD PRESSURE: 52 MMHG | HEART RATE: 58 BPM | SYSTOLIC BLOOD PRESSURE: 134 MMHG

## 2018-05-14 DIAGNOSIS — Z79.899 OTHER LONG TERM (CURRENT) DRUG THERAPY: ICD-10-CM

## 2018-05-14 DIAGNOSIS — Z95.0 PRESENCE OF CARDIAC PACEMAKER: Chronic | ICD-10-CM

## 2018-05-14 DIAGNOSIS — Z98.89 OTHER SPECIFIED POSTPROCEDURAL STATES: Chronic | ICD-10-CM

## 2018-05-14 DIAGNOSIS — Z98.891 HISTORY OF UTERINE SCAR FROM PREVIOUS SURGERY: Chronic | ICD-10-CM

## 2018-05-14 DIAGNOSIS — I48.91 UNSPECIFIED ATRIAL FIBRILLATION: ICD-10-CM

## 2018-05-14 DIAGNOSIS — M31.4 AORTIC ARCH SYNDROME [TAKAYASU]: ICD-10-CM

## 2018-05-14 LAB — INR PPP: 2.4 RATIO

## 2018-05-14 RX ORDER — TOCILIZUMAB 180 MG/ML
162 INJECTION, SOLUTION SUBCUTANEOUS
Refills: 0 | Status: COMPLETED | OUTPATIENT
Start: 2018-05-14

## 2018-05-14 RX ADMIN — TOCILIZUMAB 0 MG/0.9ML: 180 INJECTION, SOLUTION SUBCUTANEOUS at 00:00

## 2018-05-22 ENCOUNTER — APPOINTMENT (OUTPATIENT)
Dept: ELECTROPHYSIOLOGY | Facility: CLINIC | Age: 48
End: 2018-05-22

## 2018-06-05 ENCOUNTER — APPOINTMENT (OUTPATIENT)
Dept: CARDIOLOGY | Facility: HOSPITAL | Age: 48
End: 2018-06-05

## 2018-06-11 ENCOUNTER — APPOINTMENT (OUTPATIENT)
Dept: INTERNAL MEDICINE | Facility: HOSPITAL | Age: 48
End: 2018-06-11

## 2018-06-25 ENCOUNTER — LABORATORY RESULT (OUTPATIENT)
Age: 48
End: 2018-06-25

## 2018-06-25 ENCOUNTER — APPOINTMENT (OUTPATIENT)
Dept: RHEUMATOLOGY | Facility: HOSPITAL | Age: 48
End: 2018-06-25

## 2018-06-25 ENCOUNTER — OUTPATIENT (OUTPATIENT)
Dept: OUTPATIENT SERVICES | Facility: HOSPITAL | Age: 48
LOS: 1 days | End: 2018-06-25

## 2018-06-25 ENCOUNTER — APPOINTMENT (OUTPATIENT)
Dept: INTERNAL MEDICINE | Facility: HOSPITAL | Age: 48
End: 2018-06-25

## 2018-06-25 VITALS
SYSTOLIC BLOOD PRESSURE: 129 MMHG | WEIGHT: 117 LBS | HEART RATE: 41 BPM | BODY MASS INDEX: 21.4 KG/M2 | DIASTOLIC BLOOD PRESSURE: 59 MMHG

## 2018-06-25 VITALS — DIASTOLIC BLOOD PRESSURE: 50 MMHG | SYSTOLIC BLOOD PRESSURE: 110 MMHG

## 2018-06-25 DIAGNOSIS — Z95.0 PRESENCE OF CARDIAC PACEMAKER: Chronic | ICD-10-CM

## 2018-06-25 DIAGNOSIS — Z79.899 OTHER LONG TERM (CURRENT) DRUG THERAPY: ICD-10-CM

## 2018-06-25 DIAGNOSIS — Z98.891 HISTORY OF UTERINE SCAR FROM PREVIOUS SURGERY: Chronic | ICD-10-CM

## 2018-06-25 DIAGNOSIS — M31.4 AORTIC ARCH SYNDROME [TAKAYASU]: ICD-10-CM

## 2018-06-25 DIAGNOSIS — Z98.89 OTHER SPECIFIED POSTPROCEDURAL STATES: Chronic | ICD-10-CM

## 2018-06-25 DIAGNOSIS — R06.00 DYSPNEA, UNSPECIFIED: ICD-10-CM

## 2018-06-25 LAB
ALBUMIN SERPL ELPH-MCNC: 4.2 G/DL — SIGNIFICANT CHANGE UP (ref 3.3–5)
ALP SERPL-CCNC: 58 U/L — SIGNIFICANT CHANGE UP (ref 40–120)
ALT FLD-CCNC: 31 U/L — SIGNIFICANT CHANGE UP (ref 4–33)
AST SERPL-CCNC: 41 U/L — HIGH (ref 4–32)
BASOPHILS # BLD AUTO: 0.08 K/UL — SIGNIFICANT CHANGE UP (ref 0–0.2)
BASOPHILS NFR BLD AUTO: 1.4 % — SIGNIFICANT CHANGE UP (ref 0–2)
BILIRUB SERPL-MCNC: 1.4 MG/DL — HIGH (ref 0.2–1.2)
BUN SERPL-MCNC: 16 MG/DL — SIGNIFICANT CHANGE UP (ref 7–23)
CALCIUM SERPL-MCNC: 9.1 MG/DL — SIGNIFICANT CHANGE UP (ref 8.4–10.5)
CHLORIDE SERPL-SCNC: 103 MMOL/L — SIGNIFICANT CHANGE UP (ref 98–107)
CO2 SERPL-SCNC: 24 MMOL/L — SIGNIFICANT CHANGE UP (ref 22–31)
CREAT SERPL-MCNC: 0.87 MG/DL — SIGNIFICANT CHANGE UP (ref 0.5–1.3)
CRP SERPL-MCNC: < 4 MG/L — SIGNIFICANT CHANGE UP
EOSINOPHIL # BLD AUTO: 0.29 K/UL — SIGNIFICANT CHANGE UP (ref 0–0.5)
EOSINOPHIL NFR BLD AUTO: 4.9 % — SIGNIFICANT CHANGE UP (ref 0–6)
ERYTHROCYTE [SEDIMENTATION RATE] IN BLOOD: 2 MM/HR — LOW (ref 4–25)
GLUCOSE SERPL-MCNC: 90 MG/DL — SIGNIFICANT CHANGE UP (ref 70–99)
HCT VFR BLD CALC: 37.6 % — SIGNIFICANT CHANGE UP (ref 34.5–45)
HGB BLD-MCNC: 12 G/DL — SIGNIFICANT CHANGE UP (ref 11.5–15.5)
IMM GRANULOCYTES # BLD AUTO: 0.01 # — SIGNIFICANT CHANGE UP
IMM GRANULOCYTES NFR BLD AUTO: 0.2 % — SIGNIFICANT CHANGE UP (ref 0–1.5)
INR BLD: 1.88 — HIGH (ref 0.88–1.17)
LYMPHOCYTES # BLD AUTO: 1.64 K/UL — SIGNIFICANT CHANGE UP (ref 1–3.3)
LYMPHOCYTES # BLD AUTO: 27.9 % — SIGNIFICANT CHANGE UP (ref 13–44)
MCHC RBC-ENTMCNC: 31.2 PG — SIGNIFICANT CHANGE UP (ref 27–34)
MCHC RBC-ENTMCNC: 31.9 % — LOW (ref 32–36)
MCV RBC AUTO: 97.7 FL — SIGNIFICANT CHANGE UP (ref 80–100)
MONOCYTES # BLD AUTO: 0.56 K/UL — SIGNIFICANT CHANGE UP (ref 0–0.9)
MONOCYTES NFR BLD AUTO: 9.5 % — SIGNIFICANT CHANGE UP (ref 2–14)
NEUTROPHILS # BLD AUTO: 3.3 K/UL — SIGNIFICANT CHANGE UP (ref 1.8–7.4)
NEUTROPHILS NFR BLD AUTO: 56.1 % — SIGNIFICANT CHANGE UP (ref 43–77)
NRBC # FLD: 0 — SIGNIFICANT CHANGE UP
PLATELET # BLD AUTO: 215 K/UL — SIGNIFICANT CHANGE UP (ref 150–400)
PMV BLD: 10.6 FL — SIGNIFICANT CHANGE UP (ref 7–13)
POTASSIUM SERPL-MCNC: 4.5 MMOL/L — SIGNIFICANT CHANGE UP (ref 3.5–5.3)
POTASSIUM SERPL-SCNC: 4.5 MMOL/L — SIGNIFICANT CHANGE UP (ref 3.5–5.3)
PROT SERPL-MCNC: 6.7 G/DL — SIGNIFICANT CHANGE UP (ref 6–8.3)
PROTHROM AB SERPL-ACNC: 21.1 SEC — HIGH (ref 9.8–13.1)
RBC # BLD: 3.85 M/UL — SIGNIFICANT CHANGE UP (ref 3.8–5.2)
RBC # FLD: 18.1 % — HIGH (ref 10.3–14.5)
SODIUM SERPL-SCNC: 142 MMOL/L — SIGNIFICANT CHANGE UP (ref 135–145)
WBC # BLD: 5.88 K/UL — SIGNIFICANT CHANGE UP (ref 3.8–10.5)
WBC # FLD AUTO: 5.88 K/UL — SIGNIFICANT CHANGE UP (ref 3.8–10.5)

## 2018-07-03 ENCOUNTER — APPOINTMENT (OUTPATIENT)
Dept: INTERNAL MEDICINE | Facility: HOSPITAL | Age: 48
End: 2018-07-03

## 2018-07-13 ENCOUNTER — APPOINTMENT (OUTPATIENT)
Dept: MAMMOGRAPHY | Facility: IMAGING CENTER | Age: 48
End: 2018-07-13

## 2018-07-13 ENCOUNTER — APPOINTMENT (OUTPATIENT)
Dept: RADIOLOGY | Facility: IMAGING CENTER | Age: 48
End: 2018-07-13

## 2018-07-24 ENCOUNTER — APPOINTMENT (OUTPATIENT)
Dept: INTERNAL MEDICINE | Facility: HOSPITAL | Age: 48
End: 2018-07-24

## 2018-07-24 ENCOUNTER — OUTPATIENT (OUTPATIENT)
Dept: OUTPATIENT SERVICES | Facility: HOSPITAL | Age: 48
LOS: 1 days | End: 2018-07-24

## 2018-07-24 ENCOUNTER — APPOINTMENT (OUTPATIENT)
Dept: ELECTROPHYSIOLOGY | Facility: CLINIC | Age: 48
End: 2018-07-24
Payer: SELF-PAY

## 2018-07-24 ENCOUNTER — APPOINTMENT (OUTPATIENT)
Dept: CARDIOLOGY | Facility: HOSPITAL | Age: 48
End: 2018-07-24

## 2018-07-24 VITALS
SYSTOLIC BLOOD PRESSURE: 130 MMHG | RESPIRATION RATE: 14 BRPM | HEART RATE: 48 BPM | OXYGEN SATURATION: 100 % | DIASTOLIC BLOOD PRESSURE: 55 MMHG | BODY MASS INDEX: 21.95 KG/M2 | WEIGHT: 120 LBS

## 2018-07-24 DIAGNOSIS — Z95.0 PRESENCE OF CARDIAC PACEMAKER: Chronic | ICD-10-CM

## 2018-07-24 DIAGNOSIS — Z79.01 LONG TERM (CURRENT) USE OF ANTICOAGULANTS: ICD-10-CM

## 2018-07-24 DIAGNOSIS — Z98.891 HISTORY OF UTERINE SCAR FROM PREVIOUS SURGERY: Chronic | ICD-10-CM

## 2018-07-24 DIAGNOSIS — Z98.89 OTHER SPECIFIED POSTPROCEDURAL STATES: Chronic | ICD-10-CM

## 2018-07-24 LAB — INR PPP: 2 RATIO

## 2018-07-24 PROCEDURE — 93282 PRGRMG EVAL IMPLANTABLE DFB: CPT

## 2018-07-30 PROBLEM — I35.1 MODERATE AORTIC VALVE INSUFFICIENCY: Status: ACTIVE | Noted: 2017-03-08

## 2018-08-06 ENCOUNTER — LABORATORY RESULT (OUTPATIENT)
Age: 48
End: 2018-08-06

## 2018-08-06 ENCOUNTER — APPOINTMENT (OUTPATIENT)
Dept: RHEUMATOLOGY | Facility: HOSPITAL | Age: 48
End: 2018-08-06

## 2018-08-06 ENCOUNTER — OUTPATIENT (OUTPATIENT)
Dept: OUTPATIENT SERVICES | Facility: HOSPITAL | Age: 48
LOS: 1 days | End: 2018-08-06

## 2018-08-06 VITALS
HEART RATE: 50 BPM | DIASTOLIC BLOOD PRESSURE: 43 MMHG | HEIGHT: 62 IN | SYSTOLIC BLOOD PRESSURE: 113 MMHG | BODY MASS INDEX: 21.16 KG/M2 | WEIGHT: 115 LBS

## 2018-08-06 DIAGNOSIS — Z98.891 HISTORY OF UTERINE SCAR FROM PREVIOUS SURGERY: Chronic | ICD-10-CM

## 2018-08-06 DIAGNOSIS — Z79.01 LONG TERM (CURRENT) USE OF ANTICOAGULANTS: ICD-10-CM

## 2018-08-06 DIAGNOSIS — Z95.0 PRESENCE OF CARDIAC PACEMAKER: Chronic | ICD-10-CM

## 2018-08-06 DIAGNOSIS — I50.22 CHRONIC SYSTOLIC (CONGESTIVE) HEART FAILURE: ICD-10-CM

## 2018-08-06 DIAGNOSIS — M31.4 AORTIC ARCH SYNDROME [TAKAYASU]: ICD-10-CM

## 2018-08-06 DIAGNOSIS — Z98.89 OTHER SPECIFIED POSTPROCEDURAL STATES: Chronic | ICD-10-CM

## 2018-08-06 LAB
ALBUMIN SERPL ELPH-MCNC: 4.5 G/DL — SIGNIFICANT CHANGE UP (ref 3.3–5)
ALP SERPL-CCNC: 62 U/L — SIGNIFICANT CHANGE UP (ref 40–120)
ALT FLD-CCNC: 28 U/L — SIGNIFICANT CHANGE UP (ref 4–33)
AST SERPL-CCNC: 42 U/L — HIGH (ref 4–32)
BASOPHILS # BLD AUTO: 0.08 K/UL — SIGNIFICANT CHANGE UP (ref 0–0.2)
BASOPHILS NFR BLD AUTO: 1.8 % — SIGNIFICANT CHANGE UP (ref 0–2)
BILIRUB SERPL-MCNC: 1.4 MG/DL — HIGH (ref 0.2–1.2)
BUN SERPL-MCNC: 17 MG/DL — SIGNIFICANT CHANGE UP (ref 7–23)
CALCIUM SERPL-MCNC: 9.9 MG/DL — SIGNIFICANT CHANGE UP (ref 8.4–10.5)
CHLORIDE SERPL-SCNC: 103 MMOL/L — SIGNIFICANT CHANGE UP (ref 98–107)
CO2 SERPL-SCNC: 27 MMOL/L — SIGNIFICANT CHANGE UP (ref 22–31)
CREAT SERPL-MCNC: 0.88 MG/DL — SIGNIFICANT CHANGE UP (ref 0.5–1.3)
CRP SERPL-MCNC: < 5 MG/L — SIGNIFICANT CHANGE UP
EOSINOPHIL # BLD AUTO: 0.3 K/UL — SIGNIFICANT CHANGE UP (ref 0–0.5)
EOSINOPHIL NFR BLD AUTO: 6.7 % — HIGH (ref 0–6)
ERYTHROCYTE [SEDIMENTATION RATE] IN BLOOD: 2 MM/HR — LOW (ref 4–25)
GLUCOSE SERPL-MCNC: 62 MG/DL — LOW (ref 70–99)
HCT VFR BLD CALC: 38.5 % — SIGNIFICANT CHANGE UP (ref 34.5–45)
HGB BLD-MCNC: 12.2 G/DL — SIGNIFICANT CHANGE UP (ref 11.5–15.5)
IMM GRANULOCYTES # BLD AUTO: 0.01 # — SIGNIFICANT CHANGE UP
IMM GRANULOCYTES NFR BLD AUTO: 0.2 % — SIGNIFICANT CHANGE UP (ref 0–1.5)
LYMPHOCYTES # BLD AUTO: 1.59 K/UL — SIGNIFICANT CHANGE UP (ref 1–3.3)
LYMPHOCYTES # BLD AUTO: 35.3 % — SIGNIFICANT CHANGE UP (ref 13–44)
MCHC RBC-ENTMCNC: 31.2 PG — SIGNIFICANT CHANGE UP (ref 27–34)
MCHC RBC-ENTMCNC: 31.7 % — LOW (ref 32–36)
MCV RBC AUTO: 98.5 FL — SIGNIFICANT CHANGE UP (ref 80–100)
MONOCYTES # BLD AUTO: 0.75 K/UL — SIGNIFICANT CHANGE UP (ref 0–0.9)
MONOCYTES NFR BLD AUTO: 16.6 % — HIGH (ref 2–14)
NEUTROPHILS # BLD AUTO: 1.78 K/UL — LOW (ref 1.8–7.4)
NEUTROPHILS NFR BLD AUTO: 39.4 % — LOW (ref 43–77)
NRBC # FLD: 0 — SIGNIFICANT CHANGE UP
PLATELET # BLD AUTO: 209 K/UL — SIGNIFICANT CHANGE UP (ref 150–400)
PMV BLD: 10.9 FL — SIGNIFICANT CHANGE UP (ref 7–13)
POTASSIUM SERPL-MCNC: 4.7 MMOL/L — SIGNIFICANT CHANGE UP (ref 3.5–5.3)
POTASSIUM SERPL-SCNC: 4.7 MMOL/L — SIGNIFICANT CHANGE UP (ref 3.5–5.3)
PROT SERPL-MCNC: 7.4 G/DL — SIGNIFICANT CHANGE UP (ref 6–8.3)
RBC # BLD: 3.91 M/UL — SIGNIFICANT CHANGE UP (ref 3.8–5.2)
RBC # FLD: 19.3 % — HIGH (ref 10.3–14.5)
SODIUM SERPL-SCNC: 142 MMOL/L — SIGNIFICANT CHANGE UP (ref 135–145)
WBC # BLD: 4.51 K/UL — SIGNIFICANT CHANGE UP (ref 3.8–10.5)
WBC # FLD AUTO: 4.51 K/UL — SIGNIFICANT CHANGE UP (ref 3.8–10.5)

## 2018-08-06 RX ORDER — CYCLOBENZAPRINE HYDROCHLORIDE 5 MG/1
5 TABLET, FILM COATED ORAL 3 TIMES DAILY
Qty: 90 | Refills: 0 | Status: DISCONTINUED | COMMUNITY
End: 2018-08-06

## 2018-08-06 RX ORDER — CHOLECALCIFEROL (VITAMIN D3) 1250 MCG
1.25 MG CAPSULE ORAL
Qty: 4 | Refills: 4 | Status: DISCONTINUED | COMMUNITY
Start: 2017-11-29 | End: 2018-08-06

## 2018-08-14 ENCOUNTER — APPOINTMENT (OUTPATIENT)
Dept: CV DIAGNOSITCS | Facility: HOSPITAL | Age: 48
End: 2018-08-14

## 2018-08-28 ENCOUNTER — APPOINTMENT (OUTPATIENT)
Dept: INTERNAL MEDICINE | Facility: HOSPITAL | Age: 48
End: 2018-08-28

## 2018-08-28 ENCOUNTER — APPOINTMENT (OUTPATIENT)
Dept: CARDIOLOGY | Facility: HOSPITAL | Age: 48
End: 2018-08-28

## 2018-08-28 ENCOUNTER — OUTPATIENT (OUTPATIENT)
Dept: OUTPATIENT SERVICES | Facility: HOSPITAL | Age: 48
LOS: 1 days | End: 2018-08-28

## 2018-08-28 VITALS
DIASTOLIC BLOOD PRESSURE: 56 MMHG | OXYGEN SATURATION: 99 % | HEART RATE: 54 BPM | SYSTOLIC BLOOD PRESSURE: 142 MMHG | RESPIRATION RATE: 14 BRPM | WEIGHT: 121 LBS | BODY MASS INDEX: 22.13 KG/M2

## 2018-08-28 DIAGNOSIS — Z98.89 OTHER SPECIFIED POSTPROCEDURAL STATES: Chronic | ICD-10-CM

## 2018-08-28 DIAGNOSIS — Z98.891 HISTORY OF UTERINE SCAR FROM PREVIOUS SURGERY: Chronic | ICD-10-CM

## 2018-08-28 DIAGNOSIS — Z79.01 LONG TERM (CURRENT) USE OF ANTICOAGULANTS: ICD-10-CM

## 2018-08-28 DIAGNOSIS — Z95.0 PRESENCE OF CARDIAC PACEMAKER: Chronic | ICD-10-CM

## 2018-08-29 DIAGNOSIS — I48.91 UNSPECIFIED ATRIAL FIBRILLATION: ICD-10-CM

## 2018-11-20 ENCOUNTER — APPOINTMENT (OUTPATIENT)
Dept: ELECTROPHYSIOLOGY | Facility: CLINIC | Age: 48
End: 2018-11-20
Payer: SELF-PAY

## 2018-11-20 PROCEDURE — 93282 PRGRMG EVAL IMPLANTABLE DFB: CPT

## 2018-11-20 PROCEDURE — 93290 INTERROG DEV EVAL ICPMS IP: CPT | Mod: 26

## 2018-11-23 ENCOUNTER — APPOINTMENT (OUTPATIENT)
Dept: INTERNAL MEDICINE | Facility: HOSPITAL | Age: 48
End: 2018-11-23

## 2018-11-26 ENCOUNTER — LABORATORY RESULT (OUTPATIENT)
Age: 48
End: 2018-11-26

## 2018-11-26 ENCOUNTER — APPOINTMENT (OUTPATIENT)
Dept: RHEUMATOLOGY | Facility: HOSPITAL | Age: 48
End: 2018-11-26

## 2018-11-26 ENCOUNTER — OUTPATIENT (OUTPATIENT)
Dept: OUTPATIENT SERVICES | Facility: HOSPITAL | Age: 48
LOS: 1 days | End: 2018-11-26

## 2018-11-26 VITALS
SYSTOLIC BLOOD PRESSURE: 147 MMHG | WEIGHT: 119.38 LBS | BODY MASS INDEX: 21.97 KG/M2 | HEART RATE: 55 BPM | DIASTOLIC BLOOD PRESSURE: 55 MMHG | HEIGHT: 62 IN

## 2018-11-26 DIAGNOSIS — Z79.01 LONG TERM (CURRENT) USE OF ANTICOAGULANTS: ICD-10-CM

## 2018-11-26 DIAGNOSIS — M31.4 AORTIC ARCH SYNDROME [TAKAYASU]: ICD-10-CM

## 2018-11-26 DIAGNOSIS — Z98.89 OTHER SPECIFIED POSTPROCEDURAL STATES: Chronic | ICD-10-CM

## 2018-11-26 DIAGNOSIS — Z95.0 PRESENCE OF CARDIAC PACEMAKER: Chronic | ICD-10-CM

## 2018-11-26 DIAGNOSIS — Z98.891 HISTORY OF UTERINE SCAR FROM PREVIOUS SURGERY: Chronic | ICD-10-CM

## 2018-11-26 DIAGNOSIS — I50.22 CHRONIC SYSTOLIC (CONGESTIVE) HEART FAILURE: ICD-10-CM

## 2018-11-26 LAB
ALBUMIN SERPL ELPH-MCNC: 4.1 G/DL — SIGNIFICANT CHANGE UP (ref 3.3–5)
ALP SERPL-CCNC: 87 U/L — SIGNIFICANT CHANGE UP (ref 40–120)
ALT FLD-CCNC: 33 U/L — SIGNIFICANT CHANGE UP (ref 4–33)
AST SERPL-CCNC: 40 U/L — HIGH (ref 4–32)
BASOPHILS # BLD AUTO: 0.05 K/UL — SIGNIFICANT CHANGE UP (ref 0–0.2)
BASOPHILS NFR BLD AUTO: 1 % — SIGNIFICANT CHANGE UP (ref 0–2)
BILIRUB SERPL-MCNC: 0.7 MG/DL — SIGNIFICANT CHANGE UP (ref 0.2–1.2)
BUN SERPL-MCNC: 17 MG/DL — SIGNIFICANT CHANGE UP (ref 7–23)
CALCIUM SERPL-MCNC: 8.7 MG/DL — SIGNIFICANT CHANGE UP (ref 8.4–10.5)
CHLORIDE SERPL-SCNC: 103 MMOL/L — SIGNIFICANT CHANGE UP (ref 98–107)
CO2 SERPL-SCNC: 24 MMOL/L — SIGNIFICANT CHANGE UP (ref 22–31)
CREAT SERPL-MCNC: 0.83 MG/DL — SIGNIFICANT CHANGE UP (ref 0.5–1.3)
CRP SERPL-MCNC: 16.3 MG/L — HIGH
EOSINOPHIL # BLD AUTO: 0.13 K/UL — SIGNIFICANT CHANGE UP (ref 0–0.5)
EOSINOPHIL NFR BLD AUTO: 2.6 % — SIGNIFICANT CHANGE UP (ref 0–6)
ERYTHROCYTE [SEDIMENTATION RATE] IN BLOOD: 19 MM/HR — SIGNIFICANT CHANGE UP (ref 4–25)
GLUCOSE SERPL-MCNC: 78 MG/DL — SIGNIFICANT CHANGE UP (ref 70–99)
HBV CORE AB SER-ACNC: NONREACTIVE — SIGNIFICANT CHANGE UP
HBV SURFACE AB SER-ACNC: REACTIVE — SIGNIFICANT CHANGE UP
HBV SURFACE AG SER-ACNC: NONREACTIVE — SIGNIFICANT CHANGE UP
HCT VFR BLD CALC: 33.3 % — LOW (ref 34.5–45)
HCV AB S/CO SERPL IA: 0.17 S/CO — SIGNIFICANT CHANGE UP
HCV AB SERPL-IMP: SIGNIFICANT CHANGE UP
HGB BLD-MCNC: 10.8 G/DL — LOW (ref 11.5–15.5)
IMM GRANULOCYTES # BLD AUTO: 0.01 # — SIGNIFICANT CHANGE UP
IMM GRANULOCYTES NFR BLD AUTO: 0.2 % — SIGNIFICANT CHANGE UP (ref 0–1.5)
LYMPHOCYTES # BLD AUTO: 1.06 K/UL — SIGNIFICANT CHANGE UP (ref 1–3.3)
LYMPHOCYTES # BLD AUTO: 20.9 % — SIGNIFICANT CHANGE UP (ref 13–44)
MCHC RBC-ENTMCNC: 31 PG — SIGNIFICANT CHANGE UP (ref 27–34)
MCHC RBC-ENTMCNC: 32.4 % — SIGNIFICANT CHANGE UP (ref 32–36)
MCV RBC AUTO: 95.7 FL — SIGNIFICANT CHANGE UP (ref 80–100)
MONOCYTES # BLD AUTO: 0.63 K/UL — SIGNIFICANT CHANGE UP (ref 0–0.9)
MONOCYTES NFR BLD AUTO: 12.5 % — SIGNIFICANT CHANGE UP (ref 2–14)
NEUTROPHILS # BLD AUTO: 3.18 K/UL — SIGNIFICANT CHANGE UP (ref 1.8–7.4)
NEUTROPHILS NFR BLD AUTO: 62.8 % — SIGNIFICANT CHANGE UP (ref 43–77)
NRBC # FLD: 0 — SIGNIFICANT CHANGE UP
PLATELET # BLD AUTO: 264 K/UL — SIGNIFICANT CHANGE UP (ref 150–400)
PMV BLD: 10.2 FL — SIGNIFICANT CHANGE UP (ref 7–13)
POTASSIUM SERPL-MCNC: 3.7 MMOL/L — SIGNIFICANT CHANGE UP (ref 3.5–5.3)
POTASSIUM SERPL-SCNC: 3.7 MMOL/L — SIGNIFICANT CHANGE UP (ref 3.5–5.3)
PROT SERPL-MCNC: 6.9 G/DL — SIGNIFICANT CHANGE UP (ref 6–8.3)
RBC # BLD: 3.48 M/UL — LOW (ref 3.8–5.2)
RBC # FLD: 17 % — HIGH (ref 10.3–14.5)
SODIUM SERPL-SCNC: 141 MMOL/L — SIGNIFICANT CHANGE UP (ref 135–145)
WBC # BLD: 5.06 K/UL — SIGNIFICANT CHANGE UP (ref 3.8–10.5)
WBC # FLD AUTO: 5.06 K/UL — SIGNIFICANT CHANGE UP (ref 3.8–10.5)

## 2018-11-27 LAB
GAMMA INTERFERON BACKGROUND BLD IA-ACNC: 0.04 IU/ML — SIGNIFICANT CHANGE UP
M TB IFN-G BLD-IMP: NEGATIVE — SIGNIFICANT CHANGE UP
M TB IFN-G CD4+ BCKGRND COR BLD-ACNC: 0.04 IU/ML — SIGNIFICANT CHANGE UP
M TB IFN-G CD4+CD8+ BCKGRND COR BLD-ACNC: 0.01 IU/ML — SIGNIFICANT CHANGE UP
QUANT TB PLUS MITOGEN MINUS NIL: 6.44 IU/ML — SIGNIFICANT CHANGE UP

## 2018-11-30 ENCOUNTER — APPOINTMENT (OUTPATIENT)
Dept: INTERNAL MEDICINE | Facility: HOSPITAL | Age: 48
End: 2018-11-30

## 2018-11-30 ENCOUNTER — OUTPATIENT (OUTPATIENT)
Dept: OUTPATIENT SERVICES | Facility: HOSPITAL | Age: 48
LOS: 1 days | End: 2018-11-30
Payer: COMMERCIAL

## 2018-11-30 ENCOUNTER — LABORATORY RESULT (OUTPATIENT)
Age: 48
End: 2018-11-30

## 2018-11-30 DIAGNOSIS — Z98.891 HISTORY OF UTERINE SCAR FROM PREVIOUS SURGERY: Chronic | ICD-10-CM

## 2018-11-30 DIAGNOSIS — Z98.89 OTHER SPECIFIED POSTPROCEDURAL STATES: Chronic | ICD-10-CM

## 2018-11-30 DIAGNOSIS — Z95.0 PRESENCE OF CARDIAC PACEMAKER: Chronic | ICD-10-CM

## 2018-11-30 LAB
INR BLD: 1.41 — HIGH (ref 0.88–1.17)
MISCELLANEOUS - CHEM: SIGNIFICANT CHANGE UP
PROTHROM AB SERPL-ACNC: 16.2 SEC — HIGH (ref 9.8–13.1)

## 2018-12-04 DIAGNOSIS — Z79.1 LONG TERM (CURRENT) USE OF NON-STEROIDAL ANTI-INFLAMMATORIES (NSAID): ICD-10-CM

## 2018-12-07 ENCOUNTER — APPOINTMENT (OUTPATIENT)
Dept: INTERNAL MEDICINE | Facility: HOSPITAL | Age: 48
End: 2018-12-07

## 2018-12-13 ENCOUNTER — FORM ENCOUNTER (OUTPATIENT)
Age: 48
End: 2018-12-13

## 2018-12-14 ENCOUNTER — OUTPATIENT (OUTPATIENT)
Dept: OUTPATIENT SERVICES | Facility: HOSPITAL | Age: 48
LOS: 1 days | End: 2018-12-14
Payer: COMMERCIAL

## 2018-12-14 ENCOUNTER — APPOINTMENT (OUTPATIENT)
Dept: CT IMAGING | Facility: CLINIC | Age: 48
End: 2018-12-14

## 2018-12-14 DIAGNOSIS — Z98.89 OTHER SPECIFIED POSTPROCEDURAL STATES: Chronic | ICD-10-CM

## 2018-12-14 DIAGNOSIS — Z95.0 PRESENCE OF CARDIAC PACEMAKER: Chronic | ICD-10-CM

## 2018-12-14 DIAGNOSIS — Z98.891 HISTORY OF UTERINE SCAR FROM PREVIOUS SURGERY: Chronic | ICD-10-CM

## 2018-12-14 DIAGNOSIS — Z00.8 ENCOUNTER FOR OTHER GENERAL EXAMINATION: ICD-10-CM

## 2018-12-14 PROCEDURE — 71275 CT ANGIOGRAPHY CHEST: CPT | Mod: 26

## 2018-12-14 PROCEDURE — 74175 CTA ABDOMEN W/CONTRAST: CPT

## 2018-12-14 PROCEDURE — 71275 CT ANGIOGRAPHY CHEST: CPT

## 2018-12-14 PROCEDURE — 74175 CTA ABDOMEN W/CONTRAST: CPT | Mod: 26

## 2018-12-31 ENCOUNTER — APPOINTMENT (OUTPATIENT)
Dept: RHEUMATOLOGY | Facility: HOSPITAL | Age: 48
End: 2018-12-31

## 2019-01-07 ENCOUNTER — OUTPATIENT (OUTPATIENT)
Dept: OUTPATIENT SERVICES | Facility: HOSPITAL | Age: 49
LOS: 1 days | End: 2019-01-07

## 2019-01-07 ENCOUNTER — APPOINTMENT (OUTPATIENT)
Dept: INTERNAL MEDICINE | Facility: HOSPITAL | Age: 49
End: 2019-01-07

## 2019-01-07 ENCOUNTER — APPOINTMENT (OUTPATIENT)
Dept: RHEUMATOLOGY | Facility: HOSPITAL | Age: 49
End: 2019-01-07

## 2019-01-07 ENCOUNTER — OTHER (OUTPATIENT)
Age: 49
End: 2019-01-07

## 2019-01-07 ENCOUNTER — RESULT REVIEW (OUTPATIENT)
Age: 49
End: 2019-01-07

## 2019-01-07 ENCOUNTER — LABORATORY RESULT (OUTPATIENT)
Age: 49
End: 2019-01-07

## 2019-01-07 VITALS
SYSTOLIC BLOOD PRESSURE: 147 MMHG | HEIGHT: 60.63 IN | WEIGHT: 119.5 LBS | BODY MASS INDEX: 22.85 KG/M2 | DIASTOLIC BLOOD PRESSURE: 60 MMHG | HEART RATE: 60 BPM

## 2019-01-07 DIAGNOSIS — Z98.89 OTHER SPECIFIED POSTPROCEDURAL STATES: Chronic | ICD-10-CM

## 2019-01-07 DIAGNOSIS — Z98.891 HISTORY OF UTERINE SCAR FROM PREVIOUS SURGERY: Chronic | ICD-10-CM

## 2019-01-07 DIAGNOSIS — N83.209 UNSPECIFIED OVARIAN CYST, UNSPECIFIED SIDE: ICD-10-CM

## 2019-01-07 DIAGNOSIS — Z95.0 PRESENCE OF CARDIAC PACEMAKER: Chronic | ICD-10-CM

## 2019-01-07 LAB
INR BLD: 2.16 — HIGH (ref 0.88–1.17)
PROTHROM AB SERPL-ACNC: 24.6 SEC — HIGH (ref 9.8–13.1)

## 2019-01-09 DIAGNOSIS — Z79.899 OTHER LONG TERM (CURRENT) DRUG THERAPY: ICD-10-CM

## 2019-01-09 DIAGNOSIS — Z79.01 LONG TERM (CURRENT) USE OF ANTICOAGULANTS: ICD-10-CM

## 2019-01-09 DIAGNOSIS — M31.4 AORTIC ARCH SYNDROME [TAKAYASU]: ICD-10-CM

## 2019-01-09 DIAGNOSIS — I51.9 HEART DISEASE, UNSPECIFIED: ICD-10-CM

## 2019-01-22 ENCOUNTER — APPOINTMENT (OUTPATIENT)
Dept: CARDIOLOGY | Facility: HOSPITAL | Age: 49
End: 2019-01-22

## 2019-01-22 ENCOUNTER — OTHER (OUTPATIENT)
Age: 49
End: 2019-01-22

## 2019-01-22 VITALS
HEIGHT: 60 IN | RESPIRATION RATE: 16 BRPM | SYSTOLIC BLOOD PRESSURE: 135 MMHG | WEIGHT: 119 LBS | BODY MASS INDEX: 23.36 KG/M2 | DIASTOLIC BLOOD PRESSURE: 56 MMHG | OXYGEN SATURATION: 99 % | HEART RATE: 59 BPM | TEMPERATURE: 97.8 F

## 2019-01-22 RX ORDER — METOPROLOL TARTRATE 50 MG/1
50 TABLET, FILM COATED ORAL
Qty: 60 | Refills: 1 | Status: DISCONTINUED | COMMUNITY
Start: 1900-01-01 | End: 2019-01-22

## 2019-01-22 RX ORDER — DILTIAZEM HYDROCHLORIDE 240 MG/1
240 CAPSULE, EXTENDED RELEASE ORAL
Qty: 30 | Refills: 1 | Status: DISCONTINUED | COMMUNITY
Start: 1900-01-01 | End: 2019-01-22

## 2019-01-23 NOTE — REASON FOR VISIT
[Follow-Up - Clinic] : a clinic follow-up of [Atrial Fibrillation] : atrial fibrillation [Mitral Regurgitation] : mitral regurgitation [FreeTextEntry1] : aortic regurgitation, vasculitis

## 2019-01-23 NOTE — HISTORY OF PRESENT ILLNESS
[FreeTextEntry1] : 48-year-old Spanish speaking female h/o large vessel vasculitis/Takayasu's arteritis (affected bracheocephalic artery, L-subclavian and bilateral CCA, currently on MTX/prednisone), nonobstructive CAD (20% pLAD and RCA in 2015), pAfib (on sotalol and warfarin), HCM, VT (s/p AICD Medtronic 12/2016), GERD/H. pylori (last EGD 2014, esophogram 5/2015 likely extrinsic compression from severe LA enlargement), prior visit with TTE done 3/2017 showed pEF 67, LVIDd 5.1cm mod-severe AR and bileaflet MVP with mod-severe MR, both regurgitations have progressed compared to 2015 (however, 2/2016 TTE also showed mod-severe MR and mod. AR).  S/p hospitalization 8/11/17 s/p AVR(T), MVR (T), CABG x1, bentall, RF ablation.\par \par Here for f/u.  She has been able to walk about four blocks.\par \par \par

## 2019-01-23 NOTE — REVIEW OF SYSTEMS
[Dyspnea on exertion] : dyspnea during exertion [Fever] : no fever [Chills] : no chills [Shortness Of Breath] : no shortness of breath [Chest  Pressure] : no chest pressure [Chest Pain] : no chest pain [Palpitations] : no palpitations [Cough] : no cough [Skin: A Rash] : no rash: [Easy Bleeding] : no tendency for easy bleeding

## 2019-01-23 NOTE — DISCUSSION/SUMMARY
[FreeTextEntry1] : 47F , hx as above. presents for f/u. \par \par HFpEF  , s/p MVR. \par - her ongoing HF symptoms, and daily use of lasix is concerning. Will try to get her off diltiazem to maxamize beta blockers and simplify medication regimen. I am not sure that this will help her heart failure symtpoms however. \par - cut dilt down in half, feliz metop. \par rtc 1 mo

## 2019-01-23 NOTE — PHYSICAL EXAM
[General Appearance - Well Developed] : well developed [General Appearance - Well Nourished] : well nourished [] : no respiratory distress [Respiration, Rhythm And Depth] : normal respiratory rhythm and effort [Auscultation Breath Sounds / Voice Sounds] : lungs were clear to auscultation bilaterally [Heart Rate And Rhythm] : heart rate and rhythm were normal [Heart Sounds] : normal S1 and S2 [Edema] : no peripheral edema present [Systolic grade ___/6] : A grade [unfilled]/6 systolic murmur was heard. [Diastolic Grade ___/4] : A grade [unfilled]/4 diastolic murmur was heard. [Bowel Sounds] : normal bowel sounds [Abdomen Soft] : soft [Nail Clubbing] : no clubbing of the fingernails [Skin Color & Pigmentation] : normal skin color and pigmentation [Oriented To Time, Place, And Person] : oriented to person, place, and time [Affect] : the affect was normal [Mood] : the mood was normal [FreeTextEntry1] : EDWINA @ [facility]

## 2019-01-25 RX ORDER — DILTIAZEM HYDROCHLORIDE 180 MG/1
180 CAPSULE, EXTENDED RELEASE ORAL DAILY
Qty: 30 | Refills: 1 | Status: DISCONTINUED | COMMUNITY
Start: 2019-01-22 | End: 2019-01-25

## 2019-02-11 ENCOUNTER — RX RENEWAL (OUTPATIENT)
Age: 49
End: 2019-02-11

## 2019-02-25 ENCOUNTER — LABORATORY RESULT (OUTPATIENT)
Age: 49
End: 2019-02-25

## 2019-02-25 ENCOUNTER — OUTPATIENT (OUTPATIENT)
Dept: OUTPATIENT SERVICES | Facility: HOSPITAL | Age: 49
LOS: 1 days | End: 2019-02-25

## 2019-02-25 ENCOUNTER — APPOINTMENT (OUTPATIENT)
Dept: INTERNAL MEDICINE | Facility: HOSPITAL | Age: 49
End: 2019-02-25

## 2019-02-25 ENCOUNTER — APPOINTMENT (OUTPATIENT)
Dept: RHEUMATOLOGY | Facility: HOSPITAL | Age: 49
End: 2019-02-25

## 2019-02-25 VITALS
HEART RATE: 59 BPM | SYSTOLIC BLOOD PRESSURE: 117 MMHG | HEIGHT: 60 IN | BODY MASS INDEX: 24.35 KG/M2 | WEIGHT: 124 LBS | DIASTOLIC BLOOD PRESSURE: 40 MMHG

## 2019-02-25 DIAGNOSIS — Z98.89 OTHER SPECIFIED POSTPROCEDURAL STATES: Chronic | ICD-10-CM

## 2019-02-25 DIAGNOSIS — Z95.0 PRESENCE OF CARDIAC PACEMAKER: Chronic | ICD-10-CM

## 2019-02-25 DIAGNOSIS — Z98.891 HISTORY OF UTERINE SCAR FROM PREVIOUS SURGERY: Chronic | ICD-10-CM

## 2019-02-25 LAB
ALBUMIN SERPL ELPH-MCNC: 4 G/DL — SIGNIFICANT CHANGE UP (ref 3.3–5)
ALP SERPL-CCNC: 58 U/L — SIGNIFICANT CHANGE UP (ref 40–120)
ALT FLD-CCNC: 27 U/L — SIGNIFICANT CHANGE UP (ref 4–33)
ANION GAP SERPL CALC-SCNC: 8 MMO/L — SIGNIFICANT CHANGE UP (ref 7–14)
AST SERPL-CCNC: 31 U/L — SIGNIFICANT CHANGE UP (ref 4–32)
BASOPHILS # BLD AUTO: 0.06 K/UL — SIGNIFICANT CHANGE UP (ref 0–0.2)
BASOPHILS NFR BLD AUTO: 0.7 % — SIGNIFICANT CHANGE UP (ref 0–2)
BILIRUB SERPL-MCNC: 0.8 MG/DL — SIGNIFICANT CHANGE UP (ref 0.2–1.2)
BUN SERPL-MCNC: 13 MG/DL — SIGNIFICANT CHANGE UP (ref 7–23)
CALCIUM SERPL-MCNC: 9.1 MG/DL — SIGNIFICANT CHANGE UP (ref 8.4–10.5)
CHLORIDE SERPL-SCNC: 104 MMOL/L — SIGNIFICANT CHANGE UP (ref 98–107)
CO2 SERPL-SCNC: 27 MMOL/L — SIGNIFICANT CHANGE UP (ref 22–31)
CREAT SERPL-MCNC: 0.8 MG/DL — SIGNIFICANT CHANGE UP (ref 0.5–1.3)
CRP SERPL-MCNC: < 4 MG/L — SIGNIFICANT CHANGE UP
EOSINOPHIL # BLD AUTO: 0.17 K/UL — SIGNIFICANT CHANGE UP (ref 0–0.5)
EOSINOPHIL NFR BLD AUTO: 1.9 % — SIGNIFICANT CHANGE UP (ref 0–6)
ERYTHROCYTE [SEDIMENTATION RATE] IN BLOOD: 5 MM/HR — SIGNIFICANT CHANGE UP (ref 4–25)
GLUCOSE SERPL-MCNC: 86 MG/DL — SIGNIFICANT CHANGE UP (ref 70–99)
HCT VFR BLD CALC: 34.7 % — SIGNIFICANT CHANGE UP (ref 34.5–45)
HGB BLD-MCNC: 10.6 G/DL — LOW (ref 11.5–15.5)
IMM GRANULOCYTES NFR BLD AUTO: 0.2 % — SIGNIFICANT CHANGE UP (ref 0–1.5)
INR BLD: 3.4 — HIGH (ref 0.88–1.17)
LYMPHOCYTES # BLD AUTO: 1.11 K/UL — SIGNIFICANT CHANGE UP (ref 1–3.3)
LYMPHOCYTES # BLD AUTO: 12.7 % — LOW (ref 13–44)
MCHC RBC-ENTMCNC: 29.9 PG — SIGNIFICANT CHANGE UP (ref 27–34)
MCHC RBC-ENTMCNC: 30.5 % — LOW (ref 32–36)
MCV RBC AUTO: 97.7 FL — SIGNIFICANT CHANGE UP (ref 80–100)
MONOCYTES # BLD AUTO: 0.91 K/UL — HIGH (ref 0–0.9)
MONOCYTES NFR BLD AUTO: 10.4 % — SIGNIFICANT CHANGE UP (ref 2–14)
NEUTROPHILS # BLD AUTO: 6.45 K/UL — SIGNIFICANT CHANGE UP (ref 1.8–7.4)
NEUTROPHILS NFR BLD AUTO: 74.1 % — SIGNIFICANT CHANGE UP (ref 43–77)
NRBC # FLD: 0 K/UL — LOW (ref 25–125)
PLATELET # BLD AUTO: 240 K/UL — SIGNIFICANT CHANGE UP (ref 150–400)
PMV BLD: 9.9 FL — SIGNIFICANT CHANGE UP (ref 7–13)
POTASSIUM SERPL-MCNC: 4.1 MMOL/L — SIGNIFICANT CHANGE UP (ref 3.5–5.3)
POTASSIUM SERPL-SCNC: 4.1 MMOL/L — SIGNIFICANT CHANGE UP (ref 3.5–5.3)
PROT SERPL-MCNC: 6.5 G/DL — SIGNIFICANT CHANGE UP (ref 6–8.3)
PROTHROM AB SERPL-ACNC: 40.3 SEC — HIGH (ref 9.8–13.1)
RBC # BLD: 3.55 M/UL — LOW (ref 3.8–5.2)
RBC # FLD: 17.4 % — HIGH (ref 10.3–14.5)
SODIUM SERPL-SCNC: 139 MMOL/L — SIGNIFICANT CHANGE UP (ref 135–145)
WBC # BLD: 8.72 K/UL — SIGNIFICANT CHANGE UP (ref 3.8–10.5)
WBC # FLD AUTO: 8.72 K/UL — SIGNIFICANT CHANGE UP (ref 3.8–10.5)

## 2019-02-25 NOTE — DATA REVIEWED
[FreeTextEntry1] : 12/2018 CT Angio Abd: interval mitral and aortic valvae repairs, interval stability of ostial aneurysmal dilatation of the brachiocephalic and R subclavian arteries. interval stability of the peripheral stenosis of the L sublavian artery. Interval stability of 9mm splenic artery aneurysm.\par Enlarging bilobed cyst of the L ovary \par \par TTE 4/2017: MVP, mod-severe MR, mod-severe AR, dilated LA, concentric LVH, normal LVSF, normal RVSF\par \par TTE: 2/2016: , 1. Mitral valve prolapse involving the anterior mitralleaflet. Moderate-severe mitral regurgitation.Regurgitation jet is posteriorly directed.2. Moderate aortic regurgitation.3. Severely dilated left atrium. LA volume index = 121cc/m2.4. Small left ventricular size. Severely increased leftventricular mass. The ventricular septum is severely andasymmetrically hypertrophied. 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 Normal left ventricular systolic function.6. Severe diastolic dysfunction (Stage III).7. 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 \par

## 2019-02-25 NOTE — REVIEW OF SYSTEMS
[As Noted in HPI] : as noted in HPI [Negative] : Genitourinary [Shortness Of Breath] : no shortness of breath [SOB on Exertion] : no shortness of breath during exertion [Abdominal Pain] : no abdominal pain [Vomiting] : no vomiting [Constipation] : no constipation [Diarrhea] : no diarrhea [Heartburn] : no heartburn [Dizziness] : no dizziness [Fainting] : no fainting [FreeTextEntry7] : swelling of belly  [FreeTextEntry8] : no difficulty with urination

## 2019-02-25 NOTE — ASSESSMENT
[FreeTextEntry1] : 47 y/o Uzbek-speaking F w/ Takayasu arteritis (Dx 2015), pAfib/sustained VTach on coumadin s/p ICD placement, nonobstructive CAD (20% pLAD and RCA in 2015), pAfib (on sotalol and warfarin), HCM, VT (s/p AICD Medtronic 12/2016), GERD/H. pylori (last EGD 2014, esophagogram 5/2015 likely extrinsic compression from severe LA enlargement),with mod-severe AR and bileaflet MVP with mod-severe MR, s/p 8/17 AVR(T), MVR (T), CABG x1, Bentall, RF ablation.\par \par 1) Takayasu arteritis with involvement proximal  large  arteries,  including  brachiocephalic artery,  both  subclavian  arteries,  right  and  left  common  carotid  arteries, with  some  of  these  vessels  demonstrates  alternating  regions  of  stenosis  and dilatation, s/p AVR, MVR, CABG. Bentall in 08/17\par Biopsy showing chronic mild inflammation of subepicardium, on Actemra from 4/30/2018 to 8/2018. Restarted 12/2018-1/2019. \par Then ran out and didn't know which # to call. \par - Will contact patient assistance to clarify shipment of Actemra\par - cw MTX 17.5 mg for now. Plan to taper off (slowly- after 4-6 mths of stable therapy on Actemra  given prior severity of TA.\par - decrease to Prednisone 2mg for now\par - will get labs including ESR, CRP, CBC, CMP\par - Repeat CT Angio in 12/2019 or sooner if symptoms \par \par 2) L ovarian cyst- enlarging in size. Pt asymptomatic\par - Pt needs to schedule TVUS. If any other concern, will refer to GYN. \par \par 3) Abdominal Bloating - pt reports abd bloating which is relieved with diuretic therapy - pt has been requiring increased doses of diuretic for relief, not endorsing other symptoms of heart failure such as orthopnea/exertional dyspnea\par - Repeat TTE \par - Ultrasound Abdomen \par - Pt advised to go to ER if she experiences any sudden dizziness, lightheadedness, bleeding, breathing issues, chest pain, or sudden abdominal pain.\par \par 4) A fib- s/p ICD, currently normal HR\par - c/w Cardiology f/u and current regimen \par \par 5) AVR- follows w/ cardiology on Coumadin managed by coumadin clinic \par -Has an appt today for INR check later this afternoon\par \par 6) HCM\par -Flu vaccine UTD \par -HBV, HCV, Quant Gold 11/2018 neg\par -DEXA referral to be given at next visit \par Pt given information to set up patient portal so she can contact physicians more readily (will be able to use google translate - as she states language is a barrier when calling to make appointments)\par \par RTC 4 weeks\par d/w Dr Thomas

## 2019-02-25 NOTE — PHYSICAL EXAM
[General Appearance - Alert] : alert [General Appearance - In No Acute Distress] : in no acute distress [General Appearance - Well Nourished] : well nourished [General Appearance - Well Developed] : well developed [Sclera] : the sclera and conjunctiva were normal [PERRL With Normal Accommodation] : pupils were equal in size, round, and reactive to light [Extraocular Movements] : extraocular movements were intact [Outer Ear] : the ears and nose were normal in appearance [Hearing Threshold Finger Rub Not Toole] : hearing was normal [Examination Of The Oral Cavity] : the lips and gums were normal [Nasal Cavity] : the nasal mucosa and septum were normal [Oropharynx] : the oropharynx was normal [Neck Appearance] : the appearance of the neck was normal [Neck Cervical Mass (___cm)] : no neck mass was observed [Auscultation Breath Sounds / Voice Sounds] : lungs were clear to auscultation bilaterally [Heart Rate And Rhythm] : heart rate was normal and rhythm regular [Heart Sounds] : normal S1 and S2 [Murmurs] : no murmurs [Edema] : there was no peripheral edema [Abdomen Soft] : soft [Abdomen Tenderness] : non-tender [Cervical Lymph Nodes Enlarged Posterior Bilaterally] : posterior cervical [Cervical Lymph Nodes Enlarged Anterior Bilaterally] : anterior cervical [Supraclavicular Lymph Nodes Enlarged Bilaterally] : supraclavicular [No Spinal Tenderness] : no spinal tenderness [Involuntary Movements] : no involuntary movements were seen [Musculoskeletal - Swelling] : no joint swelling seen [Skin Color & Pigmentation] : normal skin color and pigmentation [Skin Lesions] : no skin lesions [No Focal Deficits] : no focal deficits [Oriented To Time, Place, And Person] : oriented to person, place, and time [] : the neck was supple [FreeTextEntry1] : abdominal distention appreciated, not tender

## 2019-02-26 ENCOUNTER — APPOINTMENT (OUTPATIENT)
Dept: CARDIOLOGY | Facility: HOSPITAL | Age: 49
End: 2019-02-26

## 2019-02-26 ENCOUNTER — APPOINTMENT (OUTPATIENT)
Dept: ELECTROPHYSIOLOGY | Facility: CLINIC | Age: 49
End: 2019-02-26
Payer: SELF-PAY

## 2019-02-26 VITALS
RESPIRATION RATE: 14 BRPM | HEART RATE: 54 BPM | DIASTOLIC BLOOD PRESSURE: 57 MMHG | SYSTOLIC BLOOD PRESSURE: 154 MMHG | BODY MASS INDEX: 22.97 KG/M2 | HEIGHT: 60 IN | WEIGHT: 117 LBS

## 2019-02-26 DIAGNOSIS — I50.22 CHRONIC SYSTOLIC (CONGESTIVE) HEART FAILURE: ICD-10-CM

## 2019-02-26 DIAGNOSIS — Z79.899 OTHER LONG TERM (CURRENT) DRUG THERAPY: ICD-10-CM

## 2019-02-26 DIAGNOSIS — M31.4 AORTIC ARCH SYNDROME [TAKAYASU]: ICD-10-CM

## 2019-02-26 DIAGNOSIS — R51 HEADACHE: ICD-10-CM

## 2019-02-26 PROCEDURE — 93282 PRGRMG EVAL IMPLANTABLE DFB: CPT

## 2019-02-26 PROCEDURE — 93290 INTERROG DEV EVAL ICPMS IP: CPT | Mod: 26

## 2019-02-26 RX ORDER — DILTIAZEM HYDROCHLORIDE 60 MG/1
60 CAPSULE, EXTENDED RELEASE ORAL TWICE DAILY
Qty: 60 | Refills: 0 | Status: DISCONTINUED | COMMUNITY
Start: 2019-02-26 | End: 2019-02-26

## 2019-02-26 RX ORDER — METOPROLOL TARTRATE 100 MG/1
100 TABLET, FILM COATED ORAL
Qty: 60 | Refills: 5 | Status: DISCONTINUED | COMMUNITY
Start: 2019-01-22 | End: 2019-02-26

## 2019-02-26 NOTE — PHYSICAL EXAM
[General Appearance - Well Developed] : well developed [General Appearance - Well Nourished] : well nourished [] : no respiratory distress [Respiration, Rhythm And Depth] : normal respiratory rhythm and effort [Auscultation Breath Sounds / Voice Sounds] : lungs were clear to auscultation bilaterally [Heart Rate And Rhythm] : heart rate and rhythm were normal [Heart Sounds] : normal S1 and S2 [Edema] : no peripheral edema present [Systolic grade ___/6] : A grade [unfilled]/6 systolic murmur was heard. [Diastolic Grade ___/4] : A grade [unfilled]/4 diastolic murmur was heard. [Bowel Sounds] : normal bowel sounds [Abdomen Soft] : soft [Nail Clubbing] : no clubbing of the fingernails [Skin Color & Pigmentation] : normal skin color and pigmentation [Oriented To Time, Place, And Person] : oriented to person, place, and time [Affect] : the affect was normal [Mood] : the mood was normal [FreeTextEntry1] : EDWINA @ [facility]

## 2019-02-26 NOTE — DISCUSSION/SUMMARY
[FreeTextEntry1] : 47F , hx as above. presents for f/u. \par \par HFpEF  , s/p MVR. \par - stop diltiazem due to HF. incr metop to 200mg BID (unable to have succinate due to insurance). \par \par - check TTE as soon as possible. concern for heart failure,  patient prosthetic mismatch, or valve dysfunction causing heart failure. \par \par rtc 1 mo

## 2019-02-26 NOTE — HISTORY OF PRESENT ILLNESS
[FreeTextEntry1] : 48-year-old Kiswahili speaking female h/o large vessel vasculitis/Takayasu's arteritis (affected bracheocephalic artery, L-subclavian and bilateral CCA, currently on MTX/prednisone), nonobstructive CAD (20% pLAD and RCA in 2015), pAfib (on sotalol and warfarin), HCM, VT (s/p AICD Medtronic 12/2016), GERD/H. pylori (last EGD 2014, esophogram 5/2015 likely extrinsic compression from severe LA enlargement), prior visit with TTE done 3/2017 showed pEF 67, LVIDd 5.1cm mod-severe AR and bileaflet MVP with mod-severe MR, both regurgitations have progressed compared to 2015 (however, 2/2016 TTE also showed mod-severe MR and mod. AR).  S/p hospitalization 8/11/17 s/p AVR(T), MVR (T), CABG x1, bentall, RF ablation.\par \par Here for f/u.  She has been able to walk about four blocks.\par Her "abdiminal fullness" relieved by lasix has now required 80mg daily. \par \par \par

## 2019-03-01 DIAGNOSIS — R51 HEADACHE: ICD-10-CM

## 2019-03-01 DIAGNOSIS — I50.22 CHRONIC SYSTOLIC (CONGESTIVE) HEART FAILURE: ICD-10-CM

## 2019-03-01 DIAGNOSIS — M31.4 AORTIC ARCH SYNDROME [TAKAYASU]: ICD-10-CM

## 2019-03-01 DIAGNOSIS — Z79.899 OTHER LONG TERM (CURRENT) DRUG THERAPY: ICD-10-CM

## 2019-03-04 ENCOUNTER — APPOINTMENT (OUTPATIENT)
Dept: INTERNAL MEDICINE | Facility: HOSPITAL | Age: 49
End: 2019-03-04

## 2019-03-06 ENCOUNTER — RX RENEWAL (OUTPATIENT)
Age: 49
End: 2019-03-06

## 2019-03-25 ENCOUNTER — LABORATORY RESULT (OUTPATIENT)
Age: 49
End: 2019-03-25

## 2019-03-25 ENCOUNTER — APPOINTMENT (OUTPATIENT)
Dept: RHEUMATOLOGY | Facility: HOSPITAL | Age: 49
End: 2019-03-25

## 2019-03-25 ENCOUNTER — OUTPATIENT (OUTPATIENT)
Dept: OUTPATIENT SERVICES | Facility: HOSPITAL | Age: 49
LOS: 1 days | End: 2019-03-25

## 2019-03-25 ENCOUNTER — OTHER (OUTPATIENT)
Age: 49
End: 2019-03-25

## 2019-03-25 ENCOUNTER — APPOINTMENT (OUTPATIENT)
Dept: INTERNAL MEDICINE | Facility: HOSPITAL | Age: 49
End: 2019-03-25

## 2019-03-25 VITALS
SYSTOLIC BLOOD PRESSURE: 125 MMHG | WEIGHT: 119.38 LBS | BODY MASS INDEX: 23.44 KG/M2 | HEART RATE: 61 BPM | HEIGHT: 60 IN | DIASTOLIC BLOOD PRESSURE: 55 MMHG

## 2019-03-25 DIAGNOSIS — Z95.0 PRESENCE OF CARDIAC PACEMAKER: Chronic | ICD-10-CM

## 2019-03-25 DIAGNOSIS — Z98.891 HISTORY OF UTERINE SCAR FROM PREVIOUS SURGERY: Chronic | ICD-10-CM

## 2019-03-25 DIAGNOSIS — Z79.899 OTHER LONG TERM (CURRENT) DRUG THERAPY: ICD-10-CM

## 2019-03-25 DIAGNOSIS — Z79.01 LONG TERM (CURRENT) USE OF ANTICOAGULANTS: ICD-10-CM

## 2019-03-25 DIAGNOSIS — Z98.89 OTHER SPECIFIED POSTPROCEDURAL STATES: Chronic | ICD-10-CM

## 2019-03-25 DIAGNOSIS — M31.4 AORTIC ARCH SYNDROME [TAKAYASU]: ICD-10-CM

## 2019-03-25 LAB
INR BLD: 1.82 — HIGH (ref 0.88–1.17)
PROTHROM AB SERPL-ACNC: 21.1 SEC — HIGH (ref 9.8–13.1)

## 2019-03-25 RX ORDER — DILTIAZEM HYDROCHLORIDE 120 MG/1
120 CAPSULE, EXTENDED RELEASE ORAL DAILY
Qty: 30 | Refills: 3 | Status: COMPLETED | COMMUNITY
Start: 2019-01-25 | End: 2019-03-25

## 2019-03-26 ENCOUNTER — APPOINTMENT (OUTPATIENT)
Dept: CV DIAGNOSITCS | Facility: HOSPITAL | Age: 49
End: 2019-03-26

## 2019-03-26 NOTE — HISTORY OF PRESENT ILLNESS
[de-identified] : Last visit 2/25 [FreeTextEntry1] : - reports feeling well overall since last visit\par - currently on prednisone 2mg since last visit \par still hasn't received actemra and reports not hearing back from the pharmacy \par -TTE scheduled for tomorrow \par

## 2019-03-26 NOTE — REVIEW OF SYSTEMS
[Negative] : Integumentary [Fever] : no fever [Chills] : no chills [FreeTextEntry7] : abdominal bloating almost resolved

## 2019-03-26 NOTE — ASSESSMENT
[FreeTextEntry1] : 48 y/o Vietnamese-speaking F w/ Takayasu arteritis (Dx 2015), pAfib/sustained VTach on coumadin s/p ICD placement, nonobstructive CAD (20% pLAD and RCA in 2015), pAfib (on sotalol and warfarin), HCM, VT (s/p AICD Medtronic 12/2016), GERD/H. pylori (last EGD 2014, esophagogram 5/2015 likely extrinsic compression from severe LA enlargement),with mod-severe AR and bileaflet MVP with mod-severe MR, s/p 8/17 AVR(T), MVR (T), CABG x1, Bentall, RF ablation.\par \par 1) Takayasu arteritis with involvement proximal large arteries, including brachiocephalic artery, both subclavian arteries, right and left common carotid arteries, with some of these vessels demonstrates alternating regions of stenosis and dilatation, s/p AVR, MVR, CABG. Bentall in 08/17\par CT 12/18: focal stenosis with poststenotic dilation at the left SC artery \par Biopsy showing chronic mild inflammation of subepicardium, on Actemra from 4/30/2018 to 8/2018. Restarted 12/2018-1/2019. \par Then ran out and didn't know which # to call. \par - Will contact patient assistance to clarify shipment of Actemra\par - cw MTX 17.5 mg for now. Plan to taper off (slowly- after 4-6 mths of stable therapy on Actemra given prior severity of TA.\par -  continue prednisone at 2 mg \par - will get labs at next visit \par - US of sublcavian arteries ordered \par \par \par 2) L ovarian cyst- enlarging in size. Pt asymptomatic\par - Pt needs to schedule TVUS. was having difficulty getting in touch with the scheduling office, provided patient with different numbers\par \par 3) Abdominal Bloating - resolving \par \par 4) A fib- s/p ICD, currently normal HR\par - c/w Cardiology f/u and current regimen, pending repeat TTE \par \par 5) AVR- on Coumadin managed by coumadin clinic \par \par 6) HCM\par -Flu vaccine UTD \par -HBV, HCV, Quant Gold 11/2018 neg\par -DEXA at a later time, patient still menstruating \par \par ALIYA Horowitz\par RTC in 6 weeks

## 2019-03-26 NOTE — PHYSICAL EXAM
[General Appearance - Alert] : alert [General Appearance - In No Acute Distress] : in no acute distress [Sclera] : the sclera and conjunctiva were normal [Hearing Threshold Finger Rub Not [place]] : hearing was normal [Neck Appearance] : the appearance of the neck was normal [Auscultation Breath Sounds / Voice Sounds] : lungs were clear to auscultation bilaterally [Heart Sounds] : normal S1 and S2 [Edema] : there was no peripheral edema [Abdomen Soft] : soft [Abdomen Tenderness] : non-tender [Musculoskeletal - Swelling] : no joint swelling seen [] : no rash [Oriented To Time, Place, And Person] : oriented to person, place, and time [FreeTextEntry1] : preserved pulses bilaterally weaker radial pulse right compared to left, audible SC bruit R>L

## 2019-04-01 ENCOUNTER — OUTPATIENT (OUTPATIENT)
Dept: OUTPATIENT SERVICES | Facility: HOSPITAL | Age: 49
LOS: 1 days | End: 2019-04-01

## 2019-04-01 ENCOUNTER — LABORATORY RESULT (OUTPATIENT)
Age: 49
End: 2019-04-01

## 2019-04-01 ENCOUNTER — APPOINTMENT (OUTPATIENT)
Dept: INTERNAL MEDICINE | Facility: HOSPITAL | Age: 49
End: 2019-04-01

## 2019-04-01 DIAGNOSIS — Z98.89 OTHER SPECIFIED POSTPROCEDURAL STATES: Chronic | ICD-10-CM

## 2019-04-01 DIAGNOSIS — Z95.0 PRESENCE OF CARDIAC PACEMAKER: Chronic | ICD-10-CM

## 2019-04-01 DIAGNOSIS — Z98.891 HISTORY OF UTERINE SCAR FROM PREVIOUS SURGERY: Chronic | ICD-10-CM

## 2019-04-01 LAB
INR BLD: 2.06 — HIGH (ref 0.88–1.17)
PROTHROM AB SERPL-ACNC: 23.4 SEC — HIGH (ref 9.8–13.1)

## 2019-04-02 ENCOUNTER — APPOINTMENT (OUTPATIENT)
Dept: CV DIAGNOSITCS | Facility: HOSPITAL | Age: 49
End: 2019-04-02

## 2019-04-02 DIAGNOSIS — Z79.01 LONG TERM (CURRENT) USE OF ANTICOAGULANTS: ICD-10-CM

## 2019-04-08 ENCOUNTER — APPOINTMENT (OUTPATIENT)
Dept: INTERNAL MEDICINE | Facility: HOSPITAL | Age: 49
End: 2019-04-08

## 2019-05-28 ENCOUNTER — APPOINTMENT (OUTPATIENT)
Dept: CARDIOLOGY | Facility: HOSPITAL | Age: 49
End: 2019-05-28

## 2019-05-28 ENCOUNTER — APPOINTMENT (OUTPATIENT)
Dept: ELECTROPHYSIOLOGY | Facility: CLINIC | Age: 49
End: 2019-05-28
Payer: SELF-PAY

## 2019-05-28 VITALS — RESPIRATION RATE: 14 BRPM | DIASTOLIC BLOOD PRESSURE: 58 MMHG | HEART RATE: 62 BPM | SYSTOLIC BLOOD PRESSURE: 150 MMHG

## 2019-05-28 VITALS — HEIGHT: 60.25 IN | OXYGEN SATURATION: 100 % | BODY MASS INDEX: 24.22 KG/M2 | WEIGHT: 125 LBS

## 2019-05-28 PROCEDURE — 93290 INTERROG DEV EVAL ICPMS IP: CPT | Mod: 26

## 2019-05-28 PROCEDURE — 93282 PRGRMG EVAL IMPLANTABLE DFB: CPT

## 2019-05-28 NOTE — PHYSICAL EXAM
[General Appearance - Well Developed] : well developed [General Appearance - Well Nourished] : well nourished [Respiration, Rhythm And Depth] : normal respiratory rhythm and effort [] : no respiratory distress [Auscultation Breath Sounds / Voice Sounds] : lungs were clear to auscultation bilaterally [Heart Sounds] : normal S1 and S2 [Heart Rate And Rhythm] : heart rate and rhythm were normal [Edema] : no peripheral edema present [Systolic grade ___/6] : A grade [unfilled]/6 systolic murmur was heard. [Diastolic Grade ___/4] : A grade [unfilled]/4 diastolic murmur was heard. [Bowel Sounds] : normal bowel sounds [Nail Clubbing] : no clubbing of the fingernails [Abdomen Soft] : soft [Oriented To Time, Place, And Person] : oriented to person, place, and time [Skin Color & Pigmentation] : normal skin color and pigmentation [Mood] : the mood was normal [Affect] : the affect was normal [FreeTextEntry1] : EDWINA @ [facility]

## 2019-05-28 NOTE — HISTORY OF PRESENT ILLNESS
[FreeTextEntry1] : 48-year-old Slovenian speaking female h/o large vessel vasculitis/Takayasu's arteritis (affected bracheocephalic artery, L-subclavian and bilateral CCA, currently on MTX/prednisone), nonobstructive CAD (20% pLAD and RCA in 2015), pAfib (on sotalol and warfarin), HCM, VT (s/p AICD Medtronic 12/2016), GERD/H. pylori (last EGD 2014, esophogram 5/2015 likely extrinsic compression from severe LA enlargement), prior visit with TTE done 3/2017 showed pEF 67, LVIDd 5.1cm mod-severe AR and bileaflet MVP with mod-severe MR, both regurgitations have progressed compared to 2015 (however, 2/2016 TTE also showed mod-severe MR and mod. AR).  S/p hospitalization 8/11/17 s/p AVR(T), MVR (T), CABG x1, bentall, RF ablation.\par \par Here for f/u.  She has been able to walk about four blocks.\par Her "abdiminal fullness" relieved by lasix has now required 80mg daily. \par \par \par

## 2019-06-05 ENCOUNTER — RX RENEWAL (OUTPATIENT)
Age: 49
End: 2019-06-05

## 2019-06-28 ENCOUNTER — RX RENEWAL (OUTPATIENT)
Age: 49
End: 2019-06-28

## 2019-08-02 ENCOUNTER — RX RENEWAL (OUTPATIENT)
Age: 49
End: 2019-08-02

## 2019-08-27 ENCOUNTER — APPOINTMENT (OUTPATIENT)
Dept: CARDIOLOGY | Facility: HOSPITAL | Age: 49
End: 2019-08-27

## 2019-08-27 ENCOUNTER — APPOINTMENT (OUTPATIENT)
Dept: ELECTROPHYSIOLOGY | Facility: CLINIC | Age: 49
End: 2019-08-27
Payer: SELF-PAY

## 2019-08-27 ENCOUNTER — NON-APPOINTMENT (OUTPATIENT)
Age: 49
End: 2019-08-27

## 2019-08-27 VITALS
RESPIRATION RATE: 14 BRPM | HEART RATE: 67 BPM | SYSTOLIC BLOOD PRESSURE: 140 MMHG | DIASTOLIC BLOOD PRESSURE: 55 MMHG | BODY MASS INDEX: 25.2 KG/M2 | HEIGHT: 59.06 IN | WEIGHT: 125 LBS

## 2019-08-27 PROCEDURE — 93290 INTERROG DEV EVAL ICPMS IP: CPT | Mod: 26

## 2019-08-27 PROCEDURE — 93282 PRGRMG EVAL IMPLANTABLE DFB: CPT

## 2019-08-27 RX ORDER — FUROSEMIDE 80 MG/1
80 TABLET ORAL DAILY
Qty: 30 | Refills: 3 | Status: DISCONTINUED | COMMUNITY
Start: 2019-02-26 | End: 2019-08-27

## 2019-08-27 RX ORDER — FUROSEMIDE 40 MG/1
40 TABLET ORAL
Qty: 60 | Refills: 5 | Status: DISCONTINUED | COMMUNITY
Start: 2017-10-24 | End: 2019-08-27

## 2019-08-27 NOTE — HISTORY OF PRESENT ILLNESS
[FreeTextEntry1] : Today, pt noted feeling well overall. She still experiences abd fullness, which improves with lasix prn. She still has limited exercise tolerance but does play golf. She denied chest pain, palpitations, and ICD shocks.\par \par 5/28/19: 48-year-old Nepali speaking female h/o large vessel vasculitis/Takayasu's arteritis (affected bracheocephalic artery, L-subclavian and bilateral CCA, currently on MTX/prednisone), nonobstructive CAD (20% pLAD and RCA in 2015), pAfib (on sotalol and warfarin), HCM, VT (s/p AICD Medtronic 12/2016), GERD/H. pylori (last EGD 2014, esophogram 5/2015 likely extrinsic compression from severe LA enlargement), prior visit with TTE done 3/2017 showed pEF 67, LVIDd 5.1cm mod-severe AR and bileaflet MVP with mod-severe MR, both regurgitations have progressed compared to 2015 (however, 2/2016 TTE also showed mod-severe MR and mod. AR).  S/p hospitalization 8/11/17 s/p AVR(T), MVR (T), CABG x1, bentall, RF ablation.\par \par \par \par

## 2019-08-27 NOTE — REVIEW OF SYSTEMS
[Dyspnea on exertion] : dyspnea during exertion [Lower Ext Edema] : lower extremity edema [see HPI] : see HPI [Negative] : Neurological [Fever] : no fever [Chills] : no chills [Chest  Pressure] : no chest pressure [Shortness Of Breath] : no shortness of breath [Palpitations] : no palpitations [Chest Pain] : no chest pain [Cough] : no cough [Wheezing] : no wheezing [Skin: A Rash] : no rash: [Easy Bleeding] : no tendency for easy bleeding

## 2019-08-27 NOTE — PHYSICAL EXAM
[General Appearance - Well Developed] : well developed [General Appearance - Well Nourished] : well nourished [] : no respiratory distress [Respiration, Rhythm And Depth] : normal respiratory rhythm and effort [Auscultation Breath Sounds / Voice Sounds] : lungs were clear to auscultation bilaterally [Heart Rate And Rhythm] : heart rate and rhythm were normal [Heart Sounds] : normal S1 and S2 [Systolic grade ___/6] : A grade [unfilled]/6 systolic murmur was heard. [Diastolic Grade ___/4] : A grade [unfilled]/4 diastolic murmur was heard. [Bowel Sounds] : normal bowel sounds [Abdomen Soft] : soft [Nail Clubbing] : no clubbing of the fingernails [Skin Color & Pigmentation] : normal skin color and pigmentation [Oriented To Time, Place, And Person] : oriented to person, place, and time [Affect] : the affect was normal [Mood] : the mood was normal [FreeTextEntry1] : EDWINA @ [facility], loud S1 and S2, trace pitting edmea in bilateral LE

## 2019-08-27 NOTE — DISCUSSION/SUMMARY
[FreeTextEntry1] : A/P: 49-year-old Bulgarian speaking female h/o large vessel vasculitis/Takayasu's arteritis (affected bracheocephalic artery, L-subclavian and bilateral CCA, currently on MTX/prednisone), nonobstructive CAD (20% pLAD and RCA in 2015), pAfib (on sotalol and warfarin), HCM, VT (s/p AICD Medtronic 12/2016), GERD/H. pylori (last EGD 2014, esophogram 5/2015 likely extrinsic compression from severe LA enlargement), prior visit with TTE done 3/2017 showed pEF 67, LVIDd 5.1cm mod-severe AR and bileaflet MVP with mod-severe MR, both regurgitations have progressed compared to 2015 (however, 2/2016 TTE also showed mod-severe MR and mod. AR).  S/p hospitalization 8/11/17 s/p AVR(T), MVR (T), CABG x1, bentall, RF ablation.\par \par 1) HFpEF s/p MVR\par - persistent abd fullness\par - start spironolactone 25mg daily as pt still volume overloaded\par - c/w po lasix 80mg daily,  may need to switch to BID if not responsive\par - c/w po lopressor 100mg bid, hr 60's today in office\par - TTE done in April 2019, check annual TTE in 2020\par - c/w coumadin at current dose, pt needs INR check with PCP\par \par 2) rheuatmoid arthritis/aortitis\par - pt last seen by Dr. Horowitz in 3/2019\par - rheum follow-up ordered today\par - rheum meds renewed per pt request\par \par RTC in 2 months

## 2019-10-05 ENCOUNTER — RX RENEWAL (OUTPATIENT)
Age: 49
End: 2019-10-05

## 2019-10-07 ENCOUNTER — RX RENEWAL (OUTPATIENT)
Age: 49
End: 2019-10-07

## 2019-10-08 ENCOUNTER — RX RENEWAL (OUTPATIENT)
Age: 49
End: 2019-10-08

## 2019-10-22 NOTE — H&P ADULT. - ASSESSMENT
Called pt and left a vm informing her to call back.   46 y/o F [place] speaking with h/o of Takayasu arteritis, paroxysmal a. fib on sotalol and warfarin / sustain V tach on coumadin s/p ICD placement, nonobstructive CAD (20% LAC and RCA in 2015), HCM, GERD/H. pylori, mod-severe and bileaflet MVP with mod-severe MR presents to the ED for near syncope.

## 2019-10-29 ENCOUNTER — APPOINTMENT (OUTPATIENT)
Dept: CARDIOLOGY | Facility: HOSPITAL | Age: 49
End: 2019-10-29

## 2019-10-29 VITALS
DIASTOLIC BLOOD PRESSURE: 69 MMHG | OXYGEN SATURATION: 99 % | RESPIRATION RATE: 16 BRPM | HEART RATE: 69 BPM | HEIGHT: 59 IN | SYSTOLIC BLOOD PRESSURE: 116 MMHG

## 2019-10-29 RX ORDER — SPIRONOLACTONE 25 MG/1
25 TABLET ORAL DAILY
Qty: 30 | Refills: 0 | Status: DISCONTINUED | COMMUNITY
Start: 2019-08-27 | End: 2019-10-29

## 2019-10-29 NOTE — PHYSICAL EXAM
[General Appearance - Well Developed] : well developed [General Appearance - Well Nourished] : well nourished [] : no respiratory distress [Respiration, Rhythm And Depth] : normal respiratory rhythm and effort [Auscultation Breath Sounds / Voice Sounds] : lungs were clear to auscultation bilaterally [Heart Rate And Rhythm] : heart rate and rhythm were normal [Heart Sounds] : normal S1 and S2 [Edema] : no peripheral edema present [Systolic grade ___/6] : A grade [unfilled]/6 systolic murmur was heard. [Diastolic Grade ___/4] : A grade [unfilled]/4 diastolic murmur was heard. [Bowel Sounds] : normal bowel sounds [Abdomen Soft] : soft [Nail Clubbing] : no clubbing of the fingernails [Skin Color & Pigmentation] : normal skin color and pigmentation [Oriented To Time, Place, And Person] : oriented to person, place, and time [Affect] : the affect was normal [Mood] : the mood was normal [FreeTextEntry1] : mild distension noted

## 2019-10-29 NOTE — HISTORY OF PRESENT ILLNESS
[FreeTextEntry1] : 10/29/2019: 49 year old F pt with PMH noted below presented for routine follow-up. Pt noted spironolactone, newly prescribed on previous visit, was not working. She currently takes 3 tabs of furosemide (120mg) at night time, which helps with diuresis the most. She denied chest pain, palpitations, and SOB. Noted LE swelling is much improved but complaining of persistent abd bloating. She has not seen rheumatology since 3/2019 because she was not able to find the new address. Pt is under the impression that her INR check is also with rheumatology and therefore has not been getting INR checked. She is currently taking coumadin 4.5mg daily with no bleeding complications.\par \par 8/27/2019: Today, pt noted feeling well overall. She still experiences abd fullness, which improves with lasix prn. She still has limited exercise tolerance but does play golf. She denied chest pain, palpitations, and ICD shocks.\par \par 5/28/19: 48-year-old Uzbek speaking female h/o large vessel vasculitis/Takayasu's arteritis (affected bracheocephalic artery, L-subclavian and bilateral CCA, currently on MTX/prednisone), nonobstructive CAD (20% pLAD and RCA in 2015), pAfib (on sotalol and warfarin), HCM, VT (s/p AICD Medtronic 12/2016), GERD/H. pylori (last EGD 2014, esophogram 5/2015 likely extrinsic compression from severe LA enlargement), prior visit with TTE done 3/2017 showed pEF 67, LVIDd 5.1cm mod-severe AR and bileaflet MVP with mod-severe MR, both regurgitations have progressed compared to 2015 (however, 2/2016 TTE also showed mod-severe MR and mod. AR).  S/p hospitalization 8/11/17 s/p AVR(T), MVR (T), CABG x1, bentall, RF ablation.\par \par \par \par

## 2019-10-29 NOTE — DISCUSSION/SUMMARY
[FreeTextEntry1] : A/P: 49-year-old Yi speaking female h/o large vessel vasculitis/Takayasu's arteritis (affected bracheocephalic artery, L-subclavian and bilateral CCA, currently on MTX/prednisone), nonobstructive CAD (20% pLAD and RCA in 2015), pAfib (on sotalol and warfarin), HCM, VT (s/p AICD Medtronic 12/2016), GERD/H. pylori (last EGD 2014, esophogram 5/2015 likely extrinsic compression from severe LA enlargement), prior visit with TTE done 3/2017 showed pEF 67, LVIDd 5.1cm mod-severe AR and bileaflet MVP with mod-severe MR, both regurgitations have progressed compared to 2015 (however, 2/2016 TTE also showed mod-severe MR and mod. AR).  S/p hospitalization 8/11/17 s/p AVR(T), MVR (T), CABG x1, bentall, RF ablation.\par \par 1) HFpEF s/p MVR\par - persistent abd fullness\par - pt stopped spironolactone due to no change in diuresis\par - pt currently taking 3 tabs of furosemide at night (120mg)\par - will prescribe torsemide 20mg tablet and monitor diuretic response\par - check blood work today, CMP and magnesium\par - c/w po lopressor 100mg bid\par - TTE done in April 2019, check annual TTE in 2020\par - c/w coumadin at current dose, pt needs INR check with PCP\par \par 2) rheuatmoid arthritis/aortitis\par - pt last seen by Dr. Horowitz in 3/2019\par - rheum follow-up ordered on previous visit but pt having difficulty finding new office location\par - will continue to renew pt's rheum meds until further care established \par \par RTC in 3 months

## 2019-10-29 NOTE — REVIEW OF SYSTEMS
[Dyspnea on exertion] : dyspnea during exertion [see HPI] : see HPI [Negative] : Neurological [Fever] : no fever [Chills] : no chills [Shortness Of Breath] : no shortness of breath [Chest  Pressure] : no chest pressure [Chest Pain] : no chest pain [Lower Ext Edema] : no extremity edema [Palpitations] : no palpitations [Cough] : no cough [Wheezing] : no wheezing [Skin: A Rash] : no rash: [Easy Bleeding] : no tendency for easy bleeding

## 2019-11-25 ENCOUNTER — APPOINTMENT (OUTPATIENT)
Dept: RHEUMATOLOGY | Facility: CLINIC | Age: 49
End: 2019-11-25

## 2019-11-25 ENCOUNTER — MED ADMIN CHARGE (OUTPATIENT)
Age: 49
End: 2019-11-25

## 2019-11-25 ENCOUNTER — LABORATORY RESULT (OUTPATIENT)
Age: 49
End: 2019-11-25

## 2019-11-25 ENCOUNTER — OUTPATIENT (OUTPATIENT)
Dept: OUTPATIENT SERVICES | Facility: HOSPITAL | Age: 49
LOS: 1 days | End: 2019-11-25

## 2019-11-25 ENCOUNTER — APPOINTMENT (OUTPATIENT)
Dept: INTERNAL MEDICINE | Facility: CLINIC | Age: 49
End: 2019-11-25

## 2019-11-25 ENCOUNTER — RESULT CHARGE (OUTPATIENT)
Age: 49
End: 2019-11-25

## 2019-11-25 ENCOUNTER — OTHER (OUTPATIENT)
Age: 49
End: 2019-11-25

## 2019-11-25 VITALS
WEIGHT: 120 LBS | RESPIRATION RATE: 14 BRPM | HEART RATE: 7 BPM | DIASTOLIC BLOOD PRESSURE: 78 MMHG | BODY MASS INDEX: 24.19 KG/M2 | OXYGEN SATURATION: 97 % | HEIGHT: 59 IN | SYSTOLIC BLOOD PRESSURE: 140 MMHG

## 2019-11-25 VITALS
HEIGHT: 59 IN | DIASTOLIC BLOOD PRESSURE: 78 MMHG | WEIGHT: 120 LBS | HEART RATE: 72 BPM | BODY MASS INDEX: 24.19 KG/M2 | SYSTOLIC BLOOD PRESSURE: 120 MMHG

## 2019-11-25 DIAGNOSIS — I48.91 UNSPECIFIED ATRIAL FIBRILLATION: ICD-10-CM

## 2019-11-25 DIAGNOSIS — Z98.891 HISTORY OF UTERINE SCAR FROM PREVIOUS SURGERY: Chronic | ICD-10-CM

## 2019-11-25 DIAGNOSIS — Z98.89 OTHER SPECIFIED POSTPROCEDURAL STATES: Chronic | ICD-10-CM

## 2019-11-25 DIAGNOSIS — M31.4 AORTIC ARCH SYNDROME [TAKAYASU]: ICD-10-CM

## 2019-11-25 DIAGNOSIS — Z95.0 PRESENCE OF CARDIAC PACEMAKER: Chronic | ICD-10-CM

## 2019-11-25 DIAGNOSIS — R10.9 UNSPECIFIED ABDOMINAL PAIN: ICD-10-CM

## 2019-11-25 DIAGNOSIS — R00.2 PALPITATIONS: ICD-10-CM

## 2019-11-25 DIAGNOSIS — Z79.899 OTHER LONG TERM (CURRENT) DRUG THERAPY: ICD-10-CM

## 2019-11-25 LAB
ALBUMIN SERPL ELPH-MCNC: 4.9 G/DL — SIGNIFICANT CHANGE UP (ref 3.3–5)
ALP SERPL-CCNC: 95 U/L — SIGNIFICANT CHANGE UP (ref 40–120)
ALT FLD-CCNC: 31 U/L — SIGNIFICANT CHANGE UP (ref 4–33)
ANION GAP SERPL CALC-SCNC: 14 MMO/L — SIGNIFICANT CHANGE UP (ref 7–14)
AST SERPL-CCNC: 43 U/L — HIGH (ref 4–32)
BASOPHILS # BLD AUTO: 0.08 K/UL — SIGNIFICANT CHANGE UP (ref 0–0.2)
BASOPHILS NFR BLD AUTO: 1.5 % — SIGNIFICANT CHANGE UP (ref 0–2)
BILIRUB SERPL-MCNC: 0.7 MG/DL — SIGNIFICANT CHANGE UP (ref 0.2–1.2)
BUN SERPL-MCNC: 10 MG/DL — SIGNIFICANT CHANGE UP (ref 7–23)
CALCIUM SERPL-MCNC: 9.6 MG/DL — SIGNIFICANT CHANGE UP (ref 8.4–10.5)
CHLORIDE SERPL-SCNC: 97 MMOL/L — LOW (ref 98–107)
CO2 SERPL-SCNC: 29 MMOL/L — SIGNIFICANT CHANGE UP (ref 22–31)
CREAT SERPL-MCNC: 0.75 MG/DL — SIGNIFICANT CHANGE UP (ref 0.5–1.3)
CRP SERPL-MCNC: < 4 MG/L — SIGNIFICANT CHANGE UP
EOSINOPHIL # BLD AUTO: 0.15 K/UL — SIGNIFICANT CHANGE UP (ref 0–0.5)
EOSINOPHIL NFR BLD AUTO: 2.8 % — SIGNIFICANT CHANGE UP (ref 0–6)
ERYTHROCYTE [SEDIMENTATION RATE] IN BLOOD: 29 MM/HR — HIGH (ref 4–25)
GLUCOSE SERPL-MCNC: 90 MG/DL — SIGNIFICANT CHANGE UP (ref 70–99)
HCT VFR BLD CALC: 43 % — SIGNIFICANT CHANGE UP (ref 34.5–45)
HGB BLD-MCNC: 13.7 G/DL — SIGNIFICANT CHANGE UP (ref 11.5–15.5)
IMM GRANULOCYTES NFR BLD AUTO: 0.2 % — SIGNIFICANT CHANGE UP (ref 0–1.5)
INR PPP: 2 RATIO
LYMPHOCYTES # BLD AUTO: 1.56 K/UL — SIGNIFICANT CHANGE UP (ref 1–3.3)
LYMPHOCYTES # BLD AUTO: 28.8 % — SIGNIFICANT CHANGE UP (ref 13–44)
MCHC RBC-ENTMCNC: 31.9 % — LOW (ref 32–36)
MCHC RBC-ENTMCNC: 32.5 PG — SIGNIFICANT CHANGE UP (ref 27–34)
MCV RBC AUTO: 102.1 FL — HIGH (ref 80–100)
MONOCYTES # BLD AUTO: 0.44 K/UL — SIGNIFICANT CHANGE UP (ref 0–0.9)
MONOCYTES NFR BLD AUTO: 8.1 % — SIGNIFICANT CHANGE UP (ref 2–14)
NEUTROPHILS # BLD AUTO: 3.17 K/UL — SIGNIFICANT CHANGE UP (ref 1.8–7.4)
NEUTROPHILS NFR BLD AUTO: 58.6 % — SIGNIFICANT CHANGE UP (ref 43–77)
NRBC # FLD: 0 K/UL — SIGNIFICANT CHANGE UP (ref 0–0)
PLATELET # BLD AUTO: 336 K/UL — SIGNIFICANT CHANGE UP (ref 150–400)
PMV BLD: 11 FL — SIGNIFICANT CHANGE UP (ref 7–13)
POCT-PROTHROMBIN TIME: 24.1 SECS
POTASSIUM SERPL-MCNC: 3.5 MMOL/L — SIGNIFICANT CHANGE UP (ref 3.5–5.3)
POTASSIUM SERPL-SCNC: 3.5 MMOL/L — SIGNIFICANT CHANGE UP (ref 3.5–5.3)
PROT SERPL-MCNC: 8.8 G/DL — HIGH (ref 6–8.3)
RBC # BLD: 4.21 M/UL — SIGNIFICANT CHANGE UP (ref 3.8–5.2)
RBC # FLD: 16.5 % — HIGH (ref 10.3–14.5)
SODIUM SERPL-SCNC: 140 MMOL/L — SIGNIFICANT CHANGE UP (ref 135–145)
WBC # BLD: 5.41 K/UL — SIGNIFICANT CHANGE UP (ref 3.8–10.5)
WBC # FLD AUTO: 5.41 K/UL — SIGNIFICANT CHANGE UP (ref 3.8–10.5)

## 2019-11-25 NOTE — PHYSICAL EXAM
[General Appearance - Well Nourished] : well nourished [General Appearance - Well Developed] : well developed [Sclera] : the sclera and conjunctiva were normal [Respiration, Rhythm And Depth] : normal respiratory rhythm and effort [Apical Impulse] : the apical impulse was normal [Heart Rate And Rhythm] : heart rate was normal and rhythm regular [Bowel Sounds] : normal bowel sounds [Abdomen Soft] : soft [Musculoskeletal - Swelling] : no joint swelling seen [Abnormal Walk] : normal gait [Skin Color & Pigmentation] : normal skin color and pigmentation [] : no rash

## 2019-11-25 NOTE — REVIEW OF SYSTEMS
[Heart Rate Is Fast] : fast heart rate [Fever] : no fever [Eye Pain] : no eye pain [Chills] : no chills [Loss Of Hearing] : no hearing loss [Red Eyes] : eyes not red [Earache] : no earache [Abdominal Pain] : no abdominal pain [Shortness Of Breath] : no shortness of breath [Arthralgias] : no arthralgias [Joint Pain] : no joint pain [Skin Wound] : no skin wound [Skin Lesions] : no skin lesions [Itching] : no itching [Change In A Mole] : no change in a mole [FreeTextEntry7] : Bloating

## 2019-11-25 NOTE — HISTORY OF PRESENT ILLNESS
[FreeTextEntry1] : Pt lost follow up since April, is back to clinic today to reestablish her health care, reports went to [place]  and has been off Actemra since then, denies any worsening of symptoms\par \par Reports occasional tachycardia and palpitation which is at baseline and reports ET limited to 2-3 blocks. \par \par Denies any fever, chills, weight loss, changes in urinary or bowel habits.

## 2019-11-25 NOTE — PHYSICAL EXAM
[No Acute Distress] : no acute distress [Normal Sclera/Conjunctiva] : normal sclera/conjunctiva [No JVD] : no jugular venous distention [No Respiratory Distress] : no respiratory distress  [Clear to Auscultation] : lungs were clear to auscultation bilaterally [Normal Rate] : normal rate  [Regular Rhythm] : with a regular rhythm [Soft] : abdomen soft [Non Tender] : non-tender [No Joint Swelling] : no joint swelling [No Rash] : no rash [de-identified] : distended, dull to percussion, positive fluid wave

## 2019-11-25 NOTE — HISTORY OF PRESENT ILLNESS
[FreeTextEntry1] : f/u medicine (hasn't seen general IM in over a year) and INR [de-identified] : 49F PMH Takayasu arteritis, pAfib/sustained VT on coumadin s/p ICD placement, nonobstructive CAD (20% pLAD and RCA in 2015), pAfib (on sotalol and warfarin), HCM, VT (s/p AICD Medtronic 12/2016), GERD/H. pylori (last EGD 2014, esophagogram 5/2015 likely extrinsic compression from severe LA enlargement),with mod-severe AR and bileaflet MVP with mod-severe MR s/p 8/17 AVR(T), MVR (T), CABG x1, Bentall, RF ablation here for f/u of chronic medical conditions and INR check for coumadin\par \par Takayasu- stable on prednisone, MTX, tocilizumab, w/ repeat labs Quant gold and HBV pending, HIV pending\par Abdominal bloating- has abdominal bloating that causes difficulty breathing, states torsemide helps\par Health care maintenance- flu shot in morning, pap smear (OBGYN referral), mammogram

## 2019-11-25 NOTE — ASSESSMENT
[FreeTextEntry1] : 48 y/o French-speaking F w/ Takayasu arteritis (Dx 2015), pAfib/sustained VTach on coumadin s/p ICD placement, nonobstructive CAD (20% pLAD and RCA in 2015), pAfib (on sotalol and warfarin), HCM, VT (s/p AICD Medtronic 12/2016), GERD/H. pylori (last EGD 2014, esophagogram 5/2015 likely extrinsic compression from severe LA enlargement),with mod-severe AR and bileaflet MVP with mod-severe MR, s/p 8/17 AVR(T), MVR (T), CABG x1, Bentall, RF ablation.\par \par 1) Takayasu arteritis with involvement proximal large arteries, including brachiocephalic artery, both subclavian arteries, right and left common carotid arteries, with some of these vessels demonstrates alternating regions of stenosis and dilatation, s/p AVR, MVR, CABG. Bentall in 08/17, CT 12/18: focal stenosis with poststenotic dilation at the left SC artery, Biopsy showing chronic mild inflammation of subepicardium, on Actemra from 4/30/2018 to 8/2018. Restarted 12/2018-1/2019. \par \par - C/w prednisone 2 mg po daily \par - C/w MTX 17.5 mg for now. Plan to taper off later\par - continue prednisone at 2 mg \par - will get labs\par - Repeat Imaging after received Actemra on medication\par \par 2) L ovarian cyst- enlarging in size. Pt asymptomatic\par - Monitor\par \par 3) Abdominal Bloating - will monitor, may need to refer back to GI\par \par 4) A fib- s/p ICD, currently normal HR\par - c/w Cardiology f/u and current regimen, pending repeat TTE \par \par 5) AVR- on Coumadin managed by coumadin clinic \par \par 6) HCM\par -Flu vaccine today\par -HBV, HCV, Quant Gold 11/2018 neg will repeat today\par -DEXA ordered \par \par DW Dr Thomas\par RTC in 4 weeks. \par

## 2019-11-25 NOTE — END OF VISIT
[] : Resident [FreeTextEntry3] : JUAN CARLOS STEELE is a 49 year old female with:\par -takayasu arteritis: under care of rheum, on immunosuppressive tx\par -HFpEF: medically managed by cardiology. \par -afib: rate controlled, on coumadin for AC\par -s/p MVR: on coumadin, INR therapeautic

## 2019-11-25 NOTE — ASSESSMENT
[FreeTextEntry1] : 49F PMH Takayasu arteritis, pAfib/sustained VT on coumadin s/p ICD placement, nonobstructive CAD (20% pLAD and RCA in 2015), pAfib (on sotalol and warfarin), HCM, VT (s/p AICD Medtronic 12/2016), GERD/H. pylori (last EGD 2014, esophagogram 5/2015 likely extrinsic compression from severe LA enlargement),with mod-severe AR and bileaflet MVP with mod-severe MR s/p 8/17 AVR(T), MVR (T), CABG x1, Bentall, RF ablation here for f/u of chronic medical conditions and INR check for coumadin\par \par Health care maintenance- flu shot in morning, pap smear (OBGYN referral), mammogram, DEXA scan pending\par \par Case discussed with Dr. Sheppard\par RTC in 1 week INR, 6 months clinic\par Firm 5. Luc Rosenthal (PGY-1)

## 2019-11-26 LAB
HBV CORE AB SER-ACNC: NONREACTIVE — SIGNIFICANT CHANGE UP
HBV SURFACE AB SER-ACNC: REACTIVE — SIGNIFICANT CHANGE UP
HBV SURFACE AG SER-ACNC: NONREACTIVE — SIGNIFICANT CHANGE UP
HCV AB S/CO SERPL IA: 0.26 S/CO — SIGNIFICANT CHANGE UP (ref 0–0.99)
HCV AB SERPL-IMP: SIGNIFICANT CHANGE UP

## 2019-11-28 LAB
GAMMA INTERFERON BACKGROUND BLD IA-ACNC: 0.02 IU/ML — SIGNIFICANT CHANGE UP
M TB IFN-G BLD-IMP: NEGATIVE — SIGNIFICANT CHANGE UP
M TB IFN-G CD4+ BCKGRND COR BLD-ACNC: 0.01 IU/ML — SIGNIFICANT CHANGE UP
M TB IFN-G CD4+CD8+ BCKGRND COR BLD-ACNC: 0.01 IU/ML — SIGNIFICANT CHANGE UP
QUANT TB PLUS MITOGEN MINUS NIL: 4.82 IU/ML — SIGNIFICANT CHANGE UP

## 2019-12-02 ENCOUNTER — APPOINTMENT (OUTPATIENT)
Dept: INTERNAL MEDICINE | Facility: CLINIC | Age: 49
End: 2019-12-02

## 2019-12-03 ENCOUNTER — APPOINTMENT (OUTPATIENT)
Dept: ELECTROPHYSIOLOGY | Facility: CLINIC | Age: 49
End: 2019-12-03

## 2019-12-06 NOTE — PHYSICAL THERAPY INITIAL EVALUATION ADULT - LIVES WITH, PROFILE
Advancement-Rotation Flap Text: The defect edges were debeveled with a #15 scalpel blade.  Given the location of the defect, shape of the defect and the proximity to free margins an advancement-rotation flap was deemed most appropriate.  Using a sterile surgical marker, an appropriate flap was drawn incorporating the defect and placing the expected incisions within the relaxed skin tension lines where possible. The area thus outlined was incised deep to adipose tissue with a #15 scalpel blade.  The skin margins were undermined to an appropriate distance in all directions utilizing iris scissors. Patient lives with her daughter in a private home in [place], [place]. Patient was independent with ambulation and daily tasks prior to admission./children PPatient states she lives on the second floor of a private house with her daughter. Patient states she was independent with ADLs and ambulation prior to hospitalization. Patient states she has 6-7 steps to enter & a flight to bedroom./children

## 2019-12-23 ENCOUNTER — APPOINTMENT (OUTPATIENT)
Dept: RHEUMATOLOGY | Facility: CLINIC | Age: 49
End: 2019-12-23

## 2020-01-13 ENCOUNTER — APPOINTMENT (OUTPATIENT)
Dept: RHEUMATOLOGY | Facility: CLINIC | Age: 50
End: 2020-01-13

## 2020-01-20 ENCOUNTER — FORM ENCOUNTER (OUTPATIENT)
Age: 50
End: 2020-01-20

## 2020-01-21 ENCOUNTER — APPOINTMENT (OUTPATIENT)
Dept: RADIOLOGY | Facility: IMAGING CENTER | Age: 50
End: 2020-01-21
Payer: COMMERCIAL

## 2020-01-21 ENCOUNTER — APPOINTMENT (OUTPATIENT)
Dept: MAMMOGRAPHY | Facility: IMAGING CENTER | Age: 50
End: 2020-01-21
Payer: COMMERCIAL

## 2020-01-21 ENCOUNTER — OUTPATIENT (OUTPATIENT)
Dept: OUTPATIENT SERVICES | Facility: HOSPITAL | Age: 50
LOS: 1 days | End: 2020-01-21
Payer: COMMERCIAL

## 2020-01-21 DIAGNOSIS — Z98.89 OTHER SPECIFIED POSTPROCEDURAL STATES: Chronic | ICD-10-CM

## 2020-01-21 DIAGNOSIS — Z95.0 PRESENCE OF CARDIAC PACEMAKER: Chronic | ICD-10-CM

## 2020-01-21 DIAGNOSIS — Z98.891 HISTORY OF UTERINE SCAR FROM PREVIOUS SURGERY: Chronic | ICD-10-CM

## 2020-01-21 DIAGNOSIS — Z00.00 ENCOUNTER FOR GENERAL ADULT MEDICAL EXAMINATION WITHOUT ABNORMAL FINDINGS: ICD-10-CM

## 2020-01-21 PROCEDURE — 77080 DXA BONE DENSITY AXIAL: CPT | Mod: 26

## 2020-01-21 PROCEDURE — 77067 SCR MAMMO BI INCL CAD: CPT | Mod: 26

## 2020-01-21 PROCEDURE — 77067 SCR MAMMO BI INCL CAD: CPT

## 2020-01-21 PROCEDURE — 77080 DXA BONE DENSITY AXIAL: CPT

## 2020-01-21 PROCEDURE — 77063 BREAST TOMOSYNTHESIS BI: CPT | Mod: 26

## 2020-01-21 PROCEDURE — 77063 BREAST TOMOSYNTHESIS BI: CPT

## 2020-01-28 ENCOUNTER — APPOINTMENT (OUTPATIENT)
Dept: CARDIOLOGY | Facility: HOSPITAL | Age: 50
End: 2020-01-28

## 2020-02-03 ENCOUNTER — APPOINTMENT (OUTPATIENT)
Dept: RHEUMATOLOGY | Facility: CLINIC | Age: 50
End: 2020-02-03

## 2020-05-18 ENCOUNTER — APPOINTMENT (OUTPATIENT)
Dept: INTERNAL MEDICINE | Facility: CLINIC | Age: 50
End: 2020-05-18

## 2020-05-18 ENCOUNTER — OUTPATIENT (OUTPATIENT)
Dept: OUTPATIENT SERVICES | Facility: HOSPITAL | Age: 50
LOS: 1 days | End: 2020-05-18

## 2020-05-18 ENCOUNTER — APPOINTMENT (OUTPATIENT)
Dept: INTERNAL MEDICINE | Facility: HOSPITAL | Age: 50
End: 2020-05-18

## 2020-05-18 VITALS
WEIGHT: 126 LBS | DIASTOLIC BLOOD PRESSURE: 53 MMHG | SYSTOLIC BLOOD PRESSURE: 150 MMHG | BODY MASS INDEX: 24.1 KG/M2 | HEIGHT: 60.63 IN

## 2020-05-18 DIAGNOSIS — Z98.89 OTHER SPECIFIED POSTPROCEDURAL STATES: Chronic | ICD-10-CM

## 2020-05-18 DIAGNOSIS — Z95.0 PRESENCE OF CARDIAC PACEMAKER: Chronic | ICD-10-CM

## 2020-05-18 DIAGNOSIS — R10.9 UNSPECIFIED ABDOMINAL PAIN: ICD-10-CM

## 2020-05-18 DIAGNOSIS — Z98.891 HISTORY OF UTERINE SCAR FROM PREVIOUS SURGERY: Chronic | ICD-10-CM

## 2020-05-18 RX ORDER — WARFARIN 3 MG/1
3 TABLET ORAL
Qty: 30 | Refills: 2 | Status: DISCONTINUED | COMMUNITY
Start: 2017-09-05 | End: 2020-05-18

## 2020-05-18 NOTE — REVIEW OF SYSTEMS
[Abdominal Pain] : abdominal pain [Negative] : Heme/Lymph [Nausea] : no nausea [Constipation] : no constipation [Diarrhea] : diarrhea [Vomiting] : no vomiting [Heartburn] : no heartburn [Melena] : no melena [FreeTextEntry7] : abdominal swelling

## 2020-05-18 NOTE — HISTORY OF PRESENT ILLNESS
[Verbal consent obtained from patient] : the patient, [unfilled] [FreeTextEntry1] : Follow Up [de-identified] : 49F Chinese speaking only, PMH Takayasu arteritis, pAfib/sustained VT on coumadin s/p ICD placement, nonobstructive CAD (20% pLAD and RCA in 2015), pAfib (on sotalol and warfarin), HCM, VT (s/p AICD Medtronic 12/2016), GERD/H. pylori (last EGD 2014, esophagogram 5/2015 likely extrinsic compression from severe LA enlargement),with mod-severe AR and bileaflet MVP with mod-severe MR s/p 8/17 AVR(T), MVR (T), CABG x1, Bentall, RF ablation being seen for follow up. \par \par  ID #: [id number]\par \par Patient states she feels well and has no new complaints. Continues to endorse abdominal bloating that is at baseline. She has been taking Lasix 120 mg daily. Prior cardiology notes/prescriptions recommend Torsemide as well, however patient states she is not taking this. She has also not had an INR checked since November of 2019. Also has been unable to take Acetmra as prescribed. Denies bleeding, bruising, chest pain, SOB, fevers, cough, chills. States she is currently in [place] until mid-June and cannot come back to [place] at this time for family/work related issues.

## 2020-05-18 NOTE — ADDENDUM
[FreeTextEntry1] : reviewed with resident via telephone due to covid19 Agree with residents evaluation and plan kareen wallace

## 2020-05-18 NOTE — ASSESSMENT
[FreeTextEntry1] : 49F PMH Takayasu arteritis, pAfib/sustained VT on coumadin s/p ICD placement, nonobstructive CAD (20% pLAD and RCA in 2015), pAfib (on sotalol and warfarin), HCM, VT (s/p AICD Medtronic 12/2016), GERD/H. pylori (last EGD 2014, esophagogram 5/2015 likely extrinsic compression from severe LA enlargement),with mod-severe AR and bileaflet MVP with mod-severe MR s/p 8/17 AVR(T), MVR (T), CABG x1, Bentall, RF ablation being seen for follow up. \par \par #Abdominal Pain/Bloating\par -2/2 heart failure \par -sx tx with diuresis \par -unclear what diuretics the patient should be on as patient's information and chart review differ; advised patient to call cardiologist to determine recommended dosing. Will task cardiology clinic for clarification as well. \par -currently at baseline\par \par #Takayasu Arteritis\par -continue Methotrexate, Prednisone, Acetmra as prescribed\par -patient was not receiving Acetmra, instructed patient to call pharmacy as indicated in Rheumatology chart note \par \par #Afib\par -on Warfarin 3 mg daily; no bleeding/bruising \par -patient has not had INR checked since Nov 2019 - urged patient to come to clinic for INR check and she can come in mid June as she is in [facility] for the month \par -instructed to go to ED if any bleeding, bruising, SOB, mental status/neurologic changes \par \par #Systolic HF\par -continue Metoprolol 100 BID, Lasix\par -spironolactone stopped per Cardiology given no change in diuresis \par -currently stable, at baseline\par -no ICD shocks felt\par \par Medication refills sent\par \par RTC for INR check in June \par \par Discussed with Dr. Myrick\par \par Denise Tolbert, PGY2

## 2020-05-19 DIAGNOSIS — I48.91 UNSPECIFIED ATRIAL FIBRILLATION: ICD-10-CM

## 2020-05-19 DIAGNOSIS — I50.22 CHRONIC SYSTOLIC (CONGESTIVE) HEART FAILURE: ICD-10-CM

## 2020-05-19 DIAGNOSIS — R10.9 UNSPECIFIED ABDOMINAL PAIN: ICD-10-CM

## 2020-05-19 DIAGNOSIS — M31.4 AORTIC ARCH SYNDROME [TAKAYASU]: ICD-10-CM

## 2020-08-25 NOTE — ED PROVIDER NOTE - INTERPRETATION
Is This A New Presentation, Or A Follow-Up?: Skin Lesions How Severe Is Your Skin Lesion?: mild Have Your Skin Lesions Been Treated?: not been treated pacemaker: good capture, regular rhythm and appropriate intervals.

## 2020-10-13 ENCOUNTER — APPOINTMENT (OUTPATIENT)
Dept: CARDIOLOGY | Facility: HOSPITAL | Age: 50
End: 2020-10-13

## 2020-10-13 ENCOUNTER — APPOINTMENT (OUTPATIENT)
Dept: ELECTROPHYSIOLOGY | Facility: CLINIC | Age: 50
End: 2020-10-13
Payer: SELF-PAY

## 2020-10-13 VITALS
SYSTOLIC BLOOD PRESSURE: 150 MMHG | HEART RATE: 60 BPM | OXYGEN SATURATION: 99 % | DIASTOLIC BLOOD PRESSURE: 80 MMHG | RESPIRATION RATE: 16 BRPM

## 2020-10-13 PROCEDURE — 93282 PRGRMG EVAL IMPLANTABLE DFB: CPT

## 2020-10-13 PROCEDURE — 93290 INTERROG DEV EVAL ICPMS IP: CPT | Mod: 26

## 2020-10-13 RX ORDER — FUROSEMIDE 40 MG/1
40 TABLET ORAL DAILY
Qty: 270 | Refills: 1 | Status: DISCONTINUED | COMMUNITY
Start: 2019-08-27 | End: 2020-10-13

## 2020-10-14 ENCOUNTER — NON-APPOINTMENT (OUTPATIENT)
Age: 50
End: 2020-10-14

## 2020-10-14 NOTE — HISTORY OF PRESENT ILLNESS
[FreeTextEntry1] : 10/13/2020: 50 year old F pt with PMH noted below presented for routine follow-up. Pt denied any illness or hospitalization during COVID but she has not been able to follow-up since last year due to the pandemic. She has been getting all the medications and compliant. For diuretics, she currently take torsemide 20mg BID with good urine output. However, she's noticed persistent abd bloating. She noted normal appetite with good bowel movements and no GI discomfort. ROS otherwise insignificant from the cardiovascular standpoint.\par \par 10/29/2019: 49 year old F pt with PMH noted below presented for routine follow-up. Pt noted spironolactone, newly prescribed on previous visit, was not working. She currently takes 3 tabs of furosemide (120mg) at night time, which helps with diuresis the most. She denied chest pain, palpitations, and SOB. Noted LE swelling is much improved but complaining of persistent abd bloating. She has not seen rheumatology since 3/2019 because she was not able to find the new address. Pt is under the impression that her INR check is also with rheumatology and therefore has not been getting INR checked. She is currently taking coumadin 4.5mg daily with no bleeding complications.\par \par 8/27/2019: Today, pt noted feeling well overall. She still experiences abd fullness, which improves with lasix prn. She still has limited exercise tolerance but does play golf. She denied chest pain, palpitations, and ICD shocks.\par \par 5/28/19: 48-year-old Japanese speaking female h/o large vessel vasculitis/Takayasu's arteritis (affected bracheocephalic artery, L-subclavian and bilateral CCA, currently on MTX/prednisone), nonobstructive CAD (20% pLAD and RCA in 2015), pAfib (on sotalol and warfarin), HCM, VT (s/p AICD Medtronic 12/2016), GERD/H. pylori (last EGD 2014, esophogram 5/2015 likely extrinsic compression from severe LA enlargement), prior visit with TTE done 3/2017 showed pEF 67, LVIDd 5.1cm mod-severe AR and bileaflet MVP with mod-severe MR, both regurgitations have progressed compared to 2015 (however, 2/2016 TTE also showed mod-severe MR and mod. AR).  S/p hospitalization 8/11/17 s/p AVR(T), MVR (T), CABG x1, bentall, RF ablation.\par \par \par \par

## 2020-10-14 NOTE — DISCUSSION/SUMMARY
[FreeTextEntry1] : A/P: 50-year-old Pashto speaking female h/o large vessel vasculitis/Takayasu's arteritis (affected bracheocephalic artery, L-subclavian and bilateral CCA, currently on MTX/prednisone), nonobstructive CAD (20% pLAD and RCA in 2015), pAfib (on sotalol and warfarin), HCM, VT (s/p AICD Medtronic 12/2016), GERD/H. pylori (last EGD 2014, esophogram 5/2015 likely extrinsic compression from severe LA enlargement), s/p hospitalization 8/11/17 s/p AVR(T), MVR (T), CABG x1, bentall, and RF ablation presented for routine follow-up.\par \par 1) HFpEF s/p MVR\par - persistent abd fullness, will check echo and abd ultrasound\par - pt has been taking torsemide 20mg BID but given abd fullness, will increase torsemide to 40mg in the AM or PM and 20mg the other time based on pt's response, rx sent to pharmacy today\par - check blood work today: CBC, CMP, and fasting lipid\par - c/w po lopressor 100mg bid\par - TTE done in April 2019, repeat echo order given today\par - c/w coumadin at current dose, pt needs INR check with PCP\par - device clinic follow-up ordered today\par \par 2) rheuatmoid arthritis/aortitis\par - currently on folic acid, methotrexate, and prednisone\par - pt last seen by rheumatology in 11/2019, will need to schedule an appt\par - meds refilled today as pt currently does not have rheum appt\par \par RTC in 3 months

## 2020-10-14 NOTE — REVIEW OF SYSTEMS
[see HPI] : see HPI [Negative] : Neurological [Fever] : no fever [Chills] : no chills [Shortness Of Breath] : no shortness of breath [Dyspnea on exertion] : not dyspnea during exertion [Chest  Pressure] : no chest pressure [Chest Pain] : no chest pain [Palpitations] : no palpitations [Leg Claudication] : no intermittent leg claudication [Lower Ext Edema] : no extremity edema [Cough] : no cough [Wheezing] : no wheezing [Nausea] : no nausea [Abdominal Pain] : no abdominal pain [Vomiting] : no vomiting [Heartburn] : no heartburn [Change in Appetite] : no change in appetite [Change In The Stool] : no change in stool [Dysphagia] : no dysphagia [Dysuria] : no dysuria [Joint Swelling] : no joint swelling [Joint Pain] : no joint pain [Skin: A Rash] : no rash: [Joint Stiffness] : no joint stiffness [Depression] : no depression [Anxiety] : no anxiety [Suicidal] : not suicidal [Easy Bleeding] : no tendency for easy bleeding

## 2020-10-14 NOTE — PHYSICAL EXAM
[General Appearance - Well Developed] : well developed [General Appearance - Well Nourished] : well nourished [] : no respiratory distress [Respiration, Rhythm And Depth] : normal respiratory rhythm and effort [Auscultation Breath Sounds / Voice Sounds] : lungs were clear to auscultation bilaterally [Heart Rate And Rhythm] : heart rate and rhythm were normal [Heart Sounds] : normal S1 and S2 [Edema] : no peripheral edema present [Systolic grade ___/6] : A grade [unfilled]/6 systolic murmur was heard. [Diastolic Grade ___/4] : A grade [unfilled]/4 diastolic murmur was heard. [Bowel Sounds] : normal bowel sounds [Abdomen Soft] : soft [Abdomen Tenderness] : non-tender [Nail Clubbing] : no clubbing of the fingernails [Skin Color & Pigmentation] : normal skin color and pigmentation [Oriented To Time, Place, And Person] : oriented to person, place, and time [Affect] : the affect was normal [Mood] : the mood was normal [FreeTextEntry1] : mild abd distension noted

## 2020-10-26 NOTE — BRIEF OPERATIVE NOTE - ELECTIVE PROCEDURE
Hospitalist Admission Note    Name: Marlyn Wilder    MRN: [id number]          YOB: 1964    Age: 56 year old  Date of admission: 10/26/2020  Primary care provider: Carlos Enrique Neal              Assessment:       Brief summary of admission assessment:    Marlyn Wilder is a 56 year old  female with a significant past medical history of brain tumor and meningioma, nephrolithiasis, hypertension, obesity, TIA and sleep apnea who presents with shortness of breath and cough of the last 2 weeks.  On arrival, patient was hypoxic with oxygen saturation 73% on room air and she was placed on 15 L of oxymask which improved her oxygenation rapidly.  She was alert and oriented x3    Vital signs on arrival showed blood pressure 131/88, temperature 97.8, respiratory rate of 40 and oxygen saturation 72% and heart rate of 115.    Basic metabolic panel is significant for mild hyponatremia with sodium 132 and complete blood count is evident for leukocytosis with WBC count of 11.3.  Chest x-ray showed evidence of subtle groundglass opacity noted in the mid to lower lateral right lung possibly reflecting viral pneumonitis.    Patient was given a bolus of normal saline, 1 g of Rocephin and 500 mg of IV Zithromax.  Hospitalist service was consulted for admission and inpatient management of this patient.      Admission diagnoses:      #1. Acute hypoxic respiratory failure: Likely secondary to pulmonary infection possibly COVID-19 given chest x-ray finding compatible with Covid pattern infiltrate    #2.  Bilateral pulmonary infiltrates: Suspect COVID-19 pneumonia    #3.  Mild hyponatremia      COVID-19 Testing :  COVID Status:  COVID-19 PCR Results    COVID-19 PCR Results 8/31/20   COVID-19 Virus by PCR (External Result) Negative      Comments are available for some flowsheets but are not being displayed.         COVID-19 Antibody Results, Testing for Immunity    COVID-19 Antibody Results, Testing for Immunity    No data to display.               Symptomatic test pending    Isolation  requirement   o Contact and droplet: May need Covid isolation if you have SARS-CoV-2 PCR comes back negative      Active comorbid medical conditions:    History of brain tumor meningioma status post debulking of her brain twice in the past    Morbid obesity    Hypertension on lisinopril    Hypothyroidism on levothyroxine    Seizure disorder on Vimpat    GERD on omeprazole     Plan/MDM:       > Admission Status: Will admit patient to hospitalist service as inpatient as patient likely need over two mid night stays in the hospital.     >Care plan:      Admit to a monitored bed, telemetry, continue supplemental oxygen, empiric Decadron, hold remdesivir for now until Covid test results comes back, continue empiric ceftriaxone and azithromycin for now, obtain COVID-19 inflammatory markers, continue to monitor closely for potential deterioration of clinical status    >Supportive care:Oxygen continued  Pain management: acetominophen, anxiolytics, oral narcotics and IV narcotics  Nausea and vomiting control measures  Respiratory therapy    >Diet:Diet advanced    >Activity:Advance activity as tolerated    >Education/Counseling :Discussed treatment plan with the patient    >Consults:Inpatient consult with respiratory therapy    >VTE prophylactic measures:prophylaxis against venous thromboembolism with Lovenox at 0.5 mg/kg every 12 hours due to Covid 19 concern    >Therapies:Respiratory therapy      >Additional orders:    --Care plan discussed with the patient/family and agreed to care plan   --Patient will be transferred to care of hospitalist attending for further evaluation and management as appropriate   --Old medical orders reviewed   --imaging result independently reviewed by me     (See orders placed for this visit by me )     - Home medication reviewed and will be continued as appropriate once pharmacy reconciliation is completed         Code  Status/Disposition:     >Code Status:Full Code      >Disposition:anticipate discharge to home and Anticipate discharge in 3 days        Disclaimer: This note consists of symbols derived from keyboarding, dictation and/or voice recognition software. As a result, there may be errors in the script that have gone undetected. Please consider this when interpreting information found in this chart.             Chief Complaint:     Shortness of breath     History is obtained from the patient          History of Present Illness:      This patient is a 56 year old  female with a significant past medical history of brain trauma, hypertension, sleep apnea, GERD and seizure disorder who presents with the following condition requiring a hospital admission:        Acute hypoxia respiratory failure likely secondary to viral pneumonitis    Patient is 56-year-old female who presents with 2 weeks history of upper respiratory symptoms such as rhinorrhea, cough associated with shortness of breath.  She has been having myalgias as well.  She had similar symptoms back in August and she was hospitalized at that time for seizure recurrence.  Her symptoms gradually worsened and she presented to the emergency department for evaluation.  She denies any fever chills.  On arrival to emergency department she was in respiratory distress and was hypoxic requiring oxygen which improved her symptoms and oxygenation.  Patient was stabilized and hospital service was consulted for admission and inpatient management of respiratory failure which is likely COVID-19 pneumonia.  Symptomatic Covid test was ordered and pending at the time of admission.           Past Medical History:     Past Medical History:   Diagnosis Date     Arthritis     atypical rheumatoid arthritis     Brain tumor (H)      Calculus of kidney 2002     Gout      Hypertension      PONV (postoperative nausea and vomiting)      Seizures (H)     frontal lobe seizures, controlled on Keppra,  starts with smell, then de ja vu, nausea     Sleep apnea     uses CPAP     TIA (transient ischemic attack)     takes baby ASA            Past Surgical History:     Past Surgical History:   Procedure Laterality Date     BRAIN SURGERY  2012    debulking x 2, short term memory deficits     C  DELIVERY ONLY      ,     C LIGATE FALLOPIAN TUBE,POSTPARTUM       COMBINED CYSTOSCOPY, INSERT CATHETER URETER Right 2020    Procedure: CYSTOSCOPY, RIGHT URETERAL CATHETER PLACEMENT;  Surgeon: Adarsh Livingston MD;  Location:  OR     HC REMOVAL OF TONSILS,<11 Y/O      Tonsils <12y.o.     IR NEPHROSTOMY TUBE REMOVAL RIGHT  2020     IR RENAL STONE REMOVAL RIGHT  2020     OPTICAL TRACKING SYSTEM CRANIOTOMY, EXCISE TUMOR, COMBINED  2012    Procedure: COMBINED OPTICAL TRACKING SYSTEM CRANIOTOMY, EXCISE TUMOR;  Stealth Guided Left Redo Craniotomy, Resection Of Tumor Ct @615 am MRI @ 630;  Surgeon: Pan Calero MD;  Location: [facility] OR     PERCUTANEOUS NEPHROLITHOTOMY Right 2020    Procedure: RIGHT PERCUTANEOUS NEPHROLITHOTOMY;  Surgeon: Adarsh Livingston MD;  Location:  OR             Social History:     Social History     Tobacco Use     Smoking status: Never Smoker     Smokeless tobacco: Never Used   Substance Use Topics     Alcohol use: No             Family History:     Family History   Problem Relation Age of Onset     Breast Cancer Mother         72-alive     Hypertension Father         76-alive     C.A.D. Father      Family History Negative Sister         2 sisters healthy     Family History Negative Brother             Allergies:     Allergies   Allergen Reactions     Duloxetine Nausea             Medications:        Prior to Admission medications    Medication Sig Last Dose Taking? Auth Provider   dexamethasone (DECADRON) 4 MG tablet    Reported, Patient   divalproex sodium extended-release (DEPAKOTE ER) 250 MG 24 hr tablet Take 1 tablet (250 mg) by mouth 2  times daily   Emmanuel Madrid MD   Flurandrenolide 0.05 % OINT Apply 1 Application topically daily as needed   Reported, Patient   folic acid (FOLVITE) 1 MG tablet Take 1 mg by mouth daily    Reported, Patient   gabapentin (NEURONTIN) 300 MG capsule Take 600 mg by mouth At Bedtime (take 2 X 300 mg = 600 mg dose)   Reported, Patient   levETIRAcetam (KEPPRA) 500 MG tablet TAKE 3 TABLETS(1500 MG) BY MOUTH IN THE MORNING AND IN THE EVENING   Emmanuel Madrid MD   levothyroxine (SYNTHROID) 125 MCG tablet Take 125 mcg by mouth daily    Reported, Patient   lisinopril (ZESTRIL) 2.5 MG tablet Take 2.5 mg by mouth 2 times daily    Reported, Patient   methotrexate sodium 2.5 MG TABS Take 17.5 mg by mouth once a week (take 7 X 2.5 mg = 17.5 mg dose)   Reported, Patient   Multiple Vitamins-Minerals (MULTIVITAMIN & MINERAL PO) Take 1 tablet by mouth daily   Reported, Patient   omeprazole (PRILOSEC) 20 MG CR capsule Take 20 mg by mouth daily    Reported, Patient   ondansetron (ZOFRAN) 4 MG tablet Take 4 mg by mouth daily as needed for nausea    Reported, Patient   pantoprazole (PROTONIX) 40 MG EC tablet Take 40 mg by mouth   Reported, Patient   VIMPAT 200 MG TABS tablet Take 1 tablet (200 mg) by mouth 2 times daily   Emmanuel Madrid MD   vitamin D3 (CHOLECALCIFEROL) 2000 units (50 mcg) tablet Take 1 tablet by mouth daily   Reported, Patient          Review of Systems:     A Comprehensive greater than 10 system review of systems was carried out.  Pertinent positives and negatives are noted above in HPI.  Otherwise negative for contributory information.           Physical Exam:     Vital signs were reviewed    Temp:  [97.8  F (36.6  C)] 97.8  F (36.6  C)  Pulse:  [101-115] 101  Resp:  [29-40] 29  BP: (117-147)/(62-88) 147/72  SpO2:  [72 %-98 %] 98 %    Patient was remotely seen using video encounter.  Physical exam by Dr. Graf on October 26, 2020 at 11:27 AM was reviewed.             Data:       All laboratory and  imaging data in the past 24 hours reviewed     Results for orders placed or performed during the hospital encounter of 10/26/20   XR Chest Port 1 View     Status: None    Narrative    CHEST ONE VIEW PORTABLE  10/26/2020 11:56 AM     HISTORY:  Hypoxia, COVID.    COMPARISON: Chest x-ray 11/9/2012.      Impression    IMPRESSION: Portable chest. Subtle groundglass opacity noted in the  mid to lower lateral right lung possibly reflecting viral pneumonitis.  Retrocardiac opacity could indicate left lower lobe atelectasis or  consolidation. No pneumothorax or significant pleural fluid. Heart is  normal in size.    DAFNE VERAS MD   Blood gas venous and oxyhgb     Status: Abnormal   Result Value Ref Range    Ph Venous 7.46 (H) 7.32 - 7.43 pH    PCO2 Venous 36 (L) 40 - 50 mm Hg    PO2 Venous 58 (H) 25 - 47 mm Hg    Bicarbonate Venous 26 21 - 28 mmol/L    FIO2 10L OxyMask     Oxyhemoglobin Venous 88 %    Base Excess Venous 2.3 mmol/L   CBC + differential     Status: Abnormal   Result Value Ref Range    WBC 11.3 (H) 4.0 - 11.0 10e9/L    RBC Count 4.13 3.8 - 5.2 10e12/L    Hemoglobin 12.2 11.7 - 15.7 g/dL    Hematocrit 37.6 35.0 - 47.0 %    MCV 91 78 - 100 fl    MCH 29.5 26.5 - 33.0 pg    MCHC 32.4 31.5 - 36.5 g/dL    RDW 17.6 (H) 10.0 - 15.0 %    Platelet Count 244 150 - 450 10e9/L    Diff Method Automated Method     % Neutrophils 70.3 %    % Lymphocytes 17.5 %    % Monocytes 7.3 %    % Eosinophils 0.6 %    % Basophils 0.6 %    % Immature Granulocytes 3.7 %    Nucleated RBCs 0 0 /100    Absolute Neutrophil 8.0 1.6 - 8.3 10e9/L    Absolute Lymphocytes 2.0 0.8 - 5.3 10e9/L    Absolute Monocytes 0.8 0.0 - 1.3 10e9/L    Absolute Eosinophils 0.1 0.0 - 0.7 10e9/L    Absolute Basophils 0.1 0.0 - 0.2 10e9/L    Abs Immature Granulocytes 0.4 0 - 0.4 10e9/L    Absolute Nucleated RBC 0.0    Basic metabolic panel (BMP)     Status: Abnormal   Result Value Ref Range    Sodium 132 (L) 133 - 144 mmol/L    Potassium 4.0 3.4 - 5.3 mmol/L     Chloride 100 94 - 109 mmol/L    Carbon Dioxide 23 20 - 32 mmol/L    Anion Gap 9 3 - 14 mmol/L    Glucose 122 (H) 70 - 99 mg/dL    Urea Nitrogen 9 7 - 30 mg/dL    Creatinine 0.66 0.52 - 1.04 mg/dL    GFR Estimate >90 >60 mL/min/[1.73_m2]    GFR Estimate If Black >90 >60 mL/min/[1.73_m2]    Calcium 8.6 8.5 - 10.1 mg/dL   Symptomatic COVID-19 Virus (Coronavirus) by PCR     Status: None    Specimen: Nasopharyngeal   Result Value Ref Range    COVID-19 Virus PCR to [facility] - Source Nasopharyngeal     COVID-19 Virus PCR to [facility] - Result       Test received-See reflex to IDDL test SARS CoV2 (COVID-19) Virus RT-PCR   Nt probnp inpatient     Status: None   Result Value Ref Range    N-Terminal Pro BNP Inpatient 881 0 - 900 pg/mL          Recent Results (from the past 48 hour(s))   XR Chest Port 1 View    Narrative    CHEST ONE VIEW PORTABLE  10/26/2020 11:56 AM     HISTORY:  Hypoxia, COVID.    COMPARISON: Chest x-ray 11/9/2012.      Impression    IMPRESSION: Portable chest. Subtle groundglass opacity noted in the  mid to lower lateral right lung possibly reflecting viral pneumonitis.  Retrocardiac opacity could indicate left lower lobe atelectasis or  consolidation. No pneumothorax or significant pleural fluid. Heart is  normal in size.    DAFNE VERAS MD          All imaging studies reviewed by me.         Patient`s old medical records reviewed and case discussed with the ED physician.    ED course-Reviewed  and care plan discussed with Dr. Graf   Yes

## 2020-11-30 ENCOUNTER — RX RENEWAL (OUTPATIENT)
Age: 50
End: 2020-11-30

## 2020-11-30 ENCOUNTER — APPOINTMENT (OUTPATIENT)
Dept: CV DIAGNOSITCS | Facility: HOSPITAL | Age: 50
End: 2020-11-30

## 2020-12-18 ENCOUNTER — APPOINTMENT (OUTPATIENT)
Dept: CV DIAGNOSITCS | Facility: HOSPITAL | Age: 50
End: 2020-12-18

## 2021-01-05 ENCOUNTER — APPOINTMENT (OUTPATIENT)
Dept: CARDIOLOGY | Facility: HOSPITAL | Age: 51
End: 2021-01-05

## 2021-01-05 ENCOUNTER — APPOINTMENT (OUTPATIENT)
Dept: ELECTROPHYSIOLOGY | Facility: CLINIC | Age: 51
End: 2021-01-05
Payer: SELF-PAY

## 2021-01-05 VITALS
OXYGEN SATURATION: 99 % | WEIGHT: 135 LBS | HEART RATE: 63 BPM | HEIGHT: 60.6 IN | BODY MASS INDEX: 25.82 KG/M2 | DIASTOLIC BLOOD PRESSURE: 54 MMHG | RESPIRATION RATE: 16 BRPM | SYSTOLIC BLOOD PRESSURE: 124 MMHG

## 2021-01-05 PROCEDURE — 93282 PRGRMG EVAL IMPLANTABLE DFB: CPT

## 2021-01-05 RX ORDER — TORSEMIDE 20 MG/1
20 TABLET ORAL
Qty: 90 | Refills: 5 | Status: DISCONTINUED | COMMUNITY
Start: 2019-10-29 | End: 2021-01-05

## 2021-01-05 NOTE — HISTORY OF PRESENT ILLNESS
[FreeTextEntry1] : 1/5/2021: 50 year old with PMH noted below p/w routine follow-up. Device interrogation in the clinic showed increased optivol since early/mid December 2020. Pt noted mild orthopnea, worsening abd distension, and diffuse stiffness in all 4 extremities. Of note, on previous visit, pt endorsed taking torsemide but today stated that she misspoke and is actually taking furosemide, which she was able to  from previous leftover refills. She has been taking 1 pill in the AM and 2 pills at PM but not sure what dose. Pt has not been urinating as well as before and concerned about it in the setting of symptoms noted above.\par \par Pt has not been able to set up rheum appointment but plan on setting it up as well as INR check (lab in the same building as rheum office).\par \par 10/13/2020: 50 year old F pt with PMH noted below presented for routine follow-up. Pt denied any illness or hospitalization during COVID but she has not been able to follow-up since last year due to the pandemic. She has been getting all the medications and compliant. For diuretics, she currently take torsemide 20mg BID with good urine output. However, she's noticed persistent abd bloating. She noted normal appetite with good bowel movements and no GI discomfort. ROS otherwise insignificant from the cardiovascular standpoint.\par \par 10/29/2019: 49 year old F pt with PMH noted below presented for routine follow-up. Pt noted spironolactone, newly prescribed on previous visit, was not working. She currently takes 3 tabs of furosemide (120mg) at night time, which helps with diuresis the most. She denied chest pain, palpitations, and SOB. Noted LE swelling is much improved but complaining of persistent abd bloating. She has not seen rheumatology since 3/2019 because she was not able to find the new address. Pt is under the impression that her INR check is also with rheumatology and therefore has not been getting INR checked. She is currently taking coumadin 4.5mg daily with no bleeding complications.\par \par 8/27/2019: Today, pt noted feeling well overall. She still experiences abd fullness, which improves with lasix prn. She still has limited exercise tolerance but does play golf. She denied chest pain, palpitations, and ICD shocks.\par \par 5/28/19: 48-year-old Latvian speaking female h/o large vessel vasculitis/Takayasu's arteritis (affected bracheocephalic artery, L-subclavian and bilateral CCA, currently on MTX/prednisone), nonobstructive CAD (20% pLAD and RCA in 2015), pAfib (on sotalol and warfarin), HCM, VT (s/p AICD Medtronic 12/2016), GERD/H. pylori (last EGD 2014, esophogram 5/2015 likely extrinsic compression from severe LA enlargement), prior visit with TTE done 3/2017 showed pEF 67, LVIDd 5.1cm mod-severe AR and bileaflet MVP with mod-severe MR, both regurgitations have progressed compared to 2015 (however, 2/2016 TTE also showed mod-severe MR and mod. AR).  S/p hospitalization 8/11/17 s/p AVR(T), MVR (T), CABG x1, bentall, RF ablation.\par \par \par \par

## 2021-01-05 NOTE — DISCUSSION/SUMMARY
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 pylori (last EGD 2014, esophogram 5/2015 likely extrinsic compression from severe LA enlargement), s/p hospitalization 8/11/17 s/p AVR(T), MVR (T), CABG x1, bentall, and RF ablation presented for routine follow-up.\par \par 1) HFpEF s/p MVR\par - persistent abd fullness\par - recent echo (12/2020) with no significant changes, abd ultrasound (12/2020) negative for AAA\par - pt misspoke during last visit; takes furosemide instead of torsemide (1 pill AM, 2 pills PM); will confirm dose and call back later today\par - given symptoms, increase in optivol, and weight increase (135lb today), will increase the dose after confirming current pill dose\par - c/w po lopressor 100mg bid\par - c/w coumadin at current dose, pt needs INR check with PCP\par - device clinic follow-up today, next 3-4 months\par \par 2) rheuatmoid arthritis/aortitis\par - currently on folic acid, methotrexate, and prednisone\par - pt last seen by rheumatology in 11/2019, will need to schedule an appt\par - meds refilled today as pt currently does not have rheum appt but will need one soon\par - prednisone may be worsening pt water retention\par \par RTC in 3 months

## 2021-01-05 NOTE — PHYSICAL EXAM
[General Appearance - Well Developed] : well developed [General Appearance - Well Nourished] : well nourished [JVD Elevated _____cm] : JVD elevated [unfilled] ~U cm above clavicle [] : no respiratory distress [Respiration, Rhythm And Depth] : normal respiratory rhythm and effort [Auscultation Breath Sounds / Voice Sounds] : lungs were clear to auscultation bilaterally [Heart Rate And Rhythm] : heart rate and rhythm were normal [Heart Sounds] : normal S1 and S2 [Edema] : no peripheral edema present [Systolic grade ___/6] : A grade [unfilled]/6 systolic murmur was heard. [Diastolic Grade ___/4] : A grade [unfilled]/4 diastolic murmur was heard. [Bowel Sounds] : normal bowel sounds [Abdomen Soft] : soft [Abdomen Tenderness] : non-tender [Nail Clubbing] : no clubbing of the fingernails [Skin Color & Pigmentation] : normal skin color and pigmentation [Oriented To Time, Place, And Person] : oriented to person, place, and time [Affect] : the affect was normal [Mood] : the mood was normal [FreeTextEntry1] : mild abd distension noted

## 2021-01-05 NOTE — REVIEW OF SYSTEMS
[Shortness Of Breath] : shortness of breath [Dyspnea on exertion] : dyspnea during exertion [Leg Claudication] : intermittent leg claudication [Cough] : cough [see HPI] : see HPI [Joint Stiffness] : joint stiffness [Negative] : Heme/Lymph [Fever] : no fever [Chills] : no chills [Chest  Pressure] : no chest pressure [Chest Pain] : no chest pain [Lower Ext Edema] : no extremity edema [Palpitations] : no palpitations [Wheezing] : no wheezing [Coughing Up Blood] : no hemoptysis [Abdominal Pain] : no abdominal pain [Nausea] : no nausea [Vomiting] : no vomiting [Heartburn] : no heartburn [Change in Appetite] : no change in appetite [Change In The Stool] : no change in stool [Dysphagia] : no dysphagia [Dysuria] : no dysuria [Joint Pain] : no joint pain [Joint Swelling] : no joint swelling [Skin: A Rash] : no rash: [Depression] : no depression [Anxiety] : no anxiety [Suicidal] : not suicidal [Easy Bleeding] : no tendency for easy bleeding

## 2021-01-25 ENCOUNTER — OUTPATIENT (OUTPATIENT)
Dept: OUTPATIENT SERVICES | Facility: HOSPITAL | Age: 51
LOS: 1 days | End: 2021-01-25

## 2021-01-25 ENCOUNTER — APPOINTMENT (OUTPATIENT)
Dept: INTERNAL MEDICINE | Facility: CLINIC | Age: 51
End: 2021-01-25

## 2021-01-25 VITALS — TEMPERATURE: 97.2 F

## 2021-01-25 VITALS — RESPIRATION RATE: 15 BRPM | HEART RATE: 63 BPM | OXYGEN SATURATION: 98 %

## 2021-01-25 DIAGNOSIS — Z98.891 HISTORY OF UTERINE SCAR FROM PREVIOUS SURGERY: Chronic | ICD-10-CM

## 2021-01-25 DIAGNOSIS — Z95.0 PRESENCE OF CARDIAC PACEMAKER: Chronic | ICD-10-CM

## 2021-01-25 DIAGNOSIS — Z98.89 OTHER SPECIFIED POSTPROCEDURAL STATES: Chronic | ICD-10-CM

## 2021-01-25 LAB
INR PPP: 1.4 RATIO
POCT-PROTHROMBIN TIME: 17 SECS

## 2021-01-27 DIAGNOSIS — Z79.01 LONG TERM (CURRENT) USE OF ANTICOAGULANTS: ICD-10-CM

## 2021-01-27 DIAGNOSIS — I48.91 UNSPECIFIED ATRIAL FIBRILLATION: ICD-10-CM

## 2021-03-22 ENCOUNTER — APPOINTMENT (OUTPATIENT)
Dept: INTERNAL MEDICINE | Facility: CLINIC | Age: 51
End: 2021-03-22

## 2021-03-22 ENCOUNTER — OUTPATIENT (OUTPATIENT)
Dept: OUTPATIENT SERVICES | Facility: HOSPITAL | Age: 51
LOS: 1 days | End: 2021-03-22

## 2021-03-22 VITALS — HEART RATE: 50 BPM | RESPIRATION RATE: 16 BRPM | OXYGEN SATURATION: 99 %

## 2021-03-22 VITALS — TEMPERATURE: 96.3 F

## 2021-03-22 DIAGNOSIS — Z98.89 OTHER SPECIFIED POSTPROCEDURAL STATES: Chronic | ICD-10-CM

## 2021-03-22 DIAGNOSIS — Z98.891 HISTORY OF UTERINE SCAR FROM PREVIOUS SURGERY: Chronic | ICD-10-CM

## 2021-03-22 DIAGNOSIS — Z51.81 ENCOUNTER FOR THERAPEUTIC DRUG LEVEL MONITORING: ICD-10-CM

## 2021-03-22 DIAGNOSIS — I48.91 UNSPECIFIED ATRIAL FIBRILLATION: ICD-10-CM

## 2021-03-22 DIAGNOSIS — Z79.01 LONG TERM (CURRENT) USE OF ANTICOAGULANTS: ICD-10-CM

## 2021-03-22 DIAGNOSIS — Z95.0 PRESENCE OF CARDIAC PACEMAKER: Chronic | ICD-10-CM

## 2021-03-22 DIAGNOSIS — Z95.2 PRESENCE OF PROSTHETIC HEART VALVE: ICD-10-CM

## 2021-03-22 LAB
INR PPP: 4 RATIO
POCT-PROTHROMBIN TIME: 48.4 SECS

## 2021-04-05 ENCOUNTER — OUTPATIENT (OUTPATIENT)
Dept: OUTPATIENT SERVICES | Facility: HOSPITAL | Age: 51
LOS: 1 days | End: 2021-04-05

## 2021-04-05 ENCOUNTER — APPOINTMENT (OUTPATIENT)
Dept: INTERNAL MEDICINE | Facility: CLINIC | Age: 51
End: 2021-04-05

## 2021-04-05 ENCOUNTER — APPOINTMENT (OUTPATIENT)
Dept: RHEUMATOLOGY | Facility: CLINIC | Age: 51
End: 2021-04-05

## 2021-04-05 ENCOUNTER — RESULT REVIEW (OUTPATIENT)
Age: 51
End: 2021-04-05

## 2021-04-05 VITALS
HEIGHT: 60.6 IN | WEIGHT: 135 LBS | HEART RATE: 60 BPM | SYSTOLIC BLOOD PRESSURE: 128 MMHG | RESPIRATION RATE: 16 BRPM | OXYGEN SATURATION: 98 % | BODY MASS INDEX: 25.82 KG/M2 | DIASTOLIC BLOOD PRESSURE: 66 MMHG

## 2021-04-05 VITALS — TEMPERATURE: 96.8 F

## 2021-04-05 DIAGNOSIS — Z51.81 ENCOUNTER FOR THERAPEUTIC DRUG LEVEL MONITORING: ICD-10-CM

## 2021-04-05 DIAGNOSIS — M25.562 PAIN IN LEFT KNEE: ICD-10-CM

## 2021-04-05 DIAGNOSIS — I48.91 UNSPECIFIED ATRIAL FIBRILLATION: ICD-10-CM

## 2021-04-05 DIAGNOSIS — Z95.2 PRESENCE OF PROSTHETIC HEART VALVE: ICD-10-CM

## 2021-04-05 DIAGNOSIS — Z98.891 HISTORY OF UTERINE SCAR FROM PREVIOUS SURGERY: Chronic | ICD-10-CM

## 2021-04-05 DIAGNOSIS — Z95.0 PRESENCE OF CARDIAC PACEMAKER: Chronic | ICD-10-CM

## 2021-04-05 DIAGNOSIS — Z79.01 LONG TERM (CURRENT) USE OF ANTICOAGULANTS: ICD-10-CM

## 2021-04-05 DIAGNOSIS — Z98.89 OTHER SPECIFIED POSTPROCEDURAL STATES: Chronic | ICD-10-CM

## 2021-04-05 LAB
ALBUMIN SERPL ELPH-MCNC: 5 G/DL — SIGNIFICANT CHANGE UP (ref 3.3–5)
ALP SERPL-CCNC: 80 U/L — SIGNIFICANT CHANGE UP (ref 40–120)
ALT FLD-CCNC: 20 U/L — SIGNIFICANT CHANGE UP (ref 4–33)
ANION GAP SERPL CALC-SCNC: 14 MMOL/L — SIGNIFICANT CHANGE UP (ref 7–14)
AST SERPL-CCNC: 36 U/L — HIGH (ref 4–32)
BASOPHILS # BLD AUTO: 0.09 K/UL — SIGNIFICANT CHANGE UP (ref 0–0.2)
BASOPHILS NFR BLD AUTO: 1.2 % — SIGNIFICANT CHANGE UP (ref 0–2)
BILIRUB SERPL-MCNC: 1.1 MG/DL — SIGNIFICANT CHANGE UP (ref 0.2–1.2)
BUN SERPL-MCNC: 14 MG/DL — SIGNIFICANT CHANGE UP (ref 7–23)
CALCIUM SERPL-MCNC: 9.5 MG/DL — SIGNIFICANT CHANGE UP (ref 8.4–10.5)
CHLORIDE SERPL-SCNC: 97 MMOL/L — LOW (ref 98–107)
CO2 SERPL-SCNC: 29 MMOL/L — SIGNIFICANT CHANGE UP (ref 22–31)
CREAT SERPL-MCNC: 0.67 MG/DL — SIGNIFICANT CHANGE UP (ref 0.5–1.3)
CRP SERPL-MCNC: 5.1 MG/L — HIGH
EOSINOPHIL # BLD AUTO: 0.2 K/UL — SIGNIFICANT CHANGE UP (ref 0–0.5)
EOSINOPHIL NFR BLD AUTO: 2.7 % — SIGNIFICANT CHANGE UP (ref 0–6)
ERYTHROCYTE [SEDIMENTATION RATE] IN BLOOD: 15 MM/HR — SIGNIFICANT CHANGE UP (ref 4–25)
GLUCOSE SERPL-MCNC: 86 MG/DL — SIGNIFICANT CHANGE UP (ref 70–99)
HCT VFR BLD CALC: 43.2 % — SIGNIFICANT CHANGE UP (ref 34.5–45)
HGB BLD-MCNC: 14.3 G/DL — SIGNIFICANT CHANGE UP (ref 11.5–15.5)
IANC: 4.64 K/UL — SIGNIFICANT CHANGE UP (ref 1.5–8.5)
IMM GRANULOCYTES NFR BLD AUTO: 0.1 % — SIGNIFICANT CHANGE UP (ref 0–1.5)
INR PPP: 2.6 RATIO
LYMPHOCYTES # BLD AUTO: 1.77 K/UL — SIGNIFICANT CHANGE UP (ref 1–3.3)
LYMPHOCYTES # BLD AUTO: 23.7 % — SIGNIFICANT CHANGE UP (ref 13–44)
MCHC RBC-ENTMCNC: 33.1 GM/DL — SIGNIFICANT CHANGE UP (ref 32–36)
MCHC RBC-ENTMCNC: 33.5 PG — SIGNIFICANT CHANGE UP (ref 27–34)
MCV RBC AUTO: 101.2 FL — HIGH (ref 80–100)
MONOCYTES # BLD AUTO: 0.76 K/UL — SIGNIFICANT CHANGE UP (ref 0–0.9)
MONOCYTES NFR BLD AUTO: 10.2 % — SIGNIFICANT CHANGE UP (ref 2–14)
NEUTROPHILS # BLD AUTO: 4.64 K/UL — SIGNIFICANT CHANGE UP (ref 1.8–7.4)
NEUTROPHILS NFR BLD AUTO: 62.1 % — SIGNIFICANT CHANGE UP (ref 43–77)
NRBC # BLD: 0 /100 WBCS — SIGNIFICANT CHANGE UP
NRBC # FLD: 0 K/UL — SIGNIFICANT CHANGE UP
PLATELET # BLD AUTO: 317 K/UL — SIGNIFICANT CHANGE UP (ref 150–400)
POCT-PROTHROMBIN TIME: 30.8 SECS
POTASSIUM SERPL-MCNC: 3.5 MMOL/L — SIGNIFICANT CHANGE UP (ref 3.5–5.3)
POTASSIUM SERPL-SCNC: 3.5 MMOL/L — SIGNIFICANT CHANGE UP (ref 3.5–5.3)
PROT SERPL-MCNC: 8.4 G/DL — HIGH (ref 6–8.3)
RBC # BLD: 4.27 M/UL — SIGNIFICANT CHANGE UP (ref 3.8–5.2)
RBC # FLD: 16.1 % — HIGH (ref 10.3–14.5)
SODIUM SERPL-SCNC: 140 MMOL/L — SIGNIFICANT CHANGE UP (ref 135–145)
WBC # BLD: 7.47 K/UL — SIGNIFICANT CHANGE UP (ref 3.8–10.5)
WBC # FLD AUTO: 7.47 K/UL — SIGNIFICANT CHANGE UP (ref 3.8–10.5)

## 2021-04-06 ENCOUNTER — APPOINTMENT (OUTPATIENT)
Dept: CARDIOLOGY | Facility: HOSPITAL | Age: 51
End: 2021-04-06

## 2021-04-06 ENCOUNTER — NON-APPOINTMENT (OUTPATIENT)
Age: 51
End: 2021-04-06

## 2021-04-06 ENCOUNTER — APPOINTMENT (OUTPATIENT)
Dept: ELECTROPHYSIOLOGY | Facility: CLINIC | Age: 51
End: 2021-04-06
Payer: COMMERCIAL

## 2021-04-06 VITALS
BODY MASS INDEX: 25.66 KG/M2 | TEMPERATURE: 97.6 F | SYSTOLIC BLOOD PRESSURE: 147 MMHG | WEIGHT: 134 LBS | OXYGEN SATURATION: 98 % | HEART RATE: 62 BPM | RESPIRATION RATE: 16 BRPM | DIASTOLIC BLOOD PRESSURE: 58 MMHG

## 2021-04-06 DIAGNOSIS — M31.4 AORTIC ARCH SYNDROME [TAKAYASU]: ICD-10-CM

## 2021-04-06 DIAGNOSIS — Z79.899 OTHER LONG TERM (CURRENT) DRUG THERAPY: ICD-10-CM

## 2021-04-06 DIAGNOSIS — M25.562 PAIN IN LEFT KNEE: ICD-10-CM

## 2021-04-06 LAB
HBV CORE AB SER-ACNC: SIGNIFICANT CHANGE UP
HBV SURFACE AG SER-ACNC: SIGNIFICANT CHANGE UP

## 2021-04-06 PROCEDURE — 93282 PRGRMG EVAL IMPLANTABLE DFB: CPT

## 2021-04-06 NOTE — HISTORY OF PRESENT ILLNESS
[FreeTextEntry1] : 4/6/2021: 51 year old F pt with PMH noted below p/w routine follow-up. Pt suffered L ankle fracture in 2/2021 and has been immobile for the past few months. During this time, she noted slight weight gain and frequent episodes of orthopnea and chest tightness. She denied any chest pain, palpitations, and ICD shocks. She has been drinking more water than usual at night time but otherwise has been watching her diet and compliant with her medications. \par \par 1/5/2021: 50 year old with PMH noted below p/w routine follow-up. Device interrogation in the clinic showed increased optivol since early/mid December 2020. Pt noted mild orthopnea, worsening abd distension, and diffuse stiffness in all 4 extremities. Of note, on previous visit, pt endorsed taking torsemide but today stated that she misspoke and is actually taking furosemide, which she was able to  from previous leftover refills. She has been taking 1 pill in the AM and 2 pills at PM but not sure what dose. Pt has not been urinating as well as before and concerned about it in the setting of symptoms noted above.\par \par Pt has not been able to set up rheum appointment but plan on setting it up as well as INR check (lab in the same building as rheum office).\par \par 10/13/2020: 50 year old F pt with PMH noted below presented for routine follow-up. Pt denied any illness or hospitalization during COVID but she has not been able to follow-up since last year due to the pandemic. She has been getting all the medications and compliant. For diuretics, she currently take torsemide 20mg BID with good urine output. However, she's noticed persistent abd bloating. She noted normal appetite with good bowel movements and no GI discomfort. ROS otherwise insignificant from the cardiovascular standpoint.\par \par 10/29/2019: 49 year old F pt with PMH noted below presented for routine follow-up. Pt noted spironolactone, newly prescribed on previous visit, was not working. She currently takes 3 tabs of furosemide (120mg) at night time, which helps with diuresis the most. She denied chest pain, palpitations, and SOB. Noted LE swelling is much improved but complaining of persistent abd bloating. She has not seen rheumatology since 3/2019 because she was not able to find the new address. Pt is under the impression that her INR check is also with rheumatology and therefore has not been getting INR checked. She is currently taking coumadin 4.5mg daily with no bleeding complications.\par \par 8/27/2019: Today, pt noted feeling well overall. She still experiences abd fullness, which improves with lasix prn. She still has limited exercise tolerance but does play golf. She denied chest pain, palpitations, and ICD shocks.\par \par 5/28/19: 48-year-old Syriac speaking female h/o large vessel vasculitis/Takayasu's arteritis (affected bracheocephalic artery, L-subclavian and bilateral CCA, currently on MTX/prednisone), nonobstructive CAD (20% pLAD and RCA in 2015), pAfib (on sotalol and warfarin), HCM, VT (s/p AICD Medtronic 12/2016), GERD/H. pylori (last EGD 2014, esophogram 5/2015 likely extrinsic compression from severe LA enlargement), prior visit with TTE done 3/2017 showed pEF 67, LVIDd 5.1cm mod-severe AR and bileaflet MVP with mod-severe MR, both regurgitations have progressed compared to 2015 (however, 2/2016 TTE also showed mod-severe MR and mod. AR).  S/p hospitalization 8/11/17 s/p AVR(T), MVR (T), CABG x1, bentall, RF ablation.\par \par \par \par  2yrs

## 2021-04-06 NOTE — REVIEW OF SYSTEMS
[Shortness Of Breath] : shortness of breath [Dyspnea on exertion] : dyspnea during exertion [Lower Ext Edema] : lower extremity edema [Leg Claudication] : intermittent leg claudication [Cough] : cough [see HPI] : see HPI [Joint Stiffness] : joint stiffness [Negative] : Heme/Lymph [Fever] : no fever [Chills] : no chills [Chest  Pressure] : no chest pressure [Chest Pain] : no chest pain [Palpitations] : no palpitations [Wheezing] : no wheezing [Coughing Up Blood] : no hemoptysis [Abdominal Pain] : no abdominal pain [Nausea] : no nausea [Vomiting] : no vomiting [Heartburn] : no heartburn [Change in Appetite] : no change in appetite [Change In The Stool] : no change in stool [Dysphagia] : no dysphagia [Dysuria] : no dysuria [Joint Pain] : no joint pain [Joint Swelling] : no joint swelling [Skin: A Rash] : no rash: [Depression] : no depression [Anxiety] : no anxiety [Suicidal] : not suicidal [Easy Bleeding] : no tendency for easy bleeding

## 2021-04-06 NOTE — DISCUSSION/SUMMARY
[FreeTextEntry1] : A/P: 51-year-old Setswana speaking female h/o large vessel vasculitis/Takayasu's arteritis (affected bracheocephalic artery, L-subclavian and bilateral CCA, currently on MTX/prednisone), nonobstructive CAD (20% pLAD and RCA in 2015), pAfib (on metoprolol and warfarin), HCM, VT (s/p AICD Medtronic 12/2016), GERD/H. pylori (last EGD 2014, esophogram 5/2015 likely extrinsic compression from severe LA enlargement), s/p hospitalization 8/11/17 s/p AVR(T), MVR (T), CABG x1, bentall, and RF ablation presented for routine follow-up.\par \par 1) HFpEF s/p MVR\par - persistent abd fullness and recently worsening orthopnea                        \par - recent echo (12/2020) with no significant changes, abd ultrasound (12/2020) negative for AAA\par - pt currently taking fuorsemide 40mg x 1 tab (AM) and 2 tabs (PM); given orthopnea and PE findings suggestive of worsening volume overload, will increase to 2 tabs BID (80 mg BID)\par - c/w po lopressor 100mg BID\par - c/w coumadin at current dose, pt needs INR check with PCP\par \par 2) rheuatmoid arthritis/aortitis\par - currently on folic acid, methotrexate, and prednisone\par - pt last seen by rheumatology on 4/5/21, pending PET scan \par - rheum meds were previously filled by us but now will defer to rheum\par \par RTC in 3 months for routine visit with Dr. Kimberlyn jessica ([facility], Setswana-speaking) and device clinic

## 2021-04-07 ENCOUNTER — NON-APPOINTMENT (OUTPATIENT)
Age: 51
End: 2021-04-07

## 2021-04-07 LAB
GAMMA INTERFERON BACKGROUND BLD IA-ACNC: 0.02 IU/ML — SIGNIFICANT CHANGE UP
M TB IFN-G BLD-IMP: NEGATIVE — SIGNIFICANT CHANGE UP
M TB IFN-G CD4+ BCKGRND COR BLD-ACNC: 0 IU/ML — SIGNIFICANT CHANGE UP
M TB IFN-G CD4+CD8+ BCKGRND COR BLD-ACNC: 0 IU/ML — SIGNIFICANT CHANGE UP
QUANT TB PLUS MITOGEN MINUS NIL: 4.5 IU/ML — SIGNIFICANT CHANGE UP

## 2021-04-09 NOTE — REVIEW OF SYSTEMS
[As Noted in HPI] : as noted in HPI [Shortness Of Breath] : shortness of breath [Fever] : no fever [Chills] : no chills [Eye Pain] : no eye pain [Red Eyes] : eyes not red [Earache] : no earache [Loss Of Hearing] : no hearing loss [Abdominal Pain] : no abdominal pain [Arthralgias] : no arthralgias [Joint Pain] : no joint pain [Skin Lesions] : no skin lesions [Skin Wound] : no skin wound [Itching] : no itching [Change In A Mole] : no change in a mole [FreeTextEntry7] : Bloating

## 2021-04-09 NOTE — ASSESSMENT
[FreeTextEntry1] : 50 y/o Lao-speaking F w/ Takayasu arteritis (Dx 2015), pAfib/sustained VTach on coumadin s/p ICD placement, nonobstructive CAD (20% pLAD and RCA in 2015), pAfib (on sotalol and warfarin), HCM, VT (s/p AICD Medtronic 12/2016), GERD/H. pylori (last EGD 2014, esophagogram 5/2015 likely extrinsic compression from severe LA enlargement),with mod-severe AR and bileaflet MVP with mod-severe MR, s/p 8/17 AVR(T), MVR (T), CABG x1, Bentall, RF ablation.\par \par Impression: \par # Takayasu arteritis with involvement proximal large arteries.\par Brachiocephalic artery, subclavian arteries, right and left common carotid arteries.\par Also had AVR, MVR and CABG procedures in 2017. \par CTA 12/18 showed stability of arterial aneurysms and stenoses. \par Was on MTX 7 pills weekly, Prednisone 3 mg and Actemra in 2019, but since then has been lost to follow up.\par Consequently has been off Actemra since then but continues to take MTX 7 pills weekly and prednisone 2 mg daily. \par Last echo in 2020 showed normal EF and no issues with valves and aortic root. \par Following with Cardiology. \par On Lasix for abdominal congestion symptoms per Cardiology. \par Currently has some abdominal discomfort, same as in 2019. \par Will get basic labs, Hep B labs, Quantiferon, PET CT whole body now to decide future therapy with MTX, Prednisone and Actemra. \par If imaging shoiws stability we will not re-initiate Actemra and keep her on MTX 7 pills weekly. \par \par # Left knee pain\par New since last Visit. \par Associated with activity, crepitus on exam.\par Findings suspicious for degenerative arthritis.\par Will get Xray.\par She can take topical analgesics. \par \par # A fib\par C/w Cardiology f/u and current regimen (Warfarin). \par \par # AVR\par On Coumadin managed by Coumadin clinic \par \par # HCM\par She asked us about Covid vaccine safety we advised her to make an appointment for vaccine as soon as possible. \par Will order DEXA scan. \par \par ALIYA Horowitz. \par \par If labs are stable her next visit will be tele visit as she has a tough time following up in clinic. \par \par Caitlyn Almonte MD\par Rheumatology Fellow\par

## 2021-04-09 NOTE — PHYSICAL EXAM
[General Appearance - Well Nourished] : well nourished [General Appearance - Well Developed] : well developed [Sclera] : the sclera and conjunctiva were normal [Respiration, Rhythm And Depth] : normal respiratory rhythm and effort [Apical Impulse] : the apical impulse was normal [Heart Rate And Rhythm] : heart rate was normal and rhythm regular [Bowel Sounds] : normal bowel sounds [Abdomen Soft] : soft [Abnormal Walk] : normal gait [Musculoskeletal - Swelling] : no joint swelling seen [Skin Color & Pigmentation] : normal skin color and pigmentation [] : no rash [FreeTextEntry1] : Crepitus noted in left knee.

## 2021-04-09 NOTE — HISTORY OF PRESENT ILLNESS
[FreeTextEntry1] : Interval Hx 4-5-21:\karis She presents today for a follow up visit after a gap of almost 2 years. \karis Says she has some abdominal discomfort, denies n/v/d. \par SOB is about the same since the last time she saw us in 2019.\karis Has been off Actemra since 2019, continues to take MTX 7 pills weekly and prednisone 2 mg daily. \par No claudication symptoms. \karis Has left knee pain. \karis Denies any fever, chills, weight loss, changes in urinary or bowel habits.

## 2021-04-15 ENCOUNTER — OUTPATIENT (OUTPATIENT)
Dept: OUTPATIENT SERVICES | Facility: HOSPITAL | Age: 51
LOS: 1 days | End: 2021-04-15

## 2021-04-15 ENCOUNTER — APPOINTMENT (OUTPATIENT)
Dept: INTERNAL MEDICINE | Facility: CLINIC | Age: 51
End: 2021-04-15

## 2021-04-15 ENCOUNTER — NON-APPOINTMENT (OUTPATIENT)
Age: 51
End: 2021-04-15

## 2021-04-15 VITALS — HEART RATE: 60 BPM | RESPIRATION RATE: 16 BRPM | OXYGEN SATURATION: 97 %

## 2021-04-15 VITALS — TEMPERATURE: 96.8 F

## 2021-04-15 DIAGNOSIS — Z95.0 PRESENCE OF CARDIAC PACEMAKER: Chronic | ICD-10-CM

## 2021-04-15 DIAGNOSIS — Z98.891 HISTORY OF UTERINE SCAR FROM PREVIOUS SURGERY: Chronic | ICD-10-CM

## 2021-04-15 DIAGNOSIS — Z98.89 OTHER SPECIFIED POSTPROCEDURAL STATES: Chronic | ICD-10-CM

## 2021-04-15 LAB
INR PPP: 2.5 RATIO
POCT-PROTHROMBIN TIME: 29.8 SECS

## 2021-04-16 ENCOUNTER — RX RENEWAL (OUTPATIENT)
Age: 51
End: 2021-04-16

## 2021-04-16 DIAGNOSIS — Z79.01 LONG TERM (CURRENT) USE OF ANTICOAGULANTS: ICD-10-CM

## 2021-04-16 DIAGNOSIS — I48.91 UNSPECIFIED ATRIAL FIBRILLATION: ICD-10-CM

## 2021-04-16 DIAGNOSIS — Z95.2 PRESENCE OF PROSTHETIC HEART VALVE: ICD-10-CM

## 2021-04-16 DIAGNOSIS — Z51.81 ENCOUNTER FOR THERAPEUTIC DRUG LEVEL MONITORING: ICD-10-CM

## 2021-04-19 ENCOUNTER — RX RENEWAL (OUTPATIENT)
Age: 51
End: 2021-04-19

## 2021-04-22 ENCOUNTER — APPOINTMENT (OUTPATIENT)
Dept: INTERNAL MEDICINE | Facility: CLINIC | Age: 51
End: 2021-04-22

## 2021-05-07 ENCOUNTER — OUTPATIENT (OUTPATIENT)
Dept: OUTPATIENT SERVICES | Facility: HOSPITAL | Age: 51
LOS: 1 days | End: 2021-05-07
Payer: COMMERCIAL

## 2021-05-07 ENCOUNTER — APPOINTMENT (OUTPATIENT)
Dept: ULTRASOUND IMAGING | Facility: IMAGING CENTER | Age: 51
End: 2021-05-07
Payer: COMMERCIAL

## 2021-05-07 DIAGNOSIS — Z98.891 HISTORY OF UTERINE SCAR FROM PREVIOUS SURGERY: Chronic | ICD-10-CM

## 2021-05-07 DIAGNOSIS — Z95.0 PRESENCE OF CARDIAC PACEMAKER: Chronic | ICD-10-CM

## 2021-05-07 DIAGNOSIS — R10.9 UNSPECIFIED ABDOMINAL PAIN: ICD-10-CM

## 2021-05-07 DIAGNOSIS — Z98.89 OTHER SPECIFIED POSTPROCEDURAL STATES: Chronic | ICD-10-CM

## 2021-05-07 PROCEDURE — 76700 US EXAM ABDOM COMPLETE: CPT | Mod: 26

## 2021-05-07 PROCEDURE — 76700 US EXAM ABDOM COMPLETE: CPT

## 2021-05-10 ENCOUNTER — APPOINTMENT (OUTPATIENT)
Dept: ULTRASOUND IMAGING | Facility: IMAGING CENTER | Age: 51
End: 2021-05-10

## 2021-05-21 ENCOUNTER — APPOINTMENT (OUTPATIENT)
Dept: NUCLEAR MEDICINE | Facility: IMAGING CENTER | Age: 51
End: 2021-05-21
Payer: COMMERCIAL

## 2021-05-21 ENCOUNTER — OUTPATIENT (OUTPATIENT)
Dept: OUTPATIENT SERVICES | Facility: HOSPITAL | Age: 51
LOS: 1 days | End: 2021-05-21
Payer: COMMERCIAL

## 2021-05-21 DIAGNOSIS — Z98.891 HISTORY OF UTERINE SCAR FROM PREVIOUS SURGERY: Chronic | ICD-10-CM

## 2021-05-21 DIAGNOSIS — M31.4 AORTIC ARCH SYNDROME [TAKAYASU]: ICD-10-CM

## 2021-05-21 DIAGNOSIS — Z98.89 OTHER SPECIFIED POSTPROCEDURAL STATES: Chronic | ICD-10-CM

## 2021-05-21 DIAGNOSIS — Z95.0 PRESENCE OF CARDIAC PACEMAKER: Chronic | ICD-10-CM

## 2021-05-21 PROCEDURE — A9552: CPT

## 2021-05-21 PROCEDURE — 78816 PET IMAGE W/CT FULL BODY: CPT

## 2021-05-21 PROCEDURE — 78816 PET IMAGE W/CT FULL BODY: CPT | Mod: 26,PS

## 2021-06-03 ENCOUNTER — NON-APPOINTMENT (OUTPATIENT)
Age: 51
End: 2021-06-03

## 2021-06-04 ENCOUNTER — NON-APPOINTMENT (OUTPATIENT)
Age: 51
End: 2021-06-04

## 2021-06-09 ENCOUNTER — APPOINTMENT (OUTPATIENT)
Dept: RADIOLOGY | Facility: HOSPITAL | Age: 51
End: 2021-06-09

## 2021-06-09 ENCOUNTER — OUTPATIENT (OUTPATIENT)
Dept: OUTPATIENT SERVICES | Facility: HOSPITAL | Age: 51
LOS: 1 days | End: 2021-06-09
Payer: COMMERCIAL

## 2021-06-09 ENCOUNTER — NON-APPOINTMENT (OUTPATIENT)
Age: 51
End: 2021-06-09

## 2021-06-09 DIAGNOSIS — Z95.0 PRESENCE OF CARDIAC PACEMAKER: Chronic | ICD-10-CM

## 2021-06-09 DIAGNOSIS — M25.562 PAIN IN LEFT KNEE: ICD-10-CM

## 2021-06-09 DIAGNOSIS — Z98.891 HISTORY OF UTERINE SCAR FROM PREVIOUS SURGERY: Chronic | ICD-10-CM

## 2021-06-09 DIAGNOSIS — Z98.89 OTHER SPECIFIED POSTPROCEDURAL STATES: Chronic | ICD-10-CM

## 2021-06-12 PROCEDURE — 73565 X-RAY EXAM OF KNEES: CPT | Mod: 26

## 2021-06-14 ENCOUNTER — APPOINTMENT (OUTPATIENT)
Dept: INTERNAL MEDICINE | Facility: CLINIC | Age: 51
End: 2021-06-14

## 2021-06-14 ENCOUNTER — OUTPATIENT (OUTPATIENT)
Dept: OUTPATIENT SERVICES | Facility: HOSPITAL | Age: 51
LOS: 1 days | End: 2021-06-14

## 2021-06-14 VITALS — TEMPERATURE: 97.7 F

## 2021-06-14 DIAGNOSIS — Z95.0 PRESENCE OF CARDIAC PACEMAKER: Chronic | ICD-10-CM

## 2021-06-14 DIAGNOSIS — Z98.891 HISTORY OF UTERINE SCAR FROM PREVIOUS SURGERY: Chronic | ICD-10-CM

## 2021-06-14 DIAGNOSIS — Z98.89 OTHER SPECIFIED POSTPROCEDURAL STATES: Chronic | ICD-10-CM

## 2021-06-14 LAB
INR PPP: 2.5 RATIO
POCT-PROTHROMBIN TIME: 30 SECS

## 2021-06-15 DIAGNOSIS — Z51.81 ENCOUNTER FOR THERAPEUTIC DRUG LEVEL MONITORING: ICD-10-CM

## 2021-06-15 DIAGNOSIS — I48.91 UNSPECIFIED ATRIAL FIBRILLATION: ICD-10-CM

## 2021-06-15 DIAGNOSIS — Z79.01 LONG TERM (CURRENT) USE OF ANTICOAGULANTS: ICD-10-CM

## 2021-06-15 DIAGNOSIS — Z95.2 PRESENCE OF PROSTHETIC HEART VALVE: ICD-10-CM

## 2021-07-15 ENCOUNTER — APPOINTMENT (OUTPATIENT)
Dept: ELECTROPHYSIOLOGY | Facility: CLINIC | Age: 51
End: 2021-07-15
Payer: COMMERCIAL

## 2021-07-15 ENCOUNTER — APPOINTMENT (OUTPATIENT)
Dept: CARDIOLOGY | Facility: HOSPITAL | Age: 51
End: 2021-07-15

## 2021-07-15 VITALS
SYSTOLIC BLOOD PRESSURE: 128 MMHG | OXYGEN SATURATION: 99 % | RESPIRATION RATE: 16 BRPM | WEIGHT: 128 LBS | DIASTOLIC BLOOD PRESSURE: 60 MMHG | HEART RATE: 63 BPM | BODY MASS INDEX: 24.51 KG/M2

## 2021-07-15 DIAGNOSIS — R09.89 OTHER SPECIFIED SYMPTOMS AND SIGNS INVOLVING THE CIRCULATORY AND RESPIRATORY SYSTEMS: ICD-10-CM

## 2021-07-15 PROCEDURE — 93290 INTERROG DEV EVAL ICPMS IP: CPT | Mod: 26

## 2021-07-15 PROCEDURE — 93282 PRGRMG EVAL IMPLANTABLE DFB: CPT

## 2021-07-15 NOTE — PHYSICAL EXAM
[General Appearance - Well Developed] : well developed [General Appearance - Well Nourished] : well nourished [] : no respiratory distress [Respiration, Rhythm And Depth] : normal respiratory rhythm and effort [Heart Rate And Rhythm] : heart rate and rhythm were normal [Heart Sounds] : normal S1 and S2 [Edema] : no peripheral edema present [Systolic grade ___/6] : A grade [unfilled]/6 systolic murmur was heard. [Diastolic Grade ___/4] : A grade [unfilled]/4 diastolic murmur was heard. [Bowel Sounds] : normal bowel sounds [Abdomen Soft] : soft [Abdomen Tenderness] : non-tender [Nail Clubbing] : no clubbing of the fingernails [Skin Color & Pigmentation] : normal skin color and pigmentation [Oriented To Time, Place, And Person] : oriented to person, place, and time [Affect] : the affect was normal [Mood] : the mood was normal [Normal Jugular Venous V Waves Present] : normal jugular venous V waves present [FreeTextEntry1] : EDWINA @ RUSB, loud S1 with mechanical click and S2

## 2021-07-15 NOTE — REVIEW OF SYSTEMS
[Negative] : Heme/Lymph [Dyspnea on exertion] : dyspnea during exertion [SOB] : no shortness of breath

## 2021-07-15 NOTE — END OF VISIT
[] : Fellow [FreeTextEntry3] : The patient is a 51-year-old woman with Takayasu aortitis, Bentall procedure, 1v CABG, MVR, paroxysmal atrial fibrillation, and VT with ICD who presents for routine follow-up. She is feeling well and without complaint. We reviewed the patient's medications and contemplated changing the patient to a DOAC from warfarin, but she is self-pay and would have high out-of-pocket costs. \par \par No carotid bruits heard on examination by me. \par \par Bert Reilly MD\par Cardiology\par [phone]

## 2021-07-15 NOTE — HISTORY OF PRESENT ILLNESS
[FreeTextEntry1] : 50 yo lady PMHx of Takayasu aortitis, severe AVR/MVR s/p Bentall procedure w/ CABG x 1 (SVG-LAD) and MVR, paroxysmal Afib on coumadin, HFpEF, and VT s/p Medtronic ICD who presents for f/u. \par \par Subjectively, she endorses that after going up on lasix PO to 80mg BID, her dyspnea on exertion, orthopnea, abdominal distension, and LE edema have all improved. However, she still endorses that she is dyspneic after about 2.5 blocks. No other associated symptoms. \par \par

## 2021-07-15 NOTE — DISCUSSION/SUMMARY
[FreeTextEntry1] : 52 yo lady PMHx of Takayasu aortitis, severe AVR/MVR s/p Bentall procedure w/ CABG x 1 (SVG-LAD) and MVR, paroxysmal Afib on coumadin, HFpEF, and VT s/p Medtronic ICD who presents for f/u. \par \par #Takayasu aortitis, severe AVR/MVR s/p Bentall procedure w/ CABG x 1 (SVG-LAD), \par - On methotrexate and prednisone, management as per rheum\par - Last TTE Dec/2020 w/ no significant new valvular dysf, with EF 66% and known moderate concentric LVH\par - C/w ASA 81mg daily and lipitor 40mg qhs\par \par #HFpEF            \par - Euvolemic and well compensated on lasix PO 80mg BID\par \par #Paroxysmal Afib\par - C/w meotoprolol tartrate 100 BID\par - C/w warfarin with routine INR check, not on DOAC due to lack of insurance\par \par #VT s/p ICD\par - Last interrogation April/2021: no ICD shocks or VT/VF\par - C/w metoprolol\par \par Discussed w/ Dr. Reilly\par \par Signed by\par Kimberlyn Xavier MD\par PGY5 Cardiology

## 2021-07-19 ENCOUNTER — APPOINTMENT (OUTPATIENT)
Dept: RHEUMATOLOGY | Facility: CLINIC | Age: 51
End: 2021-07-19

## 2021-07-19 ENCOUNTER — OUTPATIENT (OUTPATIENT)
Dept: OUTPATIENT SERVICES | Facility: HOSPITAL | Age: 51
LOS: 1 days | End: 2021-07-19

## 2021-07-19 ENCOUNTER — APPOINTMENT (OUTPATIENT)
Dept: INTERNAL MEDICINE | Facility: CLINIC | Age: 51
End: 2021-07-19

## 2021-07-19 VITALS — HEART RATE: 63 BPM | OXYGEN SATURATION: 98 % | RESPIRATION RATE: 15 BRPM

## 2021-07-19 VITALS — TEMPERATURE: 98.7 F

## 2021-07-19 DIAGNOSIS — Z95.0 PRESENCE OF CARDIAC PACEMAKER: Chronic | ICD-10-CM

## 2021-07-19 DIAGNOSIS — Z98.89 OTHER SPECIFIED POSTPROCEDURAL STATES: Chronic | ICD-10-CM

## 2021-07-19 DIAGNOSIS — Z98.891 HISTORY OF UTERINE SCAR FROM PREVIOUS SURGERY: Chronic | ICD-10-CM

## 2021-07-21 DIAGNOSIS — I48.91 UNSPECIFIED ATRIAL FIBRILLATION: ICD-10-CM

## 2021-07-27 PROBLEM — R09.89 CAROTID BRUIT: Status: ACTIVE | Noted: 2021-07-27

## 2021-08-02 ENCOUNTER — OUTPATIENT (OUTPATIENT)
Dept: OUTPATIENT SERVICES | Facility: HOSPITAL | Age: 51
LOS: 1 days | End: 2021-08-02

## 2021-08-02 ENCOUNTER — APPOINTMENT (OUTPATIENT)
Dept: INTERNAL MEDICINE | Facility: CLINIC | Age: 51
End: 2021-08-02

## 2021-08-02 VITALS — TEMPERATURE: 97.6 F

## 2021-08-02 VITALS — HEART RATE: 65 BPM | OXYGEN SATURATION: 97 % | RESPIRATION RATE: 16 BRPM

## 2021-08-02 DIAGNOSIS — Z98.891 HISTORY OF UTERINE SCAR FROM PREVIOUS SURGERY: Chronic | ICD-10-CM

## 2021-08-02 DIAGNOSIS — Z98.89 OTHER SPECIFIED POSTPROCEDURAL STATES: Chronic | ICD-10-CM

## 2021-08-02 DIAGNOSIS — Z95.0 PRESENCE OF CARDIAC PACEMAKER: Chronic | ICD-10-CM

## 2021-08-02 LAB
INR PPP: 1.7 RATIO
POCT-PROTHROMBIN TIME: 20.2 SECS
QUALITY CONTROL: YES

## 2021-08-03 DIAGNOSIS — I48.91 UNSPECIFIED ATRIAL FIBRILLATION: ICD-10-CM

## 2021-08-03 DIAGNOSIS — Z79.01 LONG TERM (CURRENT) USE OF ANTICOAGULANTS: ICD-10-CM

## 2021-08-09 ENCOUNTER — OUTPATIENT (OUTPATIENT)
Dept: OUTPATIENT SERVICES | Facility: HOSPITAL | Age: 51
LOS: 1 days | End: 2021-08-09

## 2021-08-09 ENCOUNTER — APPOINTMENT (OUTPATIENT)
Dept: INTERNAL MEDICINE | Facility: CLINIC | Age: 51
End: 2021-08-09

## 2021-08-09 VITALS — RESPIRATION RATE: 16 BRPM | OXYGEN SATURATION: 98 % | HEART RATE: 53 BPM

## 2021-08-09 VITALS — TEMPERATURE: 96.9 F

## 2021-08-09 DIAGNOSIS — Z79.01 LONG TERM (CURRENT) USE OF ANTICOAGULANTS: ICD-10-CM

## 2021-08-09 DIAGNOSIS — Z98.891 HISTORY OF UTERINE SCAR FROM PREVIOUS SURGERY: Chronic | ICD-10-CM

## 2021-08-09 DIAGNOSIS — Z95.0 PRESENCE OF CARDIAC PACEMAKER: Chronic | ICD-10-CM

## 2021-08-09 DIAGNOSIS — I48.91 UNSPECIFIED ATRIAL FIBRILLATION: ICD-10-CM

## 2021-08-09 DIAGNOSIS — Z98.89 OTHER SPECIFIED POSTPROCEDURAL STATES: Chronic | ICD-10-CM

## 2021-08-09 LAB
INR PPP: 2.6 RATIO
POCT-PROTHROMBIN TIME: 31.3 SECS

## 2021-09-21 ENCOUNTER — RX RENEWAL (OUTPATIENT)
Age: 51
End: 2021-09-21

## 2021-10-04 ENCOUNTER — APPOINTMENT (OUTPATIENT)
Dept: RHEUMATOLOGY | Facility: CLINIC | Age: 51
End: 2021-10-04

## 2021-10-04 ENCOUNTER — OUTPATIENT (OUTPATIENT)
Dept: OUTPATIENT SERVICES | Facility: HOSPITAL | Age: 51
LOS: 1 days | End: 2021-10-04

## 2021-10-04 ENCOUNTER — MED ADMIN CHARGE (OUTPATIENT)
Age: 51
End: 2021-10-04

## 2021-10-04 ENCOUNTER — APPOINTMENT (OUTPATIENT)
Dept: INTERNAL MEDICINE | Facility: CLINIC | Age: 51
End: 2021-10-04

## 2021-10-04 VITALS
HEIGHT: 60.6 IN | DIASTOLIC BLOOD PRESSURE: 56 MMHG | SYSTOLIC BLOOD PRESSURE: 116 MMHG | TEMPERATURE: 97.2 F | RESPIRATION RATE: 16 BRPM | HEART RATE: 51 BPM

## 2021-10-04 VITALS
SYSTOLIC BLOOD PRESSURE: 120 MMHG | HEART RATE: 53 BPM | BODY MASS INDEX: 24.74 KG/M2 | DIASTOLIC BLOOD PRESSURE: 60 MMHG | WEIGHT: 126 LBS | HEIGHT: 60 IN | OXYGEN SATURATION: 99 %

## 2021-10-04 VITALS — OXYGEN SATURATION: 98 % | RESPIRATION RATE: 16 BRPM | HEART RATE: 52 BPM

## 2021-10-04 DIAGNOSIS — I73.9 PERIPHERAL VASCULAR DISEASE, UNSPECIFIED: ICD-10-CM

## 2021-10-04 DIAGNOSIS — Z23 ENCOUNTER FOR IMMUNIZATION: ICD-10-CM

## 2021-10-04 DIAGNOSIS — Z98.89 OTHER SPECIFIED POSTPROCEDURAL STATES: Chronic | ICD-10-CM

## 2021-10-04 DIAGNOSIS — Z98.891 HISTORY OF UTERINE SCAR FROM PREVIOUS SURGERY: Chronic | ICD-10-CM

## 2021-10-04 DIAGNOSIS — R23.1 PALLOR: ICD-10-CM

## 2021-10-04 DIAGNOSIS — Z95.0 PRESENCE OF CARDIAC PACEMAKER: Chronic | ICD-10-CM

## 2021-10-04 DIAGNOSIS — M79.606 PAIN IN LEG, UNSPECIFIED: ICD-10-CM

## 2021-10-06 PROBLEM — R23.1 LIVEDO RETICULARIS: Status: ACTIVE | Noted: 2021-10-06

## 2021-10-06 PROBLEM — M79.606 LOWER EXTREMITY PAIN: Status: ACTIVE | Noted: 2021-10-06

## 2021-10-09 DIAGNOSIS — M31.4 AORTIC ARCH SYNDROME [TAKAYASU]: ICD-10-CM

## 2021-10-09 DIAGNOSIS — M79.606 PAIN IN LEG, UNSPECIFIED: ICD-10-CM

## 2021-10-09 DIAGNOSIS — R23.1 PALLOR: ICD-10-CM

## 2021-10-11 ENCOUNTER — APPOINTMENT (OUTPATIENT)
Dept: INTERNAL MEDICINE | Facility: CLINIC | Age: 51
End: 2021-10-11

## 2021-10-14 DIAGNOSIS — Z79.01 LONG TERM (CURRENT) USE OF ANTICOAGULANTS: ICD-10-CM

## 2021-10-14 DIAGNOSIS — I48.91 UNSPECIFIED ATRIAL FIBRILLATION: ICD-10-CM

## 2021-10-14 DIAGNOSIS — Z51.81 ENCOUNTER FOR THERAPEUTIC DRUG LEVEL MONITORING: ICD-10-CM

## 2021-10-15 ENCOUNTER — APPOINTMENT (OUTPATIENT)
Dept: ELECTROPHYSIOLOGY | Facility: CLINIC | Age: 51
End: 2021-10-15

## 2021-10-23 LAB
INR PPP: 1.9 RATIO
POCT-PROTHROMBIN TIME: 22.7 SECS

## 2021-10-26 NOTE — PATIENT PROFILE ADULT. - PT NEEDS ASSIST
Patient Seen in: [facility]      History   Patient presents with:  Sore Throat    Stated Complaint: Sore throat, stuffy nose, coug    Subjective:   HPI    57-year-old male presents to the immediate care with complaints of sore throat cough an General: No tenderness. Cervical back: Neck supple. Skin:     Capillary Refill: Capillary refill takes less than 2 seconds. Findings: No rash. Neurological:      Mental Status: He is alert and oriented to person, place, and time. yes

## 2021-10-27 ENCOUNTER — APPOINTMENT (OUTPATIENT)
Dept: INTERNAL MEDICINE | Facility: CLINIC | Age: 51
End: 2021-10-27

## 2021-10-28 ENCOUNTER — APPOINTMENT (OUTPATIENT)
Dept: CARDIOLOGY | Facility: HOSPITAL | Age: 51
End: 2021-10-28

## 2021-10-28 ENCOUNTER — APPOINTMENT (OUTPATIENT)
Dept: ELECTROPHYSIOLOGY | Facility: CLINIC | Age: 51
End: 2021-10-28

## 2021-11-01 ENCOUNTER — RESULT REVIEW (OUTPATIENT)
Age: 51
End: 2021-11-01

## 2021-11-01 ENCOUNTER — RESULT CHARGE (OUTPATIENT)
Age: 51
End: 2021-11-01

## 2021-11-01 ENCOUNTER — APPOINTMENT (OUTPATIENT)
Dept: INTERNAL MEDICINE | Facility: CLINIC | Age: 51
End: 2021-11-01

## 2021-11-01 ENCOUNTER — OUTPATIENT (OUTPATIENT)
Dept: OUTPATIENT SERVICES | Facility: HOSPITAL | Age: 51
LOS: 1 days | End: 2021-11-01

## 2021-11-01 ENCOUNTER — APPOINTMENT (OUTPATIENT)
Dept: RHEUMATOLOGY | Facility: CLINIC | Age: 51
End: 2021-11-01
Payer: COMMERCIAL

## 2021-11-01 VITALS
DIASTOLIC BLOOD PRESSURE: 60 MMHG | WEIGHT: 126.25 LBS | HEART RATE: 53 BPM | OXYGEN SATURATION: 98 % | BODY MASS INDEX: 23.84 KG/M2 | SYSTOLIC BLOOD PRESSURE: 110 MMHG | HEIGHT: 61 IN

## 2021-11-01 VITALS — TEMPERATURE: 97.9 F

## 2021-11-01 DIAGNOSIS — Z79.899 OTHER LONG TERM (CURRENT) DRUG THERAPY: ICD-10-CM

## 2021-11-01 DIAGNOSIS — M31.4 AORTIC ARCH SYNDROME [TAKAYASU]: ICD-10-CM

## 2021-11-01 DIAGNOSIS — Z98.89 OTHER SPECIFIED POSTPROCEDURAL STATES: Chronic | ICD-10-CM

## 2021-11-01 DIAGNOSIS — Z98.891 HISTORY OF UTERINE SCAR FROM PREVIOUS SURGERY: Chronic | ICD-10-CM

## 2021-11-01 DIAGNOSIS — Z95.0 PRESENCE OF CARDIAC PACEMAKER: Chronic | ICD-10-CM

## 2021-11-01 PROCEDURE — ZZZZZ: CPT | Mod: GC

## 2021-11-01 NOTE — PHYSICAL EXAM
[General Appearance - Alert] : alert [General Appearance - In No Acute Distress] : in no acute distress [General Appearance - Well Nourished] : well nourished [General Appearance - Well Developed] : well developed [PERRL With Normal Accommodation] : pupils were equal in size, round, and reactive to light [Examination Of The Oral Cavity] : the lips and gums were normal [Oropharynx] : the oropharynx was normal [Respiration, Rhythm And Depth] : normal respiratory rhythm and effort [Auscultation Breath Sounds / Voice Sounds] : lungs were clear to auscultation bilaterally [Heart Rate And Rhythm] : heart rate was normal and rhythm regular [Heart Sounds] : normal S1 and S2 [Edema] : there was no peripheral edema [Bowel Sounds] : normal bowel sounds [Abdomen Soft] : soft [Abdomen Tenderness] : non-tender [No Spinal Tenderness] : no spinal tenderness [Nail Clubbing] : no clubbing  or cyanosis of the fingernails [Musculoskeletal - Swelling] : no joint swelling seen [Motor Tone] : muscle strength and tone were normal [] : no rash [No Focal Deficits] : no focal deficits [FreeTextEntry1] : No synovitis

## 2021-11-01 NOTE — ASSESSMENT
[FreeTextEntry1] : 49 Y F with Takayasu arteritis. 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 \par Currently on MTX 17.5 mg weekly and prednisone 2 mg daily. \par Self DCed Actemra since 2019 as she was lost to f/u.\par PET CT in 2021 showing new areas of uptake in ascending thoracic aorta, new compared to previous CT in 2018. \par \par # Abdominal discomfort\par On Lasix for abdominal congestion symptoms per Cardiology. Patient with persistent abdominal distension with some discomfort on empty stomach\par \par # Livedoid skin changes of LE bilat\par Previously noted on prior exam\par \par # Left knee pain\par OA, symptoms well controlled. \par \par # A fib\par C/w Cardiology f/u and current regimen (Warfarin). \par \par # AVR\par On Coumadin managed by Coumadin clinic \par \par Recs\par -Lab and urine\par -In view of new areas of vasculitis in ascending aorta will escalate therapy, re-start Actemra\par -increase MTX  to 8 pills weekly, c/w daily prednisone 2 mg. \par -CTA chest, A/P \par -start PPI\par \par DW Dr Horowitz\par RTC 4 weeks\par

## 2021-11-01 NOTE — HISTORY OF PRESENT ILLNESS
[FreeTextEntry1] : Interval Hx 10-4-21:\par Says she feels well.\par No fever, chills, n/v/d, abdominal pain, CP or SOB.\par No headache, jaw claudication, scalp tenderness.\par No claudication of UE or LE on exertion. \par Complains of mild dry cough.

## 2021-11-01 NOTE — ASSESSMENT
[FreeTextEntry1] : 49 Y F with Takayasu arteritis. 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  \par Currently on MTX 17.5 mg weekly and prednisone 2 mg daily. \par Self DCed Actemra since 2019 as she was lost to f/u.\par PET CT in 2021 showing new areas of uptake in ascending thoracic aorta, new compared to previous CT in 2018. \par Pt asymptomatic. \par \par # Abdominal discomfort\par On Lasix for abdominal congestion symptoms per Cardiology. \par Much better control now. \par \par # Livedoid skin changes of  LE bilat\par New noted since previous visit. \par \par # Left knee pain\par OA, symptoms well controlled. \par \par # A fib\par C/w Cardiology f/u and current regimen (Warfarin). \par \par # AVR\par On Coumadin managed by Coumadin clinic \par \par Recs\par Labs.\par In view of new areas of vasculitis in ascending aorta will escalate therapy, re-start Actemra, script sent to [facility]. \par Continue MTX 17.5 mg weekly and daily prednisone 2 mg. \par Will get US duplex of b/l LE for levido rash noted on exam. \par \par DW Dr Horowitz. \par \par Caitlyn Almonte MD\par Rheumatology Fellow\par

## 2021-11-01 NOTE — PHYSICAL EXAM
[General Appearance - Well Nourished] : well nourished [General Appearance - Well Developed] : well developed [Sclera] : the sclera and conjunctiva were normal [Respiration, Rhythm And Depth] : normal respiratory rhythm and effort [Apical Impulse] : the apical impulse was normal [Heart Rate And Rhythm] : heart rate was normal and rhythm regular [Bowel Sounds] : normal bowel sounds [Abdomen Soft] : soft [Abnormal Walk] : normal gait [] : no rash [FreeTextEntry1] : Levido rash noted in b/l LE.

## 2021-11-01 NOTE — HISTORY OF PRESENT ILLNESS
[___ Month(s) Ago] : [unfilled] month(s) ago [FreeTextEntry1] : [place]  ID [id number]\par \par Patient has not received Actemra yet.\par Patient reports feeling SOB and abdominal bloating. Patient wakes up in the middle of the night with SOB, also with . Patient reports symptoms for last 3 months, reports feeling a little better recently. \par Admits to progressively worsening near-sighted vision. Patient reports rarely getting lightheaded. \par Patient reports 2 days ago feeling left sided chest heaviness. No longer having symptoms, resolved spontaneously after 2 hours. \par Denies fevers, chills, night sweats, vision changes, eye pain, eye redness, rash. n/v/d/c, urinary changes, foamy urine, HA, muscle weakness, numbness, cough, joint pain/stiffness/swelling

## 2021-11-01 NOTE — REVIEW OF SYSTEMS
[As Noted in HPI] : as noted in HPI [Cough] : cough [Fever] : no fever [Chills] : no chills [Eye Pain] : no eye pain [Red Eyes] : eyes not red [Earache] : no earache [Loss Of Hearing] : no hearing loss [Shortness Of Breath] : no shortness of breath [Arthralgias] : no arthralgias [Abdominal Pain] : no abdominal pain [Joint Pain] : no joint pain [Skin Lesions] : no skin lesions [Skin Wound] : no skin wound [Itching] : no itching [Change In A Mole] : no change in a mole [Anxiety] : no anxiety [Muscle Weakness] : no muscle weakness [Swollen Glands] : no swollen glands [FreeTextEntry7] : Bloating

## 2021-11-11 ENCOUNTER — APPOINTMENT (OUTPATIENT)
Dept: CARDIOLOGY | Facility: HOSPITAL | Age: 51
End: 2021-11-11

## 2021-11-11 ENCOUNTER — INPATIENT (INPATIENT)
Facility: HOSPITAL | Age: 51
LOS: 2 days | Discharge: HOME | End: 2021-11-14
Attending: INTERNAL MEDICINE | Admitting: INTERNAL MEDICINE
Payer: MEDICAID

## 2021-11-11 VITALS
OXYGEN SATURATION: 100 % | SYSTOLIC BLOOD PRESSURE: 129 MMHG | DIASTOLIC BLOOD PRESSURE: 85 MMHG | TEMPERATURE: 98 F | HEIGHT: 60 IN | HEART RATE: 139 BPM | RESPIRATION RATE: 16 BRPM

## 2021-11-11 VITALS
DIASTOLIC BLOOD PRESSURE: 93 MMHG | SYSTOLIC BLOOD PRESSURE: 133 MMHG | RESPIRATION RATE: 16 BRPM | HEART RATE: 140 BPM | OXYGEN SATURATION: 99 %

## 2021-11-11 DIAGNOSIS — M31.4 AORTIC ARCH SYNDROME [TAKAYASU]: ICD-10-CM

## 2021-11-11 DIAGNOSIS — I42.2 OTHER HYPERTROPHIC CARDIOMYOPATHY: ICD-10-CM

## 2021-11-11 DIAGNOSIS — Z95.0 PRESENCE OF CARDIAC PACEMAKER: Chronic | ICD-10-CM

## 2021-11-11 DIAGNOSIS — Z98.891 HISTORY OF UTERINE SCAR FROM PREVIOUS SURGERY: Chronic | ICD-10-CM

## 2021-11-11 DIAGNOSIS — I48.20 CHRONIC ATRIAL FIBRILLATION, UNSPECIFIED: ICD-10-CM

## 2021-11-11 DIAGNOSIS — I10 ESSENTIAL (PRIMARY) HYPERTENSION: ICD-10-CM

## 2021-11-11 DIAGNOSIS — Z29.9 ENCOUNTER FOR PROPHYLACTIC MEASURES, UNSPECIFIED: ICD-10-CM

## 2021-11-11 DIAGNOSIS — Z98.89 OTHER SPECIFIED POSTPROCEDURAL STATES: Chronic | ICD-10-CM

## 2021-11-11 DIAGNOSIS — I48.91 UNSPECIFIED ATRIAL FIBRILLATION: ICD-10-CM

## 2021-11-11 DIAGNOSIS — I50.33 ACUTE ON CHRONIC DIASTOLIC (CONGESTIVE) HEART FAILURE: ICD-10-CM

## 2021-11-11 LAB
ALBUMIN SERPL ELPH-MCNC: 4.7 G/DL — SIGNIFICANT CHANGE UP (ref 3.3–5)
ALP SERPL-CCNC: 81 U/L — SIGNIFICANT CHANGE UP (ref 40–120)
ALT FLD-CCNC: 20 U/L — SIGNIFICANT CHANGE UP (ref 4–33)
ANION GAP SERPL CALC-SCNC: 15 MMOL/L — HIGH (ref 7–14)
APTT BLD: 35.4 SEC — SIGNIFICANT CHANGE UP (ref 27–36.3)
AST SERPL-CCNC: 49 U/L — HIGH (ref 4–32)
B PERT DNA SPEC QL NAA+PROBE: SIGNIFICANT CHANGE UP
B PERT+PARAPERT DNA PNL SPEC NAA+PROBE: SIGNIFICANT CHANGE UP
BASOPHILS # BLD AUTO: 0.05 K/UL — SIGNIFICANT CHANGE UP (ref 0–0.2)
BASOPHILS NFR BLD AUTO: 0.8 % — SIGNIFICANT CHANGE UP (ref 0–2)
BILIRUB SERPL-MCNC: 1.3 MG/DL — HIGH (ref 0.2–1.2)
BORDETELLA PARAPERTUSSIS (RAPRVP): SIGNIFICANT CHANGE UP
BUN SERPL-MCNC: 13 MG/DL — SIGNIFICANT CHANGE UP (ref 7–23)
C PNEUM DNA SPEC QL NAA+PROBE: SIGNIFICANT CHANGE UP
CALCIUM SERPL-MCNC: 9.8 MG/DL — SIGNIFICANT CHANGE UP (ref 8.4–10.5)
CHLORIDE SERPL-SCNC: 98 MMOL/L — SIGNIFICANT CHANGE UP (ref 98–107)
CK MB BLD-MCNC: 6 % — HIGH (ref 0–2.5)
CK MB CFR SERPL CALC: 9.1 NG/ML — HIGH
CK SERPL-CCNC: 152 U/L — SIGNIFICANT CHANGE UP (ref 25–170)
CO2 SERPL-SCNC: 27 MMOL/L — SIGNIFICANT CHANGE UP (ref 22–31)
CREAT SERPL-MCNC: 0.95 MG/DL — SIGNIFICANT CHANGE UP (ref 0.5–1.3)
EOSINOPHIL # BLD AUTO: 0.13 K/UL — SIGNIFICANT CHANGE UP (ref 0–0.5)
EOSINOPHIL NFR BLD AUTO: 2.2 % — SIGNIFICANT CHANGE UP (ref 0–6)
FLUAV SUBTYP SPEC NAA+PROBE: SIGNIFICANT CHANGE UP
FLUBV RNA SPEC QL NAA+PROBE: SIGNIFICANT CHANGE UP
GLUCOSE SERPL-MCNC: 144 MG/DL — HIGH (ref 70–99)
HADV DNA SPEC QL NAA+PROBE: SIGNIFICANT CHANGE UP
HCOV 229E RNA SPEC QL NAA+PROBE: SIGNIFICANT CHANGE UP
HCOV HKU1 RNA SPEC QL NAA+PROBE: SIGNIFICANT CHANGE UP
HCOV NL63 RNA SPEC QL NAA+PROBE: SIGNIFICANT CHANGE UP
HCOV OC43 RNA SPEC QL NAA+PROBE: SIGNIFICANT CHANGE UP
HCT VFR BLD CALC: 41.8 % — SIGNIFICANT CHANGE UP (ref 34.5–45)
HGB BLD-MCNC: 13.9 G/DL — SIGNIFICANT CHANGE UP (ref 11.5–15.5)
HMPV RNA SPEC QL NAA+PROBE: SIGNIFICANT CHANGE UP
HPIV1 RNA SPEC QL NAA+PROBE: SIGNIFICANT CHANGE UP
HPIV2 RNA SPEC QL NAA+PROBE: SIGNIFICANT CHANGE UP
HPIV3 RNA SPEC QL NAA+PROBE: SIGNIFICANT CHANGE UP
HPIV4 RNA SPEC QL NAA+PROBE: SIGNIFICANT CHANGE UP
IANC: 3.5 K/UL — SIGNIFICANT CHANGE UP (ref 1.5–8.5)
IMM GRANULOCYTES NFR BLD AUTO: 0.3 % — SIGNIFICANT CHANGE UP (ref 0–1.5)
INR BLD: 2.64 RATIO — HIGH (ref 0.88–1.16)
LYMPHOCYTES # BLD AUTO: 1.65 K/UL — SIGNIFICANT CHANGE UP (ref 1–3.3)
LYMPHOCYTES # BLD AUTO: 27.5 % — SIGNIFICANT CHANGE UP (ref 13–44)
M PNEUMO DNA SPEC QL NAA+PROBE: SIGNIFICANT CHANGE UP
MAGNESIUM SERPL-MCNC: 2.3 MG/DL — SIGNIFICANT CHANGE UP (ref 1.6–2.6)
MCHC RBC-ENTMCNC: 33.3 GM/DL — SIGNIFICANT CHANGE UP (ref 32–36)
MCHC RBC-ENTMCNC: 33.7 PG — SIGNIFICANT CHANGE UP (ref 27–34)
MCV RBC AUTO: 101.2 FL — HIGH (ref 80–100)
MONOCYTES # BLD AUTO: 0.66 K/UL — SIGNIFICANT CHANGE UP (ref 0–0.9)
MONOCYTES NFR BLD AUTO: 11 % — SIGNIFICANT CHANGE UP (ref 2–14)
NEUTROPHILS # BLD AUTO: 3.5 K/UL — SIGNIFICANT CHANGE UP (ref 1.8–7.4)
NEUTROPHILS NFR BLD AUTO: 58.2 % — SIGNIFICANT CHANGE UP (ref 43–77)
NRBC # BLD: 0 /100 WBCS — SIGNIFICANT CHANGE UP
NRBC # FLD: 0.02 K/UL — HIGH
PLATELET # BLD AUTO: 258 K/UL — SIGNIFICANT CHANGE UP (ref 150–400)
POTASSIUM SERPL-MCNC: 4 MMOL/L — SIGNIFICANT CHANGE UP (ref 3.5–5.3)
POTASSIUM SERPL-SCNC: 4 MMOL/L — SIGNIFICANT CHANGE UP (ref 3.5–5.3)
PROT SERPL-MCNC: 8.4 G/DL — HIGH (ref 6–8.3)
PROTHROM AB SERPL-ACNC: 28.7 SEC — HIGH (ref 10.6–13.6)
RAPID RVP RESULT: SIGNIFICANT CHANGE UP
RBC # BLD: 4.13 M/UL — SIGNIFICANT CHANGE UP (ref 3.8–5.2)
RBC # FLD: 15.8 % — HIGH (ref 10.3–14.5)
RSV RNA SPEC QL NAA+PROBE: SIGNIFICANT CHANGE UP
RV+EV RNA SPEC QL NAA+PROBE: SIGNIFICANT CHANGE UP
SARS-COV-2 RNA SPEC QL NAA+PROBE: SIGNIFICANT CHANGE UP
SODIUM SERPL-SCNC: 140 MMOL/L — SIGNIFICANT CHANGE UP (ref 135–145)
TROPONIN T, HIGH SENSITIVITY RESULT: 87 NG/L — CRITICAL HIGH
TROPONIN T, HIGH SENSITIVITY RESULT: 93 NG/L — CRITICAL HIGH
WBC # BLD: 6.01 K/UL — SIGNIFICANT CHANGE UP (ref 3.8–10.5)
WBC # FLD AUTO: 6.01 K/UL — SIGNIFICANT CHANGE UP (ref 3.8–10.5)

## 2021-11-11 PROCEDURE — 99223 1ST HOSP IP/OBS HIGH 75: CPT | Mod: GC

## 2021-11-11 PROCEDURE — 99291 CRITICAL CARE FIRST HOUR: CPT | Mod: 25

## 2021-11-11 PROCEDURE — 99233 SBSQ HOSP IP/OBS HIGH 50: CPT

## 2021-11-11 PROCEDURE — 93010 ELECTROCARDIOGRAM REPORT: CPT | Mod: 76

## 2021-11-11 PROCEDURE — 71045 X-RAY EXAM CHEST 1 VIEW: CPT | Mod: 26

## 2021-11-11 RX ORDER — METOPROLOL TARTRATE 50 MG
5 TABLET ORAL ONCE
Refills: 0 | Status: COMPLETED | OUTPATIENT
Start: 2021-11-11 | End: 2021-11-11

## 2021-11-11 RX ORDER — METHOTREXATE 2.5 MG/1
20 TABLET ORAL
Refills: 0 | Status: COMPLETED | OUTPATIENT
Start: 2021-11-12 | End: 2021-11-12

## 2021-11-11 RX ORDER — ASPIRIN/CALCIUM CARB/MAGNESIUM 324 MG
81 TABLET ORAL DAILY
Refills: 0 | Status: DISCONTINUED | OUTPATIENT
Start: 2021-11-11 | End: 2021-11-14

## 2021-11-11 RX ORDER — MAGNESIUM SULFATE 500 MG/ML
2 VIAL (ML) INJECTION ONCE
Refills: 0 | Status: COMPLETED | OUTPATIENT
Start: 2021-11-11 | End: 2021-11-11

## 2021-11-11 RX ORDER — METOPROLOL TARTRATE 50 MG
75 TABLET ORAL EVERY 8 HOURS
Refills: 0 | Status: DISCONTINUED | OUTPATIENT
Start: 2021-11-11 | End: 2021-11-14

## 2021-11-11 RX ORDER — PANTOPRAZOLE SODIUM 20 MG/1
40 TABLET, DELAYED RELEASE ORAL
Refills: 0 | Status: DISCONTINUED | OUTPATIENT
Start: 2021-11-11 | End: 2021-11-14

## 2021-11-11 RX ORDER — FUROSEMIDE 40 MG
40 TABLET ORAL ONCE
Refills: 0 | Status: COMPLETED | OUTPATIENT
Start: 2021-11-11 | End: 2021-11-11

## 2021-11-11 RX ORDER — ATORVASTATIN CALCIUM 80 MG/1
40 TABLET, FILM COATED ORAL AT BEDTIME
Refills: 0 | Status: DISCONTINUED | OUTPATIENT
Start: 2021-11-11 | End: 2021-11-14

## 2021-11-11 RX ORDER — FOLIC ACID 0.8 MG
1 TABLET ORAL DAILY
Refills: 0 | Status: DISCONTINUED | OUTPATIENT
Start: 2021-11-11 | End: 2021-11-14

## 2021-11-11 RX ORDER — WARFARIN SODIUM 2.5 MG/1
3 TABLET ORAL ONCE
Refills: 0 | Status: COMPLETED | OUTPATIENT
Start: 2021-11-11 | End: 2021-11-11

## 2021-11-11 RX ORDER — METHOTREXATE 2.5 MG/1
7 TABLET ORAL
Qty: 0 | Refills: 0 | DISCHARGE

## 2021-11-11 RX ADMIN — Medication 75 MILLIGRAM(S): at 20:59

## 2021-11-11 RX ADMIN — WARFARIN SODIUM 3 MILLIGRAM(S): 2.5 TABLET ORAL at 23:44

## 2021-11-11 RX ADMIN — Medication 5 MILLIGRAM(S): at 16:30

## 2021-11-11 RX ADMIN — Medication 50 GRAM(S): at 17:19

## 2021-11-11 RX ADMIN — Medication 5 MILLIGRAM(S): at 16:56

## 2021-11-11 RX ADMIN — Medication 5 MILLIGRAM(S): at 23:44

## 2021-11-11 RX ADMIN — Medication 40 MILLIGRAM(S): at 20:28

## 2021-11-11 NOTE — H&P ADULT - PROBLEM SELECTOR PLAN 6
DVT PPx: warfarin   Diet: Regular; NPO after MN  Code: Full  Dispo: Pending Hospital Course  Communication:

## 2021-11-11 NOTE — ED ADULT TRIAGE NOTE - CHIEF COMPLAINT QUOTE
Pt sent from cardiology, was found to be in Afib with RVR at appointment PTA. Pt 's-140's in triage at present. Endorsing chest pain at present. Phx: AICD, Afib, HTN

## 2021-11-11 NOTE — PHYSICAL EXAM
[Well Developed] : well developed [Well Nourished] : well nourished [No Acute Distress] : no acute distress [Normal Conjunctiva] : normal conjunctiva [Normal Venous Pressure] : normal venous pressure [No Carotid Bruit] : no carotid bruit [Normal S1, S2] : normal S1, S2 [Soft] : abdomen soft [Non Tender] : non-tender [No Masses/organomegaly] : no masses/organomegaly [Normal Bowel Sounds] : normal bowel sounds [Normal Gait] : normal gait [No Edema] : no edema [No Cyanosis] : no cyanosis [No Clubbing] : no clubbing [No Varicosities] : no varicosities [No Rash] : no rash [No Skin Lesions] : no skin lesions [Moves all extremities] : moves all extremities [No Focal Deficits] : no focal deficits [Normal Speech] : normal speech [Alert and Oriented] : alert and oriented [Normal memory] : normal memory [General Appearance - Well Developed] : well developed [General Appearance - Well Nourished] : well nourished [] : no respiratory distress [Respiration, Rhythm And Depth] : normal respiratory rhythm and effort [Heart Rate And Rhythm] : heart rate and rhythm were normal [Heart Sounds] : normal S1 and S2 [Edema] : no peripheral edema present [Systolic grade ___/6] : A grade [unfilled]/6 systolic murmur was heard. [Diastolic Grade ___/4] : A grade [unfilled]/4 diastolic murmur was heard. [Bowel Sounds] : normal bowel sounds [Abdomen Soft] : soft [Abdomen Tenderness] : non-tender [Nail Clubbing] : no clubbing of the fingernails [Skin Color & Pigmentation] : normal skin color and pigmentation [Oriented To Time, Place, And Person] : oriented to person, place, and time [Affect] : the affect was normal [Mood] : the mood was normal [S3] : S3 [Timo ___] : timo StevensV [de-identified] : ROGER ~8cm, + gallop [JVD Elevated _____cm] : JVD elevated [unfilled] ~U cm above clavicle [FreeTextEntry1] : 8cm

## 2021-11-11 NOTE — REVIEW OF SYSTEMS
[Negative] : Heme/Lymph [SOB] : shortness of breath [Dyspnea on exertion] : dyspnea during exertion [Palpitations] : palpitations [Orthopnea] : orthopnea [PND] : PND [Cough] : cough

## 2021-11-11 NOTE — H&P ADULT - HISTORY OF PRESENT ILLNESS
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  Pt. Yoruba speaking, hx obtained via .  States she's had dyspnea for the last two months but no CP.  Intermittent palpitations at end-exertion.  Denies any f/c, sick contacts.  States she feels slightly dyspneic now but no CP.  Endorses compliance w/ all meds. 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 Pt notes that she had dyspnea for the last two months only when moving around but in the last week of so she starting having dyspnea during the night where she cannot lay flat, only comfortable when turning on her sides. She noticed that she started having a dry cough 1 week ago as well and more abdominal discomfort to her chronic belly swelling. No inciting event, the day before the cough, she did note a mild pressure like CP non radiating occurring at rest but then it stopped. At this time she also has associated back pain she describes as splitting. This past week she has been feeling palpitations and discomfort and went to her cardiologist who noted    States she's had dyspnea for the last two months but no CP.  Intermittent palpitations at end-exertion.  Denies any f/c, sick contacts.  States she feels slightly dyspneic now but no CP.  Endorses compliance w/ all meds. 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 Pt notes that she had dyspnea for the last two months only when moving around but in the last week of so she starting having dyspnea during the night where she cannot lay flat, only comfortable when turning on her sides. She noticed that she started having a dry cough 1 week ago as well and more abdominal discomfort to her chronic belly swelling. No inciting event, the day before the cough, she did note a mild pressure like CP non radiating occurring at rest but then it stopped. At this time she also has associated back pain she describes as splitting. This past week she has been feeling palpitations and discomfort and went to her cardiologist who noted she was in decompensated HF and afib w/ RVR and sent her to the ED. No F/C/sick contacts/ changes in vision/ diarrhea/dysuria/swelling.    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 Pt notes that she had dyspnea for the last two months only when moving around but in the last week of so she starting having dyspnea during the night where she cannot lay flat, only comfortable when turning on her sides. She noticed that she started having a dry cough 1 week ago as well and more abdominal discomfort to her chronic belly swelling. No inciting event, the day before the cough, she did note a mild pressure like CP non radiating occurring at rest but then it stopped. At this time she also has associated back pain she describes as splitting. This past week she has been feeling palpitations and discomfort and went to her cardiologist who noted she was in decompensated HF and afib w/ RVR and sent her to the ED. No F/C/sick contacts/ changes in vision/ diarrhea/dysuria/swelling.    ED VS: 98.1F 140HR 120/90BP 17RR 99%O2

## 2021-11-11 NOTE — H&P ADULT - NSHPPHYSICALEXAM_GEN_ALL_CORE
Objective:    Vitals: Vital Signs Last 24 Hrs  T(C): 36.8 (11-11-21 @ 20:50), Max: 37 (11-11-21 @ 19:12)  T(F): 98.3 (11-11-21 @ 20:50), Max: 98.6 (11-11-21 @ 19:12)  HR: 138 (11-11-21 @ 20:50) (138 - 140)  BP: 119/80 (11-11-21 @ 20:50) (119/80 - 129/86)  BP(mean): --  RR: 17 (11-11-21 @ 20:50) (16 - 25)  SpO2: 99% (11-11-21 @ 20:50) (99% - 100%)            I&O's Summary      PHYSICAL EXAM:  GENERAL: NAD, well-groomed, well-developed  HEAD:  Atraumatic, Normocephalic  EYES: EOMI, PERRLA, conjunctiva and sclera clear  ENMT: No tonsillar erythema, exudates, or enlargement; Moist mucous membranes, Good dentition, No lesions  NECK: Supple, No JVD, Normal thyroid  CHEST/LUNG: Clear to auscultation bilaterally; No rales, rhonchi, wheezing, or rubs  HEART: Regular rate and rhythm; No murmurs, rubs, or gallops  ABDOMEN: Soft, Nontender, Nondistended; Bowel sounds present  EXTREMITIES:  2+ Peripheral Pulses, No clubbing, cyanosis, or edema  LYMPH: No lymphadenopathy noted  SKIN: No rashes or lesions  NERVOUS SYSTEM:  Alert & Oriented X4, Good concentration  PSYCH: Normal Affect. Speaking in Full Sentences. Laying in bed comfortably; not agitated Objective:    Vitals: Vital Signs Last 24 Hrs  T(C): 36.8 (11-11-21 @ 20:50), Max: 37 (11-11-21 @ 19:12)  T(F): 98.3 (11-11-21 @ 20:50), Max: 98.6 (11-11-21 @ 19:12)  HR: 138 (11-11-21 @ 20:50) (138 - 140)  BP: 119/80 (11-11-21 @ 20:50) (119/80 - 129/86)  BP(mean): --  RR: 17 (11-11-21 @ 20:50) (16 - 25)  SpO2: 99% (11-11-21 @ 20:50) (99% - 100%)            I&O's Summary      PHYSICAL EXAM:  GENERAL: NAD, well-groomed, well-developed  HEAD:  Atraumatic, Normocephalic  EYES: EOMI, PERRLA, conjunctiva and sclera clear  ENMT: No tonsillar erythema, exudates, or enlargement; Moist mucous membranes, Good dentition, No lesions  NECK: Supple, No JVD, Normal thyroid  CHEST/LUNG: Clear to auscultation bilaterally; No rales, rhonchi, wheezing, or rubs; able to hear cardiac sounds from back   HEART:  tachycardia irRegular rate and rhythm; clicks in atrial and mitral area  ABDOMEN: Soft, tender epigastrum, Nondistended; Bowel sounds present  EXTREMITIES:  2+ Peripheral Pulses, No clubbing, cyanosis, or edema  LYMPH: No lymphadenopathy noted  SKIN: No rashes or lesions  NERVOUS SYSTEM:  Alert & Oriented X4, Good concentration  PSYCH: Normal Affect. Speaking in Full Sentences. Laying in bed comfortably; not agitated Objective:    Vitals: Vital Signs Last 24 Hrs  T(C): 36.8 (11-11-21 @ 20:50), Max: 37 (11-11-21 @ 19:12)  T(F): 98.3 (11-11-21 @ 20:50), Max: 98.6 (11-11-21 @ 19:12)  HR: 138 (11-11-21 @ 20:50) (138 - 140)  BP: 119/80 (11-11-21 @ 20:50) (119/80 - 129/86)  BP(mean): --  RR: 17 (11-11-21 @ 20:50) (16 - 25)  SpO2: 99% (11-11-21 @ 20:50) (99% - 100%)            I&O's Summary      PHYSICAL EXAM:  GENERAL: NAD, well-groomed, well-developed  HEAD:  Atraumatic, Normocephalic  EYES: EOMI, PERRL, conjunctiva and sclera clear  ENMT: No tonsillar erythema, exudates, or enlargement; Moist mucous membranes, Good dentition, No lesions  NECK: Supple, No JVD, Normal thyroid  CHEST/LUNG: Clear to auscultation bilaterally; No rales, rhonchi, wheezing, or rubs; able to hear cardiac sounds from back   HEART:  tachycardia irregular rate and rhythm; clicks in atrial and mitral area  ABDOMEN: Soft, tender epigastrium, Nondistended; Bowel sounds present  EXTREMITIES:  2+ Peripheral Pulses, No clubbing, cyanosis, or edema  LYMPH: No lymphadenopathy noted  SKIN: No rashes or lesions  NERVOUS SYSTEM:  Alert & Oriented X4, Good concentration  PSYCH: Normal Affect. Speaking in Full Sentences. Laying in bed comfortably; not agitated

## 2021-11-11 NOTE — ED PROVIDER NOTE - NSICDXPASTMEDICALHX_GEN_ALL_CORE_FT
PAST MEDICAL HISTORY:  AICD (automatic cardioverter/defibrillator) present     Atrial fibrillation, chronic     Hypertension     Hypertrophic cardiomyopathy     Non-sustained ventricular tachycardia     Takayasu's arteritis

## 2021-11-11 NOTE — H&P ADULT - NSHPSOCIALHISTORY_GEN_ALL_CORE
Home: Domiciled   ADL: Full ADL   Next of Kin:  Alcohol: Denies  Smoking: Denies   Drugs: Denies Home: Domiciled   ADL: full   Alcohol: Denies  Smoking: Denies   Drugs: Denies

## 2021-11-11 NOTE — HISTORY OF PRESENT ILLNESS
[FreeTextEntry1] : 50 yo lady PMHx of Takayasu aortitis, severe AVR/MVR s/p Bentall procedure w/ CABG x 1 (SVG-LAD) and MVR, paroxysmal Afib on coumadin, HFpEF, and VT s/p [employer] ICD who presents for f/u. \par \par Seen by rheumatology 11/01, was noted to have PET/CT (May/2021) w/ mild FDG activity in the wall of ascending thoracic aorta. Was recommended to start tocilizumab in addition to MTX and prednisone. Also pending CT aorta w/ angio. \par \par Last seen by cardiology July/2021. At that time well compensated on exam, with good exercise tolerance 2-3 blocks. However, over the last 2 weeks, with progressively worsening dyspnea on exertion to the point that she is now dyspneic with only few steps. +orthopnea, PND, and cough. Also with palpitation last 3 days. \par \par \par \par

## 2021-11-11 NOTE — CHART NOTE - NSCHARTNOTEFT_GEN_A_CORE
Cardiology fellow note    52 yo lady with PMHx of Takayasu arteritis diagnosed 2015 c/b VT s/p ICD 2015, severe AVR/MVR s/p Bentall procedure w/ CABG x 1 (SVG-LAD) and bioprosthetic MVR 2017, and paroxysmal Afib on coumadin who was seen in the cardiology clinic today. Last seen in clinic July/2021 and at that time, had good exercise tolerance but today presents with Aflutter RVR HR 140s (stable BP) c/b acute on chronic decompensated HFpEF exacerbation with orthopnea, PND, and dyspnea with minimal exertion. Of note, was recently seen by rheumatology 11/01, was noted to have PET/CT (May/2021) w/ mild FDG activity in the wall of ascending thoracic aorta, was recommended to 1) start tocilizumab in addition to MTX and prednisone and 2) undergo CT aorta w/ angio.     Recommend following plan for the patient:  1) Consult in house cardiology consult team to see the patient today for management of acute on chronic decompensated HFpEF exacerbation  2) Consult in house EP consult team to evaluate the patient for DCCV vs ablation  3) Consult rheumatology  4) Admission to telemetry floor  5) Continue home medications while inpatient, heparin gtt for systemic embolization prevention for possible pending procedure    Kimberlyn Xavier MD  PGY5 Cardiology

## 2021-11-11 NOTE — ED PROVIDER NOTE - ATTENDING CONTRIBUTION TO CARE
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 On ED arrival, tachy to 140s BP stable, mentating normally, given 5mg IV metop x2 w/o response. Cards and EP consulted- concern for acute HF exacerbation as contributor to rapid afib- will give lasix, reassess. Will require admission for possible ablation/cardioversion, monitoring.

## 2021-11-11 NOTE — DISCUSSION/SUMMARY
[FreeTextEntry1] : 52 yo lady PMHx of Takayasu aortitis, severe AVR/MVR s/p Bentall procedure w/ CABG x 1 (SVG-LAD) and MVR, paroxysmal Afib on coumadin, HFpEF, and VT s/p Medtronic ICD who presents for f/u. Now with Aflutter RVR c/b acute on chronic decompensated HFpEF exacerbation. Will send the patient to the ED.\par \par #Takayasu aortitis, severe AVR/MVR s/p Bentall procedure w/ CABG x 1 (SVG-LAD), \par - On methotrexate, prednisone, and tocilizumab, management as per rheum\par - PET/CT May/2021 with FDG activity in the wall of ascending thoracic aorta, pending CT angio of aorta\par - Last TTE Dec/2020 w/ no significant new valvular dysf, with EF 66% and known moderate concentric LVH\par - C/w ASA 81mg daily and lipitor 40mg qhs\par \par #HFpEF            \par - Acute on chronic decompensated HFpEF exacerbation, will send to the ED for IV diuretics\par \par #Paroxysmal Afib/Aflutter\par - C/b Aflutter RVR HR 140s, symptomatic, will send to the ED - ablation vs cardioversion\par - C/w warfarin, last INR 2.2\par \par #VT s/p ICD\par - Last interrogation April/2021: no ICD shocks or VT/VF\par - C/w metoprolol 100 BID\par \par Discussed w/ Dr. Colvin\par \par Signed by\par Kimberlyn Xavier MD\par PGY5 Cardiology

## 2021-11-11 NOTE — H&P ADULT - NSHPREVIEWOFSYSTEMS_GEN_ALL_CORE
REVIEW OF SYSTEMS:  CONSTITUTIONAL: No weakness, fevers, chills, sick contacts, or unintended weight loss  EYES: No visual changes or vertigo  ENT: No throat pain, rhinorrhea, or hearing loss   NECK: No pain or stiffness  RESPIRATORY: No cough, wheezing, hemoptysis; No shortness of breath  CARDIOVASCULAR: No chest pain or palpitations  GASTROINTESTINAL: No abdominal or epigastric pain. No nausea, vomiting, or hematemesis; No diarrhea or constipation. No melena or hematochezia.  GENITOURINARY: No dysuria, frequency or hematuria  NEUROLOGICAL: No numbness or weakness  SKIN: No itching, rashes, or bruises  Psych: Good mood, no substance use

## 2021-11-11 NOTE — H&P ADULT - PROBLEM SELECTOR PLAN 3
- Rheumatology - ADHF: HFpEF last echo 2020: 60% Al also s/p AICD   - f/u TTE  - Cards, EP consulted, recs appreciated

## 2021-11-11 NOTE — CONSULT NOTE ADULT - SUBJECTIVE AND OBJECTIVE BOX
Date of Admission: 2021    CHIEF COMPLAINT: palpitations and cough    HISTORY OF PRESENT ILLNESS:  This is a 51yoM w/ PMHx Takayasu arteritis diagnosed with VT s/p ICD in , severe AVR/MVR s/p Bentall procedure w/ CABG x1 (SVG-LAD) and bioprosthetic MVR  and PAF on coumadin was referred to ED after presenting to her cardiology appointment in aflutter w/ RVR 's, new cough, NORRIS, and acute on chronic HFpEF.  Upon arrival to ED, on telemetry remained in aflutter 138-139, was given metoprolol IVP with improvement of symptoms without improvement of heart rate.  As per patient, she can feel when she is in afib/aflutter, can feel palpitations and abdominal discomfort.  Was diagnosed with afib in  which converted on its own and happened again in  after her cardiac surgery and episode was also self-limiting.  States this time her symptoms have been lasting about a week.  Has continued to take her medications as directed without any relief.  Denies any chest pain, lightheadedness, dizziness or syncope.      Allergies  No Known Allergies    MEDICATIONS:  Coumadin   Lopressor 100mg PO BID   lasix 40mg PO daily  Prednisone 1mg PO daily  Folic acid 1mg PO daily  Methotrexate 2.5mg PO daily    PAST MEDICAL & SURGICAL HISTORY:  Atrial fibrillation, chronic  Non-sustained ventricular tachycardia  Hypertension  AICD (automatic cardioverter/defibrillator) present  Takayasu&#x27;s arteritis  Hypertrophic cardiomyopathy  History of appendectomy  H/O cardiac pacemaker  H/O  section  x2    FAMILY HISTORY:  Family history of hypertension (Child)    REVIEW OF SYSTEMS:  See HPI. Otherwise, 10 point ROS done and otherwise negative.    PHYSICAL EXAM:  T(C): 37 (21 @ 19:12), Max: 37 (21 @ 19:12)  HR: 138 (21 @ 19:12) (138 - 140)  BP: 126/87 (21 @ 19:12) (120/90 - 129/86)  RR: 18 (21 @ 19:12) (16 - 25)  SpO2: 99% (21 @ 19:12) (99% - 100%)  Wt(kg): --  I&O's Summary    Appearance: Normal	  HEENT:   Normal oral mucosa, PERRL, EOMI	  Lymphatic: No lymphadenopathy  Cardiovascular: Normal S1 S2, No JVD, No murmurs, No edema  Respiratory: Lungs clear to auscultation	  Psychiatry: A & O x 3, Mood & affect appropriate  Gastrointestinal:  Soft, Non-tender, + BS	  Skin: No rashes, No ecchymoses, No cyanosis	  Neurologic: Non-focal  Extremities: Normal range of motion, No clubbing, cyanosis or edema  Vascular: Peripheral pulses palpable 2+ bilaterally    LABS:	 	  CBC Full  -  ( 2021 16:24 )  WBC Count : 6.01 K/uL  Hemoglobin : 13.9 g/dL  Hematocrit : 41.8 %  Platelet Count - Automated : 258 K/uL  Mean Cell Volume : 101.2 fL  Mean Cell Hemoglobin : 33.7 pg  Mean Cell Hemoglobin Concentration : 33.3 gm/dL  Auto Neutrophil # : 3.50 K/uL  Auto Lymphocyte # : 1.65 K/uL  Auto Monocyte # : 0.66 K/uL  Auto Eosinophil # : 0.13 K/uL  Auto Basophil # : 0.05 K/uL  Auto Neutrophil % : 58.2 %  Auto Lymphocyte % : 27.5 %  Auto Monocyte % : 11.0 %  Auto Eosinophil % : 2.2 %  Auto Basophil % : 0.8 %        140  |  98  |  13  ----------------------------<  144<H>  4.0   |  27  |  0.95    Ca    9.8      2021 16:24  Mg     2.30         TPro  8.4<H>  /  Alb  4.7  /  TBili  1.3<H>  /  DBili  x   /  AST  49<H>  /  ALT  20  /  AlkPhos  81

## 2021-11-11 NOTE — H&P ADULT - PROBLEM SELECTOR PLAN 1
- Brought in from cards clinic for Afib with rvr   - NPO after MN, plan for cardioversion  - Megan, EP consulted, recs appreciated   - on warfarin, INR: 2.64 within goal - Brought in from [facility] clinic for Afib with rvr/ Aflutter; still in 140s    - NPO after MN, plan for cardioversion  - Cards, EP consulted, recs appreciated   - metoprolol 75mg q8h; may need diltiazem 30 po q6h   - on warfarin, INR: 2.64 within goal: has not taken warfarin today; schedule is 4.5mg Monday, 3mg the other days. Will give warfarin 3mg today - Brought in from [facility] clinic for Afib with rvr/ Aflutter; still in 140s    - NPO after MN, plan for cardioversion vs ablation   - Cards, EP consulted, recs appreciated   - metoprolol 75mg q8h; may need diltiazem 30 po q6h   - on warfarin, INR: 2.64 within goal: has not taken warfarin today; schedule is 4.5mg Monday, 3mg the other days. Will give warfarin 3mg today Patient w/ CHF HFpEF w/ last known EF: 12/2020: 60% Teichholz   - Home lasix: 40mg po; 40IVP BID   - Daily Weight, Strict I and Os, BMP QD;p K>4, Mg>2  - Cards consulted, recs appreciated   - f/u TTE Patient w/ CHF HFpEF w/ last known EF: 12/2020: 60% Teichholz   - Home lasix: 40mg po; hold off further diuresis   - Daily Weight, Strict I and Os, BMP QD;p K>4, Mg>2  - Cards consulted, recs appreciated   - f/u TTE Patient w/ CHF HFpEF w/ last known EF: 12/2020: 60% Teichholz; trops: 90->80  - Home lasix: 40mg po; hold off further diuresis   - Daily Weight, Strict I and Os, BMP QD;p K>4, Mg>2  - Cards consulted, recs appreciated   - f/u TTE - Brought in from [facility] clinic for Afib with rvr/ Aflutter; still in 140s  - NPO after MN, plan for cardioversion vs ablation   - Cards, EP consulted, recs appreciated   - metoprolol 75mg q8h; starting diltiazem 30 po q6h prn <110 as recommended by Cardiology team  - has history of Atrial fibrillation; CHADsVASC: 2: on warfarin, INR: 2.64 within goal: has not taken warfarin today; schedule is 4.5mg Monday, 3mg the other days. Will give warfarin 3mg today  - f/u AM lab-work, including TSH level

## 2021-11-11 NOTE — END OF VISIT
[] : Fellow [FreeTextEntry3] : The patient is a 51-year-old woman with Takayasu aortitis, Bentall procedure, 1v CABG, MVR, paroxysmal atrial fibrillation, and VT with ICD who presents for routine follow-up. She is feeling well and without complaint. We reviewed the patient's medications and contemplated changing the patient to a DOAC from warfarin, but she is self-pay and would have high out-of-pocket costs. \par \par No carotid bruits heard on examination by me. \par \par Bert Reilly MD\par Cardiology\par [phone]

## 2021-11-11 NOTE — ED PROVIDER NOTE - PROGRESS NOTE DETAILS
Jim PGY3 - Pt. endorses subjective improvement, but HR still in the high 130s.  BP stable.  Will administer a second dose of lopressor.  Cards consulted.  Will continue to monitor. Jim PGY3 - Pt. evaluated by cards, TBA medicine for cardioversion tomorrow.  Will give 40mg IV lasix per EP recs.

## 2021-11-11 NOTE — ED PROVIDER NOTE - PHYSICAL EXAMINATION
GENERAL: Patient awake alert NAD.  HEENT: NC/AT.  LUNGS: CTAB, no wheezes or crackles.  CARDIAC: tachycardia, no m/r/g.  ABDOMEN: Soft, NT, ND, No rebound, guarding. No CVA tenderness.   EXT: No edema. No calf tenderness. CV 2+DP/PT bilaterally.   MSK: No pain with movement, no deformities.  NEURO: A&Ox3. Moving all extremities.  SKIN: Warm and dry. No rash.  PSYCH: Normal affect.

## 2021-11-11 NOTE — ED PROVIDER NOTE - IV ALTEPLASE EXCL REL HIDDEN
Preventive Health Recommendations  Male Ages 40 to 49    Yearly exam:             See your health care provider every year in order to  o   Review health changes.   o   Discuss preventive care.    o   Review your medicines if your doctor has prescribed any.    You should be tested each year for STDs (sexually transmitted diseases) if you re at risk.     Have a cholesterol test every 5 years.     Have a colonoscopy (test for colon cancer) if someone in your family has had colon cancer or polyps before age 50.     After age 45, have a diabetes test (fasting glucose). If you are at risk for diabetes, you should have this test every 3 years.      Talk with your health care provider about whether or not a prostate cancer screening test (PSA) is right for you.    Shots: Get a flu shot each year. Get a tetanus shot every 10 years.     Nutrition:    Eat at least 5 servings of fruits and vegetables daily.     Eat whole-grain bread, whole-wheat pasta and brown rice instead of white grains and rice.     Get adequate Calcium and Vitamin D.     Lifestyle    Exercise for at least 150 minutes a week (30 minutes a day, 5 days a week). This will help you control your weight and prevent disease.     Limit alcohol to one drink per day.     No smoking.     Wear sunscreen to prevent skin cancer.     See your dentist every six months for an exam and cleaning.       show

## 2021-11-11 NOTE — H&P ADULT - PROBLEM SELECTOR PLAN 2
- f/u TTE - ADHF: HFpEF last echo 2020: 60% Al   - f/u TTE Patient w/ CHF HFpEF w/ last known EF: 12/2020: 60% Teichholz   - Home lasix: 40mg po; 40IVP BID   - Daily Weight, Strict I and Os, BMP QD;p K>4, Mg>2  - Cards consulted, recs appreciated   - f/u TTE - Brought in from [facility] for Afib with rvr/ Aflutter; still in 140s    - NPO after MN, plan for cardioversion vs ablation   - Cards, EP consulted, recs appreciated   - metoprolol 75mg q8h; may need diltiazem 30 po q6h   - CHADsVASC: 2: on warfarin, INR: 2.64 within goal: has not taken warfarin today; schedule is 4.5mg Monday, 3mg the other days. Will give warfarin 3mg today - Brought in from [facility] for Afib with rvr/ Aflutter; still in 140s    - NPO after MN, plan for cardioversion vs ablation   - Cards, EP consulted, recs appreciated   - metoprolol 75mg q8h; diltiazem 30 po q6h prn <110   - CHADsVASC: 2: on warfarin, INR: 2.64 within goal: has not taken warfarin today; schedule is 4.5mg Monday, 3mg the other days. Will give warfarin 3mg today Patient w/ CHF HFpEF w/ last known EF: 12/2020: 60% Teichholz; trops: 90->80  - Home lasix: 40mg po; hold off further diuresis (per recommendation from Cardiology team)   - Trop 93 --> 87  - Daily Weight, Strict I and Os, BMP QD;p K>4, Mg>2  - Cards consulted; recs appreciated   - f/u TTE  - f/u AM lab-work, including TSH level

## 2021-11-11 NOTE — H&P ADULT - PROBLEM SELECTOR PLAN 4
SBP: 120/90   - SBP: 120/90   - c/w metoprolol, lasix ddx 2015 c/b vt s/p AICD   - Noted PET CT 2021 showing worsening vasculitis in aorta   - on prednisone 2mg qd, 8 x 2.5mg MTX once a week (Friday), and Tocilizumab once a week (unknown last dose)    - Was pending CTA Chest, abd pelvis for Aorta   - Rheumatology following outpatient; will consult in AM if wish to continue workup here ddx 2015 c/b vt s/p AICD   - Noted PET CT 2021 showing worsening vasculitis in aorta   - on prednisone 2mg qd, 8 x 2.5mg MTX once a week (Friday), and Tocilizumab once a week (unknown last dose)    - Was pending CTA Chest, abd pelvis for Aorta outpatient  - Rheumatology following outpatient; will consult in AM if wish to continue workup here ddx 2015 c/b vt s/p AICD   - Noted PET CT 2021 showing worsening vasculitis in aorta   - on prednisone 2mg qd, 8 x 2.5mg MTX once a week (Friday), and Tocilizumab once a week (unknown last dose)    - Was pending CTA Chest, abd pelvis for Aorta outpatient;  - Rheumatology following outpatient; will consult in AM if wish to pursue further workup here and for methotrexate/ Tocilizumab approval ddx 2015 c/b vt s/p AICD   - Noted PET CT 2021 showing worsening vasculitis in aorta   - on prednisone 2mg qd, 8 x 2.5mg MTX once a week (Friday), and Tocilizumab once a week (unknown last dose)    - Was pending CTA Chest, abd pelvis for Aorta outpatient;  - Rheumatology following outpatient; consulted if wish to pursue further workup here and for methotrexate/ Tocilizumab approval ddx 2015 c/b vt s/p AICD   - Noted PET CT 2021 showing worsening vasculitis in aorta   - on prednisone 2mg qd, 8 x 2.5mg MTX once a week (Friday), and Tocilizumab once a week (unknown last dose)    - Was pending CTA Chest, abd pelvis for Aorta outpatient;  - Rheumatology following outpatient; consult in AM if wish to pursue further workup here reL scans and for methotrexate/ Tocilizumab approval ddx 2015 c/b vt s/p AICD   - Noted PET CT 2021 showing worsening vasculitis in aorta   - on prednisone 2mg qd, 8 x 2.5mg MTX once a week (Friday), and Tocilizumab once a week (unknown last dose)    - Was pending CTA Chest, abd pelvis for Aorta outpatient;  - Rheumatology following outpatient; consulted if wish to pursue further workup here reL scans and for methotrexate/ Actemra approval

## 2021-11-11 NOTE — ED PROVIDER NOTE - CLINICAL SUMMARY MEDICAL DECISION MAKING FREE TEXT BOX
51F sent in from cards office for afib w/ RVR.  EKG concerning for some sort of wide complex tachyarrhythmia.  Pt. normotensive.  Endorsing minimal dyspnea and intermittent palpitations.  Given pt. is already on PO dilt and metoprolol, will administer IV lopressor.  Unlikely WPW despite short [place] interval on initial EKG.  Will assess for infections vs elec derangements.  Will obtain labs, EKG, CXR, trops, consult cards, reassess, and dispo pending results.

## 2021-11-11 NOTE — ED ADULT NURSE NOTE - OBJECTIVE STATEMENT
Pt received to trauma room A, sent to ER by cardiologist office where she was found to be in Afib with RVR. Pt Afib on cardiac monitor with heart rate in the 130's-140's. Pt states she was having CP earlier today but denies CP at present. Denies SOb. Pt states she feels as though her heart is beating too fast. 20g placed in R wrist, labs drawn as ordered, Covid sample collected and sent. Pt safety maintained, continue to monitor.

## 2021-11-11 NOTE — H&P ADULT - ATTENDING COMMENTS
51 year old female, with past history as noted above, being managed for atrial flutter.  Has history of atrial fibrillation.  Heart rate to 140.  Already being seen by Cardiologist and EP specialist (appreciated).  TTE already performed; see results above.  Metoprolol increased and diltiazem added to formulary since heart rate still not improved.  Does not appear significantly fluid overloaded, but will get Pro-BNP and TSH levels.  Fro ROBERTA/DCCV in the AM.  F/u electrolytes and keep potassium  > 4 and magnesium > 2.  Continues on warfarin for A-fib (INR currently 2.64); nightly dose prescribed.    All other management as prescribed.

## 2021-11-11 NOTE — H&P ADULT - PROBLEM SELECTOR PLAN 5
DVT PPx: Improve Score: Heparin 5000 subq Q8h/Lovenox 40mg QD   Diet: Regular/DASH/CC/Renal/NPO  Code: Full/DNR/DNI   Dispo: Pending Hospital Course  Communication: DVT PPx: warfarin   Diet: Regular; NPO after MN  Code: Full  Dispo: Pending Hospital Course  Communication: SBP: 120/90   - c/w metoprolol, lasix SBP: 120/90   - c/w metoprolol, hold diuresis

## 2021-11-11 NOTE — CONSULT NOTE ADULT - ASSESSMENT
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  Upon arrival to ED, on telemetry remained in aflutter 138-139, was given metoprolol IVP with improvement of symptoms without improvement of heart rate.  As per patient, she can feel when she is in afib/aflutter, can feel palpitations and abdominal discomfort.     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  Upon arrival to ED, on telemetry remained in aflutter 138-139, was given metoprolol IVP with improvement of symptoms without improvement of heart rate.  As per patient, she can feel when she is in afib/aflutter, can feel palpitations and abdominal discomfort.     PAF   Monitor on telemetry  CHADsVASC 2 - continue coumadin for goal INR 2-3  Discussed ROBERTA/DCCV risks and benefits - patient agreeable for procedure- consent in chart  NPO after midnight  F/u TTE   Metoprolol tartrate to 75mg PO q8 hours   Consider adding cardizem 30mg PO q6 hours if rate remains uncontrolled and if blood pressure is normotensive

## 2021-11-11 NOTE — ED PROVIDER NOTE - NS ED ROS FT
General: denies fever, chills, weight loss/weight gain.  HENT: denies nasal congestion, sore throat, rhinorrhea, ear pain.  Eyes: denies visual changes, blurred vision, eye discharge, eye redness.  Neck: denies neck pain, neck swelling.  CV: +palpitations; denies chest pain.  Resp: +difficulty breathing.  Abdominal: denies nausea, vomiting, diarrhea, abdominal pain, blood in stool, dark stool.  MSK: denies muscle aches, bony pain, leg pain, leg swelling.  Neuro: denies headaches, numbness, tingling, dizziness, lightheadedness.  Skin: denies rashes, cuts, bruises.  Hematologic: denies unexplained bruises.

## 2021-11-11 NOTE — H&P ADULT - PROBLEM SELECTOR PROBLEM 2
Hypertrophic cardiomyopathy Acute on chronic diastolic congestive heart failure Chronic atrial fibrillation

## 2021-11-11 NOTE — H&P ADULT - NSHPLABSRESULTS_GEN_ALL_CORE
LABS:                        13.9   6.01  )-----------( 258      ( 11 Nov 2021 16:24 )             41.8     Hgb Trend: 13.9<--  11-11    140  |  98  |  13  ----------------------------<  144<H>  4.0   |  27  |  0.95    Ca    9.8      11 Nov 2021 16:24  Mg     2.30     11-11    TPro  8.4<H>  /  Alb  4.7  /  TBili  1.3<H>  /  DBili  x   /  AST  49<H>  /  ALT  20  /  AlkPhos  81  11-11    Creatinine Trend: 0.95<--, 0.71<--  PT/INR - ( 11 Nov 2021 16:24 )   PT: 28.7 sec;   INR: 2.64 ratio         PTT - ( 11 Nov 2021 16:24 )  PTT:35.4 sec                  CAPILLARY BLOOD GLUCOSE LABS:                        13.9   6.01  )-----------( 258      ( 11 Nov 2021 16:24 )             41.8     Hgb Trend: 13.9<--  11-11    140  |  98  |  13  ----------------------------<  144<H>  4.0   |  27  |  0.95    Ca    9.8      11 Nov 2021 16:24  Mg     2.30     11-11    TPro  8.4<H>  /  Alb  4.7  /  TBili  1.3<H>  /  DBili  x   /  AST  49<H>  /  ALT  20  /  AlkPhos  81  11-11    Creatinine Trend: 0.95<--, 0.71<--  PT/INR - ( 11 Nov 2021 16:24 )   PT: 28.7 sec;   INR: 2.64 ratio       PTT - ( 11 Nov 2021 16:24 )  PTT:35.4 sec                CAPILLARY BLOOD GLUCOSE

## 2021-11-11 NOTE — H&P ADULT - ASSESSMENT
51 Danish speaking F Hx of hypertrophic cardiomyopathy, chronic afib, severe MVR/AVR s/p BEntall procedure w/ CABG x1, bioprosthetic MVR (2017),chronic afib, on coumadin, HTN, Takayasu arteritis c/b non-sustained VTs s/p AICD 2015 sent in from cardiologist's office for afib w/ RVR. pending cardioversion   51 Uzbek speaking F Hx of hypertrophic cardiomyopathy, chronic afib, severe MVR/AVR s/p Bentall procedure w/ CABG x1, bioprosthetic MVR (2017),chronic afib, on coumadin, HTN, Takayasu arteritis c/b non-sustained VTs s/p AICD 2015 sent in from cardiologist's office for afib w/ RVR and ADHF. pending cardioversion   51 Greenlandic speaking F Hx of hypertrophic cardiomyopathy, chronic afib, severe MVR/AVR s/p Bentall procedure w/ CABG x1, bioprosthetic MVR (2017),chronic afib, on coumadin, HTN, Takayasu arteritis c/b non-sustained VTs s/p AICD 2015 sent in from cardiologist's office for afib w/ RVR and ADHF. pending cardioversion vs. ablation [ x ]  Lab studies reviewed  [ x ]  Radiology reviewed  [ x ]  Old records reviewed    51 Croatian speaking F Hx of hypertrophic cardiomyopathy, chronic afib, severe MVR/AVR s/p Bentall procedure w/ CABG x1, bioprosthetic MVR (2017),chronic afib, on coumadin, HTN, Takayasu arteritis c/b non-sustained VTs s/p AICD 2015 sent in from cardiologist's office for afib w/ RVR and ADHF. pending cardioversion vs. ablation

## 2021-11-11 NOTE — ED PROVIDER NOTE - NSICDXFAMILYHX_GEN_ALL_CORE_FT
FAMILY HISTORY:  Child  Still living? Yes, Estimated age: 19  Family history of hypertension, Age at diagnosis: Age Unknown

## 2021-11-12 DIAGNOSIS — I48.92 UNSPECIFIED ATRIAL FLUTTER: ICD-10-CM

## 2021-11-12 LAB
ALBUMIN SERPL ELPH-MCNC: 3.7 G/DL — SIGNIFICANT CHANGE UP (ref 3.3–5)
ALP SERPL-CCNC: 65 U/L — SIGNIFICANT CHANGE UP (ref 40–120)
ALT FLD-CCNC: 16 U/L — SIGNIFICANT CHANGE UP (ref 4–33)
ANION GAP SERPL CALC-SCNC: 14 MMOL/L — SIGNIFICANT CHANGE UP (ref 7–14)
ANION GAP SERPL CALC-SCNC: 16 MMOL/L — HIGH (ref 7–14)
APTT BLD: 45.7 SEC — HIGH (ref 27–36.3)
AST SERPL-CCNC: 39 U/L — HIGH (ref 4–32)
BASOPHILS # BLD AUTO: 0.04 K/UL — SIGNIFICANT CHANGE UP (ref 0–0.2)
BASOPHILS NFR BLD AUTO: 0.7 % — SIGNIFICANT CHANGE UP (ref 0–2)
BILIRUB SERPL-MCNC: 1.8 MG/DL — HIGH (ref 0.2–1.2)
BLD GP AB SCN SERPL QL: NEGATIVE — SIGNIFICANT CHANGE UP
BUN SERPL-MCNC: 12 MG/DL — SIGNIFICANT CHANGE UP (ref 7–23)
BUN SERPL-MCNC: 13 MG/DL — SIGNIFICANT CHANGE UP (ref 7–23)
CALCIUM SERPL-MCNC: 8.9 MG/DL — SIGNIFICANT CHANGE UP (ref 8.4–10.5)
CALCIUM SERPL-MCNC: 9 MG/DL — SIGNIFICANT CHANGE UP (ref 8.4–10.5)
CHLORIDE SERPL-SCNC: 100 MMOL/L — SIGNIFICANT CHANGE UP (ref 98–107)
CHLORIDE SERPL-SCNC: 98 MMOL/L — SIGNIFICANT CHANGE UP (ref 98–107)
CO2 SERPL-SCNC: 24 MMOL/L — SIGNIFICANT CHANGE UP (ref 22–31)
CO2 SERPL-SCNC: 24 MMOL/L — SIGNIFICANT CHANGE UP (ref 22–31)
COVID-19 SPIKE DOMAIN AB INTERP: POSITIVE
COVID-19 SPIKE DOMAIN ANTIBODY RESULT: >250 U/ML — HIGH
CREAT SERPL-MCNC: 0.69 MG/DL — SIGNIFICANT CHANGE UP (ref 0.5–1.3)
CREAT SERPL-MCNC: 0.77 MG/DL — SIGNIFICANT CHANGE UP (ref 0.5–1.3)
EOSINOPHIL # BLD AUTO: 0.08 K/UL — SIGNIFICANT CHANGE UP (ref 0–0.5)
EOSINOPHIL NFR BLD AUTO: 1.4 % — SIGNIFICANT CHANGE UP (ref 0–6)
FERRITIN SERPL-MCNC: 95 NG/ML — SIGNIFICANT CHANGE UP (ref 15–150)
FOLATE SERPL-MCNC: 17.6 NG/ML — HIGH (ref 3.1–17.5)
GLUCOSE SERPL-MCNC: 77 MG/DL — SIGNIFICANT CHANGE UP (ref 70–99)
GLUCOSE SERPL-MCNC: 86 MG/DL — SIGNIFICANT CHANGE UP (ref 70–99)
HCT VFR BLD CALC: 35.4 % — SIGNIFICANT CHANGE UP (ref 34.5–45)
HGB BLD-MCNC: 12.4 G/DL — SIGNIFICANT CHANGE UP (ref 11.5–15.5)
IANC: 4.17 K/UL — SIGNIFICANT CHANGE UP (ref 1.5–8.5)
IMM GRANULOCYTES NFR BLD AUTO: 0.3 % — SIGNIFICANT CHANGE UP (ref 0–1.5)
INR BLD: 2.11 RATIO — HIGH (ref 0.88–1.16)
IRON SATN MFR SERPL: 23 % — SIGNIFICANT CHANGE UP (ref 14–50)
IRON SATN MFR SERPL: 68 UG/DL — SIGNIFICANT CHANGE UP (ref 30–160)
LYMPHOCYTES # BLD AUTO: 0.9 K/UL — LOW (ref 1–3.3)
LYMPHOCYTES # BLD AUTO: 15.6 % — SIGNIFICANT CHANGE UP (ref 13–44)
MAGNESIUM SERPL-MCNC: 2.4 MG/DL — SIGNIFICANT CHANGE UP (ref 1.6–2.6)
MCHC RBC-ENTMCNC: 34.4 PG — HIGH (ref 27–34)
MCHC RBC-ENTMCNC: 35 GM/DL — SIGNIFICANT CHANGE UP (ref 32–36)
MCV RBC AUTO: 98.3 FL — SIGNIFICANT CHANGE UP (ref 80–100)
MONOCYTES # BLD AUTO: 0.56 K/UL — SIGNIFICANT CHANGE UP (ref 0–0.9)
MONOCYTES NFR BLD AUTO: 9.7 % — SIGNIFICANT CHANGE UP (ref 2–14)
NEUTROPHILS # BLD AUTO: 4.17 K/UL — SIGNIFICANT CHANGE UP (ref 1.8–7.4)
NEUTROPHILS NFR BLD AUTO: 72.3 % — SIGNIFICANT CHANGE UP (ref 43–77)
NRBC # BLD: 0 /100 WBCS — SIGNIFICANT CHANGE UP
NRBC # FLD: 0 K/UL — SIGNIFICANT CHANGE UP
NT-PROBNP SERPL-SCNC: 5270 PG/ML — HIGH
PHOSPHATE SERPL-MCNC: 3.7 MG/DL — SIGNIFICANT CHANGE UP (ref 2.5–4.5)
PLATELET # BLD AUTO: 217 K/UL — SIGNIFICANT CHANGE UP (ref 150–400)
POTASSIUM SERPL-MCNC: 2.9 MMOL/L — CRITICAL LOW (ref 3.5–5.3)
POTASSIUM SERPL-MCNC: 4 MMOL/L — SIGNIFICANT CHANGE UP (ref 3.5–5.3)
POTASSIUM SERPL-SCNC: 2.9 MMOL/L — CRITICAL LOW (ref 3.5–5.3)
POTASSIUM SERPL-SCNC: 4 MMOL/L — SIGNIFICANT CHANGE UP (ref 3.5–5.3)
PROT SERPL-MCNC: 6.7 G/DL — SIGNIFICANT CHANGE UP (ref 6–8.3)
PROTHROM AB SERPL-ACNC: 23.4 SEC — HIGH (ref 10.6–13.6)
RBC # BLD: 3.6 M/UL — LOW (ref 3.8–5.2)
RBC # FLD: 15.4 % — HIGH (ref 10.3–14.5)
RH IG SCN BLD-IMP: POSITIVE — SIGNIFICANT CHANGE UP
SARS-COV-2 IGG+IGM SERPL QL IA: >250 U/ML — HIGH
SARS-COV-2 IGG+IGM SERPL QL IA: POSITIVE
SODIUM SERPL-SCNC: 138 MMOL/L — SIGNIFICANT CHANGE UP (ref 135–145)
SODIUM SERPL-SCNC: 138 MMOL/L — SIGNIFICANT CHANGE UP (ref 135–145)
TIBC SERPL-MCNC: 298 UG/DL — SIGNIFICANT CHANGE UP (ref 220–430)
TSH SERPL-MCNC: 1.79 UIU/ML — SIGNIFICANT CHANGE UP (ref 0.27–4.2)
UIBC SERPL-MCNC: 230 UG/DL — SIGNIFICANT CHANGE UP (ref 110–370)
VIT B12 SERPL-MCNC: 1012 PG/ML — HIGH (ref 200–900)
WBC # BLD: 5.77 K/UL — SIGNIFICANT CHANGE UP (ref 3.8–10.5)
WBC # FLD AUTO: 5.77 K/UL — SIGNIFICANT CHANGE UP (ref 3.8–10.5)

## 2021-11-12 PROCEDURE — 99233 SBSQ HOSP IP/OBS HIGH 50: CPT | Mod: GC

## 2021-11-12 PROCEDURE — 99232 SBSQ HOSP IP/OBS MODERATE 35: CPT

## 2021-11-12 PROCEDURE — 92960 CARDIOVERSION ELECTRIC EXT: CPT

## 2021-11-12 PROCEDURE — 93312 ECHO TRANSESOPHAGEAL: CPT | Mod: 26

## 2021-11-12 PROCEDURE — 93010 ELECTROCARDIOGRAM REPORT: CPT

## 2021-11-12 PROCEDURE — 93306 TTE W/DOPPLER COMPLETE: CPT | Mod: 26

## 2021-11-12 RX ORDER — POTASSIUM CHLORIDE 20 MEQ
10 PACKET (EA) ORAL ONCE
Refills: 0 | Status: DISCONTINUED | OUTPATIENT
Start: 2021-11-12 | End: 2021-11-12

## 2021-11-12 RX ORDER — DILTIAZEM HCL 120 MG
30 CAPSULE, EXT RELEASE 24 HR ORAL EVERY 6 HOURS
Refills: 0 | Status: DISCONTINUED | OUTPATIENT
Start: 2021-11-12 | End: 2021-11-14

## 2021-11-12 RX ORDER — POTASSIUM CHLORIDE 20 MEQ
20 PACKET (EA) ORAL
Refills: 0 | Status: DISCONTINUED | OUTPATIENT
Start: 2021-11-12 | End: 2021-11-12

## 2021-11-12 RX ORDER — METOPROLOL TARTRATE 50 MG
5 TABLET ORAL ONCE
Refills: 0 | Status: COMPLETED | OUTPATIENT
Start: 2021-11-12 | End: 2021-11-12

## 2021-11-12 RX ORDER — INFLUENZA VIRUS VACCINE 15; 15; 15; 15 UG/.5ML; UG/.5ML; UG/.5ML; UG/.5ML
0.5 SUSPENSION INTRAMUSCULAR ONCE
Refills: 0 | Status: DISCONTINUED | OUTPATIENT
Start: 2021-11-12 | End: 2021-11-14

## 2021-11-12 RX ORDER — POTASSIUM CHLORIDE 20 MEQ
40 PACKET (EA) ORAL EVERY 4 HOURS
Refills: 0 | Status: DISCONTINUED | OUTPATIENT
Start: 2021-11-12 | End: 2021-11-12

## 2021-11-12 RX ORDER — TOCILIZUMAB 20 MG/ML
162 INJECTION, SOLUTION, CONCENTRATE INTRAVENOUS
Refills: 0 | Status: DISCONTINUED | OUTPATIENT
Start: 2021-11-12 | End: 2021-11-12

## 2021-11-12 RX ORDER — POTASSIUM CHLORIDE 20 MEQ
10 PACKET (EA) ORAL
Refills: 0 | Status: DISCONTINUED | OUTPATIENT
Start: 2021-11-12 | End: 2021-11-12

## 2021-11-12 RX ORDER — WARFARIN SODIUM 2.5 MG/1
3 TABLET ORAL AT BEDTIME
Refills: 0 | Status: COMPLETED | OUTPATIENT
Start: 2021-11-12 | End: 2021-11-12

## 2021-11-12 RX ADMIN — WARFARIN SODIUM 3 MILLIGRAM(S): 2.5 TABLET ORAL at 21:33

## 2021-11-12 RX ADMIN — Medication 30 MILLIGRAM(S): at 01:07

## 2021-11-12 RX ADMIN — METHOTREXATE 20 MILLIGRAM(S): 2.5 TABLET ORAL at 17:15

## 2021-11-12 RX ADMIN — ATORVASTATIN CALCIUM 40 MILLIGRAM(S): 80 TABLET, FILM COATED ORAL at 21:33

## 2021-11-12 RX ADMIN — Medication 100 MILLIEQUIVALENT(S): at 15:31

## 2021-11-12 RX ADMIN — Medication 81 MILLIGRAM(S): at 13:48

## 2021-11-12 RX ADMIN — Medication 40 MILLIEQUIVALENT(S): at 13:47

## 2021-11-12 RX ADMIN — Medication 1 MILLIGRAM(S): at 13:47

## 2021-11-12 RX ADMIN — PANTOPRAZOLE SODIUM 40 MILLIGRAM(S): 20 TABLET, DELAYED RELEASE ORAL at 06:35

## 2021-11-12 RX ADMIN — Medication 75 MILLIGRAM(S): at 13:48

## 2021-11-12 RX ADMIN — Medication 2 MILLIGRAM(S): at 17:19

## 2021-11-12 RX ADMIN — Medication 75 MILLIGRAM(S): at 21:34

## 2021-11-12 RX ADMIN — Medication 5 MILLIGRAM(S): at 00:37

## 2021-11-12 RX ADMIN — Medication 30 MILLIGRAM(S): at 08:44

## 2021-11-12 RX ADMIN — Medication 100 MILLIEQUIVALENT(S): at 13:44

## 2021-11-12 RX ADMIN — Medication 75 MILLIGRAM(S): at 06:34

## 2021-11-12 NOTE — CONSULT NOTE ADULT - ATTENDING COMMENTS
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  Upon arrival to ED, on telemetry remained in aflutter 138-139, was given metoprolol IVP with improvement of symptoms without improvement of heart rate.  As per patient, she can feel when she is in afib/aflutter, can feel palpitations and abdominal discomfort. Plan for ROBERTA/DCCV when ready. Will follow.
per above

## 2021-11-12 NOTE — PROGRESS NOTE ADULT - PROBLEM SELECTOR PLAN 1
- Brought in from [facility] for Afib with rvr/ Aflutter; still in 140s  - NPO after MN, plan for cardioversion vs ablation   - metoprolol 75mg q8h; starting diltiazem 30 po q6h prn <110 as recommended by Cardiology team  - has history of Atrial fibrillation; CHADsVASC: 2: on warfarin, INR: 2.64 within goal: has not taken warfarin today; schedule is 4.5mg Monday, 3mg the other days. Will give warfarin 3mg today  - EP interrogation normal  - s/p ROBERTA and cardioversion now converted to NSR

## 2021-11-12 NOTE — PROGRESS NOTE ADULT - SUBJECTIVE AND OBJECTIVE BOX
DATE OF SERVICE: 11-12-21 @ 09:19    Patient is a 51y old  Female who presents with a chief complaint of Afib with RVR (12 Nov 2021 00:08)      SUBJECTIVE / OVERNIGHT EVENTS:    MEDICATIONS  (STANDING):  aspirin  chewable 81 milliGRAM(s) Oral daily  atorvastatin 40 milliGRAM(s) Oral at bedtime  folic acid 1 milliGRAM(s) Oral daily  methotrexate 20 milliGRAM(s) Oral every week  metoprolol tartrate 75 milliGRAM(s) Oral every 8 hours  pantoprazole    Tablet 40 milliGRAM(s) Oral before breakfast  predniSONE   Tablet 1 milliGRAM(s) Oral two times a day    MEDICATIONS  (PRN):  diltiazem    Tablet 30 milliGRAM(s) Oral every 6 hours PRN Tachycardia >110      Vital Signs Last 24 Hrs  T(C): 36.7 (12 Nov 2021 08:44), Max: 37.2 (11 Nov 2021 22:34)  T(F): 98.1 (12 Nov 2021 08:44), Max: 98.9 (11 Nov 2021 22:34)  HR: 136 (12 Nov 2021 08:44) (115 - 140)  BP: 117/74 (12 Nov 2021 08:44) (117/74 - 135/72)  BP(mean): --  RR: 18 (12 Nov 2021 08:44) (16 - 25)  SpO2: 99% (12 Nov 2021 08:44) (95% - 100%)  CAPILLARY BLOOD GLUCOSE        I&O's Summary      PHYSICAL EXAM:  GENERAL: NAD, well-developed  HEAD:  Atraumatic, Normocephalic  EYES: EOMI, PERRLA, conjunctiva and sclera clear  NECK: Supple, No JVD  CHEST/LUNG: Clear to auscultation bilaterally; No wheeze  HEART: Regular rate and rhythm; No murmurs, rubs, or gallops  ABDOMEN: Soft, Nontender, Nondistended; Bowel sounds present  EXTREMITIES:  2+ Peripheral Pulses, No clubbing, cyanosis, or edema  PSYCH: AAOx3  NEUROLOGY: non-focal  SKIN: No rashes or lesions    LABS:                        12.4   5.77  )-----------( 217      ( 12 Nov 2021 08:03 )             35.4     11-11    140  |  98  |  13  ----------------------------<  144<H>  4.0   |  27  |  0.95    Ca    9.8      11 Nov 2021 16:24  Phos  3.7     11-12  Mg     2.40     11-12    TPro  8.4<H>  /  Alb  4.7  /  TBili  1.3<H>  /  DBili  x   /  AST  49<H>  /  ALT  20  /  AlkPhos  81  11-11    PT/INR - ( 12 Nov 2021 08:03 )   PT: 23.4 sec;   INR: 2.11 ratio         PTT - ( 12 Nov 2021 08:03 )  PTT:45.7 sec  CARDIAC MARKERS ( 11 Nov 2021 18:11 )  x     / x     / 152 U/L / x     / 9.1 ng/mL          RADIOLOGY & ADDITIONAL TESTS:    Imaging Personally Reviewed:    Consultant(s) Notes Reviewed:      Care Discussed with Consultants/Other Providers:   DATE OF SERVICE: 11-12-21 @ 09:19    Patient is a 51y old  Female who presents with a chief complaint of Afib with RVR (12 Nov 2021 00:08)      SUBJECTIVE / OVERNIGHT EVENTS:  No acute events noted overnight.   Pt explains sob for the past week, and mid- epigastric pain that began around the same time, 5-6/10 in severity, pain is tolerable. Pt feels like her sob is unchanged since admission. Pt denies any major life stressors in the past few weeks but notes that her symptoms of occasional sob and heart flutters actually began about two months prior and have acutely worsened in the past week, noting her first episode of pnd in that time- frame which gave her anxiety. Pt reports taking homeopathic Bellflower and Cabbage juice for cough/ cold, which she has d/c'ed in the past 5 days.     Otherwise, denies any new, worsening symptoms, inquires about her planned cardioversion this afternoon and if she can please have a visitor. PT lives with daughter, moved here from [place] 7 years ago where her son and  lives, and has some extended family in [place].     MEDICATIONS  (STANDING):  aspirin  chewable 81 milliGRAM(s) Oral daily  atorvastatin 40 milliGRAM(s) Oral at bedtime  folic acid 1 milliGRAM(s) Oral daily  methotrexate 20 milliGRAM(s) Oral every week  metoprolol tartrate 75 milliGRAM(s) Oral every 8 hours  pantoprazole    Tablet 40 milliGRAM(s) Oral before breakfast  predniSONE   Tablet 1 milliGRAM(s) Oral two times a day    MEDICATIONS  (PRN):  diltiazem    Tablet 30 milliGRAM(s) Oral every 6 hours PRN Tachycardia >110      Vital Signs Last 24 Hrs  T(C): 36.7 (12 Nov 2021 08:44), Max: 37.2 (11 Nov 2021 22:34)  T(F): 98.1 (12 Nov 2021 08:44), Max: 98.9 (11 Nov 2021 22:34)  HR: 136 (12 Nov 2021 08:44) (115 - 140)  BP: 117/74 (12 Nov 2021 08:44) (117/74 - 135/72)  BP(mean): --  RR: 18 (12 Nov 2021 08:44) (16 - 25)  SpO2: 99% (12 Nov 2021 08:44) (95% - 100%)  CAPILLARY BLOOD GLUCOSE        I&O's Summary      PHYSICAL EXAM:  GENERAL: NAD, well-developed F sitting up in bed  EYES: EOMI, PERRLA, conjunctiva and sclera clear  NECK: Supple, No JVD  CHEST/LUNG: Clear to auscultation bilaterally; No wheezes, rhonci, rales  HEART: rapid s1, s2, irregular rythym, No murmurs, rubs, gallops appreciated  ABDOMEN: +mild mid epigastric tenderness, Soft, Nontender, Nondistended; Bowel sounds present  EXTREMITIES:  2+ Peripheral Pulses, No clubbing, cyanosis, or edema  PSYCH: affect normal, congruent with mood  NEUROLOGY: AAOx3, non-focal  SKIN: No rashes or lesions    LABS:                        12.4   5.77  )-----------( 217      ( 12 Nov 2021 08:03 )             35.4     11-11    140  |  98  |  13  ----------------------------<  144<H>  4.0   |  27  |  0.95    Ca    9.8      11 Nov 2021 16:24  Phos  3.7     11-12  Mg     2.40     11-12    TPro  8.4<H>  /  Alb  4.7  /  TBili  1.3<H>  /  DBili  x   /  AST  49<H>  /  ALT  20  /  AlkPhos  81  11-11    PT/INR - ( 12 Nov 2021 08:03 )   PT: 23.4 sec;   INR: 2.11 ratio         PTT - ( 12 Nov 2021 08:03 )  PTT:45.7 sec  CARDIAC MARKERS ( 11 Nov 2021 18:11 )  x     / x     / 152 U/L / x     / 9.1 ng/mL          RADIOLOGY & ADDITIONAL TESTS:    Imaging Personally Reviewed:    Consultant(s) Notes Reviewed:      Care Discussed with Consultants/Other Providers:

## 2021-11-12 NOTE — PROCEDURE NOTE - INTERROGATION NOTE: COMMENTS
ICD in VVI mode; normal sensing and pacing via iterative testing; excellent threshold capture; no events recorded; reprogramming

## 2021-11-12 NOTE — PROGRESS NOTE ADULT - ATTENDING COMMENTS
pt seen and examined.  will follow up with cardio and EP recs - pt to go for ablation/cardioversion today.  aggressive repletion for hypokalemia. pt seen and examined.  will follow up with cardio and EP recs - pt to go for ablation/cardioversion today.  aggressive repletion for hypokalemia.  Cont with coumadin, goal INR of 2.5-3.5 for mechanical valve.

## 2021-11-12 NOTE — CONSULT NOTE ADULT - PROBLEM SELECTOR RECOMMENDATION 9
continue with AC (coumadin), INR goal 2-3  continue asa, statin  agree with increased dose of metoprolol, but suspect that additional increments will not control HR  consider cardizem instead.   EP eval Re: ablation vs cardioversion   echocardiogram in the AM  currently euvolemic, withhold additional diuresis. Reassess on daily basis.   Cardiology service will continue to follow.

## 2021-11-12 NOTE — CHART NOTE - NSCHARTNOTEFT_GEN_A_CORE
Type of Procedure: ROBERTA (Transesophageal echocardiogram)  Licensed independent practitioner: Dallas Colvin MD  Assistant: MD John  Estimated blood loss: None  Specimen removed: None  Preoperative Dx: aflutter  Postoperative Dx: same  Complications: None  Anesthesia type: Sedation by the attending anesthesiologist and CRNA    Findings:      LV not well visualized.  Normal biomitral valve  normal bio aortic valve  Proximal aortic root composite graft (Bental)  JAROD oversewn without flow to LA    Please note:   DCCV x 1 at 200 J performed immediately post ROBERTA with restoration of Sinus bradycardia/ventricular paced rhythm.      Full report to follow    Dallas Colvin MD  Chief, Cardiology  [facility]

## 2021-11-12 NOTE — CONSULT NOTE ADULT - ASSESSMENT
51 Maori speaking F Hx of hypertrophic cardiomyopathy, chronic afib, severe MVR/AVR s/p Bentall procedure w/ CABG x1, bioprosthetic MVR (2017),chronic afib, on coumadin, HTN, Takayasu arteritis c/b non-sustained VTs s/p AICD 2015 sent in from cardiologist's office for afib w/ RVR and ADHF, s/p TTE with cardioversion today. Rheum consulted for management of Takayasu arteritis     # Takayasu arteritis  - with involvement proximal large arteries - brachiocephalic artery, subclavian arteries, right and left common carotid arteries involvement since 2015.   - Self DCed Actemra since 2019 as she was lost to f/u.  - areas of uptake in ascending thoracic aorta, new compared to previous CT in 2018.   - In view of new areas of vasculitis in ascending aorta will escalate therapy  - plan re-start Actemra, increase MTX to 8 pills weekly, c/w daily prednisone 2 mg.  - CTA chest, A/P   -start PPI  *** INCOMPLETE NOTE     51 Yi speaking F Hx of hypertrophic cardiomyopathy, chronic afib, severe MVR/AVR s/p Bentall procedure w/ CABG x1, bioprosthetic MVR (2017),chronic afib, on coumadin, HTN, Takayasu arteritis c/b non-sustained VTs s/p AICD 2015 sent in from cardiologist's office for afib w/ RVR and ADHF, s/p TTE with cardioversion today. Rheum consulted for management of Takayasu arteritis     # Takayasu arteritis  - with involvement proximal large arteries - brachiocephalic artery, subclavian arteries, right and left common carotid arteries involvement since 2015.   - Self DCed Actemra since 2019 as she was lost to f/u.  - PET scan from earlier 2021 showed areas of uptake in ascending thoracic aorta, new compared to previous CT in 2018.   - In view of new areas of vasculitis in ascending aorta will escalate therapy, plan to re-start  Actemra, increase MTX to 8 pills weekly, c/w daily prednisone 2 mg  - pt has not received the Actemra injection from pharmacy yet, and [facility] currently does not have the Actemra subacute injection    - will continue MTX 8 tablets weekly and prednisone 2mg daily    - recommend CTA chest/A/P    Case discussed with attending    Cody Moon, PGY-4  Rheumatology Fellow   Pager: [phone]   51 Frisian speaking F Hx of hypertrophic cardiomyopathy, chronic afib, severe MVR/AVR s/p Bentall procedure w/ CABG x1, bioprosthetic MVR (2017),chronic afib, on coumadin, HTN, Takayasu arteritis c/b non-sustained VTs s/p AICD 2015 sent in from cardiologist's office for afib w/ RVR and ADHF, s/p TTE with cardioversion today. Rheum consulted for management of Takayasu arteritis     # Takayasu arteritis  - with involvement proximal large arteries - brachiocephalic artery, subclavian arteries, right and left common carotid arteries involvement since 2015.   - Self DCed Actemra since 2019 as she was lost to f/u.  - PET scan from earlier 2021 showed areas of uptake in ascending thoracic aorta, new compared to previous CT in 2018.   - In view of new areas of vasculitis in ascending aorta will escalate therapy, plan to re-start  Actemra, increase MTX to 8 pills weekly, c/w daily prednisone 2 mg  - pt has not received the Actemra injection from pharmacy yet, and [facility] currently does not have the Actemra subacute injection    - will continue MTX 8 tablets weekly and prednisone 2mg daily    -check acute hepatitis panel w hep B core total   - recommend CTA chest/A/P to check for activity     Case discussed with attending    Cody Moon, PGY-4  Rheumatology Fellow   Pager: [phone]

## 2021-11-12 NOTE — CONSULT NOTE ADULT - SUBJECTIVE AND OBJECTIVE BOX
UJAN CARLOS STEELE  [id number]    HISTORY OF PRESENT ILLNESS:        PAST MEDICAL & SURGICAL HISTORY:  Atrial fibrillation, chronic    Non-sustained ventricular tachycardia    Hypertension    AICD (automatic cardioverter/defibrillator) present    Takayasu&#x27;s arteritis    Hypertrophic cardiomyopathy    History of appendectomy    H/O cardiac pacemaker    H/O  section  x2        Review of Systems:  Gen:  No fevers/chills, weight loss  HEENT: No blurry vision, no difficulty swallowing, no oral or nasal ulcers  CVS: No chest pain/palpitations  Resp: No SOB/wheezing  GI: No N/V/C/D/abdominal pain  MSK:  Skin: No new rashes  Neuro: No headaches    MEDICATIONS  (STANDING):  aspirin  chewable 81 milliGRAM(s) Oral daily  atorvastatin 40 milliGRAM(s) Oral at bedtime  folic acid 1 milliGRAM(s) Oral daily  methotrexate 20 milliGRAM(s) Oral every week  metoprolol tartrate 75 milliGRAM(s) Oral every 8 hours  pantoprazole    Tablet 40 milliGRAM(s) Oral before breakfast  potassium chloride    Tablet ER 40 milliEquivalent(s) Oral every 4 hours  potassium chloride  10 mEq/100 mL IVPB 10 milliEquivalent(s) IV Intermittent every 1 hour  predniSONE   Tablet 1 milliGRAM(s) Oral two times a day    MEDICATIONS  (PRN):  diltiazem    Tablet 30 milliGRAM(s) Oral every 6 hours PRN Tachycardia >110      Allergies    No Known Allergies    Intolerances        PERTINENT MEDICATION HISTORY:    SOCIAL HISTORY:  OCCUPATION:  TRAVEL HISTORY:    FAMILY HISTORY:  Family history of hypertension (Child)        Vital Signs Last 24 Hrs  T(C): 36.6 (2021 13:34), Max: 37.2 (2021 22:34)  T(F): 97.8 (2021 13:34), Max: 98.9 (2021 22:34)  HR: 58 (2021 13:34) (58 - 140)  BP: 137/62 (2021 13:34) (117/74 - 137/62)  BP(mean): --  RR: 20 (2021 13:34) (16 - 25)  SpO2: 100% (2021 13:34) (95% - 100%)    Physical Exam:  General: No apparent distress  HEENT: EOMI, MMM  CVS: +S1/S2, RRR, no murmurs/rubs/gallops  Resp: CTA b/l. No crackles/wheezing  GI: Soft, NT/ND +BS  MSK:  Neuro: AAOx3  Skin: no visible rashes    LABS:                        12.4   5.77  )-----------( 217      ( 2021 08:03 )             35.4     11-12    138  |  98  |  12  ----------------------------<  77  2.9<LL>   |  24  |  0.69    Ca    9.0      2021 08:03  Phos  3.7     11-12  Mg     2.40     11-12    TPro  6.7  /  Alb  3.7  /  TBili  1.8<H>  /  DBili  x   /  AST  39<H>  /  ALT  16  /  AlkPhos  65  11-12    PT/INR - ( 2021 08:03 )   PT: 23.4 sec;   INR: 2.11 ratio         PTT - ( 2021 08:03 )  PTT:45.7 sec      RADIOLOGY & ADDITIONAL STUDIES:     JUAN CARLOS STEELE  [id number]    HISTORY OF PRESENT ILLNESS:  51 Icelandic speaking F Hx of hypertrophic cardiomyopathy, chronic afib, 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 Pt notes that she had dyspnea for the last two months only when moving around but in the last week of so she starting having dyspnea during the night where she cannot lay flat, only comfortable when turning on her sides. She noticed that she started having a dry cough 1 week ago as well and more abdominal discomfort to her chronic belly swelling. No inciting event, the day before the cough, she did note a mild pressure like CP non radiating occurring at rest but then it stopped. At this time she also has associated back pain she describes as splitting. This past week she has been feeling palpitations and discomfort and went to her cardiologist who noted she was in decompensated HF and afib w/ RVR and sent her to the ED. No F/C/sick contacts/ changes in vision/ diarrhea/dysuria/swelling    PAST MEDICAL & SURGICAL HISTORY:  Atrial fibrillation, chronic    Non-sustained ventricular tachycardia    Hypertension    AICD (automatic cardioverter/defibrillator) present    Takayasu&#x27;s arteritis    Hypertrophic cardiomyopathy    History of appendectomy    H/O cardiac pacemaker    H/O  section  x2        Review of Systems:  Gen:  No fevers/chills, weight loss  HEENT: No blurry vision, no difficulty swallowing, no oral or nasal ulcers  CVS: No chest pain/palpitations  Resp: No SOB/wheezing  GI: No N/V/C/D/abdominal pain  MSK: no joint swelling/pain   Skin: No new rashes  Neuro: No headaches    MEDICATIONS  (STANDING):  aspirin  chewable 81 milliGRAM(s) Oral daily  atorvastatin 40 milliGRAM(s) Oral at bedtime  folic acid 1 milliGRAM(s) Oral daily  methotrexate 20 milliGRAM(s) Oral every week  metoprolol tartrate 75 milliGRAM(s) Oral every 8 hours  pantoprazole    Tablet 40 milliGRAM(s) Oral before breakfast  potassium chloride    Tablet ER 40 milliEquivalent(s) Oral every 4 hours  potassium chloride  10 mEq/100 mL IVPB 10 milliEquivalent(s) IV Intermittent every 1 hour  predniSONE   Tablet 1 milliGRAM(s) Oral two times a day    MEDICATIONS  (PRN):  diltiazem    Tablet 30 milliGRAM(s) Oral every 6 hours PRN Tachycardia >110      Allergies    No Known Allergies    Intolerances        PERTINENT MEDICATION HISTORY:    SOCIAL HISTORY:  OCCUPATION:  TRAVEL HISTORY:    FAMILY HISTORY:  Family history of hypertension (Child)        Vital Signs Last 24 Hrs  T(C): 36.6 (2021 13:34), Max: 37.2 (2021 22:34)  T(F): 97.8 (2021 13:34), Max: 98.9 (2021 22:34)  HR: 58 (2021 13:34) (58 - 140)  BP: 137/62 (2021 13:34) (117/74 - 137/62)  BP(mean): --  RR: 20 (2021 13:34) (16 - 25)  SpO2: 100% (2021 13:34) (95% - 100%)    Physical Exam:  General: No apparent distress  HEENT: EOMI, MMM  CVS: +S1/S2, RRR, no murmurs/rubs/gallops  Resp: CTA b/l. No crackles/wheezing  GI: Soft, NT/ND +BS  MSK: no synovitis, joints NTP    Neuro: AAOx3  Skin: no visible rashes    LABS:                        12.4   5.77  )-----------( 217      ( 2021 08:03 )             35.4     -12    138  |  98  |  12  ----------------------------<  77  2.9<LL>   |  24  |  0.69    Ca    9.0      2021 08:03  Phos  3.7     11-12  Mg     2.40     11-12    TPro  6.7  /  Alb  3.7  /  TBili  1.8<H>  /  DBili  x   /  AST  39<H>  /  ALT  16  /  AlkPhos  65  11-12  PT/INR - ( 2021 08:03 )   PT: 23.4 sec;   INR: 2.11 ratio    PTT - ( 2021 08:03 )  PTT:45.7 sec    RADIOLOGY & ADDITIONAL STUDIES:

## 2021-11-12 NOTE — CONSULT NOTE ADULT - SUBJECTIVE AND OBJECTIVE BOX
50 yo lady with PMHx of Takayasu arteritis diagnosed 2015 c/b VT s/p ICD 2015, severe AVR/MVR s/p Bentall procedure w/ CABG x 1 ([facility]G-[facility]AD) and bioprosthetic MVR 2017, and paroxysmal Afib on coumadin who was seen in the cardiology clinic today. Last seen in clinic July/2021 and at that time, had good exercise tolerance but today presents with Aflutter RVR HR 140s (stable BP) c/b acute on chronic decompensated HFpEF exacerbation with orthopnea, PND, and dyspnea with minimal exertion.  In the ED EKG with AFL and 2:1 block.  CXR: clear lungs    HsTrop: 93>>87

## 2021-11-13 ENCOUNTER — TRANSCRIPTION ENCOUNTER (OUTPATIENT)
Age: 51
End: 2021-11-13

## 2021-11-13 LAB
ANION GAP SERPL CALC-SCNC: 9 MMOL/L — SIGNIFICANT CHANGE UP (ref 7–14)
APTT BLD: 37.6 SEC — HIGH (ref 27–36.3)
BUN SERPL-MCNC: 16 MG/DL — SIGNIFICANT CHANGE UP (ref 7–23)
CALCIUM SERPL-MCNC: 9 MG/DL — SIGNIFICANT CHANGE UP (ref 8.4–10.5)
CHLORIDE SERPL-SCNC: 102 MMOL/L — SIGNIFICANT CHANGE UP (ref 98–107)
CO2 SERPL-SCNC: 24 MMOL/L — SIGNIFICANT CHANGE UP (ref 22–31)
CREAT SERPL-MCNC: 0.75 MG/DL — SIGNIFICANT CHANGE UP (ref 0.5–1.3)
GLUCOSE SERPL-MCNC: 84 MG/DL — SIGNIFICANT CHANGE UP (ref 70–99)
HBV CORE AB SER-ACNC: SIGNIFICANT CHANGE UP
HCT VFR BLD CALC: 33 % — LOW (ref 34.5–45)
HGB BLD-MCNC: 11.3 G/DL — LOW (ref 11.5–15.5)
INR BLD: 2.23 RATIO — HIGH (ref 0.88–1.16)
MAGNESIUM SERPL-MCNC: 2.3 MG/DL — SIGNIFICANT CHANGE UP (ref 1.6–2.6)
MCHC RBC-ENTMCNC: 34.2 GM/DL — SIGNIFICANT CHANGE UP (ref 32–36)
MCHC RBC-ENTMCNC: 34.3 PG — HIGH (ref 27–34)
MCV RBC AUTO: 100.3 FL — HIGH (ref 80–100)
NRBC # BLD: 0 /100 WBCS — SIGNIFICANT CHANGE UP
NRBC # FLD: 0 K/UL — SIGNIFICANT CHANGE UP
PHOSPHATE SERPL-MCNC: 3.6 MG/DL — SIGNIFICANT CHANGE UP (ref 2.5–4.5)
PLATELET # BLD AUTO: 174 K/UL — SIGNIFICANT CHANGE UP (ref 150–400)
POTASSIUM SERPL-MCNC: 4 MMOL/L — SIGNIFICANT CHANGE UP (ref 3.5–5.3)
POTASSIUM SERPL-SCNC: 4 MMOL/L — SIGNIFICANT CHANGE UP (ref 3.5–5.3)
PROTHROM AB SERPL-ACNC: 24.7 SEC — HIGH (ref 10.6–13.6)
RBC # BLD: 3.29 M/UL — LOW (ref 3.8–5.2)
RBC # FLD: 15.5 % — HIGH (ref 10.3–14.5)
SODIUM SERPL-SCNC: 135 MMOL/L — SIGNIFICANT CHANGE UP (ref 135–145)
WBC # BLD: 4.62 K/UL — SIGNIFICANT CHANGE UP (ref 3.8–10.5)
WBC # FLD AUTO: 4.62 K/UL — SIGNIFICANT CHANGE UP (ref 3.8–10.5)

## 2021-11-13 PROCEDURE — 93306 TTE W/DOPPLER COMPLETE: CPT | Mod: 26

## 2021-11-13 PROCEDURE — 99232 SBSQ HOSP IP/OBS MODERATE 35: CPT | Mod: GC

## 2021-11-13 RX ORDER — WARFARIN SODIUM 2.5 MG/1
3 TABLET ORAL AT BEDTIME
Refills: 0 | Status: COMPLETED | OUTPATIENT
Start: 2021-11-13 | End: 2021-11-13

## 2021-11-13 RX ORDER — POLYETHYLENE GLYCOL 3350 17 G/17G
17 POWDER, FOR SOLUTION ORAL DAILY
Refills: 0 | Status: DISCONTINUED | OUTPATIENT
Start: 2021-11-13 | End: 2021-11-14

## 2021-11-13 RX ADMIN — Medication 81 MILLIGRAM(S): at 10:48

## 2021-11-13 RX ADMIN — Medication 2 MILLIGRAM(S): at 06:11

## 2021-11-13 RX ADMIN — ATORVASTATIN CALCIUM 40 MILLIGRAM(S): 80 TABLET, FILM COATED ORAL at 21:11

## 2021-11-13 RX ADMIN — POLYETHYLENE GLYCOL 3350 17 GRAM(S): 17 POWDER, FOR SOLUTION ORAL at 13:12

## 2021-11-13 RX ADMIN — Medication 75 MILLIGRAM(S): at 06:11

## 2021-11-13 RX ADMIN — PANTOPRAZOLE SODIUM 40 MILLIGRAM(S): 20 TABLET, DELAYED RELEASE ORAL at 06:11

## 2021-11-13 RX ADMIN — WARFARIN SODIUM 3 MILLIGRAM(S): 2.5 TABLET ORAL at 21:11

## 2021-11-13 RX ADMIN — Medication 1 MILLIGRAM(S): at 10:48

## 2021-11-13 RX ADMIN — Medication 75 MILLIGRAM(S): at 21:11

## 2021-11-13 NOTE — DISCHARGE NOTE PROVIDER - NSDCCPCAREPLAN_GEN_ALL_CORE_FT
PRINCIPAL DISCHARGE DIAGNOSIS  Diagnosis: Rapid atrial fibrillation  Assessment and Plan of Treatment: A-fib is an irregular heartbeat. It reduces your heart's ability to pump blood through your body. A-fib may come and go, or it may be a long-term condition. A-fib can cause life-threatening blood clots, stroke, or heart failure. It is important to treat and manage a-fib to help prevent these problems.  Call your local emergency number (911 in the [place]) or have someone call if:   •You have any of the following signs of a heart attack: ?Squeezing, pressure, or pain in your chest  ?You may also have any of the following: ?Discomfort or pain in your back, neck, jaw, stomach, or arm  ?Shortness of breath  ?Nausea or vomiting  ?Lightheadedness or a sudden cold sweat  •You have any of the following signs of a stroke: ?Numbness or drooping on one side of your face   ?Weakness in an arm or leg  ?Confusion or difficulty speaking  ?Dizziness, a severe headache, or vision loss         PRINCIPAL DISCHARGE DIAGNOSIS  Diagnosis: Rapid atrial fibrillation  Assessment and Plan of Treatment: A-fib is an irregular heartbeat. It reduces your heart's ability to pump blood through your body. A-fib may come and go, or it may be a long-term condition. A-fib can cause life-threatening blood clots, stroke, or heart failure. It is important to treat and manage a-fib to help prevent these problems.  Call your local emergency number (911 in the [place]) or have someone call if:   -You have any of the following signs of a heart attack: Squeezing, pressure, or pain in your chest  -You may also have any of the following: Discomfort or pain in your back, neck, jaw, stomach, or arm  Shortness of breath  Nausea or vomiting  Lightheadedness or a sudden cold sweat  -You have any of the following signs of a stroke: Numbness or drooping on one side of your face   Weakness in an arm or leg  Confusion or difficulty speaking  Dizziness, a severe headache, or vision loss  You had a procedure called a cardioversion while you were admitted where your heart was shocked back into a normal rhythm. Please follow up with your cardiologist and Electrophysiologist for further management after you are discharged.      SECONDARY DISCHARGE DIAGNOSES  Diagnosis: Takayasu's arteritis  Assessment and Plan of Treatment: While you were in the hospital, you were seen by the Rheumatology team for further management of your Takayasu arteritis. They recommended increasing your prednisone dose, methotrexate, and possibly restarting your Actemra. You had a CT scan of your chest, abdomen, and pelvis to assess if your arteritis had progressed which showed ______. You will be discharged on: _____  Please follow up with your Rheumatologist within 1-2 weeks of being discharged from the hospital.     PRINCIPAL DISCHARGE DIAGNOSIS  Diagnosis: Rapid atrial fibrillation  Assessment and Plan of Treatment: A-fib is an irregular heartbeat. It reduces your heart's ability to pump blood through your body. A-fib may come and go, or it may be a long-term condition. A-fib can cause life-threatening blood clots, stroke, or heart failure. It is important to treat and manage a-fib to help prevent these problems. Please continue to take your home metoproilol at 100mg BID as prescribed to maintian your heart rate.   You had a procedure called a cardioversion while you were admitted where your heart was shocked back into a normal rhythm. Please follow up with your cardiologist and Electrophysiologist for further management after you are discharged.  Call your local emergency number (243 in the [place]) or have someone call if:   -You have any of the following signs of a heart attack: Squeezing, pressure, or pain in your chest  -You may also have any of the following: Discomfort or pain in your back, neck, jaw, stomach, or arm  Shortness of breath  Nausea or vomiting  Lightheadedness or a sudden cold sweat  -You have any of the following signs of a stroke: Numbness or drooping on one side of your face   Weakness in an arm or leg  Confusion or difficulty speaking  Dizziness, a severe headache, or vision loss        SECONDARY DISCHARGE DIAGNOSES  Diagnosis: Takayasu's arteritis  Assessment and Plan of Treatment: While you were in the hospital, you were seen by the Rheumatology team for further management of your Takayasu arteritis. They recommended increasing your prednisone dose, methotrexate, and possibly restarting your Actemra. You had a CT scan of your chest, abdomen, and pelvis to assess if your arteritis had progressed which showed stable vasculits with no changes. You will be discharged on: youe home dose of prednsione at 1mg 2x aday, methrotrexate and Actrema.   Please follow up with your Rheumatologist within 1-2 weeks of being discharged from the hospital.     PRINCIPAL DISCHARGE DIAGNOSIS  Diagnosis: Rapid atrial fibrillation  Assessment and Plan of Treatment: A-fib is an irregular heartbeat. It reduces your heart's ability to pump blood through your body. A-fib may come and go, or it may be a long-term condition. A-fib can cause life-threatening blood clots, stroke, or heart failure. It is important to treat and manage a-fib to help prevent these problems. Please continue to take your home metoproilol at 100mg BID as prescribed to maintian your heart rate.   PLEASE BE SURE TO TAKE YOUR WARFARIN 3 MG TONIGHT ONCE YOU GET HOME. THIS IS VERY IMPORTANT ESPECIALLY WITH YOUR AFIB TO PREVENT ANY CLOTS FROM  THEN FOLLOW YOUR REGUALR SCHEDULE FOR YOUR WARFARIN DOSING.   You had a procedure called a cardioversion while you were admitted where your heart was shocked back into a normal rhythm. Please follow up with your cardiologist and Electrophysiologist for further management after you are discharged.        SECONDARY DISCHARGE DIAGNOSES  Diagnosis: Takayasu's arteritis  Assessment and Plan of Treatment: While you were in the hospital, you were seen by the Rheumatology team for further management of your Takayasu arteritis. They recommended increasing your prednisone dose, methotrexate, and possibly restarting your Actemra. You had a CT scan of your chest, abdomen, and pelvis to assess if your arteritis had progressed which showed stable vasculits with no changes. You will be discharged on: youe home dose of prednsione at 1mg 2x aday, methrotrexate and Actrema.   Please follow up with your Rheumatologist within 1-2 weeks of being discharged from the hospital.

## 2021-11-13 NOTE — DISCHARGE NOTE PROVIDER - NSDCFUADDAPPT_GEN_ALL_CORE_FT
Please follow up with your Cardiologist within a week of being discharged from the hospital.     Please follow up with your Rheumatologist within a week of being discharged from the hospital.     Please follow up with your primary care provider within a few days of being discharged from the hospital.

## 2021-11-13 NOTE — DISCHARGE NOTE PROVIDER - PROVIDER TOKENS
PROVIDER:[TOKEN:[[id number]],FOLLOWUP:[1 week]],PROVIDER:[TOKEN:[[id number]],FOLLOWUP:[2 weeks]]

## 2021-11-13 NOTE — DISCHARGE NOTE PROVIDER - NSDCCPTREATMENT_GEN_ALL_CORE_FT
PRINCIPAL PROCEDURE  Procedure: Internal cardioversion  Findings and Treatment: Cardioversion is a procedure that uses medicine or electrical shocks to correct arrhythmias. An arrhythmias is a heartbeat that is too slow, too fast, or irregular. It may prevent your body from getting the blood and oxygen it needs. Your heart has 4 chambers, called the atria and ventricles. The atria are at the top of your heart, and the ventricles are at the bottom of your heart. Most arrhythmias that need cardioversion start in the atria.          PRINCIPAL PROCEDURE  Procedure: Internal cardioversion  Findings and Treatment: Cardioversion is a procedure that uses medicine or electrical shocks to correct arrhythmias. An arrhythmias is a heartbeat that is too slow, too fast, or irregular. It may prevent your body from getting the blood and oxygen it needs. Your heart has 4 chambers, called the atria and ventricles. The atria are at the top of your heart, and the ventricles are at the bottom of your heart. Most arrhythmias that need cardioversion start in the atria.         SECONDARY PROCEDURE  Procedure: CT scan of abdomen  Findings and Treatment: FINDINGS:  CHEST:  LUNGS AND LARGE AIRWAYS: Patent central airways. Minimal/mild interlobular septal wall thickening and few small groundglass opacities most prominent in the right middle and lower lobes. Minimal scattered linear atelectasis.  PLEURA: No pleural effusion.  VESSELS:  Atherosclerotic aortic calcification. No aortic dissection or intramural hematoma. Unchanged thoracic aorta irregularity. Aortic measurements are as follows:  Sinuses of Valsalva 3.5 cm, unchanged.  Sinotubular junction 3.0 cm, unchanged.  Ascending thoracic aorta 3.2 cm, unchanged.  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 Left subclavian focal stenosis with poststenotic dilation approximately 1 cm distal to the origin of the left vertebral artery. Measurements of the great vessels at their origins are as follows:  Brachiocephalic artery 1.8 cm, unchanged.  Right subclavian artery 1.5 cm, unchanged.  Left subclavian artery 0.6 cm, unchanged.  Right common carotid artery 0.8 cm, unchanged.  Left common carotid artery 0.7 cm, which does not appear unchanged but is previously documented as 1.1 cm on prior report  The main pulmonary artery measures 2.9 cm., unchanged.  HEART: Cardiomegaly. No pericardial effusion. Status post aortic and mitral valve repairs. Left chest wall AICD with its leads in the right atrium and right ventricle MEDIASTINUM AND FAY: No lymphadenopathy.  CHEST WALL AND LOWER NECK: Within normal limits.  ABDOMEN AND PELVIS:  LIVER: Within normal limits.  BILE DUCTS: Normal caliber.  GALLBLADDER: Within normal limits.  SPLEEN: Within normal limits.  PANCREAS: Unchanged 1.3 cm hypoattenuation in the pancreatic body, unchanged.  ADRENALS: Within normal limits.  KIDNEYS/URETERS: Within normal limits.  BLADDER:Within normal limits.  REPRODUCTIVE ORGANS: Uterus within normal limits.  BOWEL: No bowel obstruction. Appendix is not visualized. No evidence of

## 2021-11-13 NOTE — DISCHARGE NOTE PROVIDER - CARE PROVIDER_API CALL
Dallas Colvin)  Cardiovascular Disease; Nuclear Cardiology  [phone] [place] [address]  Phone: [phone]  Fax: [phone]  Follow Up Time: 1 week    Linda Horowitz)  Rheumatology  [address] [place] [address] [address]  [place] [address]  Phone: [phone]  Fax: [phone]  Follow Up Time: 2 weeks

## 2021-11-13 NOTE — DISCHARGE NOTE PROVIDER - CARE PROVIDERS DIRECT ADDRESSES
Sharon
[email or web address]

## 2021-11-13 NOTE — DISCHARGE NOTE PROVIDER - NSDCMRMEDTOKEN_GEN_ALL_CORE_FT
Actemra 162 mg/0.9 mL subcutaneous solution: subcutaneous once a week  aspirin 81 mg oral tablet, chewable: 1 tab(s) orally once a day  atorvastatin 40 mg oral tablet: 1 tab(s) orally once a day  folic acid 1 mg oral tablet: 1 tab(s) orally once a day  furosemide 40 mg oral tablet: 1 tab(s) orally once a day  methotrexate 2.5 mg oral tablet: 8 tab(s) orally once a week  Metoprolol Tartrate 100 mg oral tablet: 1 tab(s) orally 2 times a day  omeprazole 40 mg oral delayed release capsule: 1 cap(s) orally once a day  predniSONE 1 mg oral tablet: 1 tab(s) orally 2 times a day  warfarin 3 mg oral tablet: 1 tab(s) orally once a day

## 2021-11-13 NOTE — DISCHARGE NOTE PROVIDER - NSDCFUSCHEDAPPT_GEN_ALL_CORE_FT
JUAN CARLOS STEELE ; 12/06/2021 ; NPP Intmed OP [id number] JUAN CARLOS Hearn ; 12/06/2021 ; NPP Rheum  OP [id number] [address] JUAN CARLOS Hearn ; 12/16/2021 ; NPP Cardio Electro [phone]

## 2021-11-13 NOTE — PROGRESS NOTE ADULT - SUBJECTIVE AND OBJECTIVE BOX
Brennon Lee PGY-1  Pager: NS) [phone]/ (MM[place]) [phone]  Patient is a 51y old  Female who presents with a chief complaint of Afib with RVR (12 Nov 2021 13:42)      SUBJECTIVE / OVERNIGHT EVENTS:  No acute events over night. Denies any fevers/chills, headache, CP, SOB, abd pain, N/V/D, constipation, or leg swelling.       MEDICATIONS  (STANDING):  aspirin  chewable 81 milliGRAM(s) Oral daily  atorvastatin 40 milliGRAM(s) Oral at bedtime  folic acid 1 milliGRAM(s) Oral daily  influenza   Vaccine 0.5 milliLiter(s) IntraMuscular once  metoprolol tartrate 75 milliGRAM(s) Oral every 8 hours  pantoprazole    Tablet 40 milliGRAM(s) Oral before breakfast  predniSONE   Tablet 2 milliGRAM(s) Oral daily    MEDICATIONS  (PRN):  diltiazem    Tablet 30 milliGRAM(s) Oral every 6 hours PRN Tachycardia >110          OBJECTIVE:  Vital Signs Last 24 Hrs  T(C): 37.2 (13 Nov 2021 06:30), Max: 37.2 (13 Nov 2021 06:30)  T(F): 98.9 (13 Nov 2021 06:30), Max: 98.9 (13 Nov 2021 06:30)  HR: 60 (13 Nov 2021 06:30) (58 - 136)  BP: 133/72 (13 Nov 2021 06:30) (117/74 - 148/72)  BP(mean): --  RR: 16 (13 Nov 2021 06:30) (16 - 20)  SpO2: 100% (13 Nov 2021 06:30) (99% - 100%)      PHYSICAL EXAM:  GENERAL: NAD, well-developed F sitting up in bed  EYES: EOMI, PERRLA, conjunctiva and sclera clear  NECK: Supple, No JVD  CHEST/LUNG: Clear to auscultation bilaterally; No wheezes, rhonci, rales  HEART: rapid s1, s2, irregular rythym, No murmurs, rubs, gallops appreciated  ABDOMEN: +mild mid epigastric tenderness, Soft, Nontender, Nondistended; Bowel sounds present  EXTREMITIES:  2+ Peripheral Pulses, No clubbing, cyanosis, or edema  PSYCH: affect normal, congruent with mood  NEUROLOGY: AAOx3, non-focal  SKIN: No rashes or lesions      CAPILLARY BLOOD GLUCOSE        I&O's Summary            LABS:                        12.4   5.77  )-----------( 217      ( 12 Nov 2021 08:03 )             35.4     11-12    138  |  100  |  13  ----------------------------<  86  4.0   |  24  |  0.77    Ca    8.9      12 Nov 2021 15:11  Phos  3.7     11-12  Mg     2.40     11-12    TPro  6.7  /  Alb  3.7  /  TBili  1.8<H>  /  DBili  x   /  AST  39<H>  /  ALT  16  /  AlkPhos  65  11-12    PT/INR - ( 12 Nov 2021 08:03 )   PT: 23.4 sec;   INR: 2.11 ratio         PTT - ( 12 Nov 2021 08:03 )  PTT:45.7 sec  CARDIAC MARKERS ( 11 Nov 2021 18:11 )  x     / x     / 152 U/L / x     / 9.1 ng/mL              RADIOLOGY & ADDITIONAL TESTS:

## 2021-11-13 NOTE — PROGRESS NOTE ADULT - ATTENDING COMMENTS
Aflutter w/ RVR s/p cardioversion and now in SR, c/w Metoprolol and Cardizem, dose Coumadin for INR goal 2-3   Chronic diastolic heart failure, s/p Lasix, appears euvolemic, c/w Metoprolol   Takayasu's arteritis, f/up CTA C/A/P, c/w Prednisone, c/w Methotrexate weekly   HTN, c/w Cardizem/Metoprolol, monitor BP

## 2021-11-13 NOTE — PROGRESS NOTE ADULT - PROBLEM SELECTOR PLAN 1
- Brought in from [facility] for Afib with rvr/ Aflutter; still in 140s  - NPO after MN, plan for cardioversion vs ablation   - metoprolol 75mg q8h; starting diltiazem 30 po q6h prn <110 as recommended by Cardiology team  - has history of Atrial fibrillation; CHADsVASC: 2: on warfarin, INR: 2.64 within goal: has not taken warfarin today; schedule is 4.5mg Monday, 3mg the other days. Will give warfarin 3mg today  - EP interrogation normal  - s/p ROBERTA and cardioversion now converted to NSR - Brought in from [facility] for Afib with rvr/ Aflutter; still in 140s  - NPO after MN, plan for cardioversion vs ablation   - metoprolol 75mg q8h; starting diltiazem 30 po q6h prn <110 as recommended by Cardiology team  - has history of Atrial fibrillation; CHADsVASC: 2: on warfarin, INR: 2.64 within goal: has not taken warfarin today; schedule is 4.5mg Monday, 3mg the other days. Will give warfarin 3mg today  - EP interrogation normal  - s/p ROBERTA and cardioversion now converted to NSR  - Pending CTA to assess for disease progression

## 2021-11-13 NOTE — DISCHARGE NOTE PROVIDER - HOSPITAL COURSE
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 Pt notes that she had dyspnea for the last two months only when moving around but in the last week of so she starting having dyspnea during the night where she cannot lay flat, only comfortable when turning on her sides. She noticed that she started having a dry cough 1 week ago as well and more abdominal discomfort to her chronic belly swelling. No inciting event, the day before the cough, she did note a mild pressure like CP non radiating occurring at rest but then it stopped. At this time she also has associated back pain she describes as splitting. This past week she has been feeling palpitations and discomfort and went to her cardiologist who noted she was in decompensated HF and afib w/ RVR and sent her to the ED. No F/C/sick contacts/ changes in vision/ diarrhea/dysuria/swelling.     In the hospital, patient was seen by cardiology and was cardioverted back to NSR. She also continued daily warfarin. Patient also seen by Rheumatology for Takayasu arteritis. On CTA, patient was found to have.    Patient is currently afebrile, hemodynamically stable, and ready for discharge with close f/u with EP and Rheumatology.      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 Pt notes that she had dyspnea for the last two months only when moving around but in the last week of so she starting having dyspnea during the night where she cannot lay flat, only comfortable when turning on her sides. She noticed that she started having a dry cough 1 week ago as well and more abdominal discomfort to her chronic belly swelling. No inciting event, the day before the cough, she did note a mild pressure like CP non radiating occurring at rest but then it stopped. At this time she also has associated back pain she describes as splitting. This past week she has been feeling palpitations and discomfort and went to her cardiologist who noted she was in decompensated HF and afib w/ RVR and sent her to the ED. No F/C/sick contacts/ changes in vision/ diarrhea/dysuria/swelling.     In the hospital, patient was seen by cardiology and was cardioverted back to NSR. She also continued daily warfarin. Patient also seen by Rheumatology for Takayasu arteritis. On CTA, patient was found to have___________. Rheumatology recommended _____. Cardiology recommended _______ on discharge.    Patient is currently afebrile, hemodynamically stable, and ready for discharge with close f/u with EP and Rheumatology.      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 Pt notes that she had dyspnea for the last two months only when moving around but in the last week of so she starting having dyspnea during the night where she cannot lay flat, only comfortable when turning on her sides. She noticed that she started having a dry cough 1 week ago as well and more abdominal discomfort to her chronic belly swelling. No inciting event, the day before the cough, she did note a mild pressure like CP non radiating occurring at rest but then it stopped. At this time she also has associated back pain she describes as splitting. This past week she has been feeling palpitations and discomfort and went to her cardiologist who noted she was in decompensated HF and afib w/ RVR and sent her to the ED. No F/C/sick contacts/ changes in vision/ diarrhea/dysuria/swelling.     In the hospital, patient was seen by cardiology and was cardioverted back to NSR. She also continued daily warfarin. Patient also seen by Rheumatology for Takayasu arteritis. On CTA, patient was found to have stable vasucltic disease with no acute changes  Rheumatology recommended c/w prednisone, methotrexate and Actemra( as outpatient, received last dose on Friday). She was started back on her home dose metoprolol of 100mg BID    Patient is currently afebrile, hemodynamically stable, and ready for discharge with close f/u with EP and Rheumatology.

## 2021-11-14 ENCOUNTER — TRANSCRIPTION ENCOUNTER (OUTPATIENT)
Age: 51
End: 2021-11-14

## 2021-11-14 ENCOUNTER — NON-APPOINTMENT (OUTPATIENT)
Age: 51
End: 2021-11-14

## 2021-11-14 VITALS
RESPIRATION RATE: 16 BRPM | TEMPERATURE: 98 F | HEART RATE: 65 BPM | OXYGEN SATURATION: 99 % | DIASTOLIC BLOOD PRESSURE: 65 MMHG | SYSTOLIC BLOOD PRESSURE: 135 MMHG

## 2021-11-14 LAB
ANION GAP SERPL CALC-SCNC: 12 MMOL/L — SIGNIFICANT CHANGE UP (ref 7–14)
BUN SERPL-MCNC: 11 MG/DL — SIGNIFICANT CHANGE UP (ref 7–23)
CALCIUM SERPL-MCNC: 9.4 MG/DL — SIGNIFICANT CHANGE UP (ref 8.4–10.5)
CHLORIDE SERPL-SCNC: 102 MMOL/L — SIGNIFICANT CHANGE UP (ref 98–107)
CO2 SERPL-SCNC: 23 MMOL/L — SIGNIFICANT CHANGE UP (ref 22–31)
COVID-19 NUCLEOCAPSID GAM AB INTERP: NEGATIVE — SIGNIFICANT CHANGE UP
COVID-19 NUCLEOCAPSID TOTAL GAM ANTIBODY RESULT: 0.07 INDEX — SIGNIFICANT CHANGE UP
CREAT SERPL-MCNC: 0.78 MG/DL — SIGNIFICANT CHANGE UP (ref 0.5–1.3)
GLUCOSE SERPL-MCNC: 98 MG/DL — SIGNIFICANT CHANGE UP (ref 70–99)
MAGNESIUM SERPL-MCNC: 2.5 MG/DL — SIGNIFICANT CHANGE UP (ref 1.6–2.6)
PHOSPHATE SERPL-MCNC: 4.1 MG/DL — SIGNIFICANT CHANGE UP (ref 2.5–4.5)
POTASSIUM SERPL-MCNC: 4.6 MMOL/L — SIGNIFICANT CHANGE UP (ref 3.5–5.3)
POTASSIUM SERPL-SCNC: 4.6 MMOL/L — SIGNIFICANT CHANGE UP (ref 3.5–5.3)
SARS-COV-2 IGG+IGM SERPL QL IA: 0.07 INDEX — SIGNIFICANT CHANGE UP
SARS-COV-2 IGG+IGM SERPL QL IA: NEGATIVE — SIGNIFICANT CHANGE UP
SODIUM SERPL-SCNC: 137 MMOL/L — SIGNIFICANT CHANGE UP (ref 135–145)
TRANSFERRIN SERPL-MCNC: 273 MG/DL — SIGNIFICANT CHANGE UP (ref 200–360)

## 2021-11-14 PROCEDURE — 71275 CT ANGIOGRAPHY CHEST: CPT | Mod: 26

## 2021-11-14 PROCEDURE — 74174 CTA ABD&PLVS W/CONTRAST: CPT | Mod: 26

## 2021-11-14 PROCEDURE — 99239 HOSP IP/OBS DSCHRG MGMT >30: CPT

## 2021-11-14 RX ORDER — SIMETHICONE 80 MG/1
80 TABLET, CHEWABLE ORAL ONCE
Refills: 0 | Status: COMPLETED | OUTPATIENT
Start: 2021-11-14 | End: 2021-11-14

## 2021-11-14 RX ADMIN — Medication 81 MILLIGRAM(S): at 12:51

## 2021-11-14 RX ADMIN — Medication 75 MILLIGRAM(S): at 12:55

## 2021-11-14 RX ADMIN — Medication 2 MILLIGRAM(S): at 12:51

## 2021-11-14 RX ADMIN — PANTOPRAZOLE SODIUM 40 MILLIGRAM(S): 20 TABLET, DELAYED RELEASE ORAL at 06:09

## 2021-11-14 RX ADMIN — Medication 1 MILLIGRAM(S): at 12:51

## 2021-11-14 NOTE — PROGRESS NOTE ADULT - ATTENDING COMMENTS
Aflutter w/ RVR s/p cardioversion and remains in SR, c/w Metoprolol and Cardizem, dose Coumadin for INR goal 2-3   Chronic systolic heart failure, TTE w/ moderate global LV systolic dysfunction/severe LVH/dilated LA, s/p Lasix, appears euvolemic, c/w Metoprolol   Takayasu's arteritis, f/up CTA C/A/P, c/w Prednisone, c/w Methotrexate weekly   HTN, c/w Cardizem/Metoprolol, monitor BP Aflutter w/ RVR s/p cardioversion and remains in SR, c/w Metoprolol, dose Coumadin for INR goal 2-3   Chronic systolic heart failure, TTE w/ moderate global LV systolic dysfunction/severe LVH/dilated LA, s/p Lasix, appears euvolemic, c/w Metoprolol   Takayasu's arteritis, f/up CTA C/A/P, c/w Prednisone, c/w Methotrexate weekly   HTN, c/w Cardizem/Metoprolol, monitor BP  DC home pending CT scans, d/c time 32 minutes

## 2021-11-14 NOTE — PROGRESS NOTE ADULT - ASSESSMENT
52 yo F pmh Takayasu arteritis dg 2015 c/b VT s/p ICD 2015, severe AVR/MVR s/p Bentall procedure w/ CABG x 1 (SVG-LAD) and bioprosthetic MVR 2017, and paroxysmal Afib on coumadin presented w acute on chronic diastolic CHF w preserved EF, and in Afibb with RVR. Pt is pending cardioversion today. 

## 2021-11-14 NOTE — PROGRESS NOTE ADULT - PROBLEM SELECTOR PLAN 3
- ADHF: HFpEF last echo 2020: 60% Teichholz also s/p AICD   - ROBERTA result formal report to f/u, but prelim report shows findings:   LV not well visualized.  Normal biomitral valve  normal bio aortic valve  Proximal aortic root composite graft (Bental)  JAROD oversewn without flow to LA  DCCV x 1 at 200 J performed immediately post ROBERTA with restoration of Sinus bradycardia/ventricular paced rhythm.

## 2021-11-14 NOTE — PROGRESS NOTE ADULT - PROBLEM SELECTOR PLAN 4
ddx 2015 c/b vt s/p AICD   - Noted PET CT 2021 showing worsening vasculitis in aorta   - on prednisone 2mg qd, 8 x 2.5mg MTX once a week (Friday), and Tocilizumab once a week (unknown last dose)    - Was pending CTA Chest, abd pelvis for Aorta outpatient;  - Rheumatology consulted, recommended CT angio abd/pel/chest inpatient while pt is here

## 2021-11-14 NOTE — PROGRESS NOTE ADULT - PROBLEM SELECTOR PLAN 5
SBP: 120/90   - c/w metoprolol, hold diuresis

## 2021-11-14 NOTE — DISCHARGE NOTE NURSING/CASE MANAGEMENT/SOCIAL WORK - PATIENT PORTAL LINK FT
You can access the FollowMyHealth Patient Portal offered by [facility] by registering at the following website: [email or web address]. By joining [facility]’s FollowMyHealth portal, you will also be able to view your health information using other applications (apps) compatible with our system.

## 2021-11-14 NOTE — PROGRESS NOTE ADULT - SUBJECTIVE AND OBJECTIVE BOX
Patient is a 51y old  Female who presents with a chief complaint of Afib with RVR (13 Nov 2021 12:47)      SUBJECTIVE / OVERNIGHT EVENTS: No acute events overnight     MEDICATIONS  (STANDING):  aspirin  chewable 81 milliGRAM(s) Oral daily  atorvastatin 40 milliGRAM(s) Oral at bedtime  folic acid 1 milliGRAM(s) Oral daily  influenza   Vaccine 0.5 milliLiter(s) IntraMuscular once  metoprolol tartrate 75 milliGRAM(s) Oral every 8 hours  pantoprazole    Tablet 40 milliGRAM(s) Oral before breakfast  polyethylene glycol 3350 17 Gram(s) Oral daily  predniSONE   Tablet 2 milliGRAM(s) Oral daily    MEDICATIONS  (PRN):  diltiazem    Tablet 30 milliGRAM(s) Oral every 6 hours PRN Tachycardia >110      Vital Signs Last 24 Hrs  T(C): 36.8 (14 Nov 2021 06:08), Max: 36.8 (14 Nov 2021 06:08)  T(F): 98.3 (14 Nov 2021 06:08), Max: 98.3 (14 Nov 2021 06:08)  HR: 54 (14 Nov 2021 06:08) (54 - 68)  BP: 134/55 (14 Nov 2021 06:08) (132/60 - 147/70)  BP(mean): --  RR: 16 (14 Nov 2021 06:08) (16 - 16)  SpO2: 100% (14 Nov 2021 06:08) (100% - 100%)  CAPILLARY BLOOD GLUCOSE        I&O's Summary      PHYSICAL EXAM:  GENERAL: NAD, well-developed  HEAD:  Atraumatic, Normocephalic  EYES: EOMI, PERRLA, conjunctiva and sclera clear  NECK: Supple, No JVD  CHEST/LUNG: Clear to auscultation bilaterally; No wheeze  HEART: Regular rate and rhythm; No murmurs, rubs, or gallops  ABDOMEN: Soft, Nontender, Nondistended; Bowel sounds present  EXTREMITIES:  2+ Peripheral Pulses, No clubbing, cyanosis, or edema  PSYCH: AAOx3  NEUROLOGY: non-focal  SKIN: No rashes or lesions    LABS:                        11.3   4.62  )-----------( 174      ( 13 Nov 2021 07:25 )             33.0     11-13    135  |  102  |  16  ----------------------------<  84  4.0   |  24  |  0.75    Ca    9.0      13 Nov 2021 07:25  Phos  3.6     11-13  Mg     2.30     11-13      PT/INR - ( 13 Nov 2021 07:25 )   PT: 24.7 sec;   INR: 2.23 ratio         PTT - ( 13 Nov 2021 07:25 )  PTT:37.6 sec              RADIOLOGY & ADDITIONAL TESTS:    Imaging Personally Reviewed:    Consultant(s) Notes Reviewed:      Care Discussed with Consultants/Other Providers:

## 2021-11-14 NOTE — PROGRESS NOTE ADULT - PROBLEM SELECTOR PLAN 2
Patient w/ CHF HFpEF w/ last known EF: 12/2020: 60% Teichholz; trops: 90->80  - Home lasix: 40mg po; hold off further diuresis (per recommendation from Cardiology team)   - Trop 93 --> 87  - Daily Weight, Strict I and Os, BMP QD;p K>4, Mg>2  - f/u AM lab-work, including TSH level

## 2021-11-14 NOTE — PROGRESS NOTE ADULT - PROBLEM SELECTOR PLAN 1
- Brought in from [facility] for Afib with rvr/ Aflutter; still in 140s  - NPO after MN, plan for cardioversion vs ablation   - metoprolol 75mg q8h; starting diltiazem 30 po q6h prn <110 as recommended by Cardiology team  - has history of Atrial fibrillation; CHADsVASC: 2: on warfarin, INR: 2.64 within goal: has not taken warfarin today; schedule is 4.5mg Monday, 3mg the other days. Will give warfarin 3mg today  - EP interrogation normal  - s/p ROBERTA and cardioversion now converted to NSR  - Pending CTA to assess for disease progression

## 2021-11-16 ENCOUNTER — NON-APPOINTMENT (OUTPATIENT)
Age: 51
End: 2021-11-16

## 2021-12-06 ENCOUNTER — APPOINTMENT (OUTPATIENT)
Dept: INTERNAL MEDICINE | Facility: CLINIC | Age: 51
End: 2021-12-06

## 2021-12-06 ENCOUNTER — RESULT REVIEW (OUTPATIENT)
Age: 51
End: 2021-12-06

## 2021-12-06 ENCOUNTER — APPOINTMENT (OUTPATIENT)
Dept: RHEUMATOLOGY | Facility: CLINIC | Age: 51
End: 2021-12-06

## 2021-12-06 ENCOUNTER — OUTPATIENT (OUTPATIENT)
Dept: OUTPATIENT SERVICES | Facility: HOSPITAL | Age: 51
LOS: 1 days | End: 2021-12-06

## 2021-12-06 ENCOUNTER — APPOINTMENT (OUTPATIENT)
Dept: RHEUMATOLOGY | Facility: CLINIC | Age: 51
End: 2021-12-06
Payer: MEDICAID

## 2021-12-06 VITALS — HEART RATE: 84 BPM | RESPIRATION RATE: 16 BRPM | OXYGEN SATURATION: 99 %

## 2021-12-06 DIAGNOSIS — Z98.89 OTHER SPECIFIED POSTPROCEDURAL STATES: Chronic | ICD-10-CM

## 2021-12-06 DIAGNOSIS — Z95.0 PRESENCE OF CARDIAC PACEMAKER: Chronic | ICD-10-CM

## 2021-12-06 DIAGNOSIS — Z79.01 LONG TERM (CURRENT) USE OF ANTICOAGULANTS: ICD-10-CM

## 2021-12-06 DIAGNOSIS — I77.6 ARTERITIS, UNSPECIFIED: ICD-10-CM

## 2021-12-06 DIAGNOSIS — Z79.899 OTHER LONG TERM (CURRENT) DRUG THERAPY: ICD-10-CM

## 2021-12-06 DIAGNOSIS — Z95.2 PRESENCE OF PROSTHETIC HEART VALVE: ICD-10-CM

## 2021-12-06 DIAGNOSIS — Z98.891 HISTORY OF UTERINE SCAR FROM PREVIOUS SURGERY: Chronic | ICD-10-CM

## 2021-12-06 DIAGNOSIS — M31.4 AORTIC ARCH SYNDROME [TAKAYASU]: ICD-10-CM

## 2021-12-06 LAB
INR PPP: 3.2 RATIO
POCT-PROTHROMBIN TIME: 38.3 SECS
QUALITY CONTROL: YES

## 2021-12-06 PROCEDURE — 99214 OFFICE O/P EST MOD 30 MIN: CPT | Mod: GC

## 2021-12-06 RX ORDER — FUROSEMIDE 40 MG/1
40 TABLET ORAL
Qty: 180 | Refills: 2 | Status: DISCONTINUED | COMMUNITY
Start: 2021-01-05 | End: 2021-12-06

## 2021-12-06 RX ORDER — FUROSEMIDE 40 MG/1
40 TABLET ORAL TWICE DAILY
Qty: 180 | Refills: 3 | Status: DISCONTINUED | COMMUNITY
Start: 2021-12-06 | End: 2021-12-06

## 2021-12-06 NOTE — ASSESSMENT
[FreeTextEntry1] : 49 Y F with Takayasu arteritis. 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  \par Currently on MTX 17.5 mg weekly and prednisone 2 mg daily. \par Self DCed Actemra since 2019 as she was lost to f/u.\par PET CT in 2021 showing new areas of uptake in ascending thoracic aorta, new compared to previous CT in 2018. \par recent admission for afib w/ RVR s/p TTE with cardioversion.admit at [facility] 11/12-11/14 for afib w/ RVR s/p TTE with cardioversion.\par CTA 11/2021 of c/a/p obtained this admission redemonstrated vasculitic disease of the aorta and great vessels. The aortic arch has increased in dilation measuring 2.9 cm, previously 2.6 cm (measured on CT 12/14/2018). \par \par # Livedoid skin changes of  LE bilat\par # Left knee OA, symptoms well controlled. \par \par # A fib / AVR\par On Coumadin managed by Coumadin clinic \par \par  \par Labs.\par In view of new areas of vasculitis in ascending aorta and redemonstration of vasculitic disease of the aorta and great vessels on CTA 11/2021 - will escalate therapy, re-start Actemra - script re-sent to [facility]. \par Continue MTX 20 mg weekly and daily prednisone 2 mg. \par  \par \par DW Dr Horowitz. \par \par  \par

## 2021-12-06 NOTE — HISTORY OF PRESENT ILLNESS
[FreeTextEntry1] : She has no new complaints : no chest pain or shortness of breath , no extremities claudication , \par Currently on 2mg Prednisone daily and MTX 20 mg a week

## 2021-12-06 NOTE — PHYSICAL EXAM
[General Appearance - Alert] : alert [General Appearance - In No Acute Distress] : in no acute distress [General Appearance - Well Nourished] : well nourished [General Appearance - Well Developed] : well developed [PERRL With Normal Accommodation] : pupils were equal in size, round, and reactive to light [Examination Of The Oral Cavity] : the lips and gums were normal [Oropharynx] : the oropharynx was normal [Respiration, Rhythm And Depth] : normal respiratory rhythm and effort [Auscultation Breath Sounds / Voice Sounds] : lungs were clear to auscultation bilaterally [Heart Rate And Rhythm] : heart rate was normal and rhythm regular [Heart Sounds] : normal S1 and S2 [Edema] : there was no peripheral edema [Bowel Sounds] : normal bowel sounds [Abdomen Soft] : soft [Abdomen Tenderness] : non-tender [No Spinal Tenderness] : no spinal tenderness [Nail Clubbing] : no clubbing  or cyanosis of the fingernails [Musculoskeletal - Swelling] : no joint swelling seen [Motor Tone] : muscle strength and tone were normal [] : no rash [No Focal Deficits] : no focal deficits [Oriented To Time, Place, And Person] : oriented to person, place, and time [Impaired Insight] : insight and judgment were intact [FreeTextEntry1] : No synovitis

## 2021-12-06 NOTE — REASON FOR VISIT
[Post Hospitalization] : a post hospitalization visit [FreeTextEntry1] : admit at [facility] 11/12-11/14 for afib w/ RVR s/p TTE with cardioversion.admit at [facility] 11/12-11/14 for afib w/ RVR s/p TTE with cardioversion.

## 2021-12-07 DIAGNOSIS — Z95.1 PRESENCE OF AORTOCORONARY BYPASS GRAFT: ICD-10-CM

## 2021-12-07 DIAGNOSIS — Z95.2 PRESENCE OF PROSTHETIC HEART VALVE: ICD-10-CM

## 2021-12-07 DIAGNOSIS — M31.6 OTHER GIANT CELL ARTERITIS: ICD-10-CM

## 2021-12-09 ENCOUNTER — APPOINTMENT (OUTPATIENT)
Dept: CARDIOLOGY | Facility: HOSPITAL | Age: 51
End: 2021-12-09

## 2021-12-09 ENCOUNTER — NON-APPOINTMENT (OUTPATIENT)
Age: 51
End: 2021-12-09

## 2021-12-09 VITALS — HEART RATE: 50 BPM | RESPIRATION RATE: 16 BRPM | OXYGEN SATURATION: 98 %

## 2021-12-09 VITALS — SYSTOLIC BLOOD PRESSURE: 115 MMHG | DIASTOLIC BLOOD PRESSURE: 58 MMHG

## 2021-12-09 NOTE — PHYSICAL EXAM
[Well Developed] : well developed [Well Nourished] : well nourished [No Acute Distress] : no acute distress [Normal Conjunctiva] : normal conjunctiva [Normal Venous Pressure] : normal venous pressure [No Carotid Bruit] : no carotid bruit [Normal S1, S2] : normal S1, S2 [No Murmur] : no murmur [Clear Lung Fields] : clear lung fields [Timo ___] : timo StevensV [Soft] : abdomen soft [Non Tender] : non-tender [No Masses/organomegaly] : no masses/organomegaly [Normal Bowel Sounds] : normal bowel sounds [Normal Gait] : normal gait [No Edema] : no edema [No Cyanosis] : no cyanosis [No Clubbing] : no clubbing [No Varicosities] : no varicosities [No Rash] : no rash [No Skin Lesions] : no skin lesions [Moves all extremities] : moves all extremities [No Focal Deficits] : no focal deficits [Normal Speech] : normal speech [Alert and Oriented] : alert and oriented [Normal memory] : normal memory [General Appearance - Well Developed] : well developed [General Appearance - Well Nourished] : well nourished [JVD Elevated _____cm] : JVD elevated [unfilled] ~U cm above clavicle [] : no respiratory distress [Respiration, Rhythm And Depth] : normal respiratory rhythm and effort [Heart Rate And Rhythm] : heart rate and rhythm were normal [Heart Sounds] : normal S1 and S2 [Edema] : no peripheral edema present [Systolic grade ___/6] : A grade [unfilled]/6 systolic murmur was heard. [Diastolic Grade ___/4] : A grade [unfilled]/4 diastolic murmur was heard. [Bowel Sounds] : normal bowel sounds [Abdomen Soft] : soft [Abdomen Tenderness] : non-tender [Nail Clubbing] : no clubbing of the fingernails [Skin Color & Pigmentation] : normal skin color and pigmentation [Oriented To Time, Place, And Person] : oriented to person, place, and time [Affect] : the affect was normal [Mood] : the mood was normal

## 2021-12-09 NOTE — REVIEW OF SYSTEMS
[SOB] : shortness of breath [Dyspnea on exertion] : dyspnea during exertion [Palpitations] : palpitations [Orthopnea] : orthopnea [PND] : PND [Cough] : cough [Negative] : Heme/Lymph

## 2021-12-10 NOTE — HISTORY OF PRESENT ILLNESS
[FreeTextEntry1] : 50 yo lady PMHx of Takayasu aortitis, severe AVR/MVR s/p Bentall procedure w/ CABG x 1 (SVG-LAD) and MVR, paroxysmal Afib on coumadin, HFpEF, and VT s/p Medtronic ICD who presents for f/u. \par \par Recently seen at clinic 11/11/21. Was in symptomatic Afib RVR. Sent to the ED and was admitted s/p ROBERTA + successful DCCV. ROBERTA showed new HFrEF w/ EF 43%. Also had CT angio aorta revealing active vasculitic disease. Upon dc, f/u with rheum 12/06, on MTX 20mg weekly, prednisone 2mg daily, and was started on tocilizumab. \par \par Today, she reports feeling well w/o any ROS findings with good exercise tolerance.

## 2021-12-10 NOTE — DISCUSSION/SUMMARY
[FreeTextEntry1] : 50 yo lady PMHx of Takayasu aortitis, severe AVR/MVR s/p Bentall procedure w/ CABG x 1 (SVG-LAD) and MVR, paroxysmal Afib on coumadin, HFpEF, and VT s/p Medtronic ICD who presents for f/u. \par \par Recently seen at clinic 11/11/21. Was in symptomatic Afib RVR. Sent to the ED and was admitted s/p ROBERTA + successful DCCV. ROBERTA showed new HFrEF w/ EF 43%. Also had CT angio aorta revealing active vasculitic disease. Upon dc, f/u with rheum 12/06, on MTX 20mg weekly, prednisone 2mg daily, and was started on tocilizumab. \par \par #Takayasu aortitis, severe AVR/MVR s/p Bentall procedure w/ CABG x 1 (SVG-LAD), \par - On methotrexate, prednisone, and tocilizumab, management as per rheum\par - C/w ASA 81mg daily and lipitor 40mg qhs\par \par #HFrEF, NICM          \par - Compensated and euvolemic, c/w lasix PO 80mg BID\par - Obtain repeat TTE, if with HFrEF, will start ACE/ARB/ARNI and MRA on next clinic visit \par \par #Paroxysmal Afib\par - EKG today\par - C/w metoprolol tartrate 100 BID\par - C/w warfarin for systemic embolization prevention\par \par #VT s/p ICD\par - Last interrogation April/2021: no ICD shocks or VT/VF\par - C/w metoprolol 100 BID\par \par Discussed w/ Dr. Reyes\par \par F/u in 1-2 mo\par \par Signed by\par Kimberlyn Xavier MD\par PGY5 Cardiology

## 2021-12-10 NOTE — END OF VISIT
[] : Fellow [FreeTextEntry3] : The patient is a 51-year-old woman with Takayasu aortitis, Bentall procedure, 1v CABG, MVR, paroxysmal atrial fibrillation, and VT with ICD who presents for routine follow-up. She would have high out-of-pocket costs with DOAC, so will continue warfarin for anticoagulation.

## 2021-12-13 ENCOUNTER — APPOINTMENT (OUTPATIENT)
Dept: INTERNAL MEDICINE | Facility: CLINIC | Age: 51
End: 2021-12-13

## 2021-12-13 ENCOUNTER — OUTPATIENT (OUTPATIENT)
Dept: OUTPATIENT SERVICES | Facility: HOSPITAL | Age: 51
LOS: 1 days | End: 2021-12-13

## 2021-12-13 VITALS
SYSTOLIC BLOOD PRESSURE: 110 MMHG | OXYGEN SATURATION: 99 % | HEART RATE: 50 BPM | BODY MASS INDEX: 23 KG/M2 | WEIGHT: 121.8 LBS | DIASTOLIC BLOOD PRESSURE: 60 MMHG | HEIGHT: 61 IN | TEMPERATURE: 97.5 F

## 2021-12-13 DIAGNOSIS — Z98.89 OTHER SPECIFIED POSTPROCEDURAL STATES: Chronic | ICD-10-CM

## 2021-12-13 DIAGNOSIS — Z79.01 LONG TERM (CURRENT) USE OF ANTICOAGULANTS: ICD-10-CM

## 2021-12-13 DIAGNOSIS — Z98.891 HISTORY OF UTERINE SCAR FROM PREVIOUS SURGERY: Chronic | ICD-10-CM

## 2021-12-13 DIAGNOSIS — Z95.0 PRESENCE OF CARDIAC PACEMAKER: Chronic | ICD-10-CM

## 2021-12-13 LAB
INR PPP: 2.3 RATIO
POCT-PROTHROMBIN TIME: 28.1 SECS
QUALITY CONTROL: YES

## 2021-12-16 ENCOUNTER — APPOINTMENT (OUTPATIENT)
Dept: ELECTROPHYSIOLOGY | Facility: CLINIC | Age: 51
End: 2021-12-16

## 2021-12-27 ENCOUNTER — APPOINTMENT (OUTPATIENT)
Dept: INTERNAL MEDICINE | Facility: CLINIC | Age: 51
End: 2021-12-27

## 2021-12-27 ENCOUNTER — OUTPATIENT (OUTPATIENT)
Dept: OUTPATIENT SERVICES | Facility: HOSPITAL | Age: 51
LOS: 1 days | End: 2021-12-27

## 2021-12-27 ENCOUNTER — RESULT CHARGE (OUTPATIENT)
Age: 51
End: 2021-12-27

## 2021-12-27 DIAGNOSIS — Z98.891 HISTORY OF UTERINE SCAR FROM PREVIOUS SURGERY: Chronic | ICD-10-CM

## 2021-12-27 DIAGNOSIS — I35.1 NONRHEUMATIC AORTIC (VALVE) INSUFFICIENCY: ICD-10-CM

## 2021-12-27 DIAGNOSIS — Z79.01 LONG TERM (CURRENT) USE OF ANTICOAGULANTS: ICD-10-CM

## 2021-12-27 DIAGNOSIS — Z98.89 OTHER SPECIFIED POSTPROCEDURAL STATES: Chronic | ICD-10-CM

## 2021-12-27 DIAGNOSIS — I50.22 CHRONIC SYSTOLIC (CONGESTIVE) HEART FAILURE: ICD-10-CM

## 2021-12-27 DIAGNOSIS — Z95.0 PRESENCE OF CARDIAC PACEMAKER: Chronic | ICD-10-CM

## 2021-12-29 ENCOUNTER — APPOINTMENT (OUTPATIENT)
Dept: CV DIAGNOSITCS | Facility: HOSPITAL | Age: 51
End: 2021-12-29

## 2021-12-29 LAB
INR PPP: 1.8 RATIO
POCT-PROTHROMBIN TIME: 21.9 SECS
QUALITY CONTROL: YES

## 2022-01-05 ENCOUNTER — RESULT REVIEW (OUTPATIENT)
Age: 52
End: 2022-01-05

## 2022-01-05 ENCOUNTER — RESULT CHARGE (OUTPATIENT)
Age: 52
End: 2022-01-05

## 2022-01-05 ENCOUNTER — APPOINTMENT (OUTPATIENT)
Dept: INTERNAL MEDICINE | Facility: CLINIC | Age: 52
End: 2022-01-05
Payer: MEDICAID

## 2022-01-05 ENCOUNTER — OUTPATIENT (OUTPATIENT)
Dept: OUTPATIENT SERVICES | Facility: HOSPITAL | Age: 52
LOS: 1 days | End: 2022-01-05

## 2022-01-05 VITALS
BODY MASS INDEX: 21.71 KG/M2 | OXYGEN SATURATION: 99 % | HEART RATE: 53 BPM | SYSTOLIC BLOOD PRESSURE: 122 MMHG | DIASTOLIC BLOOD PRESSURE: 68 MMHG | TEMPERATURE: 97.8 F | HEIGHT: 61 IN | WEIGHT: 115 LBS

## 2022-01-05 DIAGNOSIS — Z98.89 OTHER SPECIFIED POSTPROCEDURAL STATES: Chronic | ICD-10-CM

## 2022-01-05 DIAGNOSIS — Z95.0 PRESENCE OF CARDIAC PACEMAKER: Chronic | ICD-10-CM

## 2022-01-05 DIAGNOSIS — Z98.891 HISTORY OF UTERINE SCAR FROM PREVIOUS SURGERY: Chronic | ICD-10-CM

## 2022-01-05 DIAGNOSIS — I25.10 ATHEROSCLEROTIC HEART DISEASE OF NATIVE CORONARY ARTERY W/OUT ANGINA PECTORIS: ICD-10-CM

## 2022-01-05 DIAGNOSIS — R42 DIZZINESS AND GIDDINESS: ICD-10-CM

## 2022-01-05 LAB
24R-OH-CALCIDIOL SERPL-MCNC: 13.6 NG/ML — LOW (ref 30–80)
A1C WITH ESTIMATED AVERAGE GLUCOSE RESULT: 6.1 % — HIGH (ref 4–5.6)
ALBUMIN SERPL ELPH-MCNC: 4.8 G/DL — SIGNIFICANT CHANGE UP (ref 3.3–5)
ALP SERPL-CCNC: 78 U/L — SIGNIFICANT CHANGE UP (ref 40–120)
ALT FLD-CCNC: 42 U/L — HIGH (ref 4–33)
ANION GAP SERPL CALC-SCNC: 12 MMOL/L — SIGNIFICANT CHANGE UP (ref 7–14)
AST SERPL-CCNC: 51 U/L — HIGH (ref 4–32)
BASOPHILS # BLD AUTO: 0.04 K/UL — SIGNIFICANT CHANGE UP (ref 0–0.2)
BASOPHILS NFR BLD AUTO: 1.1 % — SIGNIFICANT CHANGE UP (ref 0–2)
BILIRUB SERPL-MCNC: 1.2 MG/DL — SIGNIFICANT CHANGE UP (ref 0.2–1.2)
BUN SERPL-MCNC: 16 MG/DL — SIGNIFICANT CHANGE UP (ref 7–23)
CALCIUM SERPL-MCNC: 9.8 MG/DL — SIGNIFICANT CHANGE UP (ref 8.4–10.5)
CHLORIDE SERPL-SCNC: 99 MMOL/L — SIGNIFICANT CHANGE UP (ref 98–107)
CHOLEST SERPL-MCNC: 231 MG/DL — HIGH
CO2 SERPL-SCNC: 31 MMOL/L — SIGNIFICANT CHANGE UP (ref 22–31)
CREAT SERPL-MCNC: 0.76 MG/DL — SIGNIFICANT CHANGE UP (ref 0.5–1.3)
EOSINOPHIL # BLD AUTO: 0.18 K/UL — SIGNIFICANT CHANGE UP (ref 0–0.5)
EOSINOPHIL NFR BLD AUTO: 4.9 % — SIGNIFICANT CHANGE UP (ref 0–6)
ESTIMATED AVERAGE GLUCOSE: 128 — SIGNIFICANT CHANGE UP
GLUCOSE SERPL-MCNC: 106 MG/DL — HIGH (ref 70–99)
HCT VFR BLD CALC: 43.1 % — SIGNIFICANT CHANGE UP (ref 34.5–45)
HDLC SERPL-MCNC: 63 MG/DL — SIGNIFICANT CHANGE UP
HGB BLD-MCNC: 14.2 G/DL — SIGNIFICANT CHANGE UP (ref 11.5–15.5)
IANC: 2.22 K/UL — SIGNIFICANT CHANGE UP (ref 1.5–8.5)
IMM GRANULOCYTES NFR BLD AUTO: 0 % — SIGNIFICANT CHANGE UP (ref 0–1.5)
LIPID PNL WITH DIRECT LDL SERPL: 133 MG/DL — HIGH
LYMPHOCYTES # BLD AUTO: 0.84 K/UL — LOW (ref 1–3.3)
LYMPHOCYTES # BLD AUTO: 22.6 % — SIGNIFICANT CHANGE UP (ref 13–44)
MCHC RBC-ENTMCNC: 32.9 GM/DL — SIGNIFICANT CHANGE UP (ref 32–36)
MCHC RBC-ENTMCNC: 33.2 PG — SIGNIFICANT CHANGE UP (ref 27–34)
MCV RBC AUTO: 100.7 FL — HIGH (ref 80–100)
MONOCYTES # BLD AUTO: 0.43 K/UL — SIGNIFICANT CHANGE UP (ref 0–0.9)
MONOCYTES NFR BLD AUTO: 11.6 % — SIGNIFICANT CHANGE UP (ref 2–14)
NEUTROPHILS # BLD AUTO: 2.22 K/UL — SIGNIFICANT CHANGE UP (ref 1.8–7.4)
NEUTROPHILS NFR BLD AUTO: 59.8 % — SIGNIFICANT CHANGE UP (ref 43–77)
NON HDL CHOLESTEROL: 168 MG/DL — HIGH
NRBC # BLD: 0 /100 WBCS — SIGNIFICANT CHANGE UP
NRBC # FLD: 0 K/UL — SIGNIFICANT CHANGE UP
PLATELET # BLD AUTO: 202 K/UL — SIGNIFICANT CHANGE UP (ref 150–400)
POTASSIUM SERPL-MCNC: 3.8 MMOL/L — SIGNIFICANT CHANGE UP (ref 3.5–5.3)
POTASSIUM SERPL-SCNC: 3.8 MMOL/L — SIGNIFICANT CHANGE UP (ref 3.5–5.3)
PROT SERPL-MCNC: 7.4 G/DL — SIGNIFICANT CHANGE UP (ref 6–8.3)
RBC # BLD: 4.28 M/UL — SIGNIFICANT CHANGE UP (ref 3.8–5.2)
RBC # FLD: 16.1 % — HIGH (ref 10.3–14.5)
SODIUM SERPL-SCNC: 142 MMOL/L — SIGNIFICANT CHANGE UP (ref 135–145)
TRIGL SERPL-MCNC: 173 MG/DL — HIGH
WBC # BLD: 3.71 K/UL — LOW (ref 3.8–10.5)
WBC # FLD AUTO: 3.71 K/UL — LOW (ref 3.8–10.5)

## 2022-01-05 PROCEDURE — 99396 PREV VISIT EST AGE 40-64: CPT | Mod: GE

## 2022-01-06 PROBLEM — R42 ORTHOSTATIC LIGHTHEADEDNESS: Status: ACTIVE | Noted: 2022-01-06

## 2022-01-06 PROBLEM — I25.10 CORONARY ATHEROSCLEROSIS OF NATIVE CORONARY VESSEL: Status: ACTIVE | Noted: 2017-04-12

## 2022-01-07 DIAGNOSIS — M31.4 AORTIC ARCH SYNDROME [TAKAYASU]: ICD-10-CM

## 2022-01-07 DIAGNOSIS — I50.22 CHRONIC SYSTOLIC (CONGESTIVE) HEART FAILURE: ICD-10-CM

## 2022-01-07 DIAGNOSIS — I25.10 ATHEROSCLEROTIC HEART DISEASE OF NATIVE CORONARY ARTERY WITHOUT ANGINA PECTORIS: ICD-10-CM

## 2022-01-07 DIAGNOSIS — Z51.81 ENCOUNTER FOR THERAPEUTIC DRUG LEVEL MONITORING: ICD-10-CM

## 2022-01-07 DIAGNOSIS — R42 DIZZINESS AND GIDDINESS: ICD-10-CM

## 2022-01-07 DIAGNOSIS — M54.50 LOW BACK PAIN, UNSPECIFIED: ICD-10-CM

## 2022-01-07 DIAGNOSIS — M31.6 OTHER GIANT CELL ARTERITIS: ICD-10-CM

## 2022-01-07 DIAGNOSIS — Z79.01 LONG TERM (CURRENT) USE OF ANTICOAGULANTS: ICD-10-CM

## 2022-01-07 DIAGNOSIS — I48.91 UNSPECIFIED ATRIAL FIBRILLATION: ICD-10-CM

## 2022-01-07 DIAGNOSIS — Z00.00 ENCOUNTER FOR GENERAL ADULT MEDICAL EXAMINATION WITHOUT ABNORMAL FINDINGS: ICD-10-CM

## 2022-01-07 LAB
INR PPP: 2 RATIO
POCT-PROTHROMBIN TIME: 23.8 SECS
QUALITY CONTROL: YES

## 2022-01-07 NOTE — HISTORY OF PRESENT ILLNESS
[Patient Declined  Services] : - None: Patient declined  services [FreeTextEntry1] : CPE [TWNoteComboBox1] : [place] [de-identified] : 51F PMH Takayasu arteritis, pAfib/sustained VT on coumadin s/p ICD placement, nonobstructive CAD (20% pLAD and RCA in 2015), pAfib (on warfarin), HCM, VT (s/p AICD Medtronic 12/2016), mod-severe AR and bileaflet MVP with mod-severe MR s/p 8/17 AVR(T), MVR (T), CABG x1, Bentall, RF ablation  who presents for CPE.\par \par Interim:\par - Patient lost to follow up since 2020 due to COVID pandemic. But patient has been following with cardiology and rheumatology regularly during this time.\par - Hospitalized 11/2021 for Afib with RVR detected in cardiology office, s/p ROBERTA + successful DCCV. ROBERTA showed new HFrEF w/ EF 43%.\par \par #Takayasu arteritis with involvement proximal large arteries. (Brachiocephalic artery, subclavian arteries, right and left common carotid arteries involvement since 2015.)\par - CTA 11/2021 of c/a/p obtained during hospitalization redemonstrated vasculitic disease of the aorta and great vessels. The aortic arch has increased in dilation measuring 2.9 cm, previously 2.6 cm (measured on CT 12/14/2018). \par - Currently on MTX 17.5 mg weekly and prednisone 2 mg daily.\par - Self DC'ed Actemra since 2019 as she was lost to f/u. Re-started Actemra Dec 2021 by rheum given enlarging involvement on CT.\par - She reports she got Actemra (Tocilizumab) injection on 12/16, 23. But experiencing delays in receiving medication, has yet to receive her third injection in the mail. Frustrated by the delays (which she reports happened back in 2019 as well)\par - Takes Aspirin 81mg, Lipitor 40mg QHS\par \par # Afib/ AVR/sustained VT s/p AICD \par - Reports having hx of ICD delivered shocks about 5 times now. Has a lot of residual trauma from it.\par - Reports occasional, out of the blue, hard to describe chest discomfort along with feeling of slight anxiety and slight palpitations that has recently increased in frequency and duration. She reports about 10 episodes over course of 1 month.\par  \par - On Coumadin managed by Coumadin clinic. Currently on Warfarin 3 mg daily; no bleeding/bruising, Last checked 12/27/21.\par - POC INR today is 2.0 (goal 2-3)\par \par #HFrEF s/p ICD\par - taking lasix PO 80mg BID, metoprolol tartrate 100mg BID\par - ROBERTA in 11/2021 showed reduced EF of 43%.  Plan was to repeat TTE (from 12/9/21 cardiology visit), but patient needs to reschedule it because she missed appointment.\par \par Acute complaints:\par \par #Lightheadedness with standing up\par Reports recently, she has lightheadedness and feeling of almost fainting sometimes when she gets up from sitting. Resolves after a few seconds. Denies syncope or falls.\par \par #Low back pain\par Reports she has hard to describe "squeezing" muscle pain that starts from low back and moves down her legs. She states that it feels like someone is wringing her muscles like wringing a towel, and feels like her muscle and bones are "melting away."  Only lasts 10-15 seconds and has no residual pain.  Not elicited by specific movement. No muscle weakness.  Has had it for a few years, but never able to describe it well so did not bring up on previous visits.  Now has increased in frequency.  Has not taken any pain medications.  \par \par #HCM:\par - Colon cancer screen none \par - Cervical cancer screen none\par - COVID vaccine (4/27, 5/27, 11/29) Moderna\par - On chronic steroids, check Vit D level. Eating sufficient calcium with dairy, anchovies.  \par - Mammogram 1/21/2020 BIRADS2

## 2022-01-07 NOTE — PHYSICAL EXAM
[Normal Rate] : normal rate  [Pedal Pulses Present] : the pedal pulses are present [No Edema] : there was no peripheral edema [Soft] : abdomen soft [Non Tender] : non-tender [Non-distended] : non-distended [No CVA Tenderness] : no CVA  tenderness [No Spinal Tenderness] : no spinal tenderness [No Joint Swelling] : no joint swelling [Grossly Normal Strength/Tone] : grossly normal strength/tone [No Rash] : no rash [Normal] : affect was normal and insight and judgment were intact [de-identified] : Irregular

## 2022-01-07 NOTE — ASSESSMENT
[FreeTextEntry1] : 51F PMH Takayasu arteritis, pAfib/sustained VT on coumadin s/p ICD placement, nonobstructive CAD (20% pLAD and RCA in 2015), pAfib (on warfarin), HCM, VT (s/p AICD Medtronic 12/2016), mod-severe AR and bileaflet MVP with mod-severe MR s/p 8/17 AVR(T), MVR (T), CABG x1, Bentall, RF ablation  who presents for CPE.\par \par #Takayasu arteritis\par - Followed by Rheumatology\par - On MTX 17.5 mg weekly, prednisone 2 mg daily, and re-started Actemra Dec 2021 given enlarging involvement on CT scan in Nov 2021.\par - C/w Aspirin 81mg, Lipitor 40mg QHS\par - Will message rheumatology regarding delays in receiving Actemra delivered, subsequent inability to adhere to weekly injections\par \par #Afib/ AVR/sustained VT s/p AICD \par Frequent episodes of [id number] chest discomfort/palpiations/anxiety (10/month) suspicious for arrhythmias.\par - Instructed patient to get TTE scheduled ASAP.\par - May be beneficial to see cardiology earlier than March 2022.  Will task Cardiology.\par - C/w Metoprolol tartrate 100mg BID\par - POC INR today is 2.0 (goal 2-3), will continue coumadin 3mg daily.  RTC for INR check in 1 month.\par \par #HFrEF s/p ICD (EF 43% in 11/2021)\par - c/w lasix PO 80mg BID, metoprolol tartrate 100mg BID\par - Instructed patient to get TTE scheduled ASAP.  Cards plans to start ACE/ARB/ARNI and MRA on next clinic visit if EF remains reduced. \par \par #Lightheadedness\par Lightheadedness with standing up, c/f orthostatic hypotension (possibly from lasix?) vs. anemia\par - Counseled patient on standing up slowly \par - Check CBC\par \par #Low back pain\par Unusual "squeezing" muscle pain that starts from low back and moves down her legs, lasting only 10-15 seconds without residual pain.  Unlikely to be steroid induced myopathy given no muscle weakness and no localized residual pain.  Possibly sciatica but unlikely given not elicited by certain positions or movement.\par - Patient not open to seeing orthopedics at this time, states will consider if worsens\par - Patient declines PT because difficult to arrange transportation\par - Counseled on stretching at home and light exercise. \par \par #HCM:\par - s/p COVID vaccine (4/27, 5/27, 11/29) Moderna\par - Referral for colonscopy\par - RTC on Friday AM for pap smear/HPV\par - Check CBC, CMP, A1c, LDL (ASCVD risk score needs to repeat)\par - On chronic steroids, will check Vit D level.  Supplement now with Vit D 2000 units daily. Check DEXA scan.\par - Mammogram 1/21/2020 BIRADS2 --> repeat mammogram ordered\par \par RTC in April 2022 with me\par \par Discussed with Dr. Sheppard\par \par JY, PGY2\par Firm 5

## 2022-01-07 NOTE — REVIEW OF SYSTEMS
[Palpitations] : palpitations [Muscle Pain] : muscle pain [Back Pain] : back pain [Dizziness] : dizziness [Anxiety] : anxiety [Negative] : ENT [Leg Claudication] : no leg claudication [Lower Ext Edema] : no lower extremity edema [Orthopnea] : no orthopnea [Paroxysmal Nocturnal Dyspnea] : no paroxysmal nocturnal dyspnea [Shortness Of Breath] : no shortness of breath [Wheezing] : no wheezing [Cough] : no cough [Abdominal Pain] : no abdominal pain [Nausea] : no nausea [Heartburn] : no heartburn [Dysuria] : no dysuria [Muscle Weakness] : no muscle weakness [Itching] : no itching [Fainting] : no fainting [Confusion] : no confusion [Unsteady Walking] : no ataxia [Easy Bleeding] : no easy bleeding

## 2022-01-27 ENCOUNTER — RESULT REVIEW (OUTPATIENT)
Age: 52
End: 2022-01-27

## 2022-01-28 ENCOUNTER — APPOINTMENT (OUTPATIENT)
Dept: INTERNAL MEDICINE | Facility: CLINIC | Age: 52
End: 2022-01-28
Payer: MEDICAID

## 2022-01-28 ENCOUNTER — OUTPATIENT (OUTPATIENT)
Dept: OUTPATIENT SERVICES | Facility: HOSPITAL | Age: 52
LOS: 1 days | End: 2022-01-28

## 2022-01-28 VITALS
SYSTOLIC BLOOD PRESSURE: 118 MMHG | WEIGHT: 119 LBS | HEART RATE: 68 BPM | OXYGEN SATURATION: 99 % | BODY MASS INDEX: 22.47 KG/M2 | RESPIRATION RATE: 16 BRPM | HEIGHT: 61 IN | TEMPERATURE: 96.1 F | DIASTOLIC BLOOD PRESSURE: 58 MMHG

## 2022-01-28 DIAGNOSIS — Z98.89 OTHER SPECIFIED POSTPROCEDURAL STATES: Chronic | ICD-10-CM

## 2022-01-28 DIAGNOSIS — Z98.891 HISTORY OF UTERINE SCAR FROM PREVIOUS SURGERY: Chronic | ICD-10-CM

## 2022-01-28 DIAGNOSIS — Z95.0 PRESENCE OF CARDIAC PACEMAKER: Chronic | ICD-10-CM

## 2022-01-28 DIAGNOSIS — M85.88 OTHER SPECIFIED DISORDERS OF BONE DENSITY AND STRUCTURE, OTHER SITE: ICD-10-CM

## 2022-01-28 PROCEDURE — 99214 OFFICE O/P EST MOD 30 MIN: CPT

## 2022-01-28 RX ORDER — MULTIVIT-MIN/FOLIC/VIT K/LYCOP 400-300MCG
25 MCG TABLET ORAL DAILY
Qty: 90 | Refills: 1 | Status: DISCONTINUED | COMMUNITY
Start: 2022-01-06 | End: 2022-01-28

## 2022-01-28 NOTE — PAST MEDICAL HISTORY
[Perimenopausal] : perimenopausal [Approximately ___] : the LMP was approximately [unfilled] [Irregular Cycle Intervals] : are  irregular [Total Preg ___] : G[unfilled] [Live Births ___] : P[unfilled]

## 2022-02-02 LAB — CYTOLOGY SPEC DOC CYTO: SIGNIFICANT CHANGE UP

## 2022-02-04 ENCOUNTER — APPOINTMENT (OUTPATIENT)
Dept: INTERNAL MEDICINE | Facility: CLINIC | Age: 52
End: 2022-02-04

## 2022-02-10 ENCOUNTER — APPOINTMENT (OUTPATIENT)
Dept: CV DIAGNOSITCS | Facility: HOSPITAL | Age: 52
End: 2022-02-10
Payer: MEDICAID

## 2022-02-10 ENCOUNTER — OUTPATIENT (OUTPATIENT)
Dept: OUTPATIENT SERVICES | Facility: HOSPITAL | Age: 52
LOS: 1 days | End: 2022-02-10

## 2022-02-10 DIAGNOSIS — Z98.891 HISTORY OF UTERINE SCAR FROM PREVIOUS SURGERY: Chronic | ICD-10-CM

## 2022-02-10 DIAGNOSIS — Z95.0 PRESENCE OF CARDIAC PACEMAKER: Chronic | ICD-10-CM

## 2022-02-10 DIAGNOSIS — M85.88 OTHER SPECIFIED DISORDERS OF BONE DENSITY AND STRUCTURE, OTHER SITE: ICD-10-CM

## 2022-02-10 DIAGNOSIS — Z12.4 ENCOUNTER FOR SCREENING FOR MALIGNANT NEOPLASM OF CERVIX: ICD-10-CM

## 2022-02-10 DIAGNOSIS — E55.9 VITAMIN D DEFICIENCY, UNSPECIFIED: ICD-10-CM

## 2022-02-10 DIAGNOSIS — I50.20 UNSPECIFIED SYSTOLIC (CONGESTIVE) HEART FAILURE: ICD-10-CM

## 2022-02-10 DIAGNOSIS — Z98.89 OTHER SPECIFIED POSTPROCEDURAL STATES: Chronic | ICD-10-CM

## 2022-02-10 PROCEDURE — 93306 TTE W/DOPPLER COMPLETE: CPT | Mod: 26

## 2022-02-11 DIAGNOSIS — Z01.419 ENCOUNTER FOR GYNECOLOGICAL EXAMINATION (GENERAL) (ROUTINE) WITHOUT ABNORMAL FINDINGS: ICD-10-CM

## 2022-02-11 NOTE — PHYSICAL EXAM
[Normal] : no acute distress, well nourished, well developed and well-appearing [Soft] : abdomen soft [Non Tender] : non-tender [Non-distended] : non-distended [Normal Bowel Sounds] : normal bowel sounds [Urethral Meatus] : normal urethra [Urinary Bladder Findings] : the bladder was normal on palpation [External Female Genitalia] : normal external genitalia [Vagina] : normal vaginal exam [Cervix] : normal cervix [Uterus] : uterus was normal size, without masses or tenderness [Uterine Adnexae] : normal adnexa [de-identified] : Patient was examined with chaperone present. Name of Chaperone: [] Mckenzie NIX

## 2022-02-11 NOTE — HISTORY OF PRESENT ILLNESS
[FreeTextEntry1] : cervical cancer screening  [Interpreters_IDNumber] : [id number] [Interpreters_FullName] : joey  [TWNoteComboBox1] : [place] [de-identified] : JUAN CARLOS STEELE is a 51 year old female with PMH of CHF, afib on counadin, RA, takayasu arthritis s/p MVR and CABG here for cervical cancer screening. She has never had a pap smear in her life- this is the first time. \par She also had a h/o low vitamin D. Dexa in  shows Left femoral neck: -1.2, within the expected range for age. \par Left total hip: -1.1, within the expected range for age. \par Spine: -2.3, below the expected range for age.\par \par LMP 10/2021 - denies Sx of menopause including hot flashes, mood changes, night sweats, GSM. Denies any changes in breast, no lesions reported. mammogram ordered in  due for completion. \par  - c- section X2 due to kidney infections. \par \par Ms. STEELE feels well and has no new concerns.

## 2022-02-14 ENCOUNTER — APPOINTMENT (OUTPATIENT)
Dept: RADIOLOGY | Facility: IMAGING CENTER | Age: 52
End: 2022-02-14
Payer: MEDICAID

## 2022-02-14 ENCOUNTER — APPOINTMENT (OUTPATIENT)
Dept: MAMMOGRAPHY | Facility: IMAGING CENTER | Age: 52
End: 2022-02-14
Payer: MEDICAID

## 2022-02-14 ENCOUNTER — NON-APPOINTMENT (OUTPATIENT)
Age: 52
End: 2022-02-14

## 2022-02-14 ENCOUNTER — OUTPATIENT (OUTPATIENT)
Dept: OUTPATIENT SERVICES | Facility: HOSPITAL | Age: 52
LOS: 1 days | End: 2022-02-14
Payer: MEDICAID

## 2022-02-14 ENCOUNTER — RESULT REVIEW (OUTPATIENT)
Age: 52
End: 2022-02-14

## 2022-02-14 DIAGNOSIS — M05.20 RHEUMATOID VASCULITIS WITH RHEUMATOID ARTHRITIS OF UNSPECIFIED SITE: ICD-10-CM

## 2022-02-14 DIAGNOSIS — Z98.89 OTHER SPECIFIED POSTPROCEDURAL STATES: Chronic | ICD-10-CM

## 2022-02-14 DIAGNOSIS — Z98.891 HISTORY OF UTERINE SCAR FROM PREVIOUS SURGERY: Chronic | ICD-10-CM

## 2022-02-14 DIAGNOSIS — Z95.0 PRESENCE OF CARDIAC PACEMAKER: Chronic | ICD-10-CM

## 2022-02-14 DIAGNOSIS — Z00.00 ENCOUNTER FOR GENERAL ADULT MEDICAL EXAMINATION WITHOUT ABNORMAL FINDINGS: ICD-10-CM

## 2022-02-14 PROCEDURE — 77063 BREAST TOMOSYNTHESIS BI: CPT

## 2022-02-14 PROCEDURE — 77067 SCR MAMMO BI INCL CAD: CPT

## 2022-02-14 PROCEDURE — 77080 DXA BONE DENSITY AXIAL: CPT | Mod: 26

## 2022-02-14 PROCEDURE — 77063 BREAST TOMOSYNTHESIS BI: CPT | Mod: 26

## 2022-02-14 PROCEDURE — 77080 DXA BONE DENSITY AXIAL: CPT

## 2022-02-14 PROCEDURE — 77067 SCR MAMMO BI INCL CAD: CPT | Mod: 26

## 2022-02-15 ENCOUNTER — NON-APPOINTMENT (OUTPATIENT)
Age: 52
End: 2022-02-15

## 2022-02-17 ENCOUNTER — APPOINTMENT (OUTPATIENT)
Dept: INTERNAL MEDICINE | Facility: CLINIC | Age: 52
End: 2022-02-17

## 2022-02-17 ENCOUNTER — OUTPATIENT (OUTPATIENT)
Dept: OUTPATIENT SERVICES | Facility: HOSPITAL | Age: 52
LOS: 1 days | End: 2022-02-17

## 2022-02-17 VITALS — RESPIRATION RATE: 16 BRPM | OXYGEN SATURATION: 98 % | HEART RATE: 72 BPM

## 2022-02-17 VITALS — TEMPERATURE: 98.1 F

## 2022-02-17 DIAGNOSIS — Z98.89 OTHER SPECIFIED POSTPROCEDURAL STATES: Chronic | ICD-10-CM

## 2022-02-17 DIAGNOSIS — Z95.0 PRESENCE OF CARDIAC PACEMAKER: Chronic | ICD-10-CM

## 2022-02-17 DIAGNOSIS — I47.2 VENTRICULAR TACHYCARDIA: ICD-10-CM

## 2022-02-17 DIAGNOSIS — Z98.891 HISTORY OF UTERINE SCAR FROM PREVIOUS SURGERY: Chronic | ICD-10-CM

## 2022-02-17 DIAGNOSIS — Z79.01 LONG TERM (CURRENT) USE OF ANTICOAGULANTS: ICD-10-CM

## 2022-02-17 DIAGNOSIS — Z95.2 PRESENCE OF PROSTHETIC HEART VALVE: ICD-10-CM

## 2022-02-17 DIAGNOSIS — I48.91 UNSPECIFIED ATRIAL FIBRILLATION: ICD-10-CM

## 2022-02-17 DIAGNOSIS — I50.22 CHRONIC SYSTOLIC (CONGESTIVE) HEART FAILURE: ICD-10-CM

## 2022-02-23 ENCOUNTER — APPOINTMENT (OUTPATIENT)
Dept: INTERNAL MEDICINE | Facility: CLINIC | Age: 52
End: 2022-02-23

## 2022-02-23 ENCOUNTER — NON-APPOINTMENT (OUTPATIENT)
Age: 52
End: 2022-02-23

## 2022-02-23 ENCOUNTER — OUTPATIENT (OUTPATIENT)
Dept: OUTPATIENT SERVICES | Facility: HOSPITAL | Age: 52
LOS: 1 days | End: 2022-02-23

## 2022-02-23 VITALS — TEMPERATURE: 97.7 F

## 2022-02-23 VITALS — OXYGEN SATURATION: 98 % | HEART RATE: 67 BPM

## 2022-02-23 DIAGNOSIS — I47.2 VENTRICULAR TACHYCARDIA: ICD-10-CM

## 2022-02-23 DIAGNOSIS — Z95.0 PRESENCE OF CARDIAC PACEMAKER: Chronic | ICD-10-CM

## 2022-02-23 DIAGNOSIS — Z95.1 PRESENCE OF AORTOCORONARY BYPASS GRAFT: ICD-10-CM

## 2022-02-23 DIAGNOSIS — I48.91 UNSPECIFIED ATRIAL FIBRILLATION: ICD-10-CM

## 2022-02-23 DIAGNOSIS — I50.22 CHRONIC SYSTOLIC (CONGESTIVE) HEART FAILURE: ICD-10-CM

## 2022-02-23 DIAGNOSIS — Z98.891 HISTORY OF UTERINE SCAR FROM PREVIOUS SURGERY: Chronic | ICD-10-CM

## 2022-02-23 DIAGNOSIS — I25.10 ATHEROSCLEROTIC HEART DISEASE OF NATIVE CORONARY ARTERY WITHOUT ANGINA PECTORIS: ICD-10-CM

## 2022-02-23 DIAGNOSIS — Z79.01 LONG TERM (CURRENT) USE OF ANTICOAGULANTS: ICD-10-CM

## 2022-02-23 DIAGNOSIS — Z98.89 OTHER SPECIFIED POSTPROCEDURAL STATES: Chronic | ICD-10-CM

## 2022-02-28 ENCOUNTER — APPOINTMENT (OUTPATIENT)
Dept: RHEUMATOLOGY | Facility: CLINIC | Age: 52
End: 2022-02-28

## 2022-03-01 ENCOUNTER — APPOINTMENT (OUTPATIENT)
Dept: CARDIOLOGY | Facility: HOSPITAL | Age: 52
End: 2022-03-01

## 2022-03-04 ENCOUNTER — OUTPATIENT (OUTPATIENT)
Dept: OUTPATIENT SERVICES | Facility: HOSPITAL | Age: 52
LOS: 1 days | End: 2022-03-04

## 2022-03-04 ENCOUNTER — APPOINTMENT (OUTPATIENT)
Dept: INTERNAL MEDICINE | Facility: CLINIC | Age: 52
End: 2022-03-04

## 2022-03-04 VITALS — TEMPERATURE: 95.54 F

## 2022-03-04 VITALS
OXYGEN SATURATION: 98 % | DIASTOLIC BLOOD PRESSURE: 70 MMHG | WEIGHT: 118 LBS | HEIGHT: 61 IN | BODY MASS INDEX: 22.28 KG/M2 | SYSTOLIC BLOOD PRESSURE: 110 MMHG | HEART RATE: 52 BPM

## 2022-03-04 VITALS — OXYGEN SATURATION: 98 % | HEART RATE: 53 BPM

## 2022-03-04 DIAGNOSIS — I50.22 CHRONIC SYSTOLIC (CONGESTIVE) HEART FAILURE: ICD-10-CM

## 2022-03-04 DIAGNOSIS — I48.91 UNSPECIFIED ATRIAL FIBRILLATION: ICD-10-CM

## 2022-03-04 DIAGNOSIS — I47.2 VENTRICULAR TACHYCARDIA: ICD-10-CM

## 2022-03-04 DIAGNOSIS — Z98.891 HISTORY OF UTERINE SCAR FROM PREVIOUS SURGERY: Chronic | ICD-10-CM

## 2022-03-04 DIAGNOSIS — Z95.810 PRESENCE OF AUTOMATIC (IMPLANTABLE) CARDIAC DEFIBRILLATOR: ICD-10-CM

## 2022-03-04 DIAGNOSIS — Z79.01 LONG TERM (CURRENT) USE OF ANTICOAGULANTS: ICD-10-CM

## 2022-03-04 DIAGNOSIS — I25.10 ATHEROSCLEROTIC HEART DISEASE OF NATIVE CORONARY ARTERY WITHOUT ANGINA PECTORIS: ICD-10-CM

## 2022-03-04 DIAGNOSIS — Z95.0 PRESENCE OF CARDIAC PACEMAKER: Chronic | ICD-10-CM

## 2022-03-04 DIAGNOSIS — Z98.89 OTHER SPECIFIED POSTPROCEDURAL STATES: Chronic | ICD-10-CM

## 2022-03-04 DIAGNOSIS — Z79.52 LONG TERM (CURRENT) USE OF SYSTEMIC STEROIDS: ICD-10-CM

## 2022-03-04 DIAGNOSIS — I42.2 OTHER HYPERTROPHIC CARDIOMYOPATHY: ICD-10-CM

## 2022-03-04 LAB
INR PPP: 1.6 RATIO
INR PPP: 2.2 RATIO
INR PPP: 2.6 RATIO
POCT-PROTHROMBIN TIME: 19.8 SECS
POCT-PROTHROMBIN TIME: 26.1 SECS
POCT-PROTHROMBIN TIME: 30.7 SECS
QUALITY CONTROL: YES
QUALITY CONTROL: YES

## 2022-03-17 ENCOUNTER — APPOINTMENT (OUTPATIENT)
Dept: ELECTROPHYSIOLOGY | Facility: CLINIC | Age: 52
End: 2022-03-17
Payer: MEDICAID

## 2022-03-17 ENCOUNTER — APPOINTMENT (OUTPATIENT)
Dept: CARDIOLOGY | Facility: HOSPITAL | Age: 52
End: 2022-03-17

## 2022-03-17 VITALS
SYSTOLIC BLOOD PRESSURE: 128 MMHG | DIASTOLIC BLOOD PRESSURE: 64 MMHG | TEMPERATURE: 207.32 F | HEIGHT: 61 IN | BODY MASS INDEX: 22.28 KG/M2 | OXYGEN SATURATION: 98 % | WEIGHT: 118 LBS | HEART RATE: 59 BPM

## 2022-03-17 PROCEDURE — 93282 PRGRMG EVAL IMPLANTABLE DFB: CPT

## 2022-03-22 ENCOUNTER — RX RENEWAL (OUTPATIENT)
Age: 52
End: 2022-03-22

## 2022-03-24 ENCOUNTER — OUTPATIENT (OUTPATIENT)
Dept: OUTPATIENT SERVICES | Facility: HOSPITAL | Age: 52
LOS: 1 days | End: 2022-03-24

## 2022-03-24 ENCOUNTER — APPOINTMENT (OUTPATIENT)
Dept: GASTROENTEROLOGY | Facility: CLINIC | Age: 52
End: 2022-03-24
Payer: MEDICAID

## 2022-03-24 VITALS
TEMPERATURE: 206.96 F | SYSTOLIC BLOOD PRESSURE: 100 MMHG | OXYGEN SATURATION: 98 % | HEIGHT: 61 IN | WEIGHT: 118 LBS | HEART RATE: 68 BPM | BODY MASS INDEX: 22.28 KG/M2 | DIASTOLIC BLOOD PRESSURE: 60 MMHG

## 2022-03-24 DIAGNOSIS — Z98.891 HISTORY OF UTERINE SCAR FROM PREVIOUS SURGERY: Chronic | ICD-10-CM

## 2022-03-24 DIAGNOSIS — Z98.89 OTHER SPECIFIED POSTPROCEDURAL STATES: Chronic | ICD-10-CM

## 2022-03-24 DIAGNOSIS — Z79.01 LONG TERM (CURRENT) USE OF ANTICOAGULANTS: ICD-10-CM

## 2022-03-24 DIAGNOSIS — Z95.0 PRESENCE OF CARDIAC PACEMAKER: Chronic | ICD-10-CM

## 2022-03-24 DIAGNOSIS — Z95.810 PRESENCE OF AUTOMATIC (IMPLANTABLE) CARDIAC DEFIBRILLATOR: ICD-10-CM

## 2022-03-24 DIAGNOSIS — Z12.11 ENCOUNTER FOR SCREENING FOR MALIGNANT NEOPLASM OF COLON: ICD-10-CM

## 2022-03-24 PROCEDURE — 99204 OFFICE O/P NEW MOD 45 MIN: CPT | Mod: GC

## 2022-03-24 NOTE — ASSESSMENT
[FreeTextEntry1] : 52F PMHx of Takayasu aortitis, severe AVR/MVR s/p Bentall procedure w/ CABG x 1 (SVG-LAD) and MVR, paroxysmal Afib on coumadin, HFpEF, and VT s/p Medtronic ICD who presents to establish care for CRC screening. \par \par Impression: \par #CRC screening: pt without any warning s/s (hematochezia, unintentional weight loss, anemia), without family Hx of CRC. Average risk for CRC. Discussed options for CRC screening including 1. colonoscopy 2. virtual CT colonography 3. cologuard testing, including risks + benefits + need for colonoscopy if either CT colonography or cologuard is positive. Patient elected to pursue virtual CT colonography. \par \par Recommendations: \par - Plan for virtual CT colonography \par - Prep: miralax + Dulcolax day prior \par - Clear liquid diet day prior to CT colonography \par - If patient needs colonoscopy procedure will need 1. cardiac clearance 2. bridging AC from Coumadin *if feasible according to cardiology 3. interrogation of AICD\par - RTC after virtual CT colonography \par \par \par Gama Guevara MD\par GI Fellow PGY5

## 2022-03-24 NOTE — END OF VISIT
[] : Fellow [FreeTextEntry3] : 52F with extensive cardiac hx as above, on coumadin for afib, s/p aicd, referred for colon cancer screening. \par \par Long discussion today re: colon cancer screening and modalities.\par Risks/benefits/alternatives reviewed. \par No prior colonoscopy. No fhx of colon cancer. \par \par Patient would like to proceed w virtual colonoscopy for screening purposes. \par RTC after virtual colonoscopy. \par

## 2022-03-24 NOTE — PHYSICAL EXAM
[General Appearance - Alert] : alert [General Appearance - In No Acute Distress] : in no acute distress [General Appearance - Well Nourished] : well nourished [General Appearance - Well Developed] : well developed [General Appearance - Well-Appearing] : healthy appearing [Extraocular Movements] : extraocular movements were intact [Sclera] : the sclera and conjunctiva were normal [Outer Ear] : the ears and nose were normal in appearance [Hearing Threshold Finger Rub Not [place]] : hearing was normal [Neck Appearance] : the appearance of the neck was normal [Neck Cervical Mass (___cm)] : no neck mass was observed [] : no respiratory distress [Exaggerated Use Of Accessory Muscles For Inspiration] : no accessory muscle use [Abdomen Soft] : soft [Abdomen Tenderness] : non-tender [Abnormal Walk] : normal gait [Nail Clubbing] : no clubbing  or cyanosis of the fingernails [Oriented To Time, Place, And Person] : oriented to person, place, and time

## 2022-03-24 NOTE — HISTORY OF PRESENT ILLNESS
[Heartburn] : denies heartburn [Nausea] : denies nausea [Vomiting] : denies vomiting [Diarrhea] : denies diarrhea [Constipation] : denies constipation [Yellow Skin Or Eyes (Jaundice)] : denies jaundice [Abdominal Pain] : denies abdominal pain [Abdominal Swelling] : denies abdominal swelling [Rectal Pain] : denies rectal pain [Wt Loss ___ Lbs] : no recent weight loss [de-identified] : 52F PMHx of Takayasu aortitis, severe AVR/MVR s/p Bentall procedure w/ CABG x 1 (SVG-LAD) and MVR, paroxysmal Afib on coumadin, HFpEF, and VT s/p Medtronic ICD who presents to establish care for CRC screening. \par \par Pt denies hematochezia, melena, abd pain, n/v/d/f/c, weight loss, hematemesis, heartburn/reflux. Denies prior Hx of screening colonoscopy. Denies family Hx of CRC, as well as gastric, pancreatic, ovarian, endometrial, breast or other malignancies.\par \par Hb 14 (1/2022)

## 2022-03-28 ENCOUNTER — APPOINTMENT (OUTPATIENT)
Dept: INTERNAL MEDICINE | Facility: CLINIC | Age: 52
End: 2022-03-28

## 2022-03-28 NOTE — DISCUSSION/SUMMARY
[FreeTextEntry1] : 50 yo lady PMHx of Takayasu aortitis, severe AVR/MVR s/p Bentall procedure w/ CABG x 1 (SVG-LAD) and MVR, paroxysmal Afib on coumadin, HFpEF, and VT s/p Medtronic ICD who presents for f/u. \par \par #Takayasu aortitis, severe AVR/MVR s/p Bentall procedure w/ CABG x 1 (SVG-LAD), \par - On methotrexate, prednisone, and tocilizumab, management as per rheum\par - C/w ASA 81mg daily and lipitor 40mg qhs\par \par #HFpEF        \par - Compensated and euvolemic, c/w lasix PO 80mg BID\par - Latest TTE Feb/2022 with normal EF\par \par #Paroxysmal Afib\par - C/w metoprolol tartrate 100 BID\par - C/w warfarin for systemic embolization prevention\par \par #VT s/p ICD\par - Last interrogation April/2021: no ICD shocks or VT/VF, advised to schedule new interrogation appt\par - C/w metoprolol 100 BID\par \par F/u in 3 mo\par \par Signed by\par Kimberlyn Xavier MD\par PGY5 Cardiology

## 2022-03-28 NOTE — HISTORY OF PRESENT ILLNESS
[FreeTextEntry1] : 50 yo lady PMHx of Takayasu aortitis, severe AVR/MVR s/p Bentall procedure w/ CABG x 1 (SVG-LAD) and MVR, paroxysmal Afib on coumadin, HFpEF, and VT s/p Medtronic ICD who presents for f/u. \par \par On methotrexate and tocilizumab for recent evidece of active aortic vasculitic disease.\par \par Had recent TTE with EF 67% and normal aortic/mitral gradients in setting of AVR/MVR s/p Bentall. \par \par Today, she reports feeling well w/o any ROS findings with good exercise tolerance.

## 2022-04-08 NOTE — ED ADULT TRIAGE NOTE - AS TEMP SITE
Sepsis secondary to UTI Sepsis secondary to UTI Sepsis secondary to UTI Sepsis secondary to UTI Sepsis secondary to UTI oral

## 2022-04-11 ENCOUNTER — NON-APPOINTMENT (OUTPATIENT)
Age: 52
End: 2022-04-11

## 2022-04-21 ENCOUNTER — APPOINTMENT (OUTPATIENT)
Dept: CT IMAGING | Facility: CLINIC | Age: 52
End: 2022-04-21

## 2022-04-22 ENCOUNTER — OUTPATIENT (OUTPATIENT)
Dept: OUTPATIENT SERVICES | Facility: HOSPITAL | Age: 52
LOS: 1 days | End: 2022-04-22

## 2022-04-22 ENCOUNTER — RESULT CHARGE (OUTPATIENT)
Age: 52
End: 2022-04-22

## 2022-04-22 ENCOUNTER — APPOINTMENT (OUTPATIENT)
Dept: INTERNAL MEDICINE | Facility: CLINIC | Age: 52
End: 2022-04-22
Payer: MEDICAID

## 2022-04-22 VITALS
HEART RATE: 64 BPM | BODY MASS INDEX: 22.09 KG/M2 | WEIGHT: 117 LBS | DIASTOLIC BLOOD PRESSURE: 70 MMHG | TEMPERATURE: 97.3 F | OXYGEN SATURATION: 99 % | RESPIRATION RATE: 16 BRPM | HEIGHT: 61 IN | SYSTOLIC BLOOD PRESSURE: 116 MMHG

## 2022-04-22 VITALS — TEMPERATURE: 97.3 F

## 2022-04-22 DIAGNOSIS — M60.822: ICD-10-CM

## 2022-04-22 DIAGNOSIS — Z98.89 OTHER SPECIFIED POSTPROCEDURAL STATES: Chronic | ICD-10-CM

## 2022-04-22 DIAGNOSIS — M60.832: ICD-10-CM

## 2022-04-22 DIAGNOSIS — I50.22 CHRONIC SYSTOLIC (CONGESTIVE) HEART FAILURE: ICD-10-CM

## 2022-04-22 DIAGNOSIS — Z51.81 ENCOUNTER FOR THERAPEUTIC DRUG LEVEL MONITORING: ICD-10-CM

## 2022-04-22 DIAGNOSIS — M31.4 AORTIC ARCH SYNDROME [TAKAYASU]: ICD-10-CM

## 2022-04-22 DIAGNOSIS — Z98.891 HISTORY OF UTERINE SCAR FROM PREVIOUS SURGERY: Chronic | ICD-10-CM

## 2022-04-22 DIAGNOSIS — M31.6 OTHER GIANT CELL ARTERITIS: ICD-10-CM

## 2022-04-22 DIAGNOSIS — M81.0 AGE-RELATED OSTEOPOROSIS WITHOUT CURRENT PATHOLOGICAL FRACTURE: ICD-10-CM

## 2022-04-22 DIAGNOSIS — I48.91 UNSPECIFIED ATRIAL FIBRILLATION: ICD-10-CM

## 2022-04-22 DIAGNOSIS — I42.2 OTHER HYPERTROPHIC CARDIOMYOPATHY: ICD-10-CM

## 2022-04-22 DIAGNOSIS — Z95.0 PRESENCE OF CARDIAC PACEMAKER: Chronic | ICD-10-CM

## 2022-04-22 PROCEDURE — 99214 OFFICE O/P EST MOD 30 MIN: CPT | Mod: GC

## 2022-04-22 NOTE — REVIEW OF SYSTEMS
[Muscle Pain] : muscle pain [Negative] : ENT [Chest Pain] : no chest pain [Palpitations] : no palpitations [Leg Claudication] : no leg claudication [Lower Ext Edema] : no lower extremity edema [Orthopnea] : no orthopnea [Paroxysmal Nocturnal Dyspnea] : no paroxysmal nocturnal dyspnea [Shortness Of Breath] : no shortness of breath [Wheezing] : no wheezing [Cough] : no cough [Abdominal Pain] : no abdominal pain [Nausea] : no nausea [Heartburn] : no heartburn [Dysuria] : no dysuria [Joint Pain] : no joint pain [Joint Stiffness] : no joint stiffness [Muscle Weakness] : no muscle weakness [Itching] : no itching [Dizziness] : no dizziness [Fainting] : no fainting [Confusion] : no confusion [Unsteady Walking] : no ataxia [Anxiety] : no anxiety [Easy Bleeding] : no easy bleeding [Easy Bruising] : no easy bruising

## 2022-04-22 NOTE — PHYSICAL EXAM
[Normal Rate] : normal rate  [Regular Rhythm] : with a regular rhythm [Pedal Pulses Present] : the pedal pulses are present [No Edema] : there was no peripheral edema [Soft] : abdomen soft [Non Tender] : non-tender [Non-distended] : non-distended [No CVA Tenderness] : no CVA  tenderness [No Spinal Tenderness] : no spinal tenderness [No Rash] : no rash [Normal] : affect was normal and insight and judgment were intact [de-identified] : Loud S1, S2 [de-identified] : Very mild lump in L upper arm, TTP. Active ROM slightly
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
## 2022-05-06 ENCOUNTER — APPOINTMENT (OUTPATIENT)
Dept: INTERNAL MEDICINE | Facility: CLINIC | Age: 52
End: 2022-05-06

## 2022-05-06 ENCOUNTER — OUTPATIENT (OUTPATIENT)
Dept: OUTPATIENT SERVICES | Facility: HOSPITAL | Age: 52
LOS: 1 days | End: 2022-05-06

## 2022-05-06 VITALS — OXYGEN SATURATION: 99 % | TEMPERATURE: 97.6 F | HEART RATE: 68 BPM

## 2022-05-06 DIAGNOSIS — Z95.0 PRESENCE OF CARDIAC PACEMAKER: Chronic | ICD-10-CM

## 2022-05-06 DIAGNOSIS — Z95.2 PRESENCE OF PROSTHETIC HEART VALVE: ICD-10-CM

## 2022-05-06 DIAGNOSIS — I47.2 VENTRICULAR TACHYCARDIA: ICD-10-CM

## 2022-05-06 DIAGNOSIS — Z98.891 HISTORY OF UTERINE SCAR FROM PREVIOUS SURGERY: Chronic | ICD-10-CM

## 2022-05-06 DIAGNOSIS — Z79.01 LONG TERM (CURRENT) USE OF ANTICOAGULANTS: ICD-10-CM

## 2022-05-06 DIAGNOSIS — I48.91 UNSPECIFIED ATRIAL FIBRILLATION: ICD-10-CM

## 2022-05-06 DIAGNOSIS — I25.10 ATHEROSCLEROTIC HEART DISEASE OF NATIVE CORONARY ARTERY WITHOUT ANGINA PECTORIS: ICD-10-CM

## 2022-05-06 DIAGNOSIS — Z98.89 OTHER SPECIFIED POSTPROCEDURAL STATES: Chronic | ICD-10-CM

## 2022-05-06 DIAGNOSIS — I50.22 CHRONIC SYSTOLIC (CONGESTIVE) HEART FAILURE: ICD-10-CM

## 2022-05-13 ENCOUNTER — APPOINTMENT (OUTPATIENT)
Dept: CT IMAGING | Facility: CLINIC | Age: 52
End: 2022-05-13

## 2022-05-14 LAB
INR PPP: 1.8 RATIO
INR PPP: 2.3 RATIO
POCT-PROTHROMBIN TIME: 21.8 SECS
POCT-PROTHROMBIN TIME: 27.2 SECS
QUALITY CONTROL: YES
QUALITY CONTROL: YES

## 2022-05-23 ENCOUNTER — RX RENEWAL (OUTPATIENT)
Age: 52
End: 2022-05-23

## 2022-05-24 ENCOUNTER — RX RENEWAL (OUTPATIENT)
Age: 52
End: 2022-05-24

## 2022-06-03 ENCOUNTER — OUTPATIENT (OUTPATIENT)
Dept: OUTPATIENT SERVICES | Facility: HOSPITAL | Age: 52
LOS: 1 days | End: 2022-06-03

## 2022-06-03 ENCOUNTER — APPOINTMENT (OUTPATIENT)
Dept: INTERNAL MEDICINE | Facility: CLINIC | Age: 52
End: 2022-06-03

## 2022-06-03 DIAGNOSIS — Z98.891 HISTORY OF UTERINE SCAR FROM PREVIOUS SURGERY: Chronic | ICD-10-CM

## 2022-06-03 DIAGNOSIS — I25.10 ATHEROSCLEROTIC HEART DISEASE OF NATIVE CORONARY ARTERY WITHOUT ANGINA PECTORIS: ICD-10-CM

## 2022-06-03 DIAGNOSIS — Z98.89 OTHER SPECIFIED POSTPROCEDURAL STATES: Chronic | ICD-10-CM

## 2022-06-03 DIAGNOSIS — Z79.01 LONG TERM (CURRENT) USE OF ANTICOAGULANTS: ICD-10-CM

## 2022-06-03 DIAGNOSIS — I48.91 UNSPECIFIED ATRIAL FIBRILLATION: ICD-10-CM

## 2022-06-03 DIAGNOSIS — Z95.2 PRESENCE OF PROSTHETIC HEART VALVE: ICD-10-CM

## 2022-06-03 DIAGNOSIS — Z95.0 PRESENCE OF CARDIAC PACEMAKER: Chronic | ICD-10-CM

## 2022-06-03 DIAGNOSIS — I50.22 CHRONIC SYSTOLIC (CONGESTIVE) HEART FAILURE: ICD-10-CM

## 2022-06-08 ENCOUNTER — OUTPATIENT (OUTPATIENT)
Dept: OUTPATIENT SERVICES | Facility: HOSPITAL | Age: 52
LOS: 1 days | End: 2022-06-08

## 2022-06-08 ENCOUNTER — APPOINTMENT (OUTPATIENT)
Dept: INTERNAL MEDICINE | Facility: CLINIC | Age: 52
End: 2022-06-08

## 2022-06-08 DIAGNOSIS — Z79.01 LONG TERM (CURRENT) USE OF ANTICOAGULANTS: ICD-10-CM

## 2022-06-08 DIAGNOSIS — I25.10 ATHEROSCLEROTIC HEART DISEASE OF NATIVE CORONARY ARTERY WITHOUT ANGINA PECTORIS: ICD-10-CM

## 2022-06-08 DIAGNOSIS — Z98.89 OTHER SPECIFIED POSTPROCEDURAL STATES: Chronic | ICD-10-CM

## 2022-06-08 DIAGNOSIS — I50.22 CHRONIC SYSTOLIC (CONGESTIVE) HEART FAILURE: ICD-10-CM

## 2022-06-08 DIAGNOSIS — I48.91 UNSPECIFIED ATRIAL FIBRILLATION: ICD-10-CM

## 2022-06-08 DIAGNOSIS — Z95.810 PRESENCE OF AUTOMATIC (IMPLANTABLE) CARDIAC DEFIBRILLATOR: ICD-10-CM

## 2022-06-08 DIAGNOSIS — Z95.0 PRESENCE OF CARDIAC PACEMAKER: Chronic | ICD-10-CM

## 2022-06-08 DIAGNOSIS — Z98.891 HISTORY OF UTERINE SCAR FROM PREVIOUS SURGERY: Chronic | ICD-10-CM

## 2022-06-08 DIAGNOSIS — Z95.2 PRESENCE OF PROSTHETIC HEART VALVE: ICD-10-CM

## 2022-06-16 ENCOUNTER — APPOINTMENT (OUTPATIENT)
Dept: ELECTROPHYSIOLOGY | Facility: CLINIC | Age: 52
End: 2022-06-16
Payer: MEDICAID

## 2022-06-16 ENCOUNTER — APPOINTMENT (OUTPATIENT)
Dept: CARDIOLOGY | Facility: HOSPITAL | Age: 52
End: 2022-06-16

## 2022-06-16 VITALS — DIASTOLIC BLOOD PRESSURE: 56 MMHG | HEART RATE: 59 BPM | SYSTOLIC BLOOD PRESSURE: 149 MMHG

## 2022-06-16 PROCEDURE — 93282 PRGRMG EVAL IMPLANTABLE DFB: CPT

## 2022-06-16 RX ORDER — PANTOPRAZOLE 40 MG/1
40 TABLET, DELAYED RELEASE ORAL DAILY
Qty: 30 | Refills: 1 | Status: DISCONTINUED | COMMUNITY
Start: 2021-11-01 | End: 2022-06-16

## 2022-06-16 RX ORDER — ENOXAPARIN SODIUM 80 MG/.8ML
80 INJECTION, SOLUTION SUBCUTANEOUS DAILY
Qty: 5 | Refills: 0 | Status: DISCONTINUED | COMMUNITY
Start: 2022-06-03 | End: 2022-06-16

## 2022-06-16 RX ORDER — ACETAMINOPHEN 325 MG/1
325 TABLET, FILM COATED ORAL
Refills: 0 | Status: DISCONTINUED | COMMUNITY
End: 2022-06-16

## 2022-06-16 RX ORDER — TOCILIZUMAB 180 MG/ML
162 INJECTION, SOLUTION SUBCUTANEOUS
Qty: 6 | Refills: 2 | Status: DISCONTINUED | COMMUNITY
Start: 2018-03-22 | End: 2022-06-16

## 2022-06-16 NOTE — HISTORY OF PRESENT ILLNESS
[FreeTextEntry1] : 50 yo lady PMHx of Takayasu aortitis, severe AVR/MVR s/p Bentall procedure w/ CABG x 1 (SVG-LAD) and MVR, paroxysmal Afib on coumadin, HFpEF, and VT s/p Medtronic ICD who presents for f/u. \par \par Last TTE with EF 67% and normal aortic/mitral gradients in setting of AVR/MVR s/p Bentall. \par \par Today, she reports feeling well w/o any cardiac ROS findings with good exercise tolerance.

## 2022-06-16 NOTE — DISCUSSION/SUMMARY
[FreeTextEntry1] : 52 yo lady PMHx of Takayasu aortitis, severe AVR/MVR s/p Bentall procedure w/ CABG x 1 (SVG-LAD) and MVR, paroxysmal Afib on coumadin, HFpEF, and VT s/p Medtronic ICD who presents for f/u. \par \par #Takayasu aortitis, severe AVR/MVR s/p Bentall procedure w/ CABG x 1 (SVG-LAD), \par - management as per rheum\par - C/w ASA 81mg daily and lipitor 40mg qhs\par \par #HFpEF        \par - Compensated and euvolemic, c/w lasix PO 80mg BID\par - Latest TTE Feb/2022 with normal EF\par \par #Paroxysmal Afib\par - C/w metoprolol tartrate 100 BID\par - C/w warfarin for systemic embolization prevention\par \par #VT s/p ICD\par - Scheduled for interrogation today\par - C/w metoprolol 100 BID\par \par F/u in 3 mo\par \par Signed by\par Kimberlyn Xavier MD\par PGY5 Cardiology

## 2022-06-17 ENCOUNTER — OUTPATIENT (OUTPATIENT)
Dept: OUTPATIENT SERVICES | Facility: HOSPITAL | Age: 52
LOS: 1 days | End: 2022-06-17

## 2022-06-17 ENCOUNTER — APPOINTMENT (OUTPATIENT)
Dept: INTERNAL MEDICINE | Facility: CLINIC | Age: 52
End: 2022-06-17

## 2022-06-17 VITALS — HEART RATE: 72 BPM | OXYGEN SATURATION: 98 % | RESPIRATION RATE: 16 BRPM

## 2022-06-17 DIAGNOSIS — Z98.891 HISTORY OF UTERINE SCAR FROM PREVIOUS SURGERY: Chronic | ICD-10-CM

## 2022-06-17 DIAGNOSIS — Z98.89 OTHER SPECIFIED POSTPROCEDURAL STATES: Chronic | ICD-10-CM

## 2022-06-17 DIAGNOSIS — Z95.0 PRESENCE OF CARDIAC PACEMAKER: Chronic | ICD-10-CM

## 2022-06-17 LAB
INR PPP: 2 RATIO
POCT-PROTHROMBIN TIME: 24.3 SECS

## 2022-06-22 DIAGNOSIS — Z79.01 LONG TERM (CURRENT) USE OF ANTICOAGULANTS: ICD-10-CM

## 2022-06-22 DIAGNOSIS — Z51.81 ENCOUNTER FOR THERAPEUTIC DRUG LEVEL MONITORING: ICD-10-CM

## 2022-07-01 ENCOUNTER — APPOINTMENT (OUTPATIENT)
Dept: INTERNAL MEDICINE | Facility: CLINIC | Age: 52
End: 2022-07-01

## 2022-07-19 ENCOUNTER — RX RENEWAL (OUTPATIENT)
Age: 52
End: 2022-07-19

## 2022-07-29 ENCOUNTER — OUTPATIENT (OUTPATIENT)
Dept: OUTPATIENT SERVICES | Facility: HOSPITAL | Age: 52
LOS: 1 days | End: 2022-07-29

## 2022-07-29 ENCOUNTER — APPOINTMENT (OUTPATIENT)
Dept: INTERNAL MEDICINE | Facility: CLINIC | Age: 52
End: 2022-07-29

## 2022-07-29 ENCOUNTER — RESULT CHARGE (OUTPATIENT)
Age: 52
End: 2022-07-29

## 2022-07-29 DIAGNOSIS — Z98.891 HISTORY OF UTERINE SCAR FROM PREVIOUS SURGERY: Chronic | ICD-10-CM

## 2022-07-29 DIAGNOSIS — Z95.0 PRESENCE OF CARDIAC PACEMAKER: Chronic | ICD-10-CM

## 2022-07-29 DIAGNOSIS — Z98.89 OTHER SPECIFIED POSTPROCEDURAL STATES: Chronic | ICD-10-CM

## 2022-08-01 ENCOUNTER — APPOINTMENT (OUTPATIENT)
Dept: INTERNAL MEDICINE | Facility: CLINIC | Age: 52
End: 2022-08-01

## 2022-08-01 ENCOUNTER — OUTPATIENT (OUTPATIENT)
Dept: OUTPATIENT SERVICES | Facility: HOSPITAL | Age: 52
LOS: 1 days | End: 2022-08-01

## 2022-08-01 VITALS
OXYGEN SATURATION: 98 % | HEIGHT: 61 IN | WEIGHT: 115 LBS | SYSTOLIC BLOOD PRESSURE: 140 MMHG | BODY MASS INDEX: 21.71 KG/M2 | DIASTOLIC BLOOD PRESSURE: 66 MMHG | HEART RATE: 69 BPM | TEMPERATURE: 96.9 F

## 2022-08-01 DIAGNOSIS — I48.91 UNSPECIFIED ATRIAL FIBRILLATION: ICD-10-CM

## 2022-08-01 DIAGNOSIS — I25.10 ATHEROSCLEROTIC HEART DISEASE OF NATIVE CORONARY ARTERY WITHOUT ANGINA PECTORIS: ICD-10-CM

## 2022-08-01 DIAGNOSIS — Z95.1 PRESENCE OF AORTOCORONARY BYPASS GRAFT: ICD-10-CM

## 2022-08-01 DIAGNOSIS — Z95.2 PRESENCE OF PROSTHETIC HEART VALVE: ICD-10-CM

## 2022-08-01 DIAGNOSIS — Z95.0 PRESENCE OF CARDIAC PACEMAKER: Chronic | ICD-10-CM

## 2022-08-01 DIAGNOSIS — Z86.39 PERSONAL HISTORY OF OTHER ENDOCRINE, NUTRITIONAL AND METABOLIC DISEASE: ICD-10-CM

## 2022-08-01 DIAGNOSIS — I50.22 CHRONIC SYSTOLIC (CONGESTIVE) HEART FAILURE: ICD-10-CM

## 2022-08-01 DIAGNOSIS — I47.2 VENTRICULAR TACHYCARDIA: ICD-10-CM

## 2022-08-01 DIAGNOSIS — Z98.89 OTHER SPECIFIED POSTPROCEDURAL STATES: Chronic | ICD-10-CM

## 2022-08-01 DIAGNOSIS — Z79.01 LONG TERM (CURRENT) USE OF ANTICOAGULANTS: ICD-10-CM

## 2022-08-01 DIAGNOSIS — Z09 ENCOUNTER FOR FOLLOW-UP EXAMINATION AFTER COMPLETED TREATMENT FOR CONDITIONS OTHER THAN MALIGNANT NEOPLASM: ICD-10-CM

## 2022-08-01 DIAGNOSIS — Z01.419 ENCOUNTER FOR GYNECOLOGICAL EXAMINATION (GENERAL) (ROUTINE) W/OUT ABNORMAL FINDINGS: ICD-10-CM

## 2022-08-01 DIAGNOSIS — Z98.891 HISTORY OF UTERINE SCAR FROM PREVIOUS SURGERY: Chronic | ICD-10-CM

## 2022-08-01 LAB
25(OH)D3 SERPL-MCNC: 33.5 NG/ML
ALBUMIN SERPL ELPH-MCNC: 4.8 G/DL
ALP BLD-CCNC: 76 U/L
ALT SERPL-CCNC: 37 U/L
ANION GAP SERPL CALC-SCNC: 10 MMOL/L
AST SERPL-CCNC: 48 U/L
BASOPHILS # BLD AUTO: 0.04 K/UL
BASOPHILS NFR BLD AUTO: 1 %
BILIRUB SERPL-MCNC: 1 MG/DL
BUN SERPL-MCNC: 17 MG/DL
CALCIUM SERPL-MCNC: 9.7 MG/DL
CHLORIDE SERPL-SCNC: 97 MMOL/L
CHOLEST SERPL-MCNC: 240 MG/DL
CO2 SERPL-SCNC: 33 MMOL/L
CREAT SERPL-MCNC: 0.79 MG/DL
EGFR: 90 ML/MIN/1.73M2
EOSINOPHIL # BLD AUTO: 0.06 K/UL
EOSINOPHIL NFR BLD AUTO: 1.5 %
GLUCOSE SERPL-MCNC: 89 MG/DL
HCT VFR BLD CALC: 41.4 %
HDLC SERPL-MCNC: 90 MG/DL
HGB BLD-MCNC: 13.9 G/DL
IMM GRANULOCYTES NFR BLD AUTO: 0.2 %
LDLC SERPL CALC-MCNC: 121 MG/DL
LYMPHOCYTES # BLD AUTO: 1.15 K/UL
LYMPHOCYTES NFR BLD AUTO: 28.7 %
MAN DIFF?: NORMAL
MCHC RBC-ENTMCNC: 33.6 GM/DL
MCHC RBC-ENTMCNC: 34.1 PG
MCV RBC AUTO: 101.5 FL
MONOCYTES # BLD AUTO: 0.54 K/UL
MONOCYTES NFR BLD AUTO: 13.5 %
NEUTROPHILS # BLD AUTO: 2.21 K/UL
NEUTROPHILS NFR BLD AUTO: 55.1 %
NONHDLC SERPL-MCNC: 150 MG/DL
PLATELET # BLD AUTO: 329 K/UL
POTASSIUM SERPL-SCNC: 4.1 MMOL/L
PROT SERPL-MCNC: 8.2 G/DL
RBC # BLD: 4.08 M/UL
RBC # FLD: 14.8 %
SODIUM SERPL-SCNC: 140 MMOL/L
TRIGL SERPL-MCNC: 144 MG/DL
WBC # FLD AUTO: 4.01 K/UL

## 2022-08-01 PROCEDURE — 99214 OFFICE O/P EST MOD 30 MIN: CPT | Mod: GC

## 2022-08-01 RX ORDER — IBUPROFEN 400 MG/1
400 TABLET, FILM COATED ORAL 4 TIMES DAILY
Qty: 28 | Refills: 0 | Status: DISCONTINUED | COMMUNITY
Start: 2022-04-22 | End: 2022-08-01

## 2022-08-02 ENCOUNTER — NON-APPOINTMENT (OUTPATIENT)
Age: 52
End: 2022-08-02

## 2022-08-02 DIAGNOSIS — Z95.1 PRESENCE OF AORTOCORONARY BYPASS GRAFT: ICD-10-CM

## 2022-08-02 DIAGNOSIS — Z95.810 PRESENCE OF AUTOMATIC (IMPLANTABLE) CARDIAC DEFIBRILLATOR: ICD-10-CM

## 2022-08-02 DIAGNOSIS — I47.2 VENTRICULAR TACHYCARDIA: ICD-10-CM

## 2022-08-02 DIAGNOSIS — M81.0 AGE-RELATED OSTEOPOROSIS WITHOUT CURRENT PATHOLOGICAL FRACTURE: ICD-10-CM

## 2022-08-02 DIAGNOSIS — I48.91 UNSPECIFIED ATRIAL FIBRILLATION: ICD-10-CM

## 2022-08-02 DIAGNOSIS — Z86.39 PERSONAL HISTORY OF OTHER ENDOCRINE, NUTRITIONAL AND METABOLIC DISEASE: ICD-10-CM

## 2022-08-02 DIAGNOSIS — I50.22 CHRONIC SYSTOLIC (CONGESTIVE) HEART FAILURE: ICD-10-CM

## 2022-08-02 DIAGNOSIS — M31.4 AORTIC ARCH SYNDROME [TAKAYASU]: ICD-10-CM

## 2022-08-02 PROBLEM — Z01.419 ENCOUNTER FOR GYNECOLOGICAL EXAMINATION: Status: RESOLVED | Noted: 2022-02-11 | Resolved: 2022-08-02

## 2022-08-02 NOTE — INTERPRETER SERVICES
[Patient Declined  Services] : - None: Patient declined  services [FreeTextEntry3] : Patient declined  services as author is native speaker of Danish [TWNoteComboBox1] : Urdu
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
## 2022-08-02 NOTE — PHYSICAL EXAM
[Normal Rate] : normal rate  [Regular Rhythm] : with a regular rhythm [Pedal Pulses Present] : the pedal pulses are present [No Edema] : there was no peripheral edema [Soft] : abdomen soft [Non Tender] : non-tender [Non-distended] : non-distended [No CVA Tenderness] : no CVA  tenderness [No Spinal Tenderness] : no spinal tenderness [No Rash] : no rash [Normal] : affect was normal and insight and judgment were intact [de-identified] : Loud S1, S2 [de-identified] : Limited active and passive ROM in L shoulder, improved from prior visit.
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
## 2022-08-05 ENCOUNTER — OUTPATIENT (OUTPATIENT)
Dept: OUTPATIENT SERVICES | Facility: HOSPITAL | Age: 52
LOS: 1 days | End: 2022-08-05

## 2022-08-05 ENCOUNTER — APPOINTMENT (OUTPATIENT)
Dept: INTERNAL MEDICINE | Facility: CLINIC | Age: 52
End: 2022-08-05

## 2022-08-05 VITALS — TEMPERATURE: 96.9 F | HEART RATE: 75 BPM | OXYGEN SATURATION: 98 %

## 2022-08-05 DIAGNOSIS — Z98.89 OTHER SPECIFIED POSTPROCEDURAL STATES: Chronic | ICD-10-CM

## 2022-08-05 DIAGNOSIS — Z98.891 HISTORY OF UTERINE SCAR FROM PREVIOUS SURGERY: Chronic | ICD-10-CM

## 2022-08-05 DIAGNOSIS — Z95.0 PRESENCE OF CARDIAC PACEMAKER: Chronic | ICD-10-CM

## 2022-08-05 DIAGNOSIS — Z79.01 LONG TERM (CURRENT) USE OF ANTICOAGULANTS: ICD-10-CM

## 2022-08-05 LAB
INR PPP: 2 RATIO
POCT-PROTHROMBIN TIME: 23.5 SECS
QUALITY CONTROL: YES

## 2022-08-12 ENCOUNTER — OUTPATIENT (OUTPATIENT)
Dept: OUTPATIENT SERVICES | Facility: HOSPITAL | Age: 52
LOS: 1 days | End: 2022-08-12

## 2022-08-12 ENCOUNTER — APPOINTMENT (OUTPATIENT)
Dept: INTERNAL MEDICINE | Facility: CLINIC | Age: 52
End: 2022-08-12

## 2022-08-12 ENCOUNTER — RESULT CHARGE (OUTPATIENT)
Age: 52
End: 2022-08-12

## 2022-08-12 DIAGNOSIS — Z98.89 OTHER SPECIFIED POSTPROCEDURAL STATES: Chronic | ICD-10-CM

## 2022-08-12 DIAGNOSIS — Z95.0 PRESENCE OF CARDIAC PACEMAKER: Chronic | ICD-10-CM

## 2022-08-12 DIAGNOSIS — Z98.891 HISTORY OF UTERINE SCAR FROM PREVIOUS SURGERY: Chronic | ICD-10-CM

## 2022-08-12 LAB
INR PPP: 1.6 RATIO
INR PPP: 1.8 RATIO
INR PPP: 2.6 RATIO
INR PPP: 2.8 RATIO
POCT-PROTHROMBIN TIME: 19.1 SECS
POCT-PROTHROMBIN TIME: 21.5 SECS
POCT-PROTHROMBIN TIME: 31 SECS
POCT-PROTHROMBIN TIME: 33.5 SECS

## 2022-08-15 DIAGNOSIS — Z79.01 LONG TERM (CURRENT) USE OF ANTICOAGULANTS: ICD-10-CM

## 2022-08-15 DIAGNOSIS — I48.91 UNSPECIFIED ATRIAL FIBRILLATION: ICD-10-CM

## 2022-08-15 DIAGNOSIS — Z51.81 ENCOUNTER FOR THERAPEUTIC DRUG LEVEL MONITORING: ICD-10-CM

## 2022-08-17 DIAGNOSIS — Z79.01 LONG TERM (CURRENT) USE OF ANTICOAGULANTS: ICD-10-CM

## 2022-08-26 ENCOUNTER — OUTPATIENT (OUTPATIENT)
Dept: OUTPATIENT SERVICES | Facility: HOSPITAL | Age: 52
LOS: 1 days | End: 2022-08-26

## 2022-08-26 ENCOUNTER — APPOINTMENT (OUTPATIENT)
Dept: INTERNAL MEDICINE | Facility: CLINIC | Age: 52
End: 2022-08-26

## 2022-08-26 VITALS — RESPIRATION RATE: 16 BRPM | OXYGEN SATURATION: 98 % | HEART RATE: 63 BPM

## 2022-08-26 DIAGNOSIS — Z98.891 HISTORY OF UTERINE SCAR FROM PREVIOUS SURGERY: Chronic | ICD-10-CM

## 2022-08-26 DIAGNOSIS — Z98.89 OTHER SPECIFIED POSTPROCEDURAL STATES: Chronic | ICD-10-CM

## 2022-08-26 DIAGNOSIS — Z95.0 PRESENCE OF CARDIAC PACEMAKER: Chronic | ICD-10-CM

## 2022-08-26 LAB
INR PPP: 2.4 RATIO
POCT-PROTHROMBIN TIME: 29.3 SECS

## 2022-08-29 DIAGNOSIS — Z51.81 ENCOUNTER FOR THERAPEUTIC DRUG LEVEL MONITORING: ICD-10-CM

## 2022-08-29 DIAGNOSIS — Z79.01 LONG TERM (CURRENT) USE OF ANTICOAGULANTS: ICD-10-CM

## 2022-09-14 NOTE — PATIENT PROFILE ADULT. - PRESSURE ULCER(S)
[facility]  [address] [facility] [address]  [facility] [address]  [phone]          September 14, 2022    RE:  Elenita Moran                                                                                                                                                       [address] [place] [id number]            To whom it may concern:    Elenita Moran is under my professional care for Stage III pressure ulcer of sacral region (H).  She requires the endoform and a Mepilex bandage for her wounds changed every other day.  Please contact my office with questions.          Sincerely,        Frankie Vega MD  [phone]  
no

## 2022-09-15 ENCOUNTER — NON-APPOINTMENT (OUTPATIENT)
Age: 52
End: 2022-09-15

## 2022-09-15 ENCOUNTER — APPOINTMENT (OUTPATIENT)
Dept: ELECTROPHYSIOLOGY | Facility: CLINIC | Age: 52
End: 2022-09-15

## 2022-09-15 ENCOUNTER — APPOINTMENT (OUTPATIENT)
Dept: CARDIOLOGY | Facility: HOSPITAL | Age: 52
End: 2022-09-15

## 2022-09-15 VITALS — HEART RATE: 63 BPM | SYSTOLIC BLOOD PRESSURE: 140 MMHG | DIASTOLIC BLOOD PRESSURE: 60 MMHG

## 2022-09-15 PROCEDURE — 93282 PRGRMG EVAL IMPLANTABLE DFB: CPT

## 2022-09-16 ENCOUNTER — APPOINTMENT (OUTPATIENT)
Dept: INTERNAL MEDICINE | Facility: CLINIC | Age: 52
End: 2022-09-16

## 2022-09-26 NOTE — HISTORY OF PRESENT ILLNESS
[FreeTextEntry1] : 52 yo lady PMHx of Takayasu aortitis, severe AVR/MVR s/p Bentall procedure w/ CABG x 1 (SVG-LAD) and MVR, paroxysmal Afib on coumadin, HFpEF, and VT s/p Medtronic ICD who presents for f/u. \par \par Last TTE with EF 67% and normal aortic/mitral gradients in setting of AVR/MVR s/p Bentall. \par \par Today, she reports feeling well w/o any cardiac ROS findings with good exercise tolerance.

## 2022-09-26 NOTE — DISCUSSION/SUMMARY
[FreeTextEntry1] : 50 yo lady PMHx of Takayasu aortitis, severe AVR/MVR s/p Bentall procedure w/ CABG x 1 (SVG-LAD) and MVR, paroxysmal Afib on coumadin, HFpEF, and VT s/p Medtronic ICD who presents for f/u. \par \par #Takayasu aortitis, severe AVR/MVR s/p Bentall procedure w/ CABG x 1 (SVG-LAD)\par - management as per rheum, rheum appt coming up \par - C/w ASA 81mg daily and lipitor 40mg qhs\par \par #HFpEF        \par - Compensated and euvolemic, c/w lasix PO 80mg BID\par - Latest TTE Feb/2022 with normal EF\par \par #Paroxysmal Afib\par - C/w metoprolol tartrate 100 BID\par - Switch warfarin to eliquis 5mg BID for systemic embolization prevention, advised the patient to start eliquis once INR is <2\par - F/u with interrogation report from today\par \par #VT s/p ICD\par - F/u with interrogation report from today\par - C/w metoprolol 100 BID\par \par Rest of non cardiology care as per PCP, rheum, and GI - appreciate excellent care\par \par F/u in 3 mo\par \par Signed by\par Kimberlyn Xavier MD\par PGY6 Cardiology

## 2022-10-03 ENCOUNTER — OUTPATIENT (OUTPATIENT)
Dept: OUTPATIENT SERVICES | Facility: HOSPITAL | Age: 52
LOS: 1 days | End: 2022-10-03

## 2022-10-03 ENCOUNTER — APPOINTMENT (OUTPATIENT)
Dept: RHEUMATOLOGY | Facility: CLINIC | Age: 52
End: 2022-10-03

## 2022-10-03 ENCOUNTER — MED ADMIN CHARGE (OUTPATIENT)
Age: 52
End: 2022-10-03

## 2022-10-03 ENCOUNTER — NON-APPOINTMENT (OUTPATIENT)
Age: 52
End: 2022-10-03

## 2022-10-03 VITALS
HEART RATE: 61 BPM | OXYGEN SATURATION: 98 % | SYSTOLIC BLOOD PRESSURE: 130 MMHG | TEMPERATURE: 97.7 F | BODY MASS INDEX: 21.9 KG/M2 | HEIGHT: 61 IN | WEIGHT: 116 LBS | DIASTOLIC BLOOD PRESSURE: 60 MMHG

## 2022-10-03 DIAGNOSIS — Z98.891 HISTORY OF UTERINE SCAR FROM PREVIOUS SURGERY: Chronic | ICD-10-CM

## 2022-10-03 DIAGNOSIS — Z98.89 OTHER SPECIFIED POSTPROCEDURAL STATES: Chronic | ICD-10-CM

## 2022-10-03 DIAGNOSIS — R51.9 HEADACHE, UNSPECIFIED: ICD-10-CM

## 2022-10-03 DIAGNOSIS — Z95.0 PRESENCE OF CARDIAC PACEMAKER: Chronic | ICD-10-CM

## 2022-10-03 PROCEDURE — 99214 OFFICE O/P EST MOD 30 MIN: CPT | Mod: GC

## 2022-10-05 DIAGNOSIS — Z79.899 OTHER LONG TERM (CURRENT) DRUG THERAPY: ICD-10-CM

## 2022-10-05 DIAGNOSIS — R51.9 HEADACHE, UNSPECIFIED: ICD-10-CM

## 2022-10-05 DIAGNOSIS — M31.6 OTHER GIANT CELL ARTERITIS: ICD-10-CM

## 2022-10-05 DIAGNOSIS — M31.4 AORTIC ARCH SYNDROME [TAKAYASU]: ICD-10-CM

## 2022-10-05 NOTE — HISTORY OF PRESENT ILLNESS
[FreeTextEntry1] : Overall feels well. Reports 3 episodes a year of chest tightness lasting minutes, nothing recently. Denies shortness of breath , no extremities claudication , no joint pain or swelling. \par Currently on 2mg Prednisone daily and MTX 17.5 mg a week. She has not been on Actemra, claiming there was an issue dealing with her pharmacy.

## 2022-10-05 NOTE — REASON FOR VISIT
[Follow-Up: _____] : a [unfilled] follow-up visit [[employer] ] : provided by [employer]   [FreeTextEntry1] : Takayasu Arteritis [Interpreters_IDNumber] : [id number] [TWNoteComboBox1] : Telugu

## 2022-10-05 NOTE — ASSESSMENT
[FreeTextEntry1] : 52 Y F with Takayasu arteritis. 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  \par = Currently on MTX 17.5 mg weekly and prednisone 2 mg daily. \par = PET CT in 2021 showing new areas of uptake in ascending thoracic aorta, new compared to previous CT in 2018. \par = Nov 2021 admission for afib w/ RVR s/p TTE with cardioversion.admit at [facility] 11/12-11/14 for afib w/ RVR s/p TTE with cardioversion. CTA 11/2021 of c/a/p redemonstrated vasculitic disease of the aorta and great vessels. The aortic arch has increased in dilation measuring 2.9 cm, previously 2.6 cm (measured on CT 12/14/2018). \par = She did not start Actemra last visit\par - reminded patient the importance of starting Actemra; she will reach out to her pharmacy. If she is unable to obtain medication then will attempt to switch to Actemra infusion\par - obtain CT Chest & Neck with contrast in November to evaluate for progression of disease\par - check CBC, CMP, ESR, CRP, Hep B status, Quant TB\par - c/w Methotrexate 17.5 mg daily, 1 mg Folic Acid daily and Prednisone 2 mg daily\par - Start Actemra as per above\par \par # A fib / AVR\par Switched to Eliquis, managed by Cardiology\par - c/w Cardiology f/u\par \par #Immunizations\par - Flu shot given this visit\par \par f/u in 2 months\par DW Dr Horowitz. \par \par  \par

## 2022-10-06 ENCOUNTER — NON-APPOINTMENT (OUTPATIENT)
Age: 52
End: 2022-10-06

## 2022-10-10 ENCOUNTER — APPOINTMENT (OUTPATIENT)
Dept: INTERNAL MEDICINE | Facility: CLINIC | Age: 52
End: 2022-10-10

## 2022-10-10 ENCOUNTER — OUTPATIENT (OUTPATIENT)
Dept: OUTPATIENT SERVICES | Facility: HOSPITAL | Age: 52
LOS: 1 days | End: 2022-10-10

## 2022-10-10 DIAGNOSIS — Z98.891 HISTORY OF UTERINE SCAR FROM PREVIOUS SURGERY: Chronic | ICD-10-CM

## 2022-10-10 DIAGNOSIS — Z98.89 OTHER SPECIFIED POSTPROCEDURAL STATES: Chronic | ICD-10-CM

## 2022-10-10 DIAGNOSIS — Z95.0 PRESENCE OF CARDIAC PACEMAKER: Chronic | ICD-10-CM

## 2022-10-11 DIAGNOSIS — M31.4 AORTIC ARCH SYNDROME [TAKAYASU]: ICD-10-CM

## 2022-10-11 DIAGNOSIS — Z79.899 OTHER LONG TERM (CURRENT) DRUG THERAPY: ICD-10-CM

## 2022-10-11 DIAGNOSIS — R51.9 HEADACHE, UNSPECIFIED: ICD-10-CM

## 2022-10-11 DIAGNOSIS — M31.6 OTHER GIANT CELL ARTERITIS: ICD-10-CM

## 2022-10-13 ENCOUNTER — NON-APPOINTMENT (OUTPATIENT)
Age: 52
End: 2022-10-13

## 2022-10-13 DIAGNOSIS — Z12.11 ENCOUNTER FOR SCREENING FOR MALIGNANT NEOPLASM OF COLON: ICD-10-CM

## 2022-10-18 NOTE — PROGRESS NOTE ADULT - SUBJECTIVE AND OBJECTIVE BOX
Home Operation; S/p C1v/Bental/ MVR (T) Ablation Ef 60%  POD3 2    Patient is doing well, resting comfortably; complains of mild incisional pain that is relieved by pain medication.    Vital Signs Last 24 Hrs  T(C): 36.8 (13 Aug 2017 07:00), Max: 37.5 (13 Aug 2017 00:00)  T(F): 98.2 (13 Aug 2017 07:00), Max: 99.5 (13 Aug 2017 00:00)  HR: 61 (13 Aug 2017 07:00) (59 - 78)  BP: --  BP(mean): --  RR: 15 (13 Aug 2017 07:00) (12 - 26)  SpO2: 100% (13 Aug 2017 07:00) (98% - 100%)  I&O's Detail    12 Aug 2017 07:01  -  13 Aug 2017 07:00  --------------------------------------------------------  IN:    insulin Infusion: 2.5 mL    IV PiggyBack: 250 mL    milrinone  Infusion: 119.7 mL    milrinone  Infusion: 9 mL      Oral Fluid: 1030 mL    sodium chloride 0.9%.: 240 mL  Total IN: 1726.2 mL    OUT:    Bulb: 30 mL    Chest Tube: 580 mL    Indwelling Catheter - Urethral: 1570 mL  Total OUT: 2180 mL    Total NET: -453.8 mL          CHEST TUBE:  on Suction  JESSIE DRAIN:    EPICARDIAL WIRES: Ventricular wires   OTTO: Yes    MEDICATIONS  (STANDING):  sodium chloride 0.9%. 1000 milliLiter(s) (10 mL/Hr) IV Continuous <Continuous>  docusate sodium 100 milliGRAM(s) Oral three times a day  milrinone Infusion 0.2 MICROgram(s)/kG/Min (3.018 mL/Hr) IV Continuous <Continuous>  predniSONE   Tablet 4 milliGRAM(s) Oral daily  sotalol 120 milliGRAM(s) Oral every 12 hours  ketorolac   Injectable 15 milliGRAM(s) IV Push every 6 hours  atorvastatin 40 milliGRAM(s) Oral at bedtime  aspirin  chewable 81 milliGRAM(s) Oral daily  metoclopramide Injectable 10 milliGRAM(s) IV Push every 8 hours  pantoprazole    Tablet 40 milliGRAM(s) Oral before breakfast    MEDICATIONS  (PRN):  oxyCODONE    5 mG/acetaminophen 325 mG 1 Tablet(s) Oral every 6 hours PRN Mild Pain  oxyCODONE    5 mG/acetaminophen 325 mG 2 Tablet(s) Oral every 6 hours PRN Moderate Pain  ondansetron Injectable 4 milliGRAM(s) IV Push every 4 hours PRN Nausea and/or Vomiting      General: A+Ox3, in NAD  Chest: sternal dressing C/D/I  Heart: S1, S2, RRR  Lungs: CTA B/L, dimished at the bases  Abdomen: Soft, ND, NT, positive BS  Extremeties: without edema B/L LE, positive DP pulses B/L     Does the patient have a history of CHF?  no  -If yes, systolic or diastolic  -pre-operative EF60%    Extubation within 24 hours? yes    CXR reviewed with attending.     Does the patient have a history of kidney disease? no  -give CKD stage if applicable  -what is patient's baseline Creatinine 0.95    Beta Blockers contraindicated for the first 24 hours due to vasopressor/inotropic medication      Did the patient receive transfusion of blood and/or products? yes  -If yes, anemia due to acute blood loss    DVT PPX with vendodynes as well as chemical prophylaxis.    Gloria-operative antibiotics to be discontinued within 48 hours of CTU admission    Patient care discussed on morning interdisciplinary rounds with CTS team.

## 2022-10-20 ENCOUNTER — NON-APPOINTMENT (OUTPATIENT)
Age: 52
End: 2022-10-20

## 2022-10-20 LAB
ALBUMIN SERPL ELPH-MCNC: 4.6 G/DL
ALP BLD-CCNC: 68 U/L
ALT SERPL-CCNC: 35 U/L
ANION GAP SERPL CALC-SCNC: 12 MMOL/L
AST SERPL-CCNC: 49 U/L
BASOPHILS # BLD AUTO: 0.07 K/UL
BASOPHILS NFR BLD AUTO: 1.1 %
BILIRUB SERPL-MCNC: 1 MG/DL
BUN SERPL-MCNC: 21 MG/DL
CALCIUM SERPL-MCNC: 10.1 MG/DL
CHLORIDE SERPL-SCNC: 100 MMOL/L
CO2 SERPL-SCNC: 32 MMOL/L
CREAT SERPL-MCNC: 0.81 MG/DL
CRP SERPL-MCNC: <3 MG/L
EGFR: 87 ML/MIN/1.73M2
EOSINOPHIL # BLD AUTO: 0.11 K/UL
EOSINOPHIL NFR BLD AUTO: 1.7 %
ERYTHROCYTE [SEDIMENTATION RATE] IN BLOOD BY WESTERGREN METHOD: 34 MM/HR
GLUCOSE SERPL-MCNC: 106 MG/DL
HBV CORE IGG+IGM SER QL: NONREACTIVE
HBV SURFACE AB SER QL: REACTIVE
HBV SURFACE AG SER QL: NONREACTIVE
HCT VFR BLD CALC: 38.8 %
HGB BLD-MCNC: 13 G/DL
IMM GRANULOCYTES NFR BLD AUTO: 0.2 %
LYMPHOCYTES # BLD AUTO: 1.49 K/UL
LYMPHOCYTES NFR BLD AUTO: 22.9 %
M TB IFN-G BLD-IMP: NEGATIVE
MAN DIFF?: NORMAL
MCHC RBC-ENTMCNC: 33.5 GM/DL
MCHC RBC-ENTMCNC: 34.8 PG
MCV RBC AUTO: 103.7 FL
MONOCYTES # BLD AUTO: 0.65 K/UL
MONOCYTES NFR BLD AUTO: 10 %
NEUTROPHILS # BLD AUTO: 4.19 K/UL
NEUTROPHILS NFR BLD AUTO: 64.1 %
PLATELET # BLD AUTO: 284 K/UL
POTASSIUM SERPL-SCNC: 4.8 MMOL/L
PROT SERPL-MCNC: 7.9 G/DL
QUANTIFERON TB PLUS MITOGEN MINUS NIL: 5.44 IU/ML
QUANTIFERON TB PLUS NIL: 0.03 IU/ML
QUANTIFERON TB PLUS TB1 MINUS NIL: 0 IU/ML
QUANTIFERON TB PLUS TB2 MINUS NIL: -0.01 IU/ML
RBC # BLD: 3.74 M/UL
RBC # FLD: 15.7 %
SODIUM SERPL-SCNC: 144 MMOL/L
WBC # FLD AUTO: 6.52 K/UL

## 2022-11-02 ENCOUNTER — OUTPATIENT (OUTPATIENT)
Dept: OUTPATIENT SERVICES | Facility: HOSPITAL | Age: 52
LOS: 1 days | End: 2022-11-02

## 2022-11-02 ENCOUNTER — NON-APPOINTMENT (OUTPATIENT)
Age: 52
End: 2022-11-02

## 2022-11-02 ENCOUNTER — APPOINTMENT (OUTPATIENT)
Dept: INTERNAL MEDICINE | Facility: CLINIC | Age: 52
End: 2022-11-02

## 2022-11-02 VITALS — HEART RATE: 63 BPM | OXYGEN SATURATION: 98 % | TEMPERATURE: 97.3 F

## 2022-11-02 DIAGNOSIS — Z98.891 HISTORY OF UTERINE SCAR FROM PREVIOUS SURGERY: Chronic | ICD-10-CM

## 2022-11-02 DIAGNOSIS — Z98.89 OTHER SPECIFIED POSTPROCEDURAL STATES: Chronic | ICD-10-CM

## 2022-11-02 DIAGNOSIS — Z95.0 PRESENCE OF CARDIAC PACEMAKER: Chronic | ICD-10-CM

## 2022-11-02 DIAGNOSIS — Z79.01 LONG TERM (CURRENT) USE OF ANTICOAGULANTS: ICD-10-CM

## 2022-11-02 DIAGNOSIS — Z51.81 ENCOUNTER FOR THERAPEUTIC DRUG LEVEL MONITORING: ICD-10-CM

## 2022-11-02 LAB
INR PPP: 1.8 RATIO
POCT-PROTHROMBIN TIME: 22.1 SECS
QUALITY CONTROL: YES

## 2022-11-09 ENCOUNTER — OUTPATIENT (OUTPATIENT)
Dept: OUTPATIENT SERVICES | Facility: HOSPITAL | Age: 52
LOS: 1 days | End: 2022-11-09

## 2022-11-09 ENCOUNTER — APPOINTMENT (OUTPATIENT)
Dept: INTERNAL MEDICINE | Facility: CLINIC | Age: 52
End: 2022-11-09

## 2022-11-09 ENCOUNTER — RX RENEWAL (OUTPATIENT)
Age: 52
End: 2022-11-09

## 2022-11-09 VITALS — HEART RATE: 68 BPM | OXYGEN SATURATION: 98 % | RESPIRATION RATE: 16 BRPM | TEMPERATURE: 97.3 F

## 2022-11-09 DIAGNOSIS — Z98.89 OTHER SPECIFIED POSTPROCEDURAL STATES: Chronic | ICD-10-CM

## 2022-11-09 DIAGNOSIS — Z98.891 HISTORY OF UTERINE SCAR FROM PREVIOUS SURGERY: Chronic | ICD-10-CM

## 2022-11-09 DIAGNOSIS — Z95.0 PRESENCE OF CARDIAC PACEMAKER: Chronic | ICD-10-CM

## 2022-11-09 LAB
INR PPP: 2.2 RATIO
POCT-PROTHROMBIN TIME: 26.2 SECS

## 2022-11-10 DIAGNOSIS — Z51.81 ENCOUNTER FOR THERAPEUTIC DRUG LEVEL MONITORING: ICD-10-CM

## 2022-11-10 DIAGNOSIS — Z79.01 LONG TERM (CURRENT) USE OF ANTICOAGULANTS: ICD-10-CM

## 2022-11-16 ENCOUNTER — APPOINTMENT (OUTPATIENT)
Dept: INTERNAL MEDICINE | Facility: CLINIC | Age: 52
End: 2022-11-16

## 2022-11-16 ENCOUNTER — RX RENEWAL (OUTPATIENT)
Age: 52
End: 2022-11-16

## 2022-11-16 ENCOUNTER — OUTPATIENT (OUTPATIENT)
Dept: OUTPATIENT SERVICES | Facility: HOSPITAL | Age: 52
LOS: 1 days | End: 2022-11-16

## 2022-11-16 VITALS — HEART RATE: 67 BPM | RESPIRATION RATE: 16 BRPM | OXYGEN SATURATION: 97 % | TEMPERATURE: 97.2 F

## 2022-11-16 DIAGNOSIS — Z98.891 HISTORY OF UTERINE SCAR FROM PREVIOUS SURGERY: Chronic | ICD-10-CM

## 2022-11-16 DIAGNOSIS — Z98.89 OTHER SPECIFIED POSTPROCEDURAL STATES: Chronic | ICD-10-CM

## 2022-11-16 DIAGNOSIS — Z79.01 LONG TERM (CURRENT) USE OF ANTICOAGULANTS: ICD-10-CM

## 2022-11-16 DIAGNOSIS — Z95.0 PRESENCE OF CARDIAC PACEMAKER: Chronic | ICD-10-CM

## 2022-11-16 DIAGNOSIS — Z51.81 ENCOUNTER FOR THERAPEUTIC DRUG LEVEL MONITORING: ICD-10-CM

## 2022-11-16 LAB
INR PPP: 1.1 RATIO
POCT-PROTHROMBIN TIME: 13.5 SECS

## 2022-11-17 ENCOUNTER — RESULT REVIEW (OUTPATIENT)
Age: 52
End: 2022-11-17

## 2022-11-17 ENCOUNTER — OUTPATIENT (OUTPATIENT)
Dept: OUTPATIENT SERVICES | Facility: HOSPITAL | Age: 52
LOS: 1 days | End: 2022-11-17
Payer: COMMERCIAL

## 2022-11-17 ENCOUNTER — APPOINTMENT (OUTPATIENT)
Dept: CT IMAGING | Facility: CLINIC | Age: 52
End: 2022-11-17

## 2022-11-17 DIAGNOSIS — Z98.891 HISTORY OF UTERINE SCAR FROM PREVIOUS SURGERY: Chronic | ICD-10-CM

## 2022-11-17 DIAGNOSIS — Z95.0 PRESENCE OF CARDIAC PACEMAKER: Chronic | ICD-10-CM

## 2022-11-17 DIAGNOSIS — Z98.89 OTHER SPECIFIED POSTPROCEDURAL STATES: Chronic | ICD-10-CM

## 2022-11-17 DIAGNOSIS — Z12.11 ENCOUNTER FOR SCREENING FOR MALIGNANT NEOPLASM OF COLON: ICD-10-CM

## 2022-11-17 PROCEDURE — 74261 CT COLONOGRAPHY DX: CPT | Mod: 26

## 2022-11-17 PROCEDURE — 74261 CT COLONOGRAPHY DX: CPT

## 2022-12-05 ENCOUNTER — APPOINTMENT (OUTPATIENT)
Dept: RHEUMATOLOGY | Facility: CLINIC | Age: 52
End: 2022-12-05

## 2022-12-07 ENCOUNTER — NON-APPOINTMENT (OUTPATIENT)
Age: 52
End: 2022-12-07

## 2022-12-15 ENCOUNTER — APPOINTMENT (OUTPATIENT)
Dept: ELECTROPHYSIOLOGY | Facility: CLINIC | Age: 52
End: 2022-12-15

## 2022-12-15 ENCOUNTER — APPOINTMENT (OUTPATIENT)
Dept: CARDIOLOGY | Facility: HOSPITAL | Age: 52
End: 2022-12-15

## 2022-12-15 VITALS
DIASTOLIC BLOOD PRESSURE: 64 MMHG | SYSTOLIC BLOOD PRESSURE: 121 MMHG | HEART RATE: 60 BPM | RESPIRATION RATE: 16 BRPM | OXYGEN SATURATION: 100 %

## 2022-12-15 PROCEDURE — 93282 PRGRMG EVAL IMPLANTABLE DFB: CPT

## 2022-12-15 NOTE — HISTORY OF PRESENT ILLNESS
[FreeTextEntry1] : 52 yo lady PMHx of Takayasu aortitis, severe AVR/MVR s/p Bentall procedure w/ CABG x 1 (SVG-LAD) and MVR, paroxysmal Afib on eliquis, HFpEF, and VT s/p Medtronic ICD who presents for f/u. \par \par Last TTE with EF 67% and normal aortic/mitral gradients in setting of AVR/MVR s/p Bentall. \par \par Today, she reports feeling well w/o any cardiac ROS findings with good exercise tolerance.

## 2022-12-15 NOTE — DISCUSSION/SUMMARY
[FreeTextEntry1] : 52 yo lady PMHx of Takayasu aortitis, severe AVR/MVR s/p Bentall procedure w/ CABG x 1 (SVG-LAD) and MVR, paroxysmal Afib on coumadin, HFpEF, and VT s/p Medtronic ICD who presents for f/u. \par \par #Takayasu aortitis, severe AVR/MVR s/p Bentall procedure w/ CABG x 1 (SVG-LAD)\par - management as per rheum, rheum appt coming up \par - C/w ASA 81mg daily and lipitor 40mg qhs\par \par #HFpEF        \par - Compensated and euvolemic, c/w lasix PO 80mg BID\par - Latest TTE Feb/2022 with normal EF\par \par #Paroxysmal Afib\par - C/w metoprolol tartrate 100 BID\par - Eliquis 5mg BID for systemic embolization prevention\par - F/u with interrogation report from today\par \par #VT s/p ICD\par - F/u with interrogation report from today\par - C/w metoprolol 100 BID\par \par Rest of non cardiology care as per PCP, rheum, and GI - appreciate excellent care\par \par F/u in 3 mo\par \par Signed by\par Kimberlyn Xavier MD\par PGY6 Cardiology

## 2022-12-16 ENCOUNTER — APPOINTMENT (OUTPATIENT)
Dept: ELECTROPHYSIOLOGY | Facility: CLINIC | Age: 52
End: 2022-12-16

## 2022-12-17 NOTE — PROVIDER CONTACT NOTE (OTHER) - NAME OF MD/NP/PA/DO NOTIFIED:
Avery JUAREZ This was a shared visit with the MAKAYLA. I reviewed and verified the documentation and independently performed the documented:

## 2022-12-19 ENCOUNTER — OUTPATIENT (OUTPATIENT)
Dept: OUTPATIENT SERVICES | Facility: HOSPITAL | Age: 52
LOS: 1 days | End: 2022-12-19

## 2022-12-19 ENCOUNTER — APPOINTMENT (OUTPATIENT)
Dept: INTERNAL MEDICINE | Facility: CLINIC | Age: 52
End: 2022-12-19

## 2022-12-19 VITALS
OXYGEN SATURATION: 99 % | SYSTOLIC BLOOD PRESSURE: 128 MMHG | DIASTOLIC BLOOD PRESSURE: 60 MMHG | HEIGHT: 61 IN | BODY MASS INDEX: 21.52 KG/M2 | HEART RATE: 60 BPM | WEIGHT: 114 LBS | RESPIRATION RATE: 16 BRPM

## 2022-12-19 DIAGNOSIS — K21.9 GASTRO-ESOPHAGEAL REFLUX DISEASE W/OUT ESOPHAGITIS: ICD-10-CM

## 2022-12-19 DIAGNOSIS — Z00.00 ENCOUNTER FOR GENERAL ADULT MEDICAL EXAMINATION W/OUT ABNORMAL FINDINGS: ICD-10-CM

## 2022-12-19 DIAGNOSIS — Z79.01 ENCOUNTER FOR THERAPEUTIC DRUG LVL MONITORING: ICD-10-CM

## 2022-12-19 DIAGNOSIS — K86.2 CYST OF PANCREAS: ICD-10-CM

## 2022-12-19 DIAGNOSIS — K86.89 OTHER SPECIFIED DISEASES OF PANCREAS: ICD-10-CM

## 2022-12-19 DIAGNOSIS — Z12.4 ENCOUNTER FOR SCREENING FOR MALIGNANT NEOPLASM OF CERVIX: ICD-10-CM

## 2022-12-19 DIAGNOSIS — G89.29 LOW BACK PAIN, UNSPECIFIED: ICD-10-CM

## 2022-12-19 DIAGNOSIS — Z95.0 PRESENCE OF CARDIAC PACEMAKER: Chronic | ICD-10-CM

## 2022-12-19 DIAGNOSIS — M54.50 LOW BACK PAIN, UNSPECIFIED: ICD-10-CM

## 2022-12-19 DIAGNOSIS — Z51.81 ENCOUNTER FOR THERAPEUTIC DRUG LVL MONITORING: ICD-10-CM

## 2022-12-19 DIAGNOSIS — Z23 ENCOUNTER FOR IMMUNIZATION: ICD-10-CM

## 2022-12-19 PROCEDURE — 99213 OFFICE O/P EST LOW 20 MIN: CPT | Mod: GE

## 2022-12-19 RX ORDER — OMEPRAZOLE 40 MG/1
40 CAPSULE, DELAYED RELEASE ORAL
Qty: 60 | Refills: 0 | Status: ACTIVE | COMMUNITY
Start: 2022-12-19 | End: 1900-01-01

## 2022-12-20 PROBLEM — M54.50 CHRONIC BILATERAL LOW BACK PAIN, UNSPECIFIED WHETHER SCIATICA PRESENT: Status: ACTIVE | Noted: 2022-01-06

## 2022-12-21 LAB
FOLATE SERPL-MCNC: 16.2 NG/ML
HIV1+2 AB SPEC QL IA.RAPID: NONREACTIVE
VIT B12 SERPL-MCNC: 989 PG/ML

## 2022-12-22 PROBLEM — K86.89 PANCREATIC MASS: Noted: 2021-07-27

## 2022-12-22 PROBLEM — Z23 ENCOUNTER FOR IMMUNIZATION: Status: RESOLVED | Noted: 2021-10-04 | Resolved: 2022-12-22

## 2022-12-22 PROBLEM — Z51.81 ANTICOAGULATION GOAL OF INR 2 TO 3: Status: RESOLVED | Noted: 2021-02-15 | Resolved: 2022-12-22

## 2022-12-22 PROBLEM — Z12.4 SCREENING FOR CERVICAL CANCER: Status: RESOLVED | Noted: 2022-01-28 | Resolved: 2022-12-22

## 2022-12-22 NOTE — REVIEW OF SYSTEMS
[Muscle Pain] : muscle pain [Negative] : ENT [Chest Pain] : no chest pain [Palpitations] : no palpitations [Lower Ext Edema] : no lower extremity edema [Orthopnea] : no orthopnea [Paroxysmal Nocturnal Dyspnea] : no paroxysmal nocturnal dyspnea [Shortness Of Breath] : no shortness of breath [Wheezing] : no wheezing [Cough] : no cough [Abdominal Pain] : no abdominal pain [Nausea] : no nausea [Heartburn] : no heartburn [Dysuria] : no dysuria [Joint Pain] : no joint pain [Joint Stiffness] : no joint stiffness [Muscle Weakness] : no muscle weakness [Itching] : no itching [Dizziness] : no dizziness [Fainting] : no fainting [Confusion] : no confusion [Unsteady Walking] : no ataxia [Anxiety] : no anxiety [Easy Bleeding] : no easy bleeding [Easy Bruising] : no easy bruising

## 2022-12-22 NOTE — PHYSICAL EXAM
[Normal Rate] : normal rate  [Regular Rhythm] : with a regular rhythm [Pedal Pulses Present] : the pedal pulses are present [No Edema] : there was no peripheral edema [Soft] : abdomen soft [Non Tender] : non-tender [Non-distended] : non-distended [No CVA Tenderness] : no CVA  tenderness [No Spinal Tenderness] : no spinal tenderness [No Rash] : no rash [Normal] : affect was normal and insight and judgment were intact [Grossly Normal Strength/Tone] : grossly normal strength/tone [de-identified] : Loud S1, S2

## 2022-12-22 NOTE — HISTORY OF PRESENT ILLNESS
[FreeTextEntry1] : Follow up chronic conditions [de-identified] : 52F PMH Takayasu aortitis/arteritis, severe AVR/MVR s/p Bentall procedure w/ CABG x 1 (SVG-LAD) and MVR, paroxysmal Afib, HFpEF, and VT s/p Medtronic ICD who presents for follow up. \par \par Interim: \par - transitioned to eliquis from warfarin for AC. reports improvement in QOL.\par \par \par #HF\par - taking lasix PO 80mg BID, metoprolol tartrate 100mg BID\par - Notes increased frequency of SOB episodes during colder months, attributes to less insensible losses/sweating during winter months. Drinks ~3 16oz water bottles a day, salty soups as part of korean diet. Reports that she takes extra pill of metoprolol because she was advised by cardiology to do so during symptom episodes.  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 (Brachiocephalic artery, subclavian arteries, right and left common carotid arteries involvement since 2015.)\par - Receiving actemra on time, taking every month.\par - Taking prednisone 2mg QD, methotrexate 17.5mg Qweekly \par - Taking Aspirin 81mg daily , atorvastatin 40mg QHS\par \par #Back pain\par Reports occasional pain that has been happening for >3 months. low back pain that radiates down both legs. Lasts >3 seconds. Unable to move due to pain. Denies numbness. no nightsweats no fevers. no urinary or bowel incontinence. Describes pain as squeezing pain that radiates down.\par \par #Osteoporosis\par DEXA in 2/2022 showed T score of -2.3 in the spine. No previous fragility fractures. Started on alendronate weekly since 2/2022, without adverse reactions - denies jaw pain, hip pain, reflex/heartburn.  c/w cholecalciferol 1000 IU daily. Taking in about 500mg of dietary calcium daily through dairy, anchovy.\par \par #GERD\par Reports reflux/heartburn symptoms. Previously tried omeprazole but didn't take everyday and symptoms persisted\par \par #HCM:\par - CT colonography done 11/2022 - no polyps or masses; has diverticulosis. \par - Cervical cancer screen 1/28/22: path negative.\par - Mammogram done 2/2022 showed BIRADS2, repeat in 2 years.\par - COVID vaccine (4/27, 5/27, 11/29) Moderna\par - No shingles vaccine yet

## 2022-12-22 NOTE — ASSESSMENT
[FreeTextEntry1] : 52F PMH Takayasu aortitis/arteritis, severe AVR/MVR s/p Bentall procedure w/ CABG x 1 (SVG-LAD) and MVR, paroxysmal Afib on eliquis, HFpEF, and VT s/p Medtronic ICD who presents for follow up. \par \par #HFpEF\par Euvolemic \par - c/w lasix PO 80mg BID, metoprolol tartrate 100mg BID\par - for SOB episodes, counseled patient on restricting fluid intake to 2 16oz water bottle a day, less salt/soups in diet. Will reach out to cardiology about taking additional lasix pill for episodes. Instructed pt to record daily weight.\par \par # Afib//VT s/p AICD \par Well controlled\par - c/w metoprolol tartrate 100mg BID\par - c/w eliquis 5 BID\par - F/u cardiology and ICD check at 3 month intervals. 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 (Brachiocephalic artery, subclavian arteries, right and left common carotid arteries involvement since 2015.)\par - c/w prednisone 2mg daily, methotrexate 17.5mg weekly \par - c/w actemra\par - f/u rheum \par - rheum had ordered CTA chest and neck. Was not able to do both on 1 day. Reordered CTA neck to be done.\par \par #S/p CABG\par - C/w Aspirin 81mg, atorva 80 QHS\par \par #Low back pain with radiation down to legs\par squeezing pain down legs ~30 seconds. onset at rest or during walking\par Unclear if neuropathy vs. sciatica vs. claudication\par - Will check B12, folate given macrocytosis\par - Patient declines medications or neurology referral at this time\par - Counselled on regular exercise and activity \par \par #Osteoporosis\par DEXA in 2/2022 showed T score of -2.3 in the spine. No previous fragility fractures. Given T score close to -2.5, and variability in T score calculation. Decision was made to start on alendronate weekly since 2/2022. No adverse reactions - denies jaw pain, hip/thigh pain, reflex/heartburn\par - c/w Alendronate 70mg weekly \par - c/w cholecalciferol 1000 IU daily. Getting sufficient  500mg of dietary calcium daily through dairy, anchovy.\par \par #GERD\par Reports reflux symptoms. Previously tried omeprazole but didn't take everyday and symptoms persisted\par - Will trial another course of omeprazole 8 weeks\par - F/u in 2 months \par \par #Pancreatic cyst\par - US 5/2021:1.2 cm cyst in the pancreatic body, unchanged from CT on 12/14/2018. Possibly 7 mm additional cystic hypodensity within the pancreatic neck. CT w/ contrast 11/2021: Unchanged 1.3 cm hypoattenuation in the pancreatic body.\par - Stable\par \par \par #HCM:\par - CT colonography done 11/2022 - no polyps or masses; has diverticulosis. \par - Cervical cancer screen 1/28/22: path negative.\par - Mammogram done 2/2022 showed BIRADS2, repeat in 2 years.\par - COVID vaccine (4/27, 5/27, 11/29) Moderna\par - No shingles vaccine yet\par - Check HIV screen\par \par \par RTC in 2 months \par \par CDW Dr. Nixon\par \par JY, PGY3\par Firm 5

## 2022-12-22 NOTE — INTERPRETER SERVICES
[Patient Declined  Services] : - None: Patient declined  services [FreeTextEntry3] : Patient declined  services as author is native speaker of Swedish [TWNoteComboBox1] : Greenlandic

## 2022-12-27 DIAGNOSIS — M31.4 AORTIC ARCH SYNDROME [TAKAYASU]: ICD-10-CM

## 2022-12-27 DIAGNOSIS — I50.22 CHRONIC SYSTOLIC (CONGESTIVE) HEART FAILURE: ICD-10-CM

## 2022-12-27 DIAGNOSIS — K21.9 GASTRO-ESOPHAGEAL REFLUX DISEASE WITHOUT ESOPHAGITIS: ICD-10-CM

## 2022-12-27 DIAGNOSIS — M31.6 OTHER GIANT CELL ARTERITIS: ICD-10-CM

## 2022-12-27 DIAGNOSIS — M54.50 LOW BACK PAIN, UNSPECIFIED: ICD-10-CM

## 2022-12-27 DIAGNOSIS — I48.91 UNSPECIFIED ATRIAL FIBRILLATION: ICD-10-CM

## 2022-12-27 DIAGNOSIS — K86.2 CYST OF PANCREAS: ICD-10-CM

## 2022-12-27 DIAGNOSIS — I42.2 OTHER HYPERTROPHIC CARDIOMYOPATHY: ICD-10-CM

## 2022-12-27 DIAGNOSIS — Z00.00 ENCOUNTER FOR GENERAL ADULT MEDICAL EXAMINATION WITHOUT ABNORMAL FINDINGS: ICD-10-CM

## 2023-01-03 ENCOUNTER — NON-APPOINTMENT (OUTPATIENT)
Age: 53
End: 2023-01-03

## 2023-01-06 ENCOUNTER — RX RENEWAL (OUTPATIENT)
Age: 53
End: 2023-01-06

## 2023-01-17 ENCOUNTER — RX RENEWAL (OUTPATIENT)
Age: 53
End: 2023-01-17

## 2023-01-18 ENCOUNTER — RX RENEWAL (OUTPATIENT)
Age: 53
End: 2023-01-18

## 2023-02-13 ENCOUNTER — APPOINTMENT (OUTPATIENT)
Dept: RHEUMATOLOGY | Facility: CLINIC | Age: 53
End: 2023-02-13

## 2023-02-21 ENCOUNTER — RX RENEWAL (OUTPATIENT)
Age: 53
End: 2023-02-21

## 2023-02-27 ENCOUNTER — APPOINTMENT (OUTPATIENT)
Dept: INTERNAL MEDICINE | Facility: CLINIC | Age: 53
End: 2023-02-27
Payer: MEDICAID

## 2023-02-27 ENCOUNTER — OUTPATIENT (OUTPATIENT)
Dept: OUTPATIENT SERVICES | Facility: HOSPITAL | Age: 53
LOS: 1 days | End: 2023-02-27

## 2023-02-27 VITALS
SYSTOLIC BLOOD PRESSURE: 130 MMHG | DIASTOLIC BLOOD PRESSURE: 60 MMHG | HEART RATE: 63 BPM | BODY MASS INDEX: 21.52 KG/M2 | OXYGEN SATURATION: 98 % | WEIGHT: 114 LBS | HEIGHT: 61 IN

## 2023-02-27 DIAGNOSIS — M54.41 LUMBAGO WITH SCIATICA, LEFT SIDE: ICD-10-CM

## 2023-02-27 DIAGNOSIS — G89.29 LUMBAGO WITH SCIATICA, LEFT SIDE: ICD-10-CM

## 2023-02-27 DIAGNOSIS — M54.42 LUMBAGO WITH SCIATICA, LEFT SIDE: ICD-10-CM

## 2023-02-27 DIAGNOSIS — Z95.0 PRESENCE OF CARDIAC PACEMAKER: Chronic | ICD-10-CM

## 2023-02-27 DIAGNOSIS — Z98.891 HISTORY OF UTERINE SCAR FROM PREVIOUS SURGERY: Chronic | ICD-10-CM

## 2023-02-27 DIAGNOSIS — Z98.89 OTHER SPECIFIED POSTPROCEDURAL STATES: Chronic | ICD-10-CM

## 2023-02-27 PROCEDURE — 99213 OFFICE O/P EST LOW 20 MIN: CPT | Mod: GE

## 2023-02-27 RX ORDER — POLYETHYLENE GLYCOL 3350 17 G/17G
17 POWDER, FOR SOLUTION ORAL
Qty: 238 | Refills: 0 | Status: DISCONTINUED | COMMUNITY
Start: 2022-03-24 | End: 2023-02-27

## 2023-02-27 RX ORDER — MULTIVIT-MIN/FOLIC/VIT K/LYCOP 400-300MCG
25 MCG TABLET ORAL DAILY
Qty: 90 | Refills: 2 | Status: DISCONTINUED | COMMUNITY
Start: 2022-08-02 | End: 2023-02-27

## 2023-03-01 DIAGNOSIS — M54.42 LUMBAGO WITH SCIATICA, LEFT SIDE: ICD-10-CM

## 2023-03-01 DIAGNOSIS — M31.4 AORTIC ARCH SYNDROME [TAKAYASU]: ICD-10-CM

## 2023-03-01 DIAGNOSIS — I48.91 UNSPECIFIED ATRIAL FIBRILLATION: ICD-10-CM

## 2023-03-01 DIAGNOSIS — I50.22 CHRONIC SYSTOLIC (CONGESTIVE) HEART FAILURE: ICD-10-CM

## 2023-03-01 DIAGNOSIS — M31.6 OTHER GIANT CELL ARTERITIS: ICD-10-CM

## 2023-03-01 RX ORDER — POTASSIUM CHLORIDE 1500 MG/1
20 TABLET, FILM COATED, EXTENDED RELEASE ORAL
Qty: 90 | Refills: 2 | Status: ACTIVE | COMMUNITY
Start: 2023-03-01 | End: 1900-01-01

## 2023-03-01 NOTE — ASSESSMENT
[FreeTextEntry1] : 52F PMH Takayasu aortitis/arteritis, severe AVR/MVR s/p Bentall procedure w/ CABG x 1 (SVG-LAD) and MVR, paroxysmal Afib, HFpEF, and VT s/p Medtronic ICD who presents for follow up. \par \par #Low back/hip pain with radiation down to legs\par DDx: Most likely sciatica. Low likelihood that this is secondary to takayasu arteritis involving abdominal aorta/iliac arteries giving timing of initial onset of episodes of pain and non-exertional nature (at least not consistently?). CTA abd/pelv done in 2021 showed no disease process in this area. Low suspicion of avascular necrosis in hip from alendronate (started 2/2022) given timing of onset of pain.\par - Discussed with Rheum - Dr. Tobin and Dr. Horowitz - they will schedule an appt with her to evaluate\par - CMP checked in case we want to pursue CTA abd/pelvis with contrast \par - PT referral for sciatica. Pt agreed to try.\par \par #HFpEF\par Appears well compensated. 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 (Brachiocephalic artery, subclavian arteries, right and left common carotid arteries involvement since 2015.)\par - c/w actemra, prednisone 2mg QD, methotrexate 17.5mg Qweekly \par - Taking Aspirin 81mg daily , atorvastatin 40mg QHS\par \par #Osteoporosis\par DEXA in 2/2022: T score of -2.3 in the spine. No previous fragility fractures. Started on alendronate weekly since 2/2022.\par - c/w alendronate weekly\par - will switch from vit D2 to D3 given its superior potency and duration of action. Cholecalciferol 18424 weekly.\par - c/w dietary calcium intake (~500mg)\par \par #GERD - RESOLVED\par Reports resolution of heartburn symptoms after 3-4 days of pantoprazole so discontinued. No recurrent symptoms.\par - continue to monitor - especially as it could be side effect of alendronate\par \par #HCM:\par - CT colonography done 11/2022 - no polyps or masses; has diverticulosis. --> Repeat in 2027\par - Cervical cancer screen 1/28/22: path negative. --> Repeat in 2025\par - Mammogram done 2/2022 showed BIRADS2 --> patient expressed would like to repeat in 2 years (2024) rather than in 1 year.\par - COVID vaccine (4/27, 5/27, 11/29) Moderna\par - Has not yet pursued getting shingles vaccine

## 2023-03-01 NOTE — REVIEW OF SYSTEMS
[Joint Pain] : joint pain [Negative] : ENT [Muscle Pain] : muscle pain [Back Pain] : back pain [Chest Pain] : no chest pain [Palpitations] : no palpitations [Lower Ext Edema] : no lower extremity edema [Orthopnea] : no orthopnea [Paroxysmal Nocturnal Dyspnea] : no paroxysmal nocturnal dyspnea [Shortness Of Breath] : no shortness of breath [Wheezing] : no wheezing [Cough] : no cough [Abdominal Pain] : no abdominal pain [Nausea] : no nausea [Heartburn] : no heartburn [Dysuria] : no dysuria [Joint Stiffness] : no joint stiffness [Muscle Weakness] : no muscle weakness [Itching] : no itching [Fainting] : no fainting [Confusion] : no confusion [Unsteady Walking] : no ataxia [Anxiety] : no anxiety [Easy Bleeding] : no easy bleeding [Easy Bruising] : no easy bruising

## 2023-03-01 NOTE — HISTORY OF PRESENT ILLNESS
[FreeTextEntry1] : Follow up  [de-identified] : 52F PMH Takayasu aortitis/arteritis, severe AVR/MVR s/p Bentall procedure w/ CABG x 1 (SVG-LAD) and MVR, paroxysmal Afib, HFpEF, and VT s/p Medtronic ICD who presents for follow up. \par \par #Low back/hip pain with radiation down to legs\par Pt reports increased frequency and duration of her pain in low back/hip that moves down bilateral legs. "squeezing/cramping" that moves down. also reports that it feels like the bones in her hip and legs are "melting." Not able to move her legs at all when pain occurs. Cannot identify consistent triggers. States it happens out of the blue - sometimes when walking, sometimes when resting, sometimes when she stands up after sitting for a long time. Onset was about 7-8 years ago, but would happen rarely and last only a few seconds. Recently in the last year to several months ago, has been happening ~ every month or so and lasting a longer time ~30 seconds to a minute.\par \par #HFpEF\par - Continues to take lasix PO 80mg BID, metoprolol tartrate 100mg BID\par - Has had very infrequent episodes of SOB in the recent months. 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 (Brachiocephalic artery, subclavian arteries, right and left common carotid arteries involvement since 2015.)\par - Receiving actemra on time, taking every week\par - Taking prednisone 2mg QD, methotrexate 17.5mg Qweekly \par - Taking Aspirin 81mg daily , atorvastatin 40mg QHS\par \par #Osteoporosis\par DEXA in 2/2022: T score of -2.3 in the spine. No previous fragility fractures. Started on alendronate weekly since 2/2022.\par - Denies jaw pain. Previous symptoms of heartburn symptoms improved.\par - Taking ergocalciferol 31973 weekly. Taking in adequate (~500mg) of dietary calcium daily through dairy, anchovy.\par \par #GERD\par - Reports resolution of heartburn symptoms after 3-4 days of pantoprazole so discontinued. No recurrent symptoms.\par \par #HCM:\par - CT colonography done 11/2022 - no polyps or masses; has diverticulosis. --> Repeat in 2027\par - Cervical cancer screen 1/28/22: path negative. --> Repeat in 2025\par - Mammogram done 2/2022 showed BIRADS2 --> patient expressed would like to repeat in 2 years (2024) rather than in 1 year.\par - COVID vaccine (4/27, 5/27, 11/29) Moderna\par - No shingles vaccine yet

## 2023-03-01 NOTE — PHYSICAL EXAM
[Normal Rate] : normal rate  [Regular Rhythm] : with a regular rhythm [Pedal Pulses Present] : the pedal pulses are present [No Edema] : there was no peripheral edema [Soft] : abdomen soft [Non Tender] : non-tender [Non-distended] : non-distended [No CVA Tenderness] : no CVA  tenderness [No Spinal Tenderness] : no spinal tenderness [Grossly Normal Strength/Tone] : grossly normal strength/tone [No Rash] : no rash [Normal] : affect was normal and insight and judgment were intact [de-identified] : Loud S1, S2

## 2023-03-01 NOTE — HEALTH RISK ASSESSMENT
[Never (0 pts)] : Never (0 points) [No] : In the past 12 months have you used drugs other than those required for medical reasons? No [0] : 2) Feeling down, depressed, or hopeless: Not at all (0) [PHQ-2 Negative - No further assessment needed] : PHQ-2 Negative - No further assessment needed [Current] : Current [0-4] : 0-4 [Audit-CScore] : 0 [[id number]] : 0

## 2023-03-01 NOTE — END OF VISIT
[] : Resident [FreeTextEntry3] : sciatica no red flag sx. rec pt. \par fu rheum though low suspicion\par cont eliquis for afib\par chf euvolemic start potassium oral supplement given K < 3.3, normal renal function, fu test 2-4w

## 2023-03-01 NOTE — INTERPRETER SERVICES
[Patient Declined  Services] : - None: Patient declined  services [FreeTextEntry3] : Patient declined  services given author is native speaker of Japanese.

## 2023-03-02 LAB
ALBUMIN SERPL ELPH-MCNC: 4.5 G/DL
ALP BLD-CCNC: 49 U/L
ALT SERPL-CCNC: 41 U/L
ANION GAP SERPL CALC-SCNC: 11 MMOL/L
AST SERPL-CCNC: 51 U/L
BILIRUB SERPL-MCNC: 0.9 MG/DL
BUN SERPL-MCNC: 22 MG/DL
CALCIUM SERPL-MCNC: 9.7 MG/DL
CHLORIDE SERPL-SCNC: 98 MMOL/L
CO2 SERPL-SCNC: 33 MMOL/L
CREAT SERPL-MCNC: 0.77 MG/DL
EGFR: 93 ML/MIN/1.73M2
GLUCOSE SERPL-MCNC: 101 MG/DL
POTASSIUM SERPL-SCNC: 3.2 MMOL/L
PROT SERPL-MCNC: 6.8 G/DL
SODIUM SERPL-SCNC: 142 MMOL/L

## 2023-03-16 ENCOUNTER — APPOINTMENT (OUTPATIENT)
Dept: ELECTROPHYSIOLOGY | Facility: CLINIC | Age: 53
End: 2023-03-16
Payer: MEDICAID

## 2023-03-16 ENCOUNTER — APPOINTMENT (OUTPATIENT)
Dept: CARDIOLOGY | Facility: HOSPITAL | Age: 53
End: 2023-03-16

## 2023-03-16 VITALS
OXYGEN SATURATION: 99 % | HEART RATE: 64 BPM | SYSTOLIC BLOOD PRESSURE: 116 MMHG | DIASTOLIC BLOOD PRESSURE: 67 MMHG | BODY MASS INDEX: 22.67 KG/M2 | HEIGHT: 61 IN

## 2023-03-16 VITALS — WEIGHT: 120 LBS | BODY MASS INDEX: 22.67 KG/M2

## 2023-03-16 PROCEDURE — 93282 PRGRMG EVAL IMPLANTABLE DFB: CPT

## 2023-03-16 PROCEDURE — 93290 INTERROG DEV EVAL ICPMS IP: CPT | Mod: 26

## 2023-03-16 NOTE — HISTORY OF PRESENT ILLNESS
[FreeTextEntry1] : 50 yo lady PMHx of Takayasu aortitis, severe AVR/MVR s/p Bentall procedure w/ CABG x 1 (SVG-LAD) and MVR, paroxysmal Afib on eliquis, HFpEF, and VT s/p Medtronic ICD who presents for f/u. \par \par Last TTE with EF 67% and normal aortic/mitral gradients in setting of AVR/MVR s/p Bentall. \par \par Today, she reports feeling well w/o any cardiac ROS findings with good exercise tolerance. Compliant with all meds.

## 2023-03-16 NOTE — DISCUSSION/SUMMARY
[FreeTextEntry1] : 50 yo lady PMHx of Takayasu aortitis, severe AVR/MVR s/p Bentall procedure w/ CABG x 1 (SVG-LAD) and MVR, paroxysmal Afib on coumadin, HFpEF, and VT s/p Medtronic ICD who presents for f/u. \par \par #Takayasu aortitis, severe AVR/MVR s/p Bentall procedure w/ CABG x 1 (SVG-LAD)\par - management as per rheum, rheum appt coming up \par - C/w ASA 81mg daily and lipitor 40mg qhs\par \par #HFpEF        \par - Compensated and euvolemic, c/w lasix PO 80mg BID\par - Latest TTE Feb/2022 with normal EF\par \par #Paroxysmal Afib\par - C/w metoprolol tartrate 100 BID\par - Eliquis 5mg BID for systemic embolization prevention\par - F/u with interrogation report from today\par \par #VT s/p ICD\par - F/u with interrogation report from today\par - C/w metoprolol 100 BID\par \par Rest of non cardiology care as per PCP, rheum, and GI - appreciate excellent care\par \par F/u in 3 mo\par \par Signed by\par Kibmerlyn Xavier MD\par PGY6 Cardiology

## 2023-04-03 ENCOUNTER — OUTPATIENT (OUTPATIENT)
Dept: OUTPATIENT SERVICES | Facility: HOSPITAL | Age: 53
LOS: 1 days | End: 2023-04-03

## 2023-04-03 ENCOUNTER — RX RENEWAL (OUTPATIENT)
Age: 53
End: 2023-04-03

## 2023-04-03 ENCOUNTER — LABORATORY RESULT (OUTPATIENT)
Age: 53
End: 2023-04-03

## 2023-04-03 ENCOUNTER — APPOINTMENT (OUTPATIENT)
Dept: RHEUMATOLOGY | Facility: CLINIC | Age: 53
End: 2023-04-03
Payer: MEDICAID

## 2023-04-03 VITALS
DIASTOLIC BLOOD PRESSURE: 70 MMHG | WEIGHT: 115 LBS | HEART RATE: 65 BPM | HEIGHT: 64 IN | OXYGEN SATURATION: 99 % | BODY MASS INDEX: 19.63 KG/M2 | SYSTOLIC BLOOD PRESSURE: 120 MMHG

## 2023-04-03 DIAGNOSIS — Z95.810 PRESENCE OF AUTOMATIC (IMPLANTABLE) CARDIAC DEFIBRILLATOR: ICD-10-CM

## 2023-04-03 DIAGNOSIS — Z98.891 HISTORY OF UTERINE SCAR FROM PREVIOUS SURGERY: Chronic | ICD-10-CM

## 2023-04-03 DIAGNOSIS — M05.20 RHEUMATOID VASCULITIS WITH RHEUMATOID ARTHRITIS OF UNSPECIFIED SITE: ICD-10-CM

## 2023-04-03 DIAGNOSIS — Z98.89 OTHER SPECIFIED POSTPROCEDURAL STATES: Chronic | ICD-10-CM

## 2023-04-03 DIAGNOSIS — Z95.0 PRESENCE OF CARDIAC PACEMAKER: Chronic | ICD-10-CM

## 2023-04-03 PROCEDURE — 99214 OFFICE O/P EST MOD 30 MIN: CPT | Mod: GC

## 2023-04-04 PROBLEM — M05.20 RHEUMATOID ARTERITIS: Status: RESOLVED | Noted: 2019-08-27 | Resolved: 2023-04-04

## 2023-04-04 NOTE — ASSESSMENT
[FreeTextEntry1] : 52 y/o Kyrgyz-speaking F with Takayasu's  arteritis (dx 2015) c/b pAfib/sustained VTach s/p ICD/PPM placement/Bentell procedure w/ CABG, HOCM, severe AR/MVR, Osteoporosis presenting for follow up. \par  \par # Takayasu's arteritis with involvement proximal large arteries: Brachiocephalic artery, subclavian arteries, right and left common carotid arteries involvement since 2015. PET CT in 2021 showing areas of uptake in ascending thoracic aorta, new compared to previous CT in 2018. \par # 4/2023 Patient says she has had increased frequency of pacemaker firing in the last 2 weeks. Reports to be due to have her pacemaker replaced.-? if increased firing may be due for pacemaker need for replacement/ vs increased vasculitis activity.\par - will reach out to her cardiologists to make them aware\par - will increase her MTX from 17.5 mg weekly to 20 mg weekly in meantime\par  - if still has increased device firing after replacement, consider to obtain PET scan to evaluate for vasculitis activity \par - c/w prednisone 2 mg daily, \par - c/w sq 162 mg Actemra weekly\par - check CBC, CMP, ESR, CRP, Hep c \par \par # Afib\par - Switched to Eliquis, managed by Cardiology\par - c/w Cardiology f/u\par \par f/u in 1 month\par DW Dr Horowitz \par \par  \par

## 2023-04-04 NOTE — HISTORY OF PRESENT ILLNESS
[FreeTextEntry1] : 4/2023: Croatian : [id number]. Patient says she has had increased frequency of pacemaker firing in the last 2 weeks. Says she is due to have her pacemaker replaced, and has tried to arrange with her cardiologist to have it replaced. Also admits to occasional sob at night where she wakes up and has to sit upright for it to resolve. Denies fever, chills, chest pain, abdominal pain.

## 2023-04-04 NOTE — REVIEW OF SYSTEMS
[As Noted in HPI] : as noted in HPI [Negative] : Neurological [FreeTextEntry9] : occasional b/l knee pain worse w/ walking up stairs

## 2023-04-04 NOTE — REASON FOR VISIT
[Follow-Up: _____] : a [unfilled] follow-up visit [[employer] ] : provided by [employer]   [FreeTextEntry1] : Takayasu Arteritis [Interpreters_IDNumber] : [id number] [TWNoteComboBox1] : Lao

## 2023-04-04 NOTE — PHYSICAL EXAM
[General Appearance - Alert] : alert [General Appearance - In No Acute Distress] : in no acute distress [General Appearance - Well Nourished] : well nourished [General Appearance - Well Developed] : well developed [Sclera] : the sclera and conjunctiva were normal [Outer Ear] : the ears and nose were normal in appearance [Examination Of The Oral Cavity] : the lips and gums were normal [Oropharynx] : the oropharynx was normal [Neck Appearance] : the appearance of the neck was normal [Respiration, Rhythm And Depth] : normal respiratory rhythm and effort [Auscultation Breath Sounds / Voice Sounds] : lungs were clear to auscultation bilaterally [Heart Rate And Rhythm] : heart rate was normal and rhythm regular [Heart Sounds] : normal S1 and S2 [Edema] : there was no peripheral edema [Bowel Sounds] : normal bowel sounds [Abdomen Soft] : soft [Abdomen Tenderness] : non-tender [No Spinal Tenderness] : no spinal tenderness [Nail Clubbing] : no clubbing  or cyanosis of the fingernails [Musculoskeletal - Swelling] : no joint swelling seen [Motor Tone] : muscle strength and tone were normal [] : no rash [No Focal Deficits] : no focal deficits [Impaired Insight] : insight and judgment were intact [FreeTextEntry1] : No synovitis

## 2023-04-05 DIAGNOSIS — Z95.810 PRESENCE OF AUTOMATIC (IMPLANTABLE) CARDIAC DEFIBRILLATOR: ICD-10-CM

## 2023-04-05 DIAGNOSIS — M31.6 OTHER GIANT CELL ARTERITIS: ICD-10-CM

## 2023-04-05 DIAGNOSIS — M31.4 AORTIC ARCH SYNDROME [TAKAYASU]: ICD-10-CM

## 2023-04-05 DIAGNOSIS — Z79.899 OTHER LONG TERM (CURRENT) DRUG THERAPY: ICD-10-CM

## 2023-04-07 ENCOUNTER — NON-APPOINTMENT (OUTPATIENT)
Age: 53
End: 2023-04-07

## 2023-04-07 LAB
ALBUMIN SERPL ELPH-MCNC: 4.6 G/DL
ALP BLD-CCNC: 46 U/L
ALT SERPL-CCNC: 51 U/L
ANION GAP SERPL CALC-SCNC: 14 MMOL/L
AST SERPL-CCNC: 52 U/L
BASOPHILS # BLD AUTO: 0.05 K/UL
BASOPHILS NFR BLD AUTO: 1.2 %
BILIRUB SERPL-MCNC: 0.9 MG/DL
BUN SERPL-MCNC: 25 MG/DL
CALCIUM SERPL-MCNC: 9.5 MG/DL
CHLORIDE SERPL-SCNC: 94 MMOL/L
CO2 SERPL-SCNC: 33 MMOL/L
CREAT SERPL-MCNC: 0.88 MG/DL
CRP SERPL-MCNC: <3 MG/L
EGFR: 79 ML/MIN/1.73M2
EOSINOPHIL # BLD AUTO: 0.11 K/UL
EOSINOPHIL NFR BLD AUTO: 2.7 %
ERYTHROCYTE [SEDIMENTATION RATE] IN BLOOD BY WESTERGREN METHOD: 4 MM/HR
GLUCOSE SERPL-MCNC: 82 MG/DL
HCT VFR BLD CALC: 39.9 %
HCV AB SER QL: NONREACTIVE
HCV S/CO RATIO: 0.21 S/CO
HGB BLD-MCNC: 13.5 G/DL
IMM GRANULOCYTES NFR BLD AUTO: 0 %
LYMPHOCYTES # BLD AUTO: 1.24 K/UL
LYMPHOCYTES NFR BLD AUTO: 30.8 %
MAN DIFF?: NORMAL
MCHC RBC-ENTMCNC: 33.8 GM/DL
MCHC RBC-ENTMCNC: 36.4 PG
MCV RBC AUTO: 107.5 FL
MONOCYTES # BLD AUTO: 0.32 K/UL
MONOCYTES NFR BLD AUTO: 8 %
NEUTROPHILS # BLD AUTO: 2.3 K/UL
NEUTROPHILS NFR BLD AUTO: 57.3 %
PLATELET # BLD AUTO: 216 K/UL
POTASSIUM SERPL-SCNC: 3 MMOL/L
PROT SERPL-MCNC: 7.2 G/DL
RBC # BLD: 3.71 M/UL
RBC # FLD: 14.7 %
SODIUM SERPL-SCNC: 141 MMOL/L
WBC # FLD AUTO: 4.02 K/UL

## 2023-05-08 ENCOUNTER — APPOINTMENT (OUTPATIENT)
Dept: RHEUMATOLOGY | Facility: CLINIC | Age: 53
End: 2023-05-08
Payer: COMMERCIAL

## 2023-05-08 ENCOUNTER — OUTPATIENT (OUTPATIENT)
Dept: OUTPATIENT SERVICES | Facility: HOSPITAL | Age: 53
LOS: 1 days | End: 2023-05-08

## 2023-05-08 ENCOUNTER — LABORATORY RESULT (OUTPATIENT)
Age: 53
End: 2023-05-08

## 2023-05-08 VITALS
WEIGHT: 117 LBS | SYSTOLIC BLOOD PRESSURE: 120 MMHG | OXYGEN SATURATION: 99 % | DIASTOLIC BLOOD PRESSURE: 66 MMHG | HEART RATE: 64 BPM | BODY MASS INDEX: 19.97 KG/M2 | HEIGHT: 64 IN

## 2023-05-08 DIAGNOSIS — Z98.89 OTHER SPECIFIED POSTPROCEDURAL STATES: Chronic | ICD-10-CM

## 2023-05-08 DIAGNOSIS — R06.02 SHORTNESS OF BREATH: ICD-10-CM

## 2023-05-08 DIAGNOSIS — Z98.891 HISTORY OF UTERINE SCAR FROM PREVIOUS SURGERY: Chronic | ICD-10-CM

## 2023-05-08 DIAGNOSIS — Z95.0 PRESENCE OF CARDIAC PACEMAKER: Chronic | ICD-10-CM

## 2023-05-08 DIAGNOSIS — Z79.899 OTHER LONG TERM (CURRENT) DRUG THERAPY: ICD-10-CM

## 2023-05-08 PROCEDURE — 99214 OFFICE O/P EST MOD 30 MIN: CPT | Mod: GC

## 2023-05-08 NOTE — REVIEW OF SYSTEMS
[Negative] : Neurological [Shortness Of Breath] : shortness of breath [SOB on Exertion] : shortness of breath during exertion [As Noted in HPI] : as noted in HPI

## 2023-05-09 DIAGNOSIS — R06.02 SHORTNESS OF BREATH: ICD-10-CM

## 2023-05-09 DIAGNOSIS — M31.6 OTHER GIANT CELL ARTERITIS: ICD-10-CM

## 2023-05-09 DIAGNOSIS — Z79.899 OTHER LONG TERM (CURRENT) DRUG THERAPY: ICD-10-CM

## 2023-05-09 DIAGNOSIS — R07.9 CHEST PAIN, UNSPECIFIED: ICD-10-CM

## 2023-05-12 NOTE — REASON FOR VISIT
[Follow-Up: _____] : a [unfilled] follow-up visit [[employer] ] : provided by [employer]   [FreeTextEntry1] : Takayasu Arteritis [Interpreters_IDNumber] : [id number] [TWNoteComboBox1] : Lithuanian

## 2023-05-12 NOTE — ASSESSMENT
[FreeTextEntry1] : 54 y/o Macedonian-speaking F with Takayasu's  arteritis (dx 2015) c/b pAfib/sustained VTach s/p ICD/PPM placement/Bentell procedure w/ CABG, HOCM, severe AR/MVR, Osteoporosis presenting for follow up. \par  \par # Takayasu's arteritis with involvement proximal large arteries: Brachiocephalic artery, subclavian arteries, right and left common carotid arteries involvement since 2015. PET CT in 2021 showing areas of uptake in ascending thoracic aorta, new compared to previous CT in 2018. CT chest 1/2023 showing mild diffuse and luminal irregularity and wall thickening of the b/l carotid arteries. Last couple of weeks, pt has had worsening sob and warmth sensation around her PPM, both of which can represent increased vasculitis activity. Euvolemic on exam. No change in symptoms since MTX increase from 17.5 mg weekly to 20 mg weekly 4/2023. \par -c/w MTX 20 mg weekly and folic acid daily\par -will order TTE and PET scan to evaluate for vasculitis activity\par -will order CBC, CMP, BNP, ESR, CRP \par -encouraged to f/u w/ cardiology as soon as possible for her worsening sob and warmth sensation near PPM\par - c/w prednisone 2 mg daily, \par - c/w sq 162 mg Actemra weekly \par \par # Afib\par - Switched to Eliquis, managed by Cardiology\par - c/w Cardiology f/u\par \par f/u in 6 weeks following cardiology appointment\par d/w Dr Horowitz

## 2023-05-12 NOTE — PHYSICAL EXAM
[General Appearance - Alert] : alert [General Appearance - In No Acute Distress] : in no acute distress [General Appearance - Well Nourished] : well nourished [General Appearance - Well Developed] : well developed [Sclera] : the sclera and conjunctiva were normal [Outer Ear] : the ears and nose were normal in appearance [Oropharynx] : the oropharynx was normal [Examination Of The Oral Cavity] : the lips and gums were normal [Neck Appearance] : the appearance of the neck was normal [Respiration, Rhythm And Depth] : normal respiratory rhythm and effort [Auscultation Breath Sounds / Voice Sounds] : lungs were clear to auscultation bilaterally [Heart Rate And Rhythm] : heart rate was normal and rhythm regular [Heart Sounds] : normal S1 and S2 [Edema] : there was no peripheral edema [Bowel Sounds] : normal bowel sounds [Abdomen Soft] : soft [Abdomen Tenderness] : non-tender [No Spinal Tenderness] : no spinal tenderness [Nail Clubbing] : no clubbing  or cyanosis of the fingernails [Musculoskeletal - Swelling] : no joint swelling seen [Motor Tone] : muscle strength and tone were normal [] : no rash [No Focal Deficits] : no focal deficits [Impaired Insight] : insight and judgment were intact [FreeTextEntry1] : No synovitis

## 2023-05-12 NOTE — HISTORY OF PRESENT ILLNESS
[___ Month(s) Ago] : [unfilled] month(s) ago [FreeTextEntry1] : 5/2023: Latvian : [id number]. Says she has sob, w/ exertion and at rest w/ sleep. Symptoms have worsened in the couple of weeks. Worsened as she had to move furniture last week, and now symptoms occur daily and very debilitating that she cant move. Still has warmth sensation that occurs sporadically around her PPM daily. Denies chest pain or palpatitations. Has upcoming appointment w/ cardiology 6/2023. Has tolerated increased MTX 7 tabs to 8 tabs since 4/3/23, however no change in warmth sensation around PPM.\par \par ROS:\par -denies chest pain or PPM firing sensation

## 2023-06-01 ENCOUNTER — APPOINTMENT (OUTPATIENT)
Dept: CARDIOLOGY | Facility: HOSPITAL | Age: 53
End: 2023-06-01

## 2023-06-01 VITALS
SYSTOLIC BLOOD PRESSURE: 115 MMHG | OXYGEN SATURATION: 100 % | HEART RATE: 69 BPM | DIASTOLIC BLOOD PRESSURE: 67 MMHG | WEIGHT: 117 LBS | HEIGHT: 64 IN | TEMPERATURE: 97.2 F | BODY MASS INDEX: 19.97 KG/M2

## 2023-06-01 LAB
BASOPHILS # BLD AUTO: 0.06 K/UL
BASOPHILS NFR BLD AUTO: 1.5 %
EOSINOPHIL # BLD AUTO: 0.12 K/UL
EOSINOPHIL NFR BLD AUTO: 3 %
HCT VFR BLD CALC: 36.3 %
HGB BLD-MCNC: 12.4 G/DL
IMM GRANULOCYTES NFR BLD AUTO: 0 %
LYMPHOCYTES # BLD AUTO: 1.18 K/UL
LYMPHOCYTES NFR BLD AUTO: 29.7 %
MAN DIFF?: NORMAL
MCHC RBC-ENTMCNC: 34.2 GM/DL
MCHC RBC-ENTMCNC: 36 PG
MCV RBC AUTO: 105.5 FL
MONOCYTES # BLD AUTO: 0.5 K/UL
MONOCYTES NFR BLD AUTO: 12.6 %
NEUTROPHILS # BLD AUTO: 2.11 K/UL
NEUTROPHILS NFR BLD AUTO: 53.2 %
PLATELET # BLD AUTO: 193 K/UL
RBC # BLD: 3.44 M/UL
RBC # FLD: 15.7 %
WBC # FLD AUTO: 3.97 K/UL

## 2023-06-01 NOTE — DISCUSSION/SUMMARY
[FreeTextEntry1] : 52 yo lady PMHx of Takayasu aortitis, severe AVR/MVR s/p Bentall procedure w/ CABG x 1 (SVG-LAD) and MVR, paroxysmal Afib on coumadin, HFpEF, and VT s/p Medtronic ICD who presents for f/u. \par \par #Takayasu aortitis, severe AVR/MVR s/p Bentall procedure w/ CABG x 1 (SVG-LAD)\par - management as per rheum with MTX and prednisone, pending PET\par - C/w ASA 81mg daily and lipitor 40mg qhs\par \par #HFpEF        \par - Compensated and euvolemic on exam without JVD or LE edema, c/w lasix PO\par - Latest TTE Feb/2022 with normal EF, will check TTE\par \par #Paroxysmal Afib\par - C/w metoprolol tartrate 100 BID\par - Eliquis 5mg BID for systemic embolization prevention\par \par #VT s/p ICD\par - 03/16/23 interrogation: remaining longevity 2.2 years\par - C/w metoprolol 100 BID\par - Will interrogate ICD to r/o Afib or VT as reason for her symptoms\par \par Rest of non cardiology care as per PCP, rheum, and GI - appreciate excellent care\par \par F/u in 2 weeks\par \par Signed by\par Kimberlyn Xavier MD\par PGY6 Cardiology

## 2023-06-01 NOTE — END OF VISIT
[] : Fellow [FreeTextEntry3] : 51-year-old woman with Takayasu aortitis, severe AVR and MVR s/p Bentall procedure and 1-vessel CABG, paroxysmal Afib on warfarin, chronic heart failure with preserved LV function, and a history of VT with subsequent ICD implantation who presents for routine follow-up. The patient is reporting some panic-like symptoms at night, but no palpitations or orthopnea. \par \par I agree that the patient's device should be interrogated to look for occult arrhythmia that might explain her symptoms.  It is also reasonable to obtain echocardiography to assess valve function and/or even obtain pro-BNP to assess whether these symptoms could be representative of mild volume overload. \par \par Bert Reilly MD\par Cardiology
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
## 2023-06-15 ENCOUNTER — APPOINTMENT (OUTPATIENT)
Dept: ELECTROPHYSIOLOGY | Facility: CLINIC | Age: 53
End: 2023-06-15
Payer: COMMERCIAL

## 2023-06-15 ENCOUNTER — NON-APPOINTMENT (OUTPATIENT)
Age: 53
End: 2023-06-15

## 2023-06-15 LAB
ALBUMIN SERPL ELPH-MCNC: 4.5 G/DL
ALP BLD-CCNC: 41 U/L
ALT SERPL-CCNC: 30 U/L
ANION GAP SERPL CALC-SCNC: 11 MMOL/L
AST SERPL-CCNC: 54 U/L
BILIRUB SERPL-MCNC: 1.3 MG/DL
BUN SERPL-MCNC: 17 MG/DL
CALCIUM SERPL-MCNC: 9.1 MG/DL
CHLORIDE SERPL-SCNC: 100 MMOL/L
CO2 SERPL-SCNC: 33 MMOL/L
CREAT SERPL-MCNC: 0.8 MG/DL
CRP SERPL-MCNC: <3 MG/L
EGFR: 88 ML/MIN/1.73M2
ERYTHROCYTE [SEDIMENTATION RATE] IN BLOOD BY WESTERGREN METHOD: < 2 MM/HR
GLUCOSE SERPL-MCNC: 97 MG/DL
NT-PROBNP SERPL-MCNC: 2249 PG/ML
POTASSIUM SERPL-SCNC: 3.1 MMOL/L
PROT SERPL-MCNC: 6.9 G/DL
SODIUM SERPL-SCNC: 144 MMOL/L

## 2023-06-15 PROCEDURE — 93282 PRGRMG EVAL IMPLANTABLE DFB: CPT

## 2023-06-15 PROCEDURE — 93290 INTERROG DEV EVAL ICPMS IP: CPT | Mod: 26

## 2023-06-26 ENCOUNTER — OUTPATIENT (OUTPATIENT)
Dept: OUTPATIENT SERVICES | Facility: HOSPITAL | Age: 53
LOS: 1 days | End: 2023-06-26

## 2023-06-26 ENCOUNTER — LABORATORY RESULT (OUTPATIENT)
Age: 53
End: 2023-06-26

## 2023-06-26 ENCOUNTER — APPOINTMENT (OUTPATIENT)
Dept: RHEUMATOLOGY | Facility: CLINIC | Age: 53
End: 2023-06-26
Payer: COMMERCIAL

## 2023-06-26 VITALS
DIASTOLIC BLOOD PRESSURE: 60 MMHG | HEART RATE: 65 BPM | BODY MASS INDEX: 19.12 KG/M2 | OXYGEN SATURATION: 98 % | SYSTOLIC BLOOD PRESSURE: 120 MMHG | WEIGHT: 112 LBS | HEIGHT: 64 IN

## 2023-06-26 DIAGNOSIS — Z98.89 OTHER SPECIFIED POSTPROCEDURAL STATES: Chronic | ICD-10-CM

## 2023-06-26 DIAGNOSIS — Z98.891 HISTORY OF UTERINE SCAR FROM PREVIOUS SURGERY: Chronic | ICD-10-CM

## 2023-06-26 DIAGNOSIS — Z95.0 PRESENCE OF CARDIAC PACEMAKER: Chronic | ICD-10-CM

## 2023-06-26 PROCEDURE — 99214 OFFICE O/P EST MOD 30 MIN: CPT | Mod: GC

## 2023-06-26 RX ORDER — DICLOFENAC SODIUM 1% 10 MG/G
1 GEL TOPICAL DAILY
Qty: 1 | Refills: 3 | Status: ACTIVE | COMMUNITY
Start: 2022-04-22 | End: 1900-01-01

## 2023-06-26 NOTE — REVIEW OF SYSTEMS
[Shortness Of Breath] : shortness of breath [SOB on Exertion] : shortness of breath during exertion [As Noted in HPI] : as noted in HPI [Negative] : Neurological

## 2023-06-26 NOTE — REASON FOR VISIT
[Follow-Up: _____] : a [unfilled] follow-up visit [[employer] ] : provided by [employer]   [FreeTextEntry1] : Takayasu Arteritis [Interpreters_IDNumber] : [id number] [TWNoteComboBox1] : Croatian

## 2023-06-26 NOTE — DATA REVIEWED
[FreeTextEntry1] : Laboratory and radiology studies that were personally reviewed at today's visit are summarized in above and below:\par

## 2023-06-26 NOTE — ASSESSMENT
[FreeTextEntry1] : 52 y/o Thai-speaking F with Takayasu's  arteritis (dx 2015) c/b pAfib/sustained VTach s/p ICD/PPM placement/Bentell procedure w/ CABG, HOCM, severe AR/MVR, Osteoporosis presenting for follow up. \par  \par # Takayasu's arteritis with involvement proximal large arteries: Brachiocephalic artery, subclavian arteries, right and left common carotid arteries involvement since 2015. PET CT in 2021 showing areas of uptake in ascending thoracic aorta, new compared to previous CT in 2018. CT chest 1/2023 showing mild diffuse and luminal irregularity and wall thickening of the b/l carotid arteries. Last couple of weeks, pt has had worsening sob and warmth sensation around her PPM, both of which can represent increased vasculitis activity. Euvolemic on exam. No change in symptoms since MTX increase from 17.5 mg weekly to 20 mg weekly 4/2023. \par -c/w MTX 20 mg weekly and folic acid daily\par -pending PET scan to evaluate for vasculitis activity\par -patient reports difficulty calling number to schedule TTE. to get   to assist patient with phone number\par -will order CBC, CMP, ESR, CRP \par - c/w prednisone 2 mg daily, \par - re-ordered sq 162 mg Actemra weekly. given number to pharmacy to call and arrange for delivery. consoled to call clinic if patient has issues regarding taking medicine\par \par # Afib\par - Switched to Eliquis, managed by Cardiology\par - c/w Cardiology f/u\par \par # osteoporosis \par on alendronate per PMD \par next dexa in 2-2024\par minimize steroids. \par RTC 8 weeks\par \par Case d/w Dr. Thomas\par \par Thiago Segovia MD, PGY-4\par Rheumatology Fellow\par [id number]\par

## 2023-06-26 NOTE — HISTORY OF PRESENT ILLNESS
[___ Month(s) Ago] : [unfilled] month(s) ago [FreeTextEntry1] : 6/26/23. Arabic  ID: [id number]. [id number]\par SOB for the past month. Over the past month only occurred twice over the past month. Improving compared to initial onset. Occurred at rest. Lasted 10 - 20 mins. Complains of pain in her right thumb base and 2nd finger PIP. Has had pain for the past year. Palpitations have improved in terms of frequency as well. Of note patient states she has not taken her Actemra since May after moving\par \par 5/2023: Arabic : [id number]. Says she has sob, w/ exertion and at rest w/ sleep. Symptoms have worsened in the couple of weeks. Worsened as she had to move furniture last week, and now symptoms occur daily and very debilitating that she cant move. Still has warmth sensation that occurs sporadically around her PPM daily. Denies chest pain or palpatitations. Has upcoming appointment w/ cardiology 6/2023. Has tolerated increased MTX 7 tabs to 8 tabs since 4/3/23, however no change in warmth sensation around PPM.\par \par ROS:\par -denies chest pain or PPM firing sensation

## 2023-06-27 ENCOUNTER — NON-APPOINTMENT (OUTPATIENT)
Age: 53
End: 2023-06-27

## 2023-06-27 LAB
CRP SERPL-MCNC: <3 MG/L
ERYTHROCYTE [SEDIMENTATION RATE] IN BLOOD BY WESTERGREN METHOD: 6 MM/HR

## 2023-06-28 DIAGNOSIS — M31.4 AORTIC ARCH SYNDROME [TAKAYASU]: ICD-10-CM

## 2023-07-02 ENCOUNTER — NON-APPOINTMENT (OUTPATIENT)
Age: 53
End: 2023-07-02

## 2023-07-03 ENCOUNTER — APPOINTMENT (OUTPATIENT)
Dept: NUCLEAR MEDICINE | Facility: IMAGING CENTER | Age: 53
End: 2023-07-03

## 2023-07-03 ENCOUNTER — NON-APPOINTMENT (OUTPATIENT)
Age: 53
End: 2023-07-03

## 2023-07-03 LAB
ALBUMIN SERPL ELPH-MCNC: 4.5 G/DL
ALP BLD-CCNC: 57 U/L
ALT SERPL-CCNC: 22 U/L
ANION GAP SERPL CALC-SCNC: 11 MMOL/L
AST SERPL-CCNC: 46 U/L
BILIRUB SERPL-MCNC: 1.4 MG/DL
BUN SERPL-MCNC: 20 MG/DL
CALCIUM SERPL-MCNC: 9.5 MG/DL
CHLORIDE SERPL-SCNC: 101 MMOL/L
CO2 SERPL-SCNC: 31 MMOL/L
CREAT SERPL-MCNC: 0.93 MG/DL
EGFR: 73 ML/MIN/1.73M2
GLUCOSE SERPL-MCNC: 92 MG/DL
POTASSIUM SERPL-SCNC: 4.2 MMOL/L
PROT SERPL-MCNC: 7 G/DL
SODIUM SERPL-SCNC: 143 MMOL/L

## 2023-07-13 RX ORDER — TOCILIZUMAB 180 MG/ML
162 INJECTION, SOLUTION SUBCUTANEOUS
Qty: 12 | Refills: 3 | Status: ACTIVE | COMMUNITY
Start: 2018-02-15

## 2023-07-28 ENCOUNTER — NON-APPOINTMENT (OUTPATIENT)
Age: 53
End: 2023-07-28

## 2023-08-14 ENCOUNTER — APPOINTMENT (OUTPATIENT)
Dept: CARDIOLOGY | Facility: CLINIC | Age: 53
End: 2023-08-14
Payer: COMMERCIAL

## 2023-08-14 VITALS
BODY MASS INDEX: 21.71 KG/M2 | HEART RATE: 126 BPM | DIASTOLIC BLOOD PRESSURE: 66 MMHG | OXYGEN SATURATION: 99 % | TEMPERATURE: 98 F | SYSTOLIC BLOOD PRESSURE: 106 MMHG | WEIGHT: 115 LBS | HEIGHT: 61 IN

## 2023-08-14 DIAGNOSIS — R06.00 DYSPNEA, UNSPECIFIED: ICD-10-CM

## 2023-08-14 PROCEDURE — 93000 ELECTROCARDIOGRAM COMPLETE: CPT

## 2023-08-14 PROCEDURE — 99215 OFFICE O/P EST HI 40 MIN: CPT | Mod: 25

## 2023-08-14 RX ORDER — FUROSEMIDE 40 MG/1
40 TABLET ORAL
Qty: 360 | Refills: 3 | Status: DISCONTINUED | COMMUNITY
Start: 2021-12-06 | End: 2023-08-14

## 2023-08-14 NOTE — HISTORY OF PRESENT ILLNESS
[FreeTextEntry1] : 52 yo lady PMHx of Takayasu aortitis, severe AVR/MVR s/p Bentall procedure w/ CABG x 1 (SVG-LAD) and MVR, paroxysmal Afib on eliquis, HFpEF, and VT s/p Medtronic ICD who presents for f/u.   Last TTE 2022 with EF 67% and normal aortic/mitral gradients in setting of AVR/MVR s/p Bentall.   Reports worsening dyspnea on exertion. Also gets palpitations with exertion. Intermittent PND and orthopnea. No LE edema, chest pain, or other pertinent symptoms.   EKG 8/14/23: sinus tachycardia, RBBB, no ischemic findings  Meds: 1) Actemra SC qweekly 2) Alendronate 70mg daily 3) ASA 81mg daily 4) Lipitor 40mg qhs 5) Eliquis 5mg BID 6) Methotrexate 7) Prednisone 2mg daily 8) Furosemide 80mg PO BID

## 2023-08-14 NOTE — DISCUSSION/SUMMARY
[EKG obtained to assist in diagnosis and management of assessed problem(s)] : EKG obtained to assist in diagnosis and management of assessed problem(s) [FreeTextEntry1] : 52 yo lady PMHx of Takayasu aortitis, severe AVR/MVR s/p Bentall procedure w/ CABG x 1 (SVG-LAD) and MVR, paroxysmal Afib on coumadin, HFpEF, and VT s/p Medtronic ICD who presents for f/u. With worsening orthopnea and dyspnea on exertion.   EKG 8/14/23: sinus tachycardia, RBBB EKG done right after rushing to the clinic.  Pulse checked in the clinic room, HR down to 100-110s.   #Takayasu aortitis, severe AVR/MVR s/p Bentall procedure w/ CABG x 1 (SVG-LAD) - management as per rheum - C/w ASA 81mg daily and lipitor 40mg qhs  #HFpEF         - Mildly decompensated, will switch furosemide 80mg BID, patient sometimes non-compliant due to BID dosing, will switch to torsemide 80mg daily, will f/u with the patient over the phone in 1 week, if without improvement, will admit the patient. ED visit was discussed today but the patient does not want to. Risk explained and the patient noted the risk.  - Latest TTE Feb/2022 with normal EF. Will obtain repeat TTE.   #Paroxysmal Afib - C/w metoprolol tartrate 100 BID, encouraged strict compliance - Eliquis 5mg BID for systemic embolization prevention - Will arrange for ICD interrogation in 1 week   #VT s/p ICD - 03/16/23 interrogation: remaining longevity 2.2 years - C/w metoprolol 100 BID - Will interrogate ICD in 1 week, provider will email EP clinic  Rest of non cardiology care as per PCP, rheum, and GI - appreciate excellent care  F/u in 1 month

## 2023-08-14 NOTE — PHYSICAL EXAM
[Well Developed] : well developed [Well Nourished] : well nourished [No Acute Distress] : no acute distress [Normal Conjunctiva] : normal conjunctiva [Normal Venous Pressure] : normal venous pressure [No Carotid Bruit] : no carotid bruit [Timo ___] : timo StevensV [Soft] : abdomen soft [Non Tender] : non-tender [No Masses/organomegaly] : no masses/organomegaly [Normal Bowel Sounds] : normal bowel sounds [Normal Gait] : normal gait [No Edema] : no edema [No Cyanosis] : no cyanosis [No Clubbing] : no clubbing [No Varicosities] : no varicosities [No Rash] : no rash [No Skin Lesions] : no skin lesions [Moves all extremities] : moves all extremities [No Focal Deficits] : no focal deficits [Normal Speech] : normal speech [Alert and Oriented] : alert and oriented [Normal memory] : normal memory [General Appearance - Well Developed] : well developed [General Appearance - Well Nourished] : well nourished [] : no respiratory distress [Respiration, Rhythm And Depth] : normal respiratory rhythm and effort [Heart Rate And Rhythm] : heart rate and rhythm were normal [Heart Sounds] : normal S1 and S2 [Edema] : no peripheral edema present [Systolic grade ___/6] : A grade [unfilled]/6 systolic murmur was heard. [Diastolic Grade ___/4] : A grade [unfilled]/4 diastolic murmur was heard. [Bowel Sounds] : normal bowel sounds [Abdomen Soft] : soft [Abdomen Tenderness] : non-tender [Nail Clubbing] : no clubbing of the fingernails [Skin Color & Pigmentation] : normal skin color and pigmentation [Oriented To Time, Place, And Person] : oriented to person, place, and time [Affect] : the affect was normal [Mood] : the mood was normal [de-identified] : Tachycardic, regular rhythm, no murmur, no S3 or S4 [de-identified] : B/l basilar rales [FreeTextEntry1] : W/o CEFERINOVD

## 2023-08-23 ENCOUNTER — NON-APPOINTMENT (OUTPATIENT)
Age: 53
End: 2023-08-23

## 2023-08-23 ENCOUNTER — APPOINTMENT (OUTPATIENT)
Dept: ELECTROPHYSIOLOGY | Facility: CLINIC | Age: 53
End: 2023-08-23
Payer: COMMERCIAL

## 2023-08-23 DIAGNOSIS — I50.22 CHRONIC SYSTOLIC (CONGESTIVE) HEART FAILURE: ICD-10-CM

## 2023-08-23 PROCEDURE — 93282 PRGRMG EVAL IMPLANTABLE DFB: CPT

## 2023-09-07 ENCOUNTER — APPOINTMENT (OUTPATIENT)
Dept: CARDIOLOGY | Facility: CLINIC | Age: 53
End: 2023-09-07

## 2023-09-07 RX ORDER — APIXABAN 5 MG/1
5 TABLET, FILM COATED ORAL
Qty: 180 | Refills: 3 | Status: ACTIVE | COMMUNITY
Start: 2022-09-15 | End: 1900-01-01

## 2023-09-11 ENCOUNTER — APPOINTMENT (OUTPATIENT)
Dept: RHEUMATOLOGY | Facility: CLINIC | Age: 53
End: 2023-09-11

## 2023-09-15 ENCOUNTER — RX RENEWAL (OUTPATIENT)
Age: 53
End: 2023-09-15

## 2023-09-21 ENCOUNTER — APPOINTMENT (OUTPATIENT)
Dept: CARDIOLOGY | Facility: CLINIC | Age: 53
End: 2023-09-21
Payer: COMMERCIAL

## 2023-09-21 VITALS
HEART RATE: 122 BPM | TEMPERATURE: 97.2 F | DIASTOLIC BLOOD PRESSURE: 69 MMHG | SYSTOLIC BLOOD PRESSURE: 103 MMHG | RESPIRATION RATE: 18 BRPM | OXYGEN SATURATION: 100 %

## 2023-09-21 DIAGNOSIS — Z86.39 PERSONAL HISTORY OF OTHER ENDOCRINE, NUTRITIONAL AND METABOLIC DISEASE: ICD-10-CM

## 2023-09-21 DIAGNOSIS — I48.3 TYPICAL ATRIAL FLUTTER: ICD-10-CM

## 2023-09-21 DIAGNOSIS — E87.6 HYPOKALEMIA: ICD-10-CM

## 2023-09-21 PROCEDURE — ZZZZZ: CPT

## 2023-09-21 PROCEDURE — 93306 TTE W/DOPPLER COMPLETE: CPT

## 2023-09-22 LAB
ALBUMIN SERPL ELPH-MCNC: 4.8 G/DL
ALP BLD-CCNC: 108 U/L
ALT SERPL-CCNC: 19 U/L
ANION GAP SERPL CALC-SCNC: 19 MMOL/L
AST SERPL-CCNC: 41 U/L
BILIRUB SERPL-MCNC: 1.8 MG/DL
BUN SERPL-MCNC: 20 MG/DL
CALCIUM SERPL-MCNC: 9.8 MG/DL
CHLORIDE SERPL-SCNC: 95 MMOL/L
CO2 SERPL-SCNC: 26 MMOL/L
CREAT SERPL-MCNC: 1.05 MG/DL
EGFR: 64 ML/MIN/1.73M2
ESTIMATED AVERAGE GLUCOSE: 131 MG/DL
GLUCOSE SERPL-MCNC: 116 MG/DL
HBA1C MFR BLD HPLC: 6.2 %
HCT VFR BLD CALC: 42.6 %
HGB BLD-MCNC: 13.4 G/DL
MCHC RBC-ENTMCNC: 31 PG
MCHC RBC-ENTMCNC: 31.5 GM/DL
MCV RBC AUTO: 98.6 FL
NT-PROBNP SERPL-MCNC: 3175 PG/ML
PLATELET # BLD AUTO: 214 K/UL
POTASSIUM SERPL-SCNC: 3.9 MMOL/L
PROT SERPL-MCNC: 8.3 G/DL
RBC # BLD: 4.32 M/UL
RBC # FLD: 13.5 %
SODIUM SERPL-SCNC: 141 MMOL/L
WBC # FLD AUTO: 5.03 K/UL

## 2023-09-26 ENCOUNTER — RX RENEWAL (OUTPATIENT)
Age: 53
End: 2023-09-26

## 2023-09-26 ENCOUNTER — OUTPATIENT (OUTPATIENT)
Dept: OUTPATIENT SERVICES | Facility: HOSPITAL | Age: 53
LOS: 1 days | End: 2023-09-26

## 2023-09-26 VITALS
OXYGEN SATURATION: 100 % | WEIGHT: 110.01 LBS | TEMPERATURE: 98 F | RESPIRATION RATE: 16 BRPM | HEART RATE: 100 BPM | HEIGHT: 61 IN | SYSTOLIC BLOOD PRESSURE: 95 MMHG | DIASTOLIC BLOOD PRESSURE: 68 MMHG

## 2023-09-26 DIAGNOSIS — Z98.89 OTHER SPECIFIED POSTPROCEDURAL STATES: Chronic | ICD-10-CM

## 2023-09-26 DIAGNOSIS — Z95.1 PRESENCE OF AORTOCORONARY BYPASS GRAFT: Chronic | ICD-10-CM

## 2023-09-26 DIAGNOSIS — Z95.2 PRESENCE OF PROSTHETIC HEART VALVE: Chronic | ICD-10-CM

## 2023-09-26 DIAGNOSIS — Z95.810 PRESENCE OF AUTOMATIC (IMPLANTABLE) CARDIAC DEFIBRILLATOR: Chronic | ICD-10-CM

## 2023-09-26 DIAGNOSIS — Z95.810 PRESENCE OF AUTOMATIC (IMPLANTABLE) CARDIAC DEFIBRILLATOR: ICD-10-CM

## 2023-09-26 DIAGNOSIS — Z98.891 HISTORY OF UTERINE SCAR FROM PREVIOUS SURGERY: Chronic | ICD-10-CM

## 2023-09-26 DIAGNOSIS — I10 ESSENTIAL (PRIMARY) HYPERTENSION: ICD-10-CM

## 2023-09-26 DIAGNOSIS — Z95.0 PRESENCE OF CARDIAC PACEMAKER: Chronic | ICD-10-CM

## 2023-09-26 DIAGNOSIS — I48.3 TYPICAL ATRIAL FLUTTER: ICD-10-CM

## 2023-09-26 DIAGNOSIS — I48.92 UNSPECIFIED ATRIAL FLUTTER: ICD-10-CM

## 2023-09-26 LAB
ALBUMIN SERPL ELPH-MCNC: 4.2 G/DL — SIGNIFICANT CHANGE UP (ref 3.3–5)
ALP SERPL-CCNC: 99 U/L — SIGNIFICANT CHANGE UP (ref 40–120)
ALT FLD-CCNC: 16 U/L — SIGNIFICANT CHANGE UP (ref 4–33)
ANION GAP SERPL CALC-SCNC: 10 MMOL/L — SIGNIFICANT CHANGE UP (ref 7–14)
AST SERPL-CCNC: 39 U/L — HIGH (ref 4–32)
BILIRUB SERPL-MCNC: 1.4 MG/DL — HIGH (ref 0.2–1.2)
BUN SERPL-MCNC: 18 MG/DL — SIGNIFICANT CHANGE UP (ref 7–23)
CALCIUM SERPL-MCNC: 9.4 MG/DL — SIGNIFICANT CHANGE UP (ref 8.4–10.5)
CHLORIDE SERPL-SCNC: 94 MMOL/L — LOW (ref 98–107)
CO2 SERPL-SCNC: 36 MMOL/L — HIGH (ref 22–31)
CREAT SERPL-MCNC: 1 MG/DL — SIGNIFICANT CHANGE UP (ref 0.5–1.3)
EGFR: 67 ML/MIN/1.73M2 — SIGNIFICANT CHANGE UP
GLUCOSE SERPL-MCNC: 102 MG/DL — HIGH (ref 70–99)
HCG SERPL-ACNC: <1 MIU/ML — SIGNIFICANT CHANGE UP
HCT VFR BLD CALC: 37.9 % — SIGNIFICANT CHANGE UP (ref 34.5–45)
HGB BLD-MCNC: 12.8 G/DL — SIGNIFICANT CHANGE UP (ref 11.5–15.5)
MCHC RBC-ENTMCNC: 31.7 PG — SIGNIFICANT CHANGE UP (ref 27–34)
MCHC RBC-ENTMCNC: 33.8 GM/DL — SIGNIFICANT CHANGE UP (ref 32–36)
MCV RBC AUTO: 93.8 FL — SIGNIFICANT CHANGE UP (ref 80–100)
NRBC # BLD: 0 /100 WBCS — SIGNIFICANT CHANGE UP (ref 0–0)
NRBC # FLD: 0 K/UL — SIGNIFICANT CHANGE UP (ref 0–0)
PLATELET # BLD AUTO: 207 K/UL — SIGNIFICANT CHANGE UP (ref 150–400)
POTASSIUM SERPL-MCNC: 3 MMOL/L — LOW (ref 3.5–5.3)
POTASSIUM SERPL-SCNC: 3 MMOL/L — LOW (ref 3.5–5.3)
PROT SERPL-MCNC: 7.7 G/DL — SIGNIFICANT CHANGE UP (ref 6–8.3)
RBC # BLD: 4.04 M/UL — SIGNIFICANT CHANGE UP (ref 3.8–5.2)
RBC # FLD: 13.2 % — SIGNIFICANT CHANGE UP (ref 10.3–14.5)
SODIUM SERPL-SCNC: 140 MMOL/L — SIGNIFICANT CHANGE UP (ref 135–145)
WBC # BLD: 4.7 K/UL — SIGNIFICANT CHANGE UP (ref 3.8–10.5)
WBC # FLD AUTO: 4.7 K/UL — SIGNIFICANT CHANGE UP (ref 3.8–10.5)

## 2023-09-26 RX ORDER — FOLIC ACID 0.8 MG
1 TABLET ORAL
Qty: 0 | Refills: 0 | DISCHARGE

## 2023-09-26 RX ORDER — METHOTREXATE 2.5 MG/1
8 TABLET ORAL
Qty: 0 | Refills: 0 | DISCHARGE

## 2023-09-26 RX ORDER — TOCILIZUMAB 20 MG/ML
0 INJECTION, SOLUTION, CONCENTRATE INTRAVENOUS
Qty: 0 | Refills: 0 | DISCHARGE

## 2023-09-26 RX ORDER — METOPROLOL TARTRATE 50 MG
1 TABLET ORAL
Qty: 0 | Refills: 0 | DISCHARGE

## 2023-09-26 NOTE — H&P PST ADULT - NSANTHBMIRD_ENT_A_CORE
No Xeldominguezz Pregnancy And Lactation Text: This medication is Pregnancy Category D and is not considered safe during pregnancy.  The risk during breast feeding is also uncertain.

## 2023-09-26 NOTE — H&P PST ADULT - WHEN WAS YOUR LAST VACCINATION? YEAR
Telephonic outreach attempt to follow up with patient and his wife, Stacy to assess progress towards goals. Left a discreet VM with a request for a return call. Will continue attempts to outreach patient. 2021

## 2023-09-26 NOTE — H&P PST ADULT - NS MD HP INPLANTS MED DEV
Pacemaker Medtronic ICD - Model # 7300TFX; Serial # [id number]/Automatic Implantable Cardioverter Defibrillator

## 2023-09-26 NOTE — H&P PST ADULT - PROBLEM SELECTOR PLAN 1
Scheduled for A-fib/A-flutter ablation with Biosense, tentatively for 10/04/23.  Pre-op instructions provided. Pt given verbal and written instructions with teach back on Pepcid. Pt verbalized understanding with return demonstration.   Pending labs

## 2023-09-26 NOTE — H&P PST ADULT - RESPIRATORY
clear to auscultation bilaterally/no wheezes/no rales/no rhonchi/airway patent clear to auscultation bilaterally/no wheezes/no rales/no rhonchi/airway patent/breath sounds equal

## 2023-09-26 NOTE — H&P PST ADULT - HISTORY OF PRESENT ILLNESS
54 y/o female with hx  54 y/o female with hx CAD, s/p CABG. Aortic and Mitral Valves Replacements (Tissue Valve), AICD in place (2015), HTN, HLD, presents for preop evaluation for A-fib/A-flutter ablation with Biosense, tentatively for 10/04/23.

## 2023-09-26 NOTE — H&P PST ADULT - NSICDXPASTMEDICALHX_GEN_ALL_CORE_FT
PAST MEDICAL HISTORY:  AICD (automatic cardioverter/defibrillator) present     Atrial fibrillation, chronic     Atrial flutter     Heart valve disease     Hypertension     Hypertrophic cardiomyopathy     Non-sustained ventricular tachycardia     Takayasu's arteritis

## 2023-09-26 NOTE — H&P PST ADULT - PROBLEM SELECTOR PLAN 2
OR booking notified via fax  Pt on Eliquis therapy, instructed by Dr Corral to hold on day of procedure

## 2023-09-26 NOTE — H&P PST ADULT - HEART RATE (BEATS/MIN)
RN NOTE

PT IN BED AWAKE/ALERT AND ORIENTED X 4. IN SEMI ALEJANDRE'S POSITION. PT DENIES PAIN OR 
DISCOMFORT. REPOSITIONED PT AS REQUESTED AND TOLERATING WELL. PT ON 3L OF O2 VIA NC. 
RESPIRATIONS EVEN AND UNLABORED. VITAL SIGNS STABLE VIA BEDSIDE MONITOR. IV LINES FLUSHED 
AND INTACT. NO SIGNS OF BLEEDING NOTED. CALL LIGHT WITHIN REACH, SAFETY MEASURES IN PLACE, 
WILL MONITOR PATIENT 100

## 2023-09-26 NOTE — H&P PST ADULT - NSANTHOSAYNRD_GEN_A_CORE
No. ION screening performed.  STOP BANG Legend: 0-2 = LOW Risk; 3-4 = INTERMEDIATE Risk; 5-8 = HIGH Risk

## 2023-09-26 NOTE — H&P PST ADULT - NSICDXPASTSURGICALHX_GEN_ALL_CORE_FT
PAST SURGICAL HISTORY:  H/O cardiac pacemaker     H/O  section x2    History of appendectomy      PAST SURGICAL HISTORY:  H/O  section x2    History of appendectomy     Presence of biventricular automatic cardioverter/defibrillator (AICD)     S/P AVR (aortic valve replacement)     S/P CABG (coronary artery bypass graft)     S/P MVR (mitral valve replacement)

## 2023-09-27 PROBLEM — E87.6 HYPOKALEMIA: Status: ACTIVE | Noted: 2023-09-27

## 2023-09-27 PROBLEM — Z86.39 HISTORY OF HYPOKALEMIA: Status: RESOLVED | Noted: 2023-03-01 | Resolved: 2023-09-27

## 2023-09-27 RX ORDER — POTASSIUM CHLORIDE 1500 MG/1
20 TABLET, FILM COATED, EXTENDED RELEASE ORAL DAILY
Qty: 20 | Refills: 0 | Status: COMPLETED | COMMUNITY
Start: 2023-09-27 | End: 2023-10-07

## 2023-10-04 ENCOUNTER — OUTPATIENT (OUTPATIENT)
Dept: OUTPATIENT SERVICES | Facility: HOSPITAL | Age: 53
LOS: 1 days | Discharge: HOME | End: 2023-10-04
Payer: MEDICAID

## 2023-10-04 VITALS
HEART RATE: 69 BPM | WEIGHT: 122.36 LBS | TEMPERATURE: 98 F | DIASTOLIC BLOOD PRESSURE: 56 MMHG | SYSTOLIC BLOOD PRESSURE: 104 MMHG | RESPIRATION RATE: 23 BRPM | OXYGEN SATURATION: 100 %

## 2023-10-04 DIAGNOSIS — Z95.810 PRESENCE OF AUTOMATIC (IMPLANTABLE) CARDIAC DEFIBRILLATOR: Chronic | ICD-10-CM

## 2023-10-04 DIAGNOSIS — I48.3 TYPICAL ATRIAL FLUTTER: ICD-10-CM

## 2023-10-04 DIAGNOSIS — Z98.891 HISTORY OF UTERINE SCAR FROM PREVIOUS SURGERY: Chronic | ICD-10-CM

## 2023-10-04 DIAGNOSIS — Z98.89 OTHER SPECIFIED POSTPROCEDURAL STATES: Chronic | ICD-10-CM

## 2023-10-04 DIAGNOSIS — Z95.2 PRESENCE OF PROSTHETIC HEART VALVE: Chronic | ICD-10-CM

## 2023-10-04 DIAGNOSIS — Z95.1 PRESENCE OF AORTOCORONARY BYPASS GRAFT: Chronic | ICD-10-CM

## 2023-10-04 LAB
GLUCOSE BLDC GLUCOMTR-MCNC: 118 MG/DL — HIGH (ref 70–99)
HCG UR QL: NEGATIVE — SIGNIFICANT CHANGE UP

## 2023-10-04 PROCEDURE — 93010 ELECTROCARDIOGRAM REPORT: CPT | Mod: 76,59

## 2023-10-04 PROCEDURE — 93623 PRGRMD STIMJ&PACG IV RX NFS: CPT | Mod: 26

## 2023-10-04 PROCEDURE — 93656 COMPRE EP EVAL ABLTJ ATR FIB: CPT

## 2023-10-04 RX ORDER — TOBRAMYCIN 0.3 %
1 DROPS OPHTHALMIC (EYE) EVERY 4 HOURS
Refills: 0 | Status: DISCONTINUED | OUTPATIENT
Start: 2023-10-04 | End: 2023-10-18

## 2023-10-04 RX ORDER — APIXABAN 2.5 MG/1
5 TABLET, FILM COATED ORAL
Refills: 0 | Status: DISCONTINUED | OUTPATIENT
Start: 2023-10-04 | End: 2023-10-04

## 2023-10-04 RX ORDER — PANTOPRAZOLE SODIUM 20 MG/1
40 TABLET, DELAYED RELEASE ORAL
Refills: 0 | Status: DISCONTINUED | OUTPATIENT
Start: 2023-10-04 | End: 2023-10-18

## 2023-10-04 RX ORDER — FOLIC ACID 0.8 MG
1 TABLET ORAL DAILY
Refills: 0 | Status: DISCONTINUED | OUTPATIENT
Start: 2023-10-04 | End: 2023-10-18

## 2023-10-04 RX ORDER — ASPIRIN/CALCIUM CARB/MAGNESIUM 324 MG
81 TABLET ORAL DAILY
Refills: 0 | Status: DISCONTINUED | OUTPATIENT
Start: 2023-10-04 | End: 2023-10-18

## 2023-10-04 RX ORDER — ERGOCALCIFEROL 1.25 MG/1
0 CAPSULE ORAL
Refills: 0 | DISCHARGE

## 2023-10-04 RX ORDER — METOPROLOL TARTRATE 50 MG
100 TABLET ORAL
Refills: 0 | Status: DISCONTINUED | OUTPATIENT
Start: 2023-10-04 | End: 2023-10-18

## 2023-10-04 RX ORDER — FUROSEMIDE 40 MG
20 TABLET ORAL ONCE
Refills: 0 | Status: COMPLETED | OUTPATIENT
Start: 2023-10-04 | End: 2023-10-04

## 2023-10-04 RX ORDER — ATORVASTATIN CALCIUM 80 MG/1
40 TABLET, FILM COATED ORAL AT BEDTIME
Refills: 0 | Status: DISCONTINUED | OUTPATIENT
Start: 2023-10-04 | End: 2023-10-18

## 2023-10-04 RX ORDER — PANTOPRAZOLE SODIUM 20 MG/1
1 TABLET, DELAYED RELEASE ORAL
Qty: 28 | Refills: 0
Start: 2023-10-04

## 2023-10-04 RX ORDER — METHOTREXATE 2.5 MG/1
17.5 TABLET ORAL
Refills: 0 | Status: DISCONTINUED | OUTPATIENT
Start: 2023-10-06 | End: 2023-10-15

## 2023-10-04 RX ORDER — SODIUM CHLORIDE 9 MG/ML
3 INJECTION INTRAMUSCULAR; INTRAVENOUS; SUBCUTANEOUS EVERY 8 HOURS
Refills: 0 | Status: DISCONTINUED | OUTPATIENT
Start: 2023-10-04 | End: 2023-10-18

## 2023-10-04 RX ORDER — APIXABAN 2.5 MG/1
5 TABLET, FILM COATED ORAL
Refills: 0 | Status: DISCONTINUED | OUTPATIENT
Start: 2023-10-04 | End: 2023-10-18

## 2023-10-04 RX ADMIN — Medication 20 MILLIGRAM(S): at 18:08

## 2023-10-04 RX ADMIN — Medication 1 DROP(S): at 16:19

## 2023-10-04 RX ADMIN — Medication 20 MILLIGRAM(S): at 16:22

## 2023-10-04 RX ADMIN — Medication 40 MILLIGRAM(S): at 23:52

## 2023-10-04 RX ADMIN — APIXABAN 5 MILLIGRAM(S): 2.5 TABLET, FILM COATED ORAL at 23:53

## 2023-10-04 RX ADMIN — ATORVASTATIN CALCIUM 40 MILLIGRAM(S): 80 TABLET, FILM COATED ORAL at 23:53

## 2023-10-04 RX ADMIN — Medication 40 MILLIGRAM(S): at 20:09

## 2023-10-04 NOTE — CHART NOTE - NSCHARTNOTEFT_GEN_A_CORE
The patient felt dizzy after she went to the bathroom.   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   Dr. Corral was made aware.   Will admit the patient for observation.   The patient is stable at present.

## 2023-10-04 NOTE — CHART NOTE - NSCHARTNOTEFT_GEN_A_CORE
53y Female s/p p A. Fib/AVNRT ablation x today for right groin check. Pt currently denies any complaints.    Right groin site: Dressing in place c/d/i. No hematoma present. No edema/swelling/discoloration/coldness of extremity. Pulses and sensation intact.     OK to receive Eliquis tonight    Will cont to monitor

## 2023-10-04 NOTE — CHART NOTE - NSCHARTNOTEFT_GEN_A_CORE
The patient is s/p A. fib. Ablation c/o L eye pain, most likely due to corneal abrasion.   Anesthesiologist, Dr. Horner was made aware, he evaluated the patient and ordered Tetracaine and Tobramycin ophthalmic solution.   The patient was given Tetracaine ophthalmic solution to L eye with improvement in symptoms. Tobramycin was given as well.   As per Dr. Corral, will give Lasix 20 mg IV x 1 due to elevated filing pressure.   The patient is stable, will continue observation. The patient is s/p A. fib. Ablation c/o L eye pain, most likely due to corneal abrasion.   Anesthesiologist, Dr. Horner was made aware, he evaluated the patient and ordered Tetracaine and Tobramycin ophthalmic solution.   The patient was given Tetracaine ophthalmic solution to L eye with improvement in symptoms. Tobramycin was given as well.   As per Dr. Corral, will give Lasix 20 mg IV x 2 due to elevated filing pressure.   The patient is stable, will continue observation.

## 2023-10-04 NOTE — CHART NOTE - NSCHARTNOTEFT_GEN_A_CORE
The patient presents today for elective A. Fib/A. Flutter Ablation.  See hard copy of H&P from [employer] and PST.  The patient denies chest pain dizziness, presyncope, syncope,  headache, visual disturbances, CVA, PE, DVT, ION, abdominal pain, N/V/D/C, hematochezia, melena, dysuria, hematuria, fever, chills.  Medications reviewed.  Last dose of Eliqis was taken on 10/3/23 PM  The patient denies any new complaints since the last time he was seen by Dr. Corral.  Potassium 3.0, repeat sent to [facility], pending results.  LMP 3 years ago. The patient presents today for elective A. Fib/A. Flutter Ablation.  See hard copy of H&P from [employer] and PST.  The patient denies chest pain dizziness, presyncope, syncope,  headache, visual disturbances, CVA, PE, DVT, ION, abdominal pain, N/V/D/C, hematochezia, melena, dysuria, hematuria, fever, chills.  Medications reviewed.  Last dose of Eliqis was taken on 10/3/23 PM  The patient denies any new complaints since the last time he was seen by Dr. Corral.  Potassium 3.0, repeat K via gem is 3.8.  LMP 3 years ago.

## 2023-10-04 NOTE — CHART NOTE - NSCHARTNOTEFT_GEN_A_CORE
The patient is s/p A. Fib. Ablation, was noted new TWI in V5, V6 - asymptomatic. Dr. Corral was made aware, recommended no interventions and to continue observation.

## 2023-10-05 ENCOUNTER — TRANSCRIPTION ENCOUNTER (OUTPATIENT)
Age: 53
End: 2023-10-05

## 2023-10-05 VITALS
RESPIRATION RATE: 18 BRPM | OXYGEN SATURATION: 98 % | TEMPERATURE: 98 F | HEART RATE: 73 BPM | DIASTOLIC BLOOD PRESSURE: 61 MMHG | SYSTOLIC BLOOD PRESSURE: 124 MMHG

## 2023-10-05 LAB
ANION GAP SERPL CALC-SCNC: 16 MMOL/L — HIGH (ref 7–14)
BUN SERPL-MCNC: 23 MG/DL — SIGNIFICANT CHANGE UP (ref 7–23)
CALCIUM SERPL-MCNC: 8.9 MG/DL — SIGNIFICANT CHANGE UP (ref 8.4–10.5)
CHLORIDE SERPL-SCNC: 99 MMOL/L — SIGNIFICANT CHANGE UP (ref 98–107)
CO2 SERPL-SCNC: 26 MMOL/L — SIGNIFICANT CHANGE UP (ref 22–31)
CREAT SERPL-MCNC: 0.89 MG/DL — SIGNIFICANT CHANGE UP (ref 0.5–1.3)
EGFR: 77 ML/MIN/1.73M2 — SIGNIFICANT CHANGE UP
GLUCOSE SERPL-MCNC: 142 MG/DL — HIGH (ref 70–99)
HCT VFR BLD CALC: 34 % — LOW (ref 34.5–45)
HGB BLD-MCNC: 11.2 G/DL — LOW (ref 11.5–15.5)
MAGNESIUM SERPL-MCNC: 2 MG/DL — SIGNIFICANT CHANGE UP (ref 1.6–2.6)
MCHC RBC-ENTMCNC: 30.9 PG — SIGNIFICANT CHANGE UP (ref 27–34)
MCHC RBC-ENTMCNC: 32.9 GM/DL — SIGNIFICANT CHANGE UP (ref 32–36)
MCV RBC AUTO: 93.9 FL — SIGNIFICANT CHANGE UP (ref 80–100)
NRBC # BLD: 0 /100 WBCS — SIGNIFICANT CHANGE UP (ref 0–0)
NRBC # FLD: 0 K/UL — SIGNIFICANT CHANGE UP (ref 0–0)
PLATELET # BLD AUTO: 156 K/UL — SIGNIFICANT CHANGE UP (ref 150–400)
POTASSIUM SERPL-MCNC: 3.5 MMOL/L — SIGNIFICANT CHANGE UP (ref 3.5–5.3)
POTASSIUM SERPL-SCNC: 3.5 MMOL/L — SIGNIFICANT CHANGE UP (ref 3.5–5.3)
RBC # BLD: 3.62 M/UL — LOW (ref 3.8–5.2)
RBC # FLD: 13.9 % — SIGNIFICANT CHANGE UP (ref 10.3–14.5)
SODIUM SERPL-SCNC: 141 MMOL/L — SIGNIFICANT CHANGE UP (ref 135–145)
WBC # BLD: 5.91 K/UL — SIGNIFICANT CHANGE UP (ref 3.8–10.5)
WBC # FLD AUTO: 5.91 K/UL — SIGNIFICANT CHANGE UP (ref 3.8–10.5)

## 2023-10-05 PROCEDURE — 99231 SBSQ HOSP IP/OBS SF/LOW 25: CPT

## 2023-10-05 RX ORDER — PANTOPRAZOLE SODIUM 20 MG/1
1 TABLET, DELAYED RELEASE ORAL
Qty: 30 | Refills: 0
Start: 2023-10-05 | End: 2023-11-03

## 2023-10-05 RX ADMIN — APIXABAN 5 MILLIGRAM(S): 2.5 TABLET, FILM COATED ORAL at 16:55

## 2023-10-05 RX ADMIN — Medication 80 MILLIGRAM(S): at 06:10

## 2023-10-05 RX ADMIN — Medication 100 MILLIGRAM(S): at 16:55

## 2023-10-05 RX ADMIN — Medication 2 MILLIGRAM(S): at 06:08

## 2023-10-05 RX ADMIN — Medication 100 MILLIGRAM(S): at 06:08

## 2023-10-05 RX ADMIN — SODIUM CHLORIDE 3 MILLILITER(S): 9 INJECTION INTRAMUSCULAR; INTRAVENOUS; SUBCUTANEOUS at 00:08

## 2023-10-05 RX ADMIN — Medication 81 MILLIGRAM(S): at 13:02

## 2023-10-05 RX ADMIN — APIXABAN 5 MILLIGRAM(S): 2.5 TABLET, FILM COATED ORAL at 06:08

## 2023-10-05 RX ADMIN — PANTOPRAZOLE SODIUM 40 MILLIGRAM(S): 20 TABLET, DELAYED RELEASE ORAL at 06:08

## 2023-10-05 RX ADMIN — SODIUM CHLORIDE 3 MILLILITER(S): 9 INJECTION INTRAMUSCULAR; INTRAVENOUS; SUBCUTANEOUS at 13:00

## 2023-10-05 RX ADMIN — SODIUM CHLORIDE 3 MILLILITER(S): 9 INJECTION INTRAMUSCULAR; INTRAVENOUS; SUBCUTANEOUS at 07:09

## 2023-10-05 RX ADMIN — Medication 1 MILLIGRAM(S): at 13:02

## 2023-10-05 NOTE — DISCHARGE NOTE PROVIDER - DID THE PATIENT PRESENT WITH OR WAS TREATED FOR MALNUTRITION DURING THIS ADMISSION
[facility].    Eboni Rubin APRN CNP FNP RN  [facility]     No

## 2023-10-05 NOTE — PATIENT PROFILE ADULT - NSPRESCRALCFREQ_GEN_A_NUR
Never Odomzo Pregnancy And Lactation Text: This medication is Pregnancy Category X and is absolutely contraindicated during pregnancy. It is unknown if it is excreted in breast milk.

## 2023-10-05 NOTE — DISCHARGE NOTE PROVIDER - NSDCCPCAREPLAN_GEN_ALL_CORE_FT
PRINCIPAL DISCHARGE DIAGNOSIS  Diagnosis: Atrial fibrillation and flutter  Assessment and Plan of Treatment: You underwent procedure (ablation) on 10/4/23.   Monitor site of procedure (right groin) for swelling, redness, or discharge. Please notify your doctor if any of these symptoms are noted. No heavy lifting for one week. No strenuous activity for 3 weeks. No driving for 24 hours. You may shower but no baths or swimming for one week.  Follow up with your cardiologist and primary care physician.

## 2023-10-05 NOTE — DISCHARGE NOTE PROVIDER - CARE PROVIDER_API CALL
John Corral  Cardiovascular Disease  [address] [place] [address], [address]  [place] [address]  Phone: [phone]  Fax: [phone]  Scheduled Appointment: 11/08/2023 01:15 PM    Adam Seay  Phone: [phone]  Fax: [phone]  Scheduled Appointment: 11/27/2023 10:00 AM    Your primary care physician,   Phone: (   )    -  Fax: (   )    -  Follow Up Time: 1 week    Kimberlyn Xavier  Cardiovascular Disease  [address] [place] [address], [address] [place] [address]  [place] [address]  Phone: [phone]  Fax: [phone]  Scheduled Appointment: 11/30/2023 01:00 PM

## 2023-10-05 NOTE — DISCHARGE NOTE PROVIDER - CARE PROVIDERS DIRECT ADDRESSES
[email or web address],DirectAddress_Unknown,DirectAddress_Unknown,DirectAddress_Unknown

## 2023-10-05 NOTE — DISCHARGE NOTE PROVIDER - NSDCFUSCHEDAPPT_GEN_ALL_CORE_FT
John Corral  [facility]  ELECTROPH [phone]  Scheduled Appointment: 11/08/2023    Adam Seay  [facility]  RHEUM [id number]  Scheduled Appointment: 11/27/2023    [facility]  E[address]ROPH [id number]  Scheduled Appointment: 11/28/2023    Kimberlyn Xavier  [facility]  CARDIOLOGY [id number]  Scheduled Appointment: 11/30/2023    [facility]  [address]OPH [id number]  Scheduled Appointment: 12/07/2023

## 2023-10-05 NOTE — DISCHARGE NOTE NURSING/CASE MANAGEMENT/SOCIAL WORK - PATIENT PORTAL LINK FT
You can access the FollowMyHealth Patient Portal offered by [facility] by registering at the following website: [email or web address]. By joining [employer]’s FollowMyHealth portal, you will also be able to view your health information using other applications (apps) compatible with our system.

## 2023-10-05 NOTE — DISCHARGE NOTE PROVIDER - NSDCMRMEDTOKEN_GEN_ALL_CORE_FT
aspirin 81 mg oral tablet: 1 tab(s) orally once a day  atorvastatin 40 mg oral tablet: 1 tab(s) orally once a day  Eliquis 5 mg oral tablet: 1 tab(s) orally 2 times a day  folic acid 1 mg oral tablet: 1 tab(s) orally once a day  methotrexate 2.5 mg oral tablet: 7 tab(s) orally once a week FRIDAY  metoprolol tartrate 100 mg oral tablet: 1 tab(s) orally 2 times a day  Potassium Chloride (Eqv-Klor-Con M20) 20 mEq oral tablet, extended release: 1 tab(s) orally once a day  predniSONE 1 mg oral tablet: 2 tab(s) orally once a day  Protonix 40 mg oral delayed release tablet: 1 tab(s) orally once a day  torsemide 20 mg oral tablet: 4 tab(s) orally once a day (at bedtime)   aspirin 81 mg oral tablet: 1 tab(s) orally once a day  atorvastatin 40 mg oral tablet: 1 tab(s) orally once a day  Eliquis 5 mg oral tablet: 1 tab(s) orally 2 times a day  folic acid 1 mg oral tablet: 1 tab(s) orally once a day  methotrexate 2.5 mg oral tablet: 7 tab(s) orally once a week FRIDAY  metoprolol tartrate 100 mg oral tablet: 1 tab(s) orally 2 times a day  pantoprazole 40 mg oral delayed release tablet: 1 tab(s) orally once a day (before a meal)  Potassium Chloride (Eqv-Klor-Con M20) 20 mEq oral tablet, extended release: 1 tab(s) orally once a day  predniSONE 1 mg oral tablet: 2 tab(s) orally once a day  Protonix 40 mg oral delayed release tablet: 1 tab(s) orally once a day  torsemide 20 mg oral tablet: 4 tab(s) orally once a day (at bedtime)   aspirin 81 mg oral tablet: 1 tab(s) orally once a day  atorvastatin 40 mg oral tablet: 1 tab(s) orally once a day  Eliquis 5 mg oral tablet: 1 tab(s) orally 2 times a day  folic acid 1 mg oral tablet: 1 tab(s) orally once a day  methotrexate 2.5 mg oral tablet: 7 tab(s) orally once a week FRIDAY  metoprolol tartrate 100 mg oral tablet: 1 tab(s) orally 2 times a day  pantoprazole 40 mg oral delayed release tablet: 1 tab(s) orally once a day (before a meal)  Potassium Chloride (Eqv-Klor-Con M20) 20 mEq oral tablet, extended release: 1 tab(s) orally once a day  predniSONE 1 mg oral tablet: 2 tab(s) orally once a day  torsemide 20 mg oral tablet: 4 tab(s) orally once a day (at bedtime)

## 2023-10-05 NOTE — PATIENT PROFILE ADULT - FALL HARM RISK - RISK INTERVENTIONS

## 2023-10-05 NOTE — PROGRESS NOTE ADULT - SUBJECTIVE AND OBJECTIVE BOX
Patient is a 53y old  Female who presents with a chief complaint of   [place]  Jenniferramez ID: [id number]    Patient denies CP, SOB or palpitations.    PAST MEDICAL & SURGICAL HISTORY:  Atrial fibrillation, chronic    Non-sustained ventricular tachycardia    Hypertension    AICD (automatic cardioverter/defibrillator) present    Takayasu's arteritis    Hypertrophic cardiomyopathy    Heart valve disease    Atrial flutter    No significant past surgical history    History of appendectomy    H/O cardiac pacemaker    H/O  section  x2    S/P AVR (aortic valve replacement)    S/P MVR (mitral valve replacement)    Presence of biventricular automatic cardioverter/defibrillator (AICD)    S/P CABG (coronary artery bypass graft)        MEDICATIONS  (STANDING):  apixaban 5 milliGRAM(s) Oral two times a day  aspirin enteric coated 81 milliGRAM(s) Oral daily  atorvastatin 40 milliGRAM(s) Oral at bedtime  folic acid 1 milliGRAM(s) Oral daily  metoprolol tartrate 100 milliGRAM(s) Oral two times a day  pantoprazole    Tablet 40 milliGRAM(s) Oral before breakfast  predniSONE   Tablet 2 milliGRAM(s) Oral daily  sodium chloride 0.9% lock flush 3 milliLiter(s) IV Push every 8 hours  torsemide 80 milliGRAM(s) Oral daily    MEDICATIONS  (PRN):  tobramycin 0.3% Ophthalmic Solution 1 Drop(s) Left EYE every 4 hours PRN eye pain            Vital Signs Last 24 Hrs  T(C): 36.4 (05 Oct 2023 06:51), Max: 36.6 (04 Oct 2023 22:30)  T(F): 97.6 (05 Oct 2023 06:51), Max: 97.8 (04 Oct 2023 22:30)  HR: 71 (05 Oct 2023 06:51) (69 - 71)  BP: 124/58 (05 Oct 2023 06:51) (104/56 - 124/58)  BP(mean): 68 (04 Oct 2023 22:30) (68 - 68)  RR: 15 (05 Oct 2023 06:51) (15 - 23)  SpO2: 99% (05 Oct 2023 06:51) (99% - 100%)    Parameters below as of 05 Oct 2023 06:51  Patient On (Oxygen Delivery Method): room air                INTERPRETATION OF TELEMETRY:  SR with PVC's/ burst of SVT lasting 2-3 seconds     ECG:        LABS:                        11.2   5.91  )-----------( 156      ( 05 Oct 2023 06:10 )             34.0     10    141  |  99  |  23  ----------------------------<  142<H>  3.5   |  26  |  0.89    Ca    8.9      05 Oct 2023 06:10  Mg     2.00     10-05            Urinalysis Basic - ( 05 Oct 2023 06:10 )    Color: x / Appearance: x / SG: x / pH: x  Gluc: 142 mg/dL / Ketone: x  / Bili: x / Urobili: x   Blood: x / Protein: x / Nitrite: x   Leuk Esterase: x / RBC: x / WBC x   Sq Epi: x / Non Sq Epi: x / Bacteria: x        BNP  RADIOLOGY & ADDITIONAL STUDIES:      PHYSICAL EXAM:    GENERAL: In no apparent distress  NECK: Supple and normal thyroid.  No JVD or carotid bruit.  Carotid pulse is 2+ bilaterally.  HEART: Regular rate and rhythm; No murmurs, rubs, or gallops.  L ICD  PULMONARY: Clear to auscultation and perfusion.  No rales, wheezing, or rhonchi bilaterally.  ABDOMEN: Soft, Nontender, Nondistended; Bowel sounds present  EXTREMITIES:  2+ Peripheral Pulses, No clubbing, cyanosis, or edema;  R groins site no active bleeding or hematoma  NEUROLOGICAL: alert oriented x4, speech clear no focal deficit

## 2023-10-05 NOTE — DISCHARGE NOTE NURSING/CASE MANAGEMENT/SOCIAL WORK - NSDCPEFALRISK_GEN_ALL_CORE
For information on Fall & Injury Prevention, visit: [email or web address] OR  [email or web address] OR  https://www.cdc.gov/steadi/patient.html

## 2023-10-05 NOTE — DISCHARGE NOTE PROVIDER - HOSPITAL COURSE
53 year old female with a PMH of CAD CABG, AVR/MVR, ICD for hypertrophic CM, chronic AF who presents for AF ablation s/p AF pulmonary vein isolations and Cavotricuspid-Isthmus dependent AFL and AVNRT ablation on 10/4.      Afib/Aflutter  - s/p A. Fib and AVNRT ablation (10/4/23) - Lasix 20 mg IV x 2 given in IRS.  - episode of AVNRT  in IRS 3 hrs post ablation, -140's, lasted 5 min, self converted to Sinus rhythm. Continue observation.  -continue AC Eliquis, needs Rx for Protonix 40mg daily for one month, continue home meds     On 10/05/23, case was discussed with Dr. Corral, patient is medically cleared and optimized for discharge today. All medications were reviewed with attending, and sent to mutually agreed upon pharmacy.

## 2023-10-05 NOTE — PROGRESS NOTE ADULT - ASSESSMENT
53 year old female with a PMH of CAD CABG, AVR/MVR, ICD for hypertrophic CM, chronic AF who presents for AF ablation s/p AF pulmonary vein isolations and Cavotricuspid-Isthmus dependent AFL and AVNRT ablation on 10/4.  Post ablation teaching with instructions provided.  Patient  verbalized understanding.    -continue AC Eliquis, needs Rx for Protonix 40mg daily for one month, continue home meds   Follow up on 11/8 at 1:15pm.    Stable for discharge home.

## 2023-10-05 NOTE — PROGRESS NOTE ADULT - NS ATTEND AMEND GEN_ALL_CORE FT
53 year old female with a PMH of CAD CABG, AVR/MVR, ICD for hypertrophic CM, chronic AF who presents for AF ablation s/p AF pulmonary vein isolations and Cavotricuspid-Isthmus dependent AFL and AVNRT ablation on 10/4.  Post ablation teaching with instructions provided.  Continue AC Eliquis, needs Rx for Protonix 40mg daily for one month, continue home meds

## 2023-10-05 NOTE — DISCHARGE NOTE PROVIDER - PROVIDER TOKENS
PROVIDER:[TOKEN:[[id number]],SCHEDULEDAPPT:[11/08/2023],SCHEDULEDAPPTTIME:[01:15 PM]],PROVIDER:[TOKEN:[[id number]],SCHEDULEDAPPT:[11/27/2023],SCHEDULEDAPPTTIME:[10:00 AM]],FREE:[LAST:[Your primary care physician],PHONE:[(   )    -],FAX:[(   )    -],FOLLOWUP:[1 week]],PROVIDER:[TOKEN:[[id number]],SCHEDULEDAPPT:[11/30/2023],SCHEDULEDAPPTTIME:[01:00 PM]]

## 2023-10-06 PROBLEM — I38 ENDOCARDITIS, VALVE UNSPECIFIED: Chronic | Status: ACTIVE | Noted: 2023-09-26

## 2023-10-06 PROBLEM — I48.92 UNSPECIFIED ATRIAL FLUTTER: Chronic | Status: ACTIVE | Noted: 2023-09-26

## 2023-10-16 ENCOUNTER — RX RENEWAL (OUTPATIENT)
Age: 53
End: 2023-10-16

## 2023-10-16 ENCOUNTER — APPOINTMENT (OUTPATIENT)
Dept: CARDIOLOGY | Facility: CLINIC | Age: 53
End: 2023-10-16
Payer: COMMERCIAL

## 2023-10-16 VITALS
HEART RATE: 62 BPM | DIASTOLIC BLOOD PRESSURE: 71 MMHG | BODY MASS INDEX: 20.41 KG/M2 | RESPIRATION RATE: 18 BRPM | TEMPERATURE: 96.8 F | SYSTOLIC BLOOD PRESSURE: 112 MMHG | OXYGEN SATURATION: 98 % | WEIGHT: 108 LBS

## 2023-10-16 DIAGNOSIS — R07.89 OTHER CHEST PAIN: ICD-10-CM

## 2023-10-16 PROCEDURE — 99215 OFFICE O/P EST HI 40 MIN: CPT | Mod: 25

## 2023-10-16 PROCEDURE — 93000 ELECTROCARDIOGRAM COMPLETE: CPT

## 2023-11-06 NOTE — HISTORY OF PRESENT ILLNESS
----- Message from Dario Canas [address]  [phone]; OK to leave message on voicemail  ---------------------------------------------------------------------------  --------------  SCRIPT ANSWERS [___ Week(s) Ago] : [unfilled] week(s) ago [FreeTextEntry1] : Pt states overall feeling okay but still notes fullness in her belly. Says she is now taking double the dose of Lasix because she doesn't think 1 pill is helping her. Has discomfort in abdomen when going up and down the stairs. Sleeps with on pillow, does not get SOB on ambulation\par Says she has followed instructions from cardiology re: med adjustments. \par Says she ran out of [facility] about 6 weeks ago and didn't have the phone number to call pharmacy for refill. Taking MTX 7 tabs/week and Pred 3mg qd. \par Is taking Coumadin 4.5mg qd, will be going for INR check today \par \par Also has not yet gone for TVUS because the # she called had been busy

## 2023-11-08 ENCOUNTER — APPOINTMENT (OUTPATIENT)
Dept: ELECTROPHYSIOLOGY | Facility: CLINIC | Age: 53
End: 2023-11-08
Payer: COMMERCIAL

## 2023-11-08 ENCOUNTER — NON-APPOINTMENT (OUTPATIENT)
Age: 53
End: 2023-11-08

## 2023-11-08 VITALS
RESPIRATION RATE: 17 BRPM | HEART RATE: 60 BPM | DIASTOLIC BLOOD PRESSURE: 57 MMHG | BODY MASS INDEX: 20.78 KG/M2 | TEMPERATURE: 97.3 F | WEIGHT: 110 LBS | OXYGEN SATURATION: 97 % | SYSTOLIC BLOOD PRESSURE: 129 MMHG

## 2023-11-08 PROCEDURE — 99213 OFFICE O/P EST LOW 20 MIN: CPT | Mod: 25

## 2023-11-08 PROCEDURE — 93000 ELECTROCARDIOGRAM COMPLETE: CPT

## 2023-11-10 ENCOUNTER — RX RENEWAL (OUTPATIENT)
Age: 53
End: 2023-11-10

## 2023-11-13 ENCOUNTER — APPOINTMENT (OUTPATIENT)
Dept: CARDIOLOGY | Facility: CLINIC | Age: 53
End: 2023-11-13
Payer: MEDICAID

## 2023-11-13 VITALS
HEART RATE: 66 BPM | DIASTOLIC BLOOD PRESSURE: 64 MMHG | WEIGHT: 111 LBS | OXYGEN SATURATION: 99 % | RESPIRATION RATE: 18 BRPM | BODY MASS INDEX: 20.97 KG/M2 | TEMPERATURE: 97.5 F | SYSTOLIC BLOOD PRESSURE: 136 MMHG

## 2023-11-13 PROCEDURE — 93306 TTE W/DOPPLER COMPLETE: CPT

## 2023-11-13 PROCEDURE — 99215 OFFICE O/P EST HI 40 MIN: CPT

## 2023-11-14 LAB
CRP SERPL-MCNC: 5 MG/L
ERYTHROCYTE [SEDIMENTATION RATE] IN BLOOD BY WESTERGREN METHOD: 56 MM/HR

## 2023-11-16 VITALS
HEART RATE: 56 BPM | OXYGEN SATURATION: 100 % | RESPIRATION RATE: 16 BRPM | TEMPERATURE: 98 F | WEIGHT: 110.01 LBS | HEIGHT: 61 IN | DIASTOLIC BLOOD PRESSURE: 63 MMHG | SYSTOLIC BLOOD PRESSURE: 135 MMHG

## 2023-11-16 NOTE — H&P ADULT - NSHPLABSRESULTS_GEN_ALL_CORE
LABS:                          10.9   4.70  )-----------( 244      ( 21 Nov 2023 06:54 )             32.9     11-21    142  |  100  |  16  ----------------------------<  89  3.3<L>   |  33<H>  |  0.76    Ca    9.2      21 Nov 2023 06:54  Mg     2.0     11-21    TPro  7.5  /  Alb  3.9  /  TBili  0.7  /  DBili  x   /  AST  28  /  ALT  9<L>  /  AlkPhos  98  11-21    LIVER FUNCTIONS - ( 21 Nov 2023 06:54 )  Alb: 3.9 g/dL / Pro: 7.5 g/dL / ALK PHOS: 98 U/L / ALT: 9 U/L / AST: 28 U/L / GGT: x           PT/INR - ( 21 Nov 2023 06:54 )   PT: 11.3 sec;   INR: 0.99          PTT - ( 21 Nov 2023 06:54 )  PTT:30.8 sec  Urinalysis Basic - ( 21 Nov 2023 06:54 )    Color: x / Appearance: x / SG: x / pH: x  Gluc: 89 mg/dL / Ketone: x  / Bili: x / Urobili: x   Blood: x / Protein: x / Nitrite: x   Leuk Esterase: x / RBC: x / WBC x   Sq Epi: x / Non Sq Epi: x / Bacteria: x

## 2023-11-16 NOTE — H&P ADULT - NSICDXPASTSURGICALHX_GEN_ALL_CORE_FT
PAST SURGICAL HISTORY:  H/O  section x2    History of appendectomy     Presence of biventricular automatic cardioverter/defibrillator (AICD)     S/P AVR (aortic valve replacement)     S/P CABG (coronary artery bypass graft)     S/P MVR (mitral valve replacement)

## 2023-11-16 NOTE — H&P ADULT - HISTORY OF PRESENT ILLNESS
Cardio: Dr. 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 Denies,  dizziness, palpitations, orthopnea/PND, leg swelling, LOC, bleeding, melena/hematochezia, fever, chills, URI symptoms, or recent illness.  Cardio: Dr. 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 Denies,  dizziness, palpitations, orthopnea/PND, leg swelling, LOC, bleeding, melena/hematochezia, fever, chills, URI symptoms, or recent illness.  Cardio: Dr. Xavier    51 Wolof speaking F Hx of hypertrophic cardiomyopathy, chronic afib, severe MVR/AVR s/p Bentall procedure w/ CABG x1 SVG- LAD , Severe AVR/MR (s/p Bioprosthetic MVR in 2017), Chronic Afib recent Ablation 10/04/2023 at [facility] on Eliquis last dose___ , HTN, Takayasu arteritis c/b non-sustained VTs s/p Medtronic AICD 2015, presents after ablation for follow up complaining of NORRIS and had CCTA 10/31/2023 which was + for probable occluded graft of LAD. Denies,  dizziness, palpitations, orthopnea/PND, leg swelling, LOC, bleeding, melena/hematochezia, fever, chills, URI symptoms, or recent illness.  Cardio: Dr. Xavier  Escort   Pharmacy    Spoke to Dr. Xavier in agreement to hold AC 48 hours prior although Ablation on 10/04/2023    51 Croatian speaking F Hx of hypertrophic cardiomyopathy, HFrEF  chronic afib, severe MVR/AVR s/p Bentall procedure w/ CABG x1 SVG- LAD , Severe AVR/MR (s/p Bioprosthetic MVR in 2017), Chronic Afib recent Ablation 10/04/2023 at [facility] on Eliquis last dose ___ , HTN, Takayasu arteritis c/b non-sustained VTs s/p Medtronic AICD 2015, presents after ablation for follow up complaining of NORRIS and had CCTA 10/31/2023 which was + for probable occluded graft of LAD. Denies, dizziness, palpitations, orthopnea/PND, leg swelling, LOC, bleeding, melena/hematochezia, fever, chills, URI symptoms, or recent illness.       ECHO TTE 11/13/2023 A bioprosthetic valve replacement is present in the aortic position. The prosthetic valve is not well visualized. Peak transaortic valve gradient equals 9 mm Hg. A bioprosthetic valve replacement is present in the mitral position. Prosthetic mitral valve leaflets are not well visualized. There is mild MR. LV function is normal, Severe LVH . ICD lead is visualized in the right heart. LA severe dilation PASP is 57, Mild TR but with severe Grade 3 left ventricular diastolic dysfunction, with elevated filling pressure. LV mass and concentric hypertrophy.  Cardio: Dr. Xavier  Escort   Pharmacy    Spoke to Dr. Xavier in agreement to hold AC 48 hours prior although Ablation on 10/04/2023    51 Slovenian speaking F Hx of hypertrophic cardiomyopathy, HFrEF  chronic afib, severe MVR/AVR s/p Bentall procedure w/ CABG x1 SVG- LAD , Severe AVR/MR (s/p Bioprosthetic MVR in 2017), Chronic Afib recent Ablation 10/04/2023 at [facility] on Eliquis last dose ___ , HTN, Takayasu arteritis c/b non-sustained VTs s/p Medtronic AICD 2015, presents after ablation for follow up complaining of NORRIS and had CCTA 10/31/2023 which was + for probable occluded graft of LAD. Denies, dizziness, palpitations, orthopnea/PND, leg swelling, LOC, bleeding, melena/hematochezia, fever, chills, URI symptoms, or recent illness.       ECHO TTE 11/13/2023 A bioprosthetic valve replacement is present in the aortic position. The prosthetic valve is not well visualized. Peak transaortic valve gradient equals 9 mm Hg. A bioprosthetic valve replacement is present in the mitral position. Prosthetic mitral valve leaflets are not well visualized. There is mild MR. LV function is normal, Severe LVH . ICD lead is visualized in the right heart. LA severe dilation PASP is 57, Mild TR but with severe Grade 3 left ventricular diastolic dysfunction, with elevated filling pressure. LV mass and concentric hypertrophy.  Cardio: Dr. Xavier  Escort   Pharmacy    Spoke to Dr. Xavier in agreement to hold AC 48 hours prior although Ablation on 10/04/2023    51 Wolof speaking F Hx of hypertrophic cardiomyopathy, HFrEF  chronic afib, severe MVR/AVR s/p Bentall procedure w/ CABG x1 SVG- LAD , Severe AVR/MR (s/p Bioprosthetic MVR in 2017), Chronic Afib recent Ablation 10/04/2023 at [facility] on Eliquis last dose ___ , HTN, Takayasu arteritis c/b non-sustained VTs s/p Medtronic AICD 2015, presents after ablation for follow up complaining of NORRIS and had CCTA 10/31/2023 which was + for probable occluded graft of LAD. Denies, dizziness, palpitations, orthopnea/PND, leg swelling, LOC, bleeding, melena/hematochezia, fever, chills, URI symptoms, or recent illness.       ECHO TTE 11/13/2023 A bioprosthetic valve replacement is present in the aortic position. The prosthetic valve is not well visualized. Peak transaortic valve gradient equals 9 mm Hg. A bioprosthetic valve replacement is present in the mitral position. Prosthetic mitral valve leaflets are not well visualized. There is mild MR. LV function is normal, Severe LVH . ICD lead is visualized in the right heart. LA severe dilation PASP is 57, Mild TR but with severe Grade 3 left ventricular diastolic dysfunction, with elevated filling pressure. LV mass and concentric hypertrophy.  Cardio: Dr. Xavier  Escort: none, but will arrange if needed  Pharmacy: [facility] [id number]  [place] [address]    50 yo F, Sinhala speaking, PMHx of HTN, hypertrophic cardiomyopathy, ?HFrEF (per MD notes, but TTE 11/2023 nl EV G3DD), pAfib/Flutter s/p aflutter/AVNRT ablation 10/2023 (on eliquis, last dose 11/18 PM), severe MVR/AVR s/p Bentall procedure w/ CABG x1 SVG- LAD, s/p Bioprosthetic MVR (2017), Takayasu arteritis c/b non-sustained VT s/p Medtronic AICD 2015, presents after ablation for follow up complaining of NORRIS with mild exertion; otherwise denies, dizziness, palpitations, orthopnea/PND, leg swelling, LOC, bleeding, melena/hematochezia, fever, chills, URI symptoms, or recent illness.     - CCTA 10/31/2023 - probably occluded graft of LAD.  - ECHO TTE 11/13/2023 A bioprosthetic valve replacement is present in the aortic position. The prosthetic valve is not well visualized. Peak transaortic valve gradient equals 8 mm Hg. A bioprosthetic valve replacement is present in the mitral position. Prosthetic mitral valve leaflets are not well visualized. There is mild MR. LV function is normal, Severe LVH. ICD lead is visualized in the right heart. LA severe dilation PASP is 57, Mild TR but with severe Grade 3 left ventricular diastolic dysfunction, with elevated filling pressure. LV mass and concentric hypertrophy.       In light of risk factors, CCS class III anginal-equivalent symptoms and abnormal CCTA, pt now presents for cardiac catheterization with possible intervention if clinically indicated.   Cardio: Dr. Xavier  Escort: none, but will arrange if needed  Pharmacy: [facility] [id number]  [place] [address]    50 yo F, Hungarian speaking, PMHx of HTN, hypertrophic cardiomyopathy, ?HFrEF (per MD notes, but TTE 11/2023 nl EV G3DD), pAfib/Flutter s/p aflutter/AVNRT ablation 10/2023 (on eliquis, last dose 11/18 PM), severe MVR/AVR s/p Bentall procedure w/ CABG x1 SVG- LAD, s/p Bioprosthetic MVR (2017), Takayasu arteritis c/b non-sustained VT s/p Medtronic AICD 2015, presents after ablation for follow up complaining of NORRIS with mild exertion; otherwise denies, dizziness, palpitations, orthopnea/PND, leg swelling, LOC, bleeding, melena/hematochezia, fever, chills, URI symptoms, or recent illness.     - CCTA 10/31/2023 - probably occluded graft of LAD.  - ECHO TTE 11/13/2023 A bioprosthetic valve replacement is present in the aortic position. The prosthetic valve is not well visualized. Peak transaortic valve gradient equals 8 mm Hg. A bioprosthetic valve replacement is present in the mitral position. Prosthetic mitral valve leaflets are not well visualized. There is mild MR. LV function is normal, Severe LVH. ICD lead is visualized in the right heart. LA severe dilation PASP is 57, Mild TR but with severe Grade 3 left ventricular diastolic dysfunction, with elevated filling pressure. LV mass and concentric hypertrophy.       In light of risk factors, CCS class III anginal-equivalent symptoms and abnormal CCTA, pt now presents for cardiac catheterization with possible intervention if clinically indicated.   Cardio: Dr. Xavier  Escort: none, but will arrange if needed  Pharmacy: [facility] [id number]  [place] [address]    50 yo F, Serbian speaking, PMHx of HTN, hypertrophic cardiomyopathy, ?HFrEF (per MD notes, but TTE 11/2023 nl EV G3DD), pAfib/Flutter s/p aflutter/AVNRT ablation 10/2023 (on eliquis, last dose 11/18 PM), severe MVR/AVR s/p Bentall procedure w/ CABG x1 SVG- LAD, s/p Bioprosthetic MVR (2017), Takayasu arteritis c/b non-sustained VT s/p Medtronic AICD 2015, presents after ablation for follow up complaining of NORRIS with mild exertion; otherwise denies, dizziness, palpitations, orthopnea/PND, leg swelling, LOC, bleeding, melena/hematochezia, fever, chills, URI symptoms, or recent illness.     - CCTA 10/31/2023 - probably occluded graft of LAD.  - ECHO TTE 11/13/2023 A bioprosthetic valve replacement is present in the aortic position. The prosthetic valve is not well visualized. Peak transaortic valve gradient equals 8 mm Hg. A bioprosthetic valve replacement is present in the mitral position. Prosthetic mitral valve leaflets are not well visualized. There is mild MR. LV function is normal, Severe LVH. ICD lead is visualized in the right heart. LA severe dilation PASP is 57, Mild TR but with severe Grade 3 left ventricular diastolic dysfunction, with elevated filling pressure. LV mass and concentric hypertrophy.       In light of risk factors, CCS class III anginal-equivalent symptoms and abnormal CCTA, pt now presents for cardiac catheterization with possible intervention if clinically indicated.

## 2023-11-16 NOTE — H&P ADULT - MLM HIDDEN
Is This A New Presentation, Or A Follow-Up?: Wart Additional History: This was treated once before. Not sure that it ever fully went away. yes

## 2023-11-16 NOTE — H&P ADULT - ASSESSMENT
50 yo F, Swedish speaking, PMHx of HTN, hypertrophic cardiomyopathy, ?HFrEF (per MD notes, but TTE 11/2023 nl EV G3DD), pAfib/Flutter s/p aflutter/AVNRT ablation 10/2023 (on eliquis, last dose 11/18 PM), severe MVR/AVR s/p Bentall procedure w/ CABG x1 SVG- LAD, s/p Bioprosthetic MVR (2017), Takayasu arteritis c/b non-sustained VT s/p Medtronic AICD 2015, presents after ablation for follow up complaining of NORRIS with mild exertion. Underwent CCTA which showed probable occlusion of LAD graft. In light of risk factors, CCS class III anginal-equivalent symptoms and abnormal CCTA, pt now presents for cardiac catheterization with possible intervention if clinically indicated.    - ASA III Mallampati II  - VSS  - Patient is a candidate for moderate sedation  - Labs reviewed by LARRY ACOSTA 3.3, repleted with 40 mg PO KCl x2; new anemia w/ Hgb 10.9, was recently 12.8 in september with slow downtrend. Denies abnormal vaginal bleeding (has not had a period in 3 years), hemoptysis, hematochezia, hematuria.   - No pregnancy test performed as post-menopausal patient w/ LMP 3 years ago  - GFR/Cr - 94/0.86  - EKG - Sinus Renan 55 bpm, RBBB, no abnormal TWI/ST changes  - LOAD -  mg, Plavix 600 mg  - FLUIDS -  ml bolus given x1; NS 75 ml/hr given x 2hr     Risks & benefits of procedure and alternative therapy have been explained to the patient including but not limited to: allergic reaction, bleeding w/possible need for blood transfusion, infection, renal and vascular compromise, limb damage, arrhythmia, stroke, vessel dissection/perforation, Myocardial infarction, emergent CABG. Informed consent obtained and in chart.  50 yo F, Amharic speaking, PMHx of HTN, hypertrophic cardiomyopathy, ?HFrEF (per MD notes, but TTE 11/2023 nl EV G3DD), pAfib/Flutter s/p aflutter/AVNRT ablation 10/2023 (on eliquis, last dose 11/18 PM), severe MVR/AVR s/p Bentall procedure w/ CABG x1 SVG- LAD, s/p Bioprosthetic MVR (2017), Takayasu arteritis c/b non-sustained VT s/p Medtronic AICD 2015, presents after ablation for follow up complaining of NORRIS with mild exertion. Underwent CCTA which showed probable occlusion of LAD graft. In light of risk factors, CCS class III anginal-equivalent symptoms and abnormal CCTA, pt now presents for cardiac catheterization with possible intervention if clinically indicated.    - ASA III Mallampati II  - VSS  - Patient is a candidate for moderate sedation  - Labs reviewed by LARRY ACOSTA 3.3, repleted with 40 mg PO KCl x2; new anemia w/ Hgb 10.9, was recently 12.8 in september with slow downtrend. Denies abnormal vaginal bleeding (has not had a period in 3 years), hemoptysis, hematochezia, hematuria.   - No pregnancy test performed as post-menopausal patient w/ LMP 3 years ago  - GFR/Cr - 94/0.86  - EKG - Sinus Renan 55 bpm, RBBB, no abnormal TWI/ST changes  - LOAD -  mg, Plavix 600 mg  - FLUIDS -  ml bolus given x1; NS 75 ml/hr given x 2hr     Risks & benefits of procedure and alternative therapy have been explained to the patient including but not limited to: allergic reaction, bleeding w/possible need for blood transfusion, infection, renal and vascular compromise, limb damage, arrhythmia, stroke, vessel dissection/perforation, Myocardial infarction, emergent CABG. Informed consent obtained and in chart.  50 yo F, Greek speaking, PMHx of HTN, hypertrophic cardiomyopathy, ?HFrEF (per MD notes, but TTE 11/2023 nl EV G3DD), pAfib/Flutter s/p aflutter/AVNRT ablation 10/2023 (on eliquis, last dose 11/18 PM), severe MVR/AVR s/p Bentall procedure w/ CABG x1 SVG- LAD, s/p Bioprosthetic MVR (2017), Takayasu arteritis c/b non-sustained VT s/p Medtronic AICD 2015, presents after ablation for follow up complaining of NORRIS with mild exertion. Underwent CCTA which showed probable occlusion of LAD graft. In light of risk factors, CCS class III anginal-equivalent symptoms and abnormal CCTA, pt now presents for cardiac catheterization with possible intervention if clinically indicated.    - ASA III Mallampati II  - VSS  - Patient is a candidate for moderate sedation  - Labs reviewed by LARRY ACOSTA 3.3, repleted with 40 mg PO KCl x2; new anemia w/ Hgb 10.9, was recently 12.8 in september with slow downtrend. Denies abnormal vaginal bleeding (has not had a period in 3 years), hemoptysis, hematochezia, hematuria.   - No pregnancy test performed as post-menopausal patient w/ LMP 3 years ago  - GFR/Cr - 94/0.86  - EKG - Sinus Renan 55 bpm, RBBB, no abnormal TWI/ST changes  - LOAD -  mg, Plavix 600 mg  - FLUIDS -  ml bolus given x1; NS 75 ml/hr given x 2hr     Risks & benefits of procedure and alternative therapy have been explained to the patient including but not limited to: allergic reaction, bleeding w/possible need for blood transfusion, infection, renal and vascular compromise, limb damage, arrhythmia, stroke, vessel dissection/perforation, Myocardial infarction, emergent CABG. Informed consent obtained and in chart.  52 yo F, Mohawk speaking, PMHx of HTN, hypertrophic cardiomyopathy, ?HFrEF (per MD notes, but TTE 11/2023 nl EV G3DD), pAfib/Flutter s/p aflutter/AVNRT ablation 10/2023 (on eliquis, last dose 11/18 PM), severe MVR/AVR s/p Bentall procedure w/ CABG x1 SVG- LAD, s/p Bioprosthetic MVR (2017), Takayasu arteritis c/b non-sustained VT s/p Medtronic AICD 2015, presents after ablation for follow up complaining of NORRIS with mild exertion. Underwent CCTA which showed probable occlusion of LAD graft. In light of risk factors, CCS class III anginal-equivalent symptoms and abnormal CCTA, pt now presents for cardiac catheterization with possible intervention if clinically indicated.    - ASA III Mallampati II  - VSS  - Patient is a candidate for moderate sedation  - Labs reviewed by LARRY ACOSTA 3.3, repleted with 40 mg PO KCl x2; new anemia w/ Hgb 10.9, was recently 12.8 in september with slow downtrend. Denies abnormal vaginal bleeding (has not had a period in 3 years), hemoptysis, hematochezia, hematuria.   - No pregnancy test performed as post-menopausal patient w/ LMP 3 years ago  - GFR/Cr - 94/0.86  - EKG - Sinus Renan 55 bpm, RBBB, no abnormal TWI/ST changes  - LOAD -  mg, Plavix 600 mg; also on eliquis  - FLUIDS -  ml bolus given x1; NS 75 ml/hr given x 2hr     Risks & benefits of procedure and alternative therapy have been explained to the patient including but not limited to: allergic reaction, bleeding w/possible need for blood transfusion, infection, renal and vascular compromise, limb damage, arrhythmia, stroke, vessel dissection/perforation, Myocardial infarction, emergent CABG. Informed consent obtained and in chart.  52 yo F, Belarusian speaking, PMHx of HTN, hypertrophic cardiomyopathy, ?HFrEF (per MD notes, but TTE 11/2023 nl EV G3DD), pAfib/Flutter s/p aflutter/AVNRT ablation 10/2023 (on eliquis, last dose 11/18 PM), severe MVR/AVR s/p Bentall procedure w/ CABG x1 SVG- LAD, s/p Bioprosthetic MVR (2017), Takayasu arteritis c/b non-sustained VT s/p Medtronic AICD 2015, presents after ablation for follow up complaining of NORRIS with mild exertion. Underwent CCTA which showed probable occlusion of LAD graft. In light of risk factors, CCS class III anginal-equivalent symptoms and abnormal CCTA, pt now presents for cardiac catheterization with possible intervention if clinically indicated.    - ASA III Mallampati II  - VSS  - Patient is a candidate for moderate sedation  - Labs reviewed by LARRY ACOSTA 3.3, repleted with 40 mg PO KCl x2; new anemia w/ Hgb 10.9, was recently 12.8 in september with slow downtrend. Denies abnormal vaginal bleeding (has not had a period in 3 years), hemoptysis, hematochezia, hematuria.   - No pregnancy test performed as post-menopausal patient w/ LMP 3 years ago  - GFR/Cr - 94/0.86  - EKG - Sinus Renan 55 bpm, RBBB, no abnormal TWI/ST changes  - LOAD -  mg, Plavix 600 mg; also on eliquis  - FLUIDS -  ml bolus given x1; NS 75 ml/hr given x 2hr     Risks & benefits of procedure and alternative therapy have been explained to the patient including but not limited to: allergic reaction, bleeding w/possible need for blood transfusion, infection, renal and vascular compromise, limb damage, arrhythmia, stroke, vessel dissection/perforation, Myocardial infarction, emergent CABG. Informed consent obtained and in chart.  50 yo F, Vietnamese speaking, PMHx of HTN, hypertrophic cardiomyopathy, ?HFrEF (per MD notes, but TTE 11/2023 nl EV G3DD), pAfib/Flutter s/p aflutter/AVNRT ablation 10/2023 (on eliquis, last dose 11/18 PM), severe MVR/AVR s/p Bentall procedure w/ CABG x1 SVG- LAD, s/p Bioprosthetic MVR (2017), Takayasu arteritis c/b non-sustained VT s/p Medtronic AICD 2015, presents after ablation for follow up complaining of NORRIS with mild exertion. Underwent CCTA which showed probable occlusion of LAD graft. In light of risk factors, CCS class III anginal-equivalent symptoms and abnormal CCTA, pt now presents for cardiac catheterization with possible intervention if clinically indicated.    - ASA III Mallampati II  - VSS  - Patient is a candidate for moderate sedation  - Labs reviewed by LARRY ACOSTA 3.3, repleted with 40 mg PO KCl x2; new anemia w/ Hgb 10.9, was recently 12.8 in september with slow downtrend. Denies abnormal vaginal bleeding (has not had a period in 3 years), hemoptysis, hematochezia, hematuria.   - No pregnancy test performed as post-menopausal patient w/ LMP 3 years ago  - GFR/Cr - 94/0.86  - EKG - Sinus Rnean 55 bpm, RBBB, no abnormal TWI/ST changes  - LOAD -  mg, Plavix 600 mg; also on eliquis  - FLUIDS -  ml bolus given x1; NS 75 ml/hr given x 2hr     Risks & benefits of procedure and alternative therapy have been explained to the patient including but not limited to: allergic reaction, bleeding w/possible need for blood transfusion, infection, renal and vascular compromise, limb damage, arrhythmia, stroke, vessel dissection/perforation, Myocardial infarction, emergent CABG. Informed consent obtained and in chart.

## 2023-11-21 ENCOUNTER — OUTPATIENT (OUTPATIENT)
Dept: OUTPATIENT SERVICES | Facility: HOSPITAL | Age: 53
LOS: 1 days | Discharge: HOME | End: 2023-11-21
Payer: MEDICAID

## 2023-11-21 DIAGNOSIS — Z98.891 HISTORY OF UTERINE SCAR FROM PREVIOUS SURGERY: Chronic | ICD-10-CM

## 2023-11-21 DIAGNOSIS — Z95.2 PRESENCE OF PROSTHETIC HEART VALVE: Chronic | ICD-10-CM

## 2023-11-21 DIAGNOSIS — Z95.1 PRESENCE OF AORTOCORONARY BYPASS GRAFT: Chronic | ICD-10-CM

## 2023-11-21 DIAGNOSIS — Z95.810 PRESENCE OF AUTOMATIC (IMPLANTABLE) CARDIAC DEFIBRILLATOR: Chronic | ICD-10-CM

## 2023-11-21 DIAGNOSIS — Z98.89 OTHER SPECIFIED POSTPROCEDURAL STATES: Chronic | ICD-10-CM

## 2023-11-21 LAB
A1C WITH ESTIMATED AVERAGE GLUCOSE RESULT: 6 % — HIGH (ref 4–5.6)
A1C WITH ESTIMATED AVERAGE GLUCOSE RESULT: 6 % — HIGH (ref 4–5.6)
ALBUMIN SERPL ELPH-MCNC: 3.9 G/DL — SIGNIFICANT CHANGE UP (ref 3.3–5)
ALBUMIN SERPL ELPH-MCNC: 3.9 G/DL — SIGNIFICANT CHANGE UP (ref 3.3–5)
ALP SERPL-CCNC: 98 U/L — SIGNIFICANT CHANGE UP (ref 40–120)
ALP SERPL-CCNC: 98 U/L — SIGNIFICANT CHANGE UP (ref 40–120)
ALT FLD-CCNC: 9 U/L — LOW (ref 10–45)
ALT FLD-CCNC: 9 U/L — LOW (ref 10–45)
ANION GAP SERPL CALC-SCNC: 9 MMOL/L — SIGNIFICANT CHANGE UP (ref 5–17)
ANION GAP SERPL CALC-SCNC: 9 MMOL/L — SIGNIFICANT CHANGE UP (ref 5–17)
APTT BLD: 30.8 SEC — SIGNIFICANT CHANGE UP (ref 24.5–35.6)
APTT BLD: 30.8 SEC — SIGNIFICANT CHANGE UP (ref 24.5–35.6)
AST SERPL-CCNC: 28 U/L — SIGNIFICANT CHANGE UP (ref 10–40)
AST SERPL-CCNC: 28 U/L — SIGNIFICANT CHANGE UP (ref 10–40)
BASOPHILS # BLD AUTO: 0.05 K/UL — SIGNIFICANT CHANGE UP (ref 0–0.2)
BASOPHILS # BLD AUTO: 0.05 K/UL — SIGNIFICANT CHANGE UP (ref 0–0.2)
BASOPHILS NFR BLD AUTO: 1.1 % — SIGNIFICANT CHANGE UP (ref 0–2)
BASOPHILS NFR BLD AUTO: 1.1 % — SIGNIFICANT CHANGE UP (ref 0–2)
BILIRUB SERPL-MCNC: 0.7 MG/DL — SIGNIFICANT CHANGE UP (ref 0.2–1.2)
BILIRUB SERPL-MCNC: 0.7 MG/DL — SIGNIFICANT CHANGE UP (ref 0.2–1.2)
BUN SERPL-MCNC: 16 MG/DL — SIGNIFICANT CHANGE UP (ref 7–23)
BUN SERPL-MCNC: 16 MG/DL — SIGNIFICANT CHANGE UP (ref 7–23)
CALCIUM SERPL-MCNC: 9.2 MG/DL — SIGNIFICANT CHANGE UP (ref 8.4–10.5)
CALCIUM SERPL-MCNC: 9.2 MG/DL — SIGNIFICANT CHANGE UP (ref 8.4–10.5)
CHLORIDE SERPL-SCNC: 100 MMOL/L — SIGNIFICANT CHANGE UP (ref 96–108)
CHLORIDE SERPL-SCNC: 100 MMOL/L — SIGNIFICANT CHANGE UP (ref 96–108)
CHOLEST SERPL-MCNC: 197 MG/DL — SIGNIFICANT CHANGE UP
CHOLEST SERPL-MCNC: 197 MG/DL — SIGNIFICANT CHANGE UP
CK MB CFR SERPL CALC: 3 NG/ML — SIGNIFICANT CHANGE UP (ref 0–6.7)
CK MB CFR SERPL CALC: 3 NG/ML — SIGNIFICANT CHANGE UP (ref 0–6.7)
CK SERPL-CCNC: 73 U/L — SIGNIFICANT CHANGE UP (ref 25–170)
CK SERPL-CCNC: 73 U/L — SIGNIFICANT CHANGE UP (ref 25–170)
CO2 SERPL-SCNC: 33 MMOL/L — HIGH (ref 22–31)
CO2 SERPL-SCNC: 33 MMOL/L — HIGH (ref 22–31)
CREAT SERPL-MCNC: 0.76 MG/DL — SIGNIFICANT CHANGE UP (ref 0.5–1.3)
CREAT SERPL-MCNC: 0.76 MG/DL — SIGNIFICANT CHANGE UP (ref 0.5–1.3)
EGFR: 94 ML/MIN/1.73M2 — SIGNIFICANT CHANGE UP
EGFR: 94 ML/MIN/1.73M2 — SIGNIFICANT CHANGE UP
EOSINOPHIL # BLD AUTO: 0.24 K/UL — SIGNIFICANT CHANGE UP (ref 0–0.5)
EOSINOPHIL # BLD AUTO: 0.24 K/UL — SIGNIFICANT CHANGE UP (ref 0–0.5)
EOSINOPHIL NFR BLD AUTO: 5.1 % — SIGNIFICANT CHANGE UP (ref 0–6)
EOSINOPHIL NFR BLD AUTO: 5.1 % — SIGNIFICANT CHANGE UP (ref 0–6)
ESTIMATED AVERAGE GLUCOSE: 126 MG/DL — HIGH (ref 68–114)
ESTIMATED AVERAGE GLUCOSE: 126 MG/DL — HIGH (ref 68–114)
GLUCOSE SERPL-MCNC: 89 MG/DL — SIGNIFICANT CHANGE UP (ref 70–99)
GLUCOSE SERPL-MCNC: 89 MG/DL — SIGNIFICANT CHANGE UP (ref 70–99)
HCT VFR BLD CALC: 32.9 % — LOW (ref 34.5–45)
HCT VFR BLD CALC: 32.9 % — LOW (ref 34.5–45)
HDLC SERPL-MCNC: 55 MG/DL — SIGNIFICANT CHANGE UP
HDLC SERPL-MCNC: 55 MG/DL — SIGNIFICANT CHANGE UP
HGB BLD-MCNC: 10.9 G/DL — LOW (ref 11.5–15.5)
HGB BLD-MCNC: 10.9 G/DL — LOW (ref 11.5–15.5)
IMM GRANULOCYTES NFR BLD AUTO: 0.4 % — SIGNIFICANT CHANGE UP (ref 0–0.9)
IMM GRANULOCYTES NFR BLD AUTO: 0.4 % — SIGNIFICANT CHANGE UP (ref 0–0.9)
INR BLD: 0.99 — SIGNIFICANT CHANGE UP (ref 0.85–1.18)
INR BLD: 0.99 — SIGNIFICANT CHANGE UP (ref 0.85–1.18)
LIPID PNL WITH DIRECT LDL SERPL: 111 MG/DL — HIGH
LIPID PNL WITH DIRECT LDL SERPL: 111 MG/DL — HIGH
LYMPHOCYTES # BLD AUTO: 1.47 K/UL — SIGNIFICANT CHANGE UP (ref 1–3.3)
LYMPHOCYTES # BLD AUTO: 1.47 K/UL — SIGNIFICANT CHANGE UP (ref 1–3.3)
LYMPHOCYTES # BLD AUTO: 31.3 % — SIGNIFICANT CHANGE UP (ref 13–44)
LYMPHOCYTES # BLD AUTO: 31.3 % — SIGNIFICANT CHANGE UP (ref 13–44)
MAGNESIUM SERPL-MCNC: 2 MG/DL — SIGNIFICANT CHANGE UP (ref 1.6–2.6)
MAGNESIUM SERPL-MCNC: 2 MG/DL — SIGNIFICANT CHANGE UP (ref 1.6–2.6)
MCHC RBC-ENTMCNC: 31.2 PG — SIGNIFICANT CHANGE UP (ref 27–34)
MCHC RBC-ENTMCNC: 31.2 PG — SIGNIFICANT CHANGE UP (ref 27–34)
MCHC RBC-ENTMCNC: 33.1 GM/DL — SIGNIFICANT CHANGE UP (ref 32–36)
MCHC RBC-ENTMCNC: 33.1 GM/DL — SIGNIFICANT CHANGE UP (ref 32–36)
MCV RBC AUTO: 94.3 FL — SIGNIFICANT CHANGE UP (ref 80–100)
MCV RBC AUTO: 94.3 FL — SIGNIFICANT CHANGE UP (ref 80–100)
MONOCYTES # BLD AUTO: 0.51 K/UL — SIGNIFICANT CHANGE UP (ref 0–0.9)
MONOCYTES # BLD AUTO: 0.51 K/UL — SIGNIFICANT CHANGE UP (ref 0–0.9)
MONOCYTES NFR BLD AUTO: 10.9 % — SIGNIFICANT CHANGE UP (ref 2–14)
MONOCYTES NFR BLD AUTO: 10.9 % — SIGNIFICANT CHANGE UP (ref 2–14)
NEUTROPHILS # BLD AUTO: 2.41 K/UL — SIGNIFICANT CHANGE UP (ref 1.8–7.4)
NEUTROPHILS # BLD AUTO: 2.41 K/UL — SIGNIFICANT CHANGE UP (ref 1.8–7.4)
NEUTROPHILS NFR BLD AUTO: 51.2 % — SIGNIFICANT CHANGE UP (ref 43–77)
NEUTROPHILS NFR BLD AUTO: 51.2 % — SIGNIFICANT CHANGE UP (ref 43–77)
NON HDL CHOLESTEROL: 142 MG/DL — HIGH
NON HDL CHOLESTEROL: 142 MG/DL — HIGH
NRBC # BLD: 0 /100 WBCS — SIGNIFICANT CHANGE UP (ref 0–0)
NRBC # BLD: 0 /100 WBCS — SIGNIFICANT CHANGE UP (ref 0–0)
PLATELET # BLD AUTO: 244 K/UL — SIGNIFICANT CHANGE UP (ref 150–400)
PLATELET # BLD AUTO: 244 K/UL — SIGNIFICANT CHANGE UP (ref 150–400)
POTASSIUM SERPL-MCNC: 3.3 MMOL/L — LOW (ref 3.5–5.3)
POTASSIUM SERPL-MCNC: 3.3 MMOL/L — LOW (ref 3.5–5.3)
POTASSIUM SERPL-SCNC: 3.3 MMOL/L — LOW (ref 3.5–5.3)
POTASSIUM SERPL-SCNC: 3.3 MMOL/L — LOW (ref 3.5–5.3)
PROT SERPL-MCNC: 7.5 G/DL — SIGNIFICANT CHANGE UP (ref 6–8.3)
PROT SERPL-MCNC: 7.5 G/DL — SIGNIFICANT CHANGE UP (ref 6–8.3)
PROTHROM AB SERPL-ACNC: 11.3 SEC — SIGNIFICANT CHANGE UP (ref 9.5–13)
PROTHROM AB SERPL-ACNC: 11.3 SEC — SIGNIFICANT CHANGE UP (ref 9.5–13)
RBC # BLD: 3.49 M/UL — LOW (ref 3.8–5.2)
RBC # BLD: 3.49 M/UL — LOW (ref 3.8–5.2)
RBC # FLD: 15.2 % — HIGH (ref 10.3–14.5)
RBC # FLD: 15.2 % — HIGH (ref 10.3–14.5)
SODIUM SERPL-SCNC: 142 MMOL/L — SIGNIFICANT CHANGE UP (ref 135–145)
SODIUM SERPL-SCNC: 142 MMOL/L — SIGNIFICANT CHANGE UP (ref 135–145)
TRIGL SERPL-MCNC: 155 MG/DL — HIGH
TRIGL SERPL-MCNC: 155 MG/DL — HIGH
WBC # BLD: 4.7 K/UL — SIGNIFICANT CHANGE UP (ref 3.8–10.5)
WBC # BLD: 4.7 K/UL — SIGNIFICANT CHANGE UP (ref 3.8–10.5)
WBC # FLD AUTO: 4.7 K/UL — SIGNIFICANT CHANGE UP (ref 3.8–10.5)
WBC # FLD AUTO: 4.7 K/UL — SIGNIFICANT CHANGE UP (ref 3.8–10.5)

## 2023-11-21 PROCEDURE — C1769: CPT

## 2023-11-21 PROCEDURE — C1887: CPT

## 2023-11-21 PROCEDURE — 83735 ASSAY OF MAGNESIUM: CPT

## 2023-11-21 PROCEDURE — 93455 CORONARY ART/GRFT ANGIO S&I: CPT | Mod: 26

## 2023-11-21 PROCEDURE — 99152 MOD SED SAME PHYS/QHP 5/>YRS: CPT

## 2023-11-21 PROCEDURE — 85730 THROMBOPLASTIN TIME PARTIAL: CPT

## 2023-11-21 PROCEDURE — 82553 CREATINE MB FRACTION: CPT

## 2023-11-21 PROCEDURE — C1894: CPT

## 2023-11-21 PROCEDURE — 85025 COMPLETE CBC W/AUTO DIFF WBC: CPT

## 2023-11-21 PROCEDURE — 80061 LIPID PANEL: CPT

## 2023-11-21 PROCEDURE — 80053 COMPREHEN METABOLIC PANEL: CPT

## 2023-11-21 PROCEDURE — 93455 CORONARY ART/GRFT ANGIO S&I: CPT

## 2023-11-21 PROCEDURE — 83036 HEMOGLOBIN GLYCOSYLATED A1C: CPT

## 2023-11-21 PROCEDURE — 99153 MOD SED SAME PHYS/QHP EA: CPT

## 2023-11-21 PROCEDURE — C1760: CPT

## 2023-11-21 PROCEDURE — 85610 PROTHROMBIN TIME: CPT

## 2023-11-21 PROCEDURE — 82550 ASSAY OF CK (CPK): CPT

## 2023-11-21 RX ORDER — FOLIC ACID 0.8 MG
1 TABLET ORAL
Refills: 0 | DISCHARGE

## 2023-11-21 RX ORDER — METOPROLOL TARTRATE 50 MG
1 TABLET ORAL
Refills: 0 | DISCHARGE

## 2023-11-21 RX ORDER — ATORVASTATIN CALCIUM 80 MG/1
1 TABLET, FILM COATED ORAL
Refills: 0 | DISCHARGE

## 2023-11-21 RX ORDER — POTASSIUM CHLORIDE 20 MEQ
1 PACKET (EA) ORAL
Refills: 0 | DISCHARGE

## 2023-11-21 RX ORDER — CHLORHEXIDINE GLUCONATE 213 G/1000ML
1 SOLUTION TOPICAL ONCE
Refills: 0 | Status: DISCONTINUED | OUTPATIENT
Start: 2023-11-21 | End: 2023-11-21

## 2023-11-21 RX ORDER — ASPIRIN/CALCIUM CARB/MAGNESIUM 324 MG
1 TABLET ORAL
Refills: 0 | DISCHARGE

## 2023-11-21 RX ORDER — APIXABAN 2.5 MG/1
1 TABLET, FILM COATED ORAL
Refills: 0 | DISCHARGE

## 2023-11-21 RX ORDER — METHOTREXATE 2.5 MG/1
7 TABLET ORAL
Refills: 0 | DISCHARGE

## 2023-11-21 RX ORDER — CLOPIDOGREL BISULFATE 75 MG/1
600 TABLET, FILM COATED ORAL ONCE
Refills: 0 | Status: COMPLETED | OUTPATIENT
Start: 2023-11-21 | End: 2023-11-21

## 2023-11-21 RX ORDER — POTASSIUM CHLORIDE 20 MEQ
40 PACKET (EA) ORAL EVERY 4 HOURS
Refills: 0 | Status: COMPLETED | OUTPATIENT
Start: 2023-11-21 | End: 2023-11-21

## 2023-11-21 RX ORDER — SODIUM CHLORIDE 9 MG/ML
500 INJECTION INTRAMUSCULAR; INTRAVENOUS; SUBCUTANEOUS
Refills: 0 | Status: DISCONTINUED | OUTPATIENT
Start: 2023-11-21 | End: 2023-11-21

## 2023-11-21 RX ORDER — SODIUM CHLORIDE 9 MG/ML
250 INJECTION INTRAMUSCULAR; INTRAVENOUS; SUBCUTANEOUS ONCE
Refills: 0 | Status: COMPLETED | OUTPATIENT
Start: 2023-11-21 | End: 2023-11-21

## 2023-11-21 RX ORDER — METHOTREXATE 2.5 MG/1
8 TABLET ORAL
Refills: 0 | DISCHARGE

## 2023-11-21 RX ORDER — ASPIRIN/CALCIUM CARB/MAGNESIUM 324 MG
325 TABLET ORAL ONCE
Refills: 0 | Status: COMPLETED | OUTPATIENT
Start: 2023-11-21 | End: 2023-11-21

## 2023-11-21 RX ORDER — ASPIRIN/CALCIUM CARB/MAGNESIUM 324 MG
81 TABLET ORAL DAILY
Refills: 0 | Status: DISCONTINUED | OUTPATIENT
Start: 2023-11-21 | End: 2023-11-21

## 2023-11-21 RX ADMIN — SODIUM CHLORIDE 75 MILLILITER(S): 9 INJECTION INTRAMUSCULAR; INTRAVENOUS; SUBCUTANEOUS at 08:47

## 2023-11-21 RX ADMIN — Medication 40 MILLIEQUIVALENT(S): at 12:56

## 2023-11-21 RX ADMIN — Medication 40 MILLIEQUIVALENT(S): at 08:47

## 2023-11-21 RX ADMIN — CLOPIDOGREL BISULFATE 600 MILLIGRAM(S): 75 TABLET, FILM COATED ORAL at 08:47

## 2023-11-21 RX ADMIN — SODIUM CHLORIDE 1000 MILLILITER(S): 9 INJECTION INTRAMUSCULAR; INTRAVENOUS; SUBCUTANEOUS at 08:47

## 2023-11-21 RX ADMIN — Medication 325 MILLIGRAM(S): at 08:47

## 2023-11-21 NOTE — PROGRESS NOTE ADULT - SUBJECTIVE AND OBJECTIVE BOX
Interventional Cardiology PA SDA Discharge Note    Patient without complaints. Ambulated and voided without difficulties    Afebrile, VSS    Ext:  Right Groin:  no hematoma,  no bruit, dressing; C/D/I    Pulses: intact RAD/DP/PT to baseline     A/P:  51F, Uzbek speaking, w/ PMHx of HTN, hypertrophic cardiomyopathy, HFpEF w/ G3DD, pAfib/Flutter s/p aflutter/AVNRT ablation 10/2023 (on eliquis, last dose 11/18 PM), severe MVR/AVR s/p Bentall procedure w/ CABG x1 SVG- LAD, s/p Bioprosthetic MVR (2017), Takayasu arteritis c/b non-sustained VT s/p Medtronic AICD 2015, presents to [facility] for recommended cardiac cath w/ possible intervention if clinically indicated, in light of pts risk factors, CCS class III anginal symptoms and abnormal CCTA.  Pt now s/p dx cardiac cath 11/21 revealing SVG-LAD occluded, however LAD patent, pLAD 30% and pRCA 30%, access RFA PC. Given recent ablation, pt is to resume Eliquis tonight.    1.	Stable for discharge as per attending Dr. Gibson after bed rest, pt voids, groin stable and 30 minutes of ambulation.  2.	Follow-up with PMD/Cardiologist Dr. Xavier in 1-2 weeks  3.	Discharged forms signed and copies in chart    Interventional Cardiology PA SDA Discharge Note    Patient without complaints. Ambulated and voided without difficulties    Afebrile, VSS    Ext:  Right Groin:  no hematoma,  no bruit, dressing; C/D/I    Pulses: intact RAD/DP/PT to baseline     A/P:  51F, Welsh speaking, w/ PMHx of HTN, hypertrophic cardiomyopathy, HFpEF w/ G3DD, pAfib/Flutter s/p aflutter/AVNRT ablation 10/2023 (on eliquis, last dose 11/18 PM), severe MVR/AVR s/p Bentall procedure w/ CABG x1 SVG- LAD, s/p Bioprosthetic MVR (2017), Takayasu arteritis c/b non-sustained VT s/p Medtronic AICD 2015, presents to [facility] for recommended cardiac cath w/ possible intervention if clinically indicated, in light of pts risk factors, CCS class III anginal symptoms and abnormal CCTA.  Pt now s/p dx cardiac cath 11/21 revealing SVG-LAD occluded, however LAD patent - pLAD 20/30%, dLAD 50% (ectatic), LM mild luminal, LCx luminal (tortuous), RCA aneurysmal, pRCA 30%, access RFA PC. Given recent ablation, pt is to resume Eliquis tonight.  1.	Stable for discharge as per attending Dr. Gibson after bed rest, pt voids, groin stable and 30 minutes of ambulation.  2.	Follow-up with PMD/Cardiologist Dr. Xavier in 1-2 weeks  3.	Discharged forms signed and copies in chart

## 2023-11-22 RX ORDER — TORSEMIDE 20 MG/1
20 TABLET ORAL DAILY
Qty: 180 | Refills: 0 | Status: DISCONTINUED | COMMUNITY
Start: 2023-08-14 | End: 2023-11-22

## 2023-11-22 RX ORDER — TORSEMIDE 20 MG/1
20 TABLET ORAL DAILY
Qty: 270 | Refills: 3 | Status: ACTIVE | COMMUNITY
Start: 2023-11-22 | End: 1900-01-01

## 2023-11-27 ENCOUNTER — OUTPATIENT (OUTPATIENT)
Dept: OUTPATIENT SERVICES | Facility: HOSPITAL | Age: 53
LOS: 1 days | End: 2023-11-27

## 2023-11-27 ENCOUNTER — APPOINTMENT (OUTPATIENT)
Dept: RHEUMATOLOGY | Facility: CLINIC | Age: 53
End: 2023-11-27
Payer: COMMERCIAL

## 2023-11-27 VITALS
DIASTOLIC BLOOD PRESSURE: 60 MMHG | RESPIRATION RATE: 16 BRPM | SYSTOLIC BLOOD PRESSURE: 122 MMHG | HEIGHT: 61 IN | OXYGEN SATURATION: 99 % | WEIGHT: 109 LBS | HEART RATE: 65 BPM | BODY MASS INDEX: 20.58 KG/M2

## 2023-11-27 DIAGNOSIS — Z95.2 PRESENCE OF PROSTHETIC HEART VALVE: Chronic | ICD-10-CM

## 2023-11-27 DIAGNOSIS — Z95.1 PRESENCE OF AORTOCORONARY BYPASS GRAFT: Chronic | ICD-10-CM

## 2023-11-27 DIAGNOSIS — Z98.891 HISTORY OF UTERINE SCAR FROM PREVIOUS SURGERY: Chronic | ICD-10-CM

## 2023-11-27 DIAGNOSIS — M25.512 PAIN IN RIGHT SHOULDER: ICD-10-CM

## 2023-11-27 DIAGNOSIS — M25.511 PAIN IN RIGHT SHOULDER: ICD-10-CM

## 2023-11-27 PROCEDURE — 99214 OFFICE O/P EST MOD 30 MIN: CPT | Mod: GC

## 2023-11-27 RX ORDER — CETIRIZINE HYDROCHLORIDE 10 MG/1
10 CAPSULE, LIQUID FILLED ORAL
Refills: 0 | Status: ACTIVE | OUTPATIENT
Start: 2023-11-27 | End: 1900-01-01

## 2023-11-27 RX ORDER — TOCILIZUMAB 20 MG/ML
400 INJECTION, SOLUTION, CONCENTRATE INTRAVENOUS
Refills: 0 | Status: ACTIVE | OUTPATIENT
Start: 2023-11-27 | End: 1900-01-01

## 2023-11-27 RX ORDER — HYDROCORTISONE SODIUM SUCCINATE 100 MG/2ML
100 INJECTION, POWDER, FOR SOLUTION INTRAMUSCULAR; INTRAVENOUS
Refills: 0 | Status: ACTIVE | OUTPATIENT
Start: 2023-11-27 | End: 1900-01-01

## 2023-11-27 RX ADMIN — TOCILIZUMAB 0 MG/20ML: 20 INJECTION, SOLUTION, CONCENTRATE INTRAVENOUS at 00:00

## 2023-11-27 RX ADMIN — Medication 0 MG: at 00:00

## 2023-11-27 RX ADMIN — HYDROCORTISONE SODIUM SUCCINATE 0 MG: 100 INJECTION, POWDER, FOR SOLUTION INTRAMUSCULAR; INTRAVENOUS at 00:00

## 2023-11-27 RX ADMIN — CETIRIZINE HYDROCHLORIDE 0 MG: 10 CAPSULE, LIQUID FILLED ORAL at 00:00

## 2023-11-28 DIAGNOSIS — I25.810 ATHEROSCLEROSIS OF CORONARY ARTERY BYPASS GRAFT(S) WITHOUT ANGINA PECTORIS: ICD-10-CM

## 2023-11-28 DIAGNOSIS — I25.10 ATHEROSCLEROTIC HEART DISEASE OF NATIVE CORONARY ARTERY WITHOUT ANGINA PECTORIS: ICD-10-CM

## 2023-11-28 DIAGNOSIS — R94.39 ABNORMAL RESULT OF OTHER CARDIOVASCULAR FUNCTION STUDY: ICD-10-CM

## 2023-11-29 DIAGNOSIS — I42.9 CARDIOMYOPATHY, UNSPECIFIED: ICD-10-CM

## 2023-11-29 DIAGNOSIS — M31.4 AORTIC ARCH SYNDROME [TAKAYASU]: ICD-10-CM

## 2023-11-29 DIAGNOSIS — M81.0 AGE-RELATED OSTEOPOROSIS WITHOUT CURRENT PATHOLOGICAL FRACTURE: ICD-10-CM

## 2023-11-29 DIAGNOSIS — M25.511 PAIN IN RIGHT SHOULDER: ICD-10-CM

## 2023-11-30 ENCOUNTER — APPOINTMENT (OUTPATIENT)
Dept: CARDIOLOGY | Facility: CLINIC | Age: 53
End: 2023-11-30
Payer: COMMERCIAL

## 2023-11-30 VITALS
HEART RATE: 57 BPM | RESPIRATION RATE: 17 BRPM | OXYGEN SATURATION: 100 % | TEMPERATURE: 97.3 F | SYSTOLIC BLOOD PRESSURE: 126 MMHG | BODY MASS INDEX: 21.54 KG/M2 | WEIGHT: 114 LBS | DIASTOLIC BLOOD PRESSURE: 69 MMHG

## 2023-11-30 DIAGNOSIS — R07.81 PLEURODYNIA: ICD-10-CM

## 2023-11-30 DIAGNOSIS — M62.838 OTHER MUSCLE SPASM: ICD-10-CM

## 2023-11-30 PROCEDURE — 99215 OFFICE O/P EST HI 40 MIN: CPT

## 2023-11-30 RX ORDER — ASPIRIN ENTERIC COATED TABLETS 81 MG 81 MG/1
81 TABLET, DELAYED RELEASE ORAL
Qty: 90 | Refills: 3 | Status: DISCONTINUED | COMMUNITY
Start: 2017-08-29 | End: 2023-11-30

## 2023-11-30 RX ORDER — OMEGA-3/DHA/EPA/FISH OIL 300-1000MG
400 CAPSULE ORAL DAILY
Qty: 90 | Refills: 3 | Status: ACTIVE | COMMUNITY
Start: 2023-11-30 | End: 1900-01-01

## 2023-11-30 RX ORDER — BACLOFEN 5 MG/1
5 TABLET ORAL AS DIRECTED
Qty: 30 | Refills: 0 | Status: ACTIVE | COMMUNITY
Start: 2023-11-30 | End: 1900-01-01

## 2023-12-01 ENCOUNTER — NON-APPOINTMENT (OUTPATIENT)
Age: 53
End: 2023-12-01

## 2023-12-01 LAB
ALBUMIN SERPL ELPH-MCNC: 4.3 G/DL
ALP BLD-CCNC: 106 U/L
ALT SERPL-CCNC: 11 U/L
ANION GAP SERPL CALC-SCNC: 13 MMOL/L
AST SERPL-CCNC: 27 U/L
BASOPHILS # BLD AUTO: 0.06 K/UL
BASOPHILS NFR BLD AUTO: 1.2 %
BILIRUB SERPL-MCNC: 0.9 MG/DL
BUN SERPL-MCNC: 19 MG/DL
CALCIUM SERPL-MCNC: 9.9 MG/DL
CHLORIDE SERPL-SCNC: 100 MMOL/L
CO2 SERPL-SCNC: 29 MMOL/L
CREAT SERPL-MCNC: 0.86 MG/DL
CRP SERPL-MCNC: 4 MG/L
EGFR: 81 ML/MIN/1.73M2
EOSINOPHIL # BLD AUTO: 0.14 K/UL
EOSINOPHIL NFR BLD AUTO: 2.8 %
ERYTHROCYTE [SEDIMENTATION RATE] IN BLOOD BY WESTERGREN METHOD: 64 MM/HR
GLUCOSE SERPL-MCNC: 93 MG/DL
HAV IGM SER QL: NONREACTIVE
HBV CORE IGG+IGM SER QL: NONREACTIVE
HBV CORE IGM SER QL: NONREACTIVE
HBV SURFACE AG SER QL: NONREACTIVE
HCT VFR BLD CALC: 35.3 %
HCV AB SER QL: NONREACTIVE
HCV S/CO RATIO: 0.14 S/CO
HGB BLD-MCNC: 11.3 G/DL
IMM GRANULOCYTES NFR BLD AUTO: 0.2 %
LYMPHOCYTES # BLD AUTO: 1.56 K/UL
LYMPHOCYTES NFR BLD AUTO: 30.9 %
M TB IFN-G BLD-IMP: NEGATIVE
MAN DIFF?: NORMAL
MCHC RBC-ENTMCNC: 30.5 PG
MCHC RBC-ENTMCNC: 32 GM/DL
MCV RBC AUTO: 95.4 FL
MONOCYTES # BLD AUTO: 0.69 K/UL
MONOCYTES NFR BLD AUTO: 13.7 %
NEUTROPHILS # BLD AUTO: 2.59 K/UL
NEUTROPHILS NFR BLD AUTO: 51.2 %
PLATELET # BLD AUTO: 298 K/UL
POTASSIUM SERPL-SCNC: 4.4 MMOL/L
PROT SERPL-MCNC: 7.7 G/DL
QUANTIFERON TB PLUS MITOGEN MINUS NIL: 6.18 IU/ML
QUANTIFERON TB PLUS NIL: 0.02 IU/ML
QUANTIFERON TB PLUS TB1 MINUS NIL: 0 IU/ML
QUANTIFERON TB PLUS TB2 MINUS NIL: 0.01 IU/ML
RBC # BLD: 3.7 M/UL
RBC # FLD: 15.4 %
SODIUM SERPL-SCNC: 142 MMOL/L
WBC # FLD AUTO: 5.05 K/UL

## 2023-12-01 NOTE — PATIENT PROFILE ADULT - NSPROGENPREVTRANSF_GEN_A_NUR
From: Tammy Cruz  To: Fatimah Quinn  Sent: 12/1/2023 11:03 AM CST  Subject: Urine sample    Good Morning    I wanted to let you know, as I dropped off a sample for urine today — based on the last one having the small amount of blood and being around time time of my last period. I am just finishing up my period, so there is a possibility it may show that again as I have some residual spotting still.     Tammy    no

## 2023-12-07 ENCOUNTER — APPOINTMENT (OUTPATIENT)
Dept: ELECTROPHYSIOLOGY | Facility: CLINIC | Age: 53
End: 2023-12-07

## 2023-12-12 ENCOUNTER — OUTPATIENT (OUTPATIENT)
Dept: OUTPATIENT SERVICES | Facility: HOSPITAL | Age: 53
LOS: 1 days | End: 2023-12-12
Payer: COMMERCIAL

## 2023-12-12 ENCOUNTER — APPOINTMENT (OUTPATIENT)
Dept: NUCLEAR MEDICINE | Facility: IMAGING CENTER | Age: 53
End: 2023-12-12
Payer: COMMERCIAL

## 2023-12-12 DIAGNOSIS — Z95.2 PRESENCE OF PROSTHETIC HEART VALVE: Chronic | ICD-10-CM

## 2023-12-12 DIAGNOSIS — M31.4 AORTIC ARCH SYNDROME [TAKAYASU]: ICD-10-CM

## 2023-12-12 DIAGNOSIS — Z98.89 OTHER SPECIFIED POSTPROCEDURAL STATES: Chronic | ICD-10-CM

## 2023-12-12 DIAGNOSIS — Z98.891 HISTORY OF UTERINE SCAR FROM PREVIOUS SURGERY: Chronic | ICD-10-CM

## 2023-12-12 DIAGNOSIS — Z95.1 PRESENCE OF AORTOCORONARY BYPASS GRAFT: Chronic | ICD-10-CM

## 2023-12-12 DIAGNOSIS — Z95.810 PRESENCE OF AUTOMATIC (IMPLANTABLE) CARDIAC DEFIBRILLATOR: Chronic | ICD-10-CM

## 2023-12-12 PROCEDURE — 78816 PET IMAGE W/CT FULL BODY: CPT | Mod: 26

## 2023-12-12 PROCEDURE — A9552: CPT

## 2023-12-12 PROCEDURE — 78816 PET IMAGE W/CT FULL BODY: CPT

## 2023-12-18 ENCOUNTER — OUTPATIENT (OUTPATIENT)
Dept: OUTPATIENT SERVICES | Facility: HOSPITAL | Age: 53
LOS: 1 days | End: 2023-12-18

## 2023-12-18 ENCOUNTER — APPOINTMENT (OUTPATIENT)
Dept: RHEUMATOLOGY | Facility: CLINIC | Age: 53
End: 2023-12-18
Payer: COMMERCIAL

## 2023-12-18 VITALS
SYSTOLIC BLOOD PRESSURE: 122 MMHG | HEART RATE: 60 BPM | WEIGHT: 109 LBS | DIASTOLIC BLOOD PRESSURE: 61 MMHG | BODY MASS INDEX: 20.58 KG/M2 | HEIGHT: 61 IN | OXYGEN SATURATION: 98 % | RESPIRATION RATE: 16 BRPM

## 2023-12-18 DIAGNOSIS — Z95.2 PRESENCE OF PROSTHETIC HEART VALVE: Chronic | ICD-10-CM

## 2023-12-18 DIAGNOSIS — Z95.810 PRESENCE OF AUTOMATIC (IMPLANTABLE) CARDIAC DEFIBRILLATOR: Chronic | ICD-10-CM

## 2023-12-18 DIAGNOSIS — Z95.1 PRESENCE OF AORTOCORONARY BYPASS GRAFT: Chronic | ICD-10-CM

## 2023-12-18 DIAGNOSIS — Z98.891 HISTORY OF UTERINE SCAR FROM PREVIOUS SURGERY: Chronic | ICD-10-CM

## 2023-12-18 PROCEDURE — 99215 OFFICE O/P EST HI 40 MIN: CPT | Mod: GC,25

## 2023-12-18 RX ORDER — PREDNISONE 1 MG/1
1 TABLET ORAL
Qty: 120 | Refills: 2 | Status: ACTIVE | COMMUNITY
Start: 2019-02-11 | End: 1900-01-01

## 2023-12-19 DIAGNOSIS — M31.4 AORTIC ARCH SYNDROME [TAKAYASU]: ICD-10-CM

## 2023-12-19 DIAGNOSIS — Z79.899 OTHER LONG TERM (CURRENT) DRUG THERAPY: ICD-10-CM

## 2023-12-19 NOTE — ASSESSMENT
[FreeTextEntry1] : 52 y/o Hebrew-speaking F with Takayasu's arteritis (dx 2015) c/b pAfib/sustained VTach s/p ICD/PPM placement/Bentell procedure w/ CABG, HOCM, severe AR/MVR, Osteoporosis presenting for follow up.  # Takayasu's arteritis with involvement proximal large arteries: Brachiocephalic artery, subclavian arteries, right and left common carotid arteries involvement since 2015. PET CT in 2021 showing areas of uptake in ascending thoracic aorta, new compared to previous CT in 2018. CT chest 1/2023 showing mild diffuse and luminal irregularity and wall thickening of the b/l carotid arteries and found to have probable occluded venous graft to the LAD on 10/31/23 s/p LHC on 11/21 showed  SVG-LAD occlusion. Not a candidate for intervention per Dr. Gibson. Native vessels w/o significant stenosis pending Actemra infusion initiation  -Actemra approved by insurance. pending arrangement of infusion times -PET scan 12/2023 shows similar increased FDG uptake of prox. ascending aorta in region of AV and root repair, anterior L third rib uptake like secondary to trauma, and non FDG avid stable pancreatic body cystic lesion -no symptoms at present concerning for KRZYSZTOF flare -c/w MTX 20 mg weekly and folic acid daily - c/w prednisone 2 mg daily,  # Afib - Switched to Eliquis, managed by Cardiology - c/w Cardiology f/u  # osteoporosis on alendronate per PMD next dexa in 2-2024 minimize steroids.  RTC 3 months  case d/w Dr. Carlos Manuel Segovia MD, PGY-5 Rheumatology Fellow [facility]

## 2023-12-19 NOTE — PHYSICAL EXAM
[General Appearance - Alert] : alert [General Appearance - In No Acute Distress] : in no acute distress [General Appearance - Well Nourished] : well nourished [General Appearance - Well Developed] : well developed [Sclera] : the sclera and conjunctiva were normal [Outer Ear] : the ears and nose were normal in appearance [Examination Of The Oral Cavity] : the lips and gums were normal [Oropharynx] : the oropharynx was normal [Neck Appearance] : the appearance of the neck was normal [Respiration, Rhythm And Depth] : normal respiratory rhythm and effort [Auscultation Breath Sounds / Voice Sounds] : lungs were clear to auscultation bilaterally [Heart Rate And Rhythm] : heart rate was normal and rhythm regular [Heart Sounds] : normal S1 and S2 [Edema] : there was no peripheral edema [Bowel Sounds] : normal bowel sounds [Abdomen Soft] : soft [Abdomen Tenderness] : non-tender [No Spinal Tenderness] : no spinal tenderness [Nail Clubbing] : no clubbing  or cyanosis of the fingernails [Musculoskeletal - Swelling] : no joint swelling seen [Motor Tone] : muscle strength and tone were normal [] : no rash [No Focal Deficits] : no focal deficits [Impaired Insight] : insight and judgment were intact [FreeTextEntry1] : No synovitis. Positive Empty can test b/l shoulders

## 2023-12-19 NOTE — HISTORY OF PRESENT ILLNESS
[___ Month(s) Ago] : [unfilled] month(s) ago [de-identified] : 11/2023 [FreeTextEntry1] : 12/18/23. Croatian  ID: [id number] Does not complain of SOB/chest pain/ICD firing. Complains of pain in fingers and  wrist x5-6 months Comes and goes. Worse with movement/activity. Denies swelling/warmth/erythema of the hands. just tender.    11/27/23. Croatian  ID: [id number] Pt has been having cp and SOB and was found to have probable occluded venous graft to the LAD on cardiac CR on 10/31/23. On Nov 1st patient reports having stent placed  After stent placement patient has not had any chest pain. Denies SOB/fevers/chills/ Patient states she had one delivery of the Actemra. Patient expressed difficulty with getting delivery coordinate with the drug company and unable to get them on the phone. Last Actemra was taken around June/July 2023.    SOB for the past month. Over the past month only occurred twice over the past month. Improving compared to initial onset. Occurred at rest. Lasted 10 - 20 mins. Complains of pain in her right thumb base and 2nd finger PIP. Has had pain for the past year. Palpitations have improved in terms of frequency as well. Of note patient states she has not taken her Actemra since May after moving  5/2023: Croatian : [id number]. Says she has sob, w/ exertion and at rest w/ sleep. Symptoms have worsened in the couple of weeks. Worsened as she had to move furniture last week, and now symptoms occur daily and very debilitating that she cant move. Still has warmth sensation that occurs sporadically around her PPM daily. Denies chest pain or palpatitations. Has upcoming appointment w/ cardiology 6/2023. Has tolerated increased MTX 7 tabs to 8 tabs since 4/3/23, however no change in warmth sensation around PPM.  ROS: -denies chest pain or PPM firing sensation

## 2023-12-19 NOTE — REASON FOR VISIT
[Follow-Up: _____] : a [unfilled] follow-up visit [[employer] ] : provided by [employer]   [Time Spent: ____ minutes] : Total time spent using  services: [unfilled] minutes. The patient's primary language is not English thus required  services. [FreeTextEntry1] : Takayasu Arteritis [Interpreters_IDNumber] : [id number] [TWNoteComboBox1] : Croatian

## 2023-12-20 ENCOUNTER — RX RENEWAL (OUTPATIENT)
Age: 53
End: 2023-12-20

## 2024-01-08 ENCOUNTER — APPOINTMENT (OUTPATIENT)
Dept: CARDIOLOGY | Facility: CLINIC | Age: 54
End: 2024-01-08
Payer: MEDICAID

## 2024-01-08 VITALS
WEIGHT: 109 LBS | SYSTOLIC BLOOD PRESSURE: 116 MMHG | RESPIRATION RATE: 16 BRPM | TEMPERATURE: 97.2 F | HEART RATE: 70 BPM | OXYGEN SATURATION: 100 % | DIASTOLIC BLOOD PRESSURE: 62 MMHG | BODY MASS INDEX: 20.6 KG/M2

## 2024-01-08 DIAGNOSIS — R05.8 OTHER SPECIFIED COUGH: ICD-10-CM

## 2024-01-08 PROCEDURE — 93000 ELECTROCARDIOGRAM COMPLETE: CPT

## 2024-01-08 PROCEDURE — G2211 COMPLEX E/M VISIT ADD ON: CPT

## 2024-01-08 PROCEDURE — 99215 OFFICE O/P EST HI 40 MIN: CPT

## 2024-01-08 RX ORDER — DEXTROMETHORPHAN HYDROBROMIDE AND GUAIFENESIN 10; 200 MG/1; MG/1
10-200 CAPSULE, LIQUID FILLED ORAL
Qty: 42 | Refills: 0 | Status: COMPLETED | COMMUNITY
Start: 2024-01-08 | End: 2024-01-15

## 2024-01-09 ENCOUNTER — LABORATORY RESULT (OUTPATIENT)
Age: 54
End: 2024-01-09

## 2024-01-09 ENCOUNTER — APPOINTMENT (OUTPATIENT)
Dept: RHEUMATOLOGY | Facility: CLINIC | Age: 54
End: 2024-01-09
Payer: MEDICAID

## 2024-01-09 ENCOUNTER — MED ADMIN CHARGE (OUTPATIENT)
Age: 54
End: 2024-01-09

## 2024-01-09 VITALS
DIASTOLIC BLOOD PRESSURE: 63 MMHG | HEART RATE: 64 BPM | RESPIRATION RATE: 16 BRPM | SYSTOLIC BLOOD PRESSURE: 119 MMHG | OXYGEN SATURATION: 97 %

## 2024-01-09 VITALS
TEMPERATURE: 97.1 F | OXYGEN SATURATION: 98 % | RESPIRATION RATE: 16 BRPM | SYSTOLIC BLOOD PRESSURE: 127 MMHG | DIASTOLIC BLOOD PRESSURE: 73 MMHG | HEART RATE: 80 BPM

## 2024-01-09 PROCEDURE — 96413 CHEMO IV INFUSION 1 HR: CPT

## 2024-01-09 PROCEDURE — 96374 THER/PROPH/DIAG INJ IV PUSH: CPT | Mod: 59

## 2024-01-09 PROCEDURE — 36415 COLL VENOUS BLD VENIPUNCTURE: CPT

## 2024-01-09 RX ORDER — CETIRIZINE HYDROCHLORIDE 10 MG/1
10 CAPSULE, LIQUID FILLED ORAL
Qty: 0 | Refills: 0 | Status: COMPLETED
Start: 2023-11-27

## 2024-01-09 RX ORDER — HYDROCORTISONE SODIUM SUCCINATE 100 MG/2ML
100 INJECTION, POWDER, FOR SOLUTION INTRAMUSCULAR; INTRAVENOUS
Qty: 0 | Refills: 0 | Status: COMPLETED
Start: 2023-11-27

## 2024-01-09 RX ORDER — TOCILIZUMAB 20 MG/ML
400 INJECTION, SOLUTION, CONCENTRATE INTRAVENOUS
Qty: 1 | Refills: 0 | Status: COMPLETED
Start: 2023-11-27

## 2024-01-09 NOTE — HISTORY OF PRESENT ILLNESS
[Denies] : Denies [No] : No [N/A] : N/A [Yes] : Yes [Left upper extremity] : Left upper extremity [24g] : 24g [Start Time: ___] : Medication Start Time: [unfilled] [End Time: ___] : Medication End Time: [unfilled] [Medication Name: ___] : Medication Name: [unfilled] [Total Amount Administered: ___] : Total Amount Administered: [unfilled] [IV discontinued. Intact. No signs or symptoms of IV complications noted. Time: ___] : IV discontinued. Intact. No signs or symptoms of IV complications noted. Time: [unfilled] [Patient  instructed to seek medical attention with signs and symptoms of adverse effects] : Patient  instructed to seek medical attention with signs and symptoms of adverse effects [Patient left unit in no acute distress] : Patient left unit in no acute distress [Medications administered as ordered and tolerated well.] : Medications administered as ordered and tolerated well. [Blood drawn at time of visit] : Blood drawn at time of visit [Outside pharmacy] : Outside pharmacy [de-identified] : left forearm [de-identified] : Patient presents for Actemra infusion. Patient has hx of takayasu's arteritis. Patient reports to have been on Actemra subcutaneous injection, last injection was in 7/2023. Reports to have switched to IV due to inconsistency of delivery. Patient currently taking 2 mg of Prednisone, unsure when to stop. Advised patient to follow up with MD in regards of the medication. Patient reports to have joint pain, especially during activities. Other than that, no complaints. Patient denies of recent infection or abx use. Patient pre-medicated as per order. Infusion tolerated well and patient left unit ambulatory in [facility].

## 2024-02-05 DIAGNOSIS — D64.9 ANEMIA, UNSPECIFIED: ICD-10-CM

## 2024-02-07 ENCOUNTER — APPOINTMENT (OUTPATIENT)
Dept: ELECTROPHYSIOLOGY | Facility: CLINIC | Age: 54
End: 2024-02-07

## 2024-02-12 ENCOUNTER — RX RENEWAL (OUTPATIENT)
Age: 54
End: 2024-02-12

## 2024-02-13 ENCOUNTER — APPOINTMENT (OUTPATIENT)
Dept: RHEUMATOLOGY | Facility: CLINIC | Age: 54
End: 2024-02-13

## 2024-02-14 ENCOUNTER — APPOINTMENT (OUTPATIENT)
Dept: ELECTROPHYSIOLOGY | Facility: CLINIC | Age: 54
End: 2024-02-14
Payer: COMMERCIAL

## 2024-02-14 VITALS
WEIGHT: 111 LBS | DIASTOLIC BLOOD PRESSURE: 62 MMHG | HEART RATE: 67 BPM | TEMPERATURE: 98 F | SYSTOLIC BLOOD PRESSURE: 120 MMHG | RESPIRATION RATE: 18 BRPM | OXYGEN SATURATION: 100 % | BODY MASS INDEX: 20.97 KG/M2

## 2024-02-14 PROCEDURE — 93000 ELECTROCARDIOGRAM COMPLETE: CPT

## 2024-02-14 PROCEDURE — 99213 OFFICE O/P EST LOW 20 MIN: CPT | Mod: 25

## 2024-02-14 NOTE — DISCUSSION/SUMMARY
[FreeTextEntry1] : 1. Hx of AF ablation with PVI, ablation of MA and CTI flutter, and ablation of AVNRT now here for follow-up.  Patient has not had any symptoms of recurrence of arrhythmia.  She will continue to take beta blocker and anticoagulation for now.   2. ICD: to be followed by Dr. Xavier. No RV pacing observed on ECG from January 2024.  [EKG obtained to assist in diagnosis and management of assessed problem(s)] : EKG obtained to assist in diagnosis and management of assessed problem(s)

## 2024-02-14 NOTE — HISTORY OF PRESENT ILLNESS
[FreeTextEntry1] : Ms. YASH STEELE ia a 53-year-old female with a PMH of Takayasu aortitis, AVR/MVR s/p Bentall procedure with CABG-1V (SVG-LAD) and MVR, paroxysmal Afib (on Eliquis), HFpEF, and VT s/p Medtronic single chamber ICD implanted 5/18/2015 who underwent successful isolation of pulmonary vein, ablation of CTI and mitral annular atrial flutter and AVNRT ablation on 10/4/23 who returns for follow up given previous pacing burden of 17.5%. She denies CP, SOB, palpitations, difficulty with swallowing, dizziness or syncope. Patient does experience transient right and left chest tightness lasting 1 second while at rest, but no exertional chest discomfort. No palpitations. Dr. Xavier is currently following her ICD. Review of ECG from January revealed sinus rhythm with RBBB and no right ventricular pacing.

## 2024-02-15 ENCOUNTER — LABORATORY RESULT (OUTPATIENT)
Age: 54
End: 2024-02-15

## 2024-02-15 ENCOUNTER — MED ADMIN CHARGE (OUTPATIENT)
Age: 54
End: 2024-02-15

## 2024-02-15 ENCOUNTER — APPOINTMENT (OUTPATIENT)
Dept: RHEUMATOLOGY | Facility: CLINIC | Age: 54
End: 2024-02-15
Payer: COMMERCIAL

## 2024-02-15 VITALS
RESPIRATION RATE: 16 BRPM | OXYGEN SATURATION: 97 % | HEART RATE: 65 BPM | DIASTOLIC BLOOD PRESSURE: 72 MMHG | SYSTOLIC BLOOD PRESSURE: 157 MMHG

## 2024-02-15 VITALS
SYSTOLIC BLOOD PRESSURE: 154 MMHG | OXYGEN SATURATION: 97 % | HEART RATE: 68 BPM | RESPIRATION RATE: 16 BRPM | TEMPERATURE: 97.2 F | DIASTOLIC BLOOD PRESSURE: 68 MMHG

## 2024-02-15 PROCEDURE — 96413 CHEMO IV INFUSION 1 HR: CPT

## 2024-02-15 PROCEDURE — 36415 COLL VENOUS BLD VENIPUNCTURE: CPT

## 2024-02-15 PROCEDURE — 96374 THER/PROPH/DIAG INJ IV PUSH: CPT | Mod: 59

## 2024-02-15 RX ORDER — TOCILIZUMAB 20 MG/ML
400 INJECTION, SOLUTION, CONCENTRATE INTRAVENOUS
Qty: 1 | Refills: 0 | Status: COMPLETED
Start: 2023-11-27

## 2024-02-20 RX ORDER — HYDROCORTISONE SODIUM SUCCINATE 100 MG/2ML
100 INJECTION, POWDER, FOR SOLUTION INTRAMUSCULAR; INTRAVENOUS
Qty: 0 | Refills: 0 | Status: COMPLETED
Start: 2023-11-27

## 2024-02-20 RX ORDER — CETIRIZINE HYDROCHLORIDE 10 MG/1
10 CAPSULE, LIQUID FILLED ORAL
Qty: 0 | Refills: 0 | Status: COMPLETED
Start: 2023-11-27

## 2024-02-20 NOTE — HISTORY OF PRESENT ILLNESS
[Denies] : Denies [No] : No [N/A] : N/A [Yes] : Yes [Left upper extremity] : Left upper extremity [24g] : 24g [Start Time: ___] : Medication Start Time: [unfilled] [End Time: ___] : Medication End Time: [unfilled] [Medication Name: ___] : Medication Name: [unfilled] [Total Amount Administered: ___] : Total Amount Administered: [unfilled] [IV discontinued. Intact. No signs or symptoms of IV complications noted. Time: ___] : IV discontinued. Intact. No signs or symptoms of IV complications noted. Time: [unfilled] [Patient  instructed to seek medical attention with signs and symptoms of adverse effects] : Patient  instructed to seek medical attention with signs and symptoms of adverse effects [Patient left unit in no acute distress] : Patient left unit in no acute distress [Medications administered as ordered and tolerated well.] : Medications administered as ordered and tolerated well. [Blood drawn at time of visit] : Blood drawn at time of visit [Outside pharmacy] : Outside pharmacy [de-identified] : left forearm [de-identified] : Patient presents for Actemra infusion. Patient has hx of takayasu's arteritis. Patient reports to have joint pain, especially during activities. Other than that, no complaints. Patient denies of recent infection or abx use. Patient pre-medicated as per order. Infusion tolerated well and patient left unit ambulatory in [facility].

## 2024-02-21 ENCOUNTER — RX RENEWAL (OUTPATIENT)
Age: 54
End: 2024-02-21

## 2024-03-07 ENCOUNTER — APPOINTMENT (OUTPATIENT)
Dept: RHEUMATOLOGY | Facility: CLINIC | Age: 54
End: 2024-03-07

## 2024-03-14 ENCOUNTER — APPOINTMENT (OUTPATIENT)
Dept: RHEUMATOLOGY | Facility: CLINIC | Age: 54
End: 2024-03-14
Payer: COMMERCIAL

## 2024-03-14 ENCOUNTER — LABORATORY RESULT (OUTPATIENT)
Age: 54
End: 2024-03-14

## 2024-03-14 ENCOUNTER — APPOINTMENT (OUTPATIENT)
Dept: ELECTROPHYSIOLOGY | Facility: CLINIC | Age: 54
End: 2024-03-14

## 2024-03-14 ENCOUNTER — MED ADMIN CHARGE (OUTPATIENT)
Age: 54
End: 2024-03-14

## 2024-03-14 VITALS — DIASTOLIC BLOOD PRESSURE: 63 MMHG | HEART RATE: 68 BPM | OXYGEN SATURATION: 96 % | SYSTOLIC BLOOD PRESSURE: 112 MMHG

## 2024-03-14 VITALS
SYSTOLIC BLOOD PRESSURE: 129 MMHG | HEART RATE: 65 BPM | OXYGEN SATURATION: 97 % | TEMPERATURE: 97.5 F | DIASTOLIC BLOOD PRESSURE: 70 MMHG

## 2024-03-14 PROCEDURE — 96413 CHEMO IV INFUSION 1 HR: CPT

## 2024-03-14 PROCEDURE — 36415 COLL VENOUS BLD VENIPUNCTURE: CPT

## 2024-03-14 PROCEDURE — 96374 THER/PROPH/DIAG INJ IV PUSH: CPT | Mod: 59

## 2024-03-14 RX ORDER — CETIRIZINE HYDROCHLORIDE 10 MG/1
10 CAPSULE, LIQUID FILLED ORAL
Qty: 0 | Refills: 0 | Status: COMPLETED
Start: 2023-11-27

## 2024-03-14 RX ORDER — TOCILIZUMAB 20 MG/ML
400 INJECTION, SOLUTION, CONCENTRATE INTRAVENOUS
Qty: 1 | Refills: 0 | Status: COMPLETED
Start: 2023-11-27

## 2024-03-14 RX ORDER — HYDROCORTISONE SODIUM SUCCINATE 100 MG/2ML
100 INJECTION, POWDER, FOR SOLUTION INTRAMUSCULAR; INTRAVENOUS
Qty: 0 | Refills: 0 | Status: COMPLETED
Start: 2023-11-27

## 2024-03-14 NOTE — HISTORY OF PRESENT ILLNESS
[4] : 4 [Denies] : Denies [No] : No [N/A] : N/A [Yes] : Yes [de-identified] : right shoulder, elbows, fingers [de-identified] : achy [Left upper extremity] : Left upper extremity [24g] : 24g [Start Time: ___] : Medication Start Time: [unfilled] [End Time: ___] : Medication End Time: [unfilled] [Medication Name: ___] : Medication Name: [unfilled] [Total Amount Administered: ___] : Total Amount Administered: [unfilled] [IV discontinued. Intact. No signs or symptoms of IV complications noted. Time: ___] : IV discontinued. Intact. No signs or symptoms of IV complications noted. Time: [unfilled] [Patient  instructed to seek medical attention with signs and symptoms of adverse effects] : Patient  instructed to seek medical attention with signs and symptoms of adverse effects [Patient left unit in no acute distress] : Patient left unit in no acute distress [Medications administered as ordered and tolerated well.] : Medications administered as ordered and tolerated well. [Blood drawn at time of visit] : Blood drawn at time of visit [Outside pharmacy] : Outside pharmacy [de-identified] : left forearm [de-identified] : Patient presents for Actemra infusion. Patient has Hx of Takayasu's arteritis. Patient reports to have joint pain, especially during activity. Patient admits to right "frozen" shoulder. Admits to pain over past 6 months, MD aware of same. Other than that patient with no complaints. Patient denies of recent infection or abx use. Patient pre-medicated as per order. Infusion tolerated well and patient left unit ambulatory in [facility].  [de-identified] : [employer]  [id number] used ([place])

## 2024-03-18 ENCOUNTER — APPOINTMENT (OUTPATIENT)
Dept: RHEUMATOLOGY | Facility: CLINIC | Age: 54
End: 2024-03-18
Payer: COMMERCIAL

## 2024-03-18 ENCOUNTER — OUTPATIENT (OUTPATIENT)
Dept: OUTPATIENT SERVICES | Facility: HOSPITAL | Age: 54
LOS: 1 days | End: 2024-03-18

## 2024-03-18 VITALS
BODY MASS INDEX: 20.77 KG/M2 | DIASTOLIC BLOOD PRESSURE: 57 MMHG | RESPIRATION RATE: 16 BRPM | HEART RATE: 61 BPM | TEMPERATURE: 97.8 F | OXYGEN SATURATION: 100 % | WEIGHT: 110 LBS | SYSTOLIC BLOOD PRESSURE: 134 MMHG | HEIGHT: 61 IN

## 2024-03-18 DIAGNOSIS — I50.30 UNSPECIFIED DIASTOLIC (CONGESTIVE) HEART FAILURE: ICD-10-CM

## 2024-03-18 DIAGNOSIS — Z95.2 PRESENCE OF PROSTHETIC HEART VALVE: Chronic | ICD-10-CM

## 2024-03-18 DIAGNOSIS — M31.4 AORTIC ARCH SYNDROME [TAKAYASU]: ICD-10-CM

## 2024-03-18 DIAGNOSIS — I48.91 UNSPECIFIED ATRIAL FIBRILLATION: ICD-10-CM

## 2024-03-18 DIAGNOSIS — Z98.891 HISTORY OF UTERINE SCAR FROM PREVIOUS SURGERY: Chronic | ICD-10-CM

## 2024-03-18 DIAGNOSIS — Z98.89 OTHER SPECIFIED POSTPROCEDURAL STATES: Chronic | ICD-10-CM

## 2024-03-18 DIAGNOSIS — I42.2 OTHER HYPERTROPHIC CARDIOMYOPATHY: ICD-10-CM

## 2024-03-18 DIAGNOSIS — Z95.1 PRESENCE OF AORTOCORONARY BYPASS GRAFT: Chronic | ICD-10-CM

## 2024-03-18 DIAGNOSIS — M81.0 AGE-RELATED OSTEOPOROSIS W/OUT CURRENT PATHOLOGICAL FRACTURE: ICD-10-CM

## 2024-03-18 PROCEDURE — 99214 OFFICE O/P EST MOD 30 MIN: CPT | Mod: GC

## 2024-03-18 RX ORDER — ALENDRONATE SODIUM 70 MG/1
70 TABLET ORAL
Qty: 8 | Refills: 3 | Status: ACTIVE | COMMUNITY
Start: 2022-02-17 | End: 1900-01-01

## 2024-03-18 RX ORDER — PREDNISONE 1 MG/1
1 TABLET ORAL
Qty: 120 | Refills: 1 | Status: ACTIVE | COMMUNITY
Start: 2024-03-18 | End: 1900-01-01

## 2024-03-18 NOTE — PHYSICAL EXAM
[General Appearance - Alert] : alert [General Appearance - In No Acute Distress] : in no acute distress [General Appearance - Well Developed] : well developed [General Appearance - Well Nourished] : well nourished [Sclera] : the sclera and conjunctiva were normal [Outer Ear] : the ears and nose were normal in appearance [Examination Of The Oral Cavity] : the lips and gums were normal [Oropharynx] : the oropharynx was normal [Neck Appearance] : the appearance of the neck was normal [Respiration, Rhythm And Depth] : normal respiratory rhythm and effort [Auscultation Breath Sounds / Voice Sounds] : lungs were clear to auscultation bilaterally [Heart Rate And Rhythm] : heart rate was normal and rhythm regular [Heart Sounds] : normal S1 and S2 [Edema] : there was no peripheral edema [Bowel Sounds] : normal bowel sounds [Abdomen Soft] : soft [No Spinal Tenderness] : no spinal tenderness [Abdomen Tenderness] : non-tender [Nail Clubbing] : no clubbing  or cyanosis of the fingernails [Musculoskeletal - Swelling] : no joint swelling seen [Motor Tone] : muscle strength and tone were normal [] : no rash [No Focal Deficits] : no focal deficits [Impaired Insight] : insight and judgment were intact [FreeTextEntry1] : No synovitis. Positive Empty can test b/l shoulders

## 2024-03-18 NOTE — REASON FOR VISIT
[Follow-Up: _____] : a [unfilled] follow-up visit [[employer] ] : provided by [employer]   [FreeTextEntry1] : Takayasu Arteritis [Interpreters_IDNumber] : [id number] [TWNoteComboBox1] : Latvian

## 2024-03-18 NOTE — REVIEW OF SYSTEMS
[Shortness Of Breath] : shortness of breath [SOB on Exertion] : shortness of breath during exertion [As Noted in HPI] : as noted in HPI [Negative] : Integumentary

## 2024-03-18 NOTE — HISTORY OF PRESENT ILLNESS
[___ Month(s) Ago] : [unfilled] month(s) ago [de-identified] : 12/2023 [FreeTextEntry1] :  Bengali :  Pt presented for follow up Reports no complaints, no claudication in arms or legs, chest pain, SOB Currently on Actemra monthly (received 3 infusion so far), MTX 20 mg weekly, folic acid, prednisone 2 mg daily

## 2024-03-18 NOTE — DATA REVIEWED
[FreeTextEntry1] : Laboratory and radiology studies that were personally reviewed at today's visit are summarized in above and below:\par   no

## 2024-03-18 NOTE — ASSESSMENT
[FreeTextEntry1] : 89 y/o Ukrainian-speaking F with Takayasu's arteritis (dx 2015) c/b pAfib/sustained VTach s/p ICD/PPM placement/Bentell procedure w/ CABG, HOCM, severe AR/MVR, Osteoporosis presenting for follow up.  # Takayasu's arteritis with involvement proximal large arteries: Brachiocephalic artery, subclavian arteries, right and left common carotid arteries involvement since 2015. PET CT in 2021 showing areas of uptake in ascending thoracic aorta, new compared to previous CT in 2018. CT chest 1/2023 showing mild diffuse and luminal irregularity and wall thickening of the b/l carotid arteries and found to have probable occluded venous graft to the LAD on 10/31/23 s/p LHC on 11/21 showed  SVG-LAD occlusion. Not a candidate for intervention per Dr. Gibson. Native vessels w/o significant stenosis pending Actemra infusion initiation - CTA chest 3/23: no significant stenosis  - Actemra approved by insurance. pending arrangement of infusion times - PET scan 12/2023 shows similar increased FDG uptake of prox. ascending aorta in region of AV and root repair, anterior L third rib uptake like secondary to trauma, and non FDG avid stable pancreatic body cystic lesion - no symptoms at present concerning for KRZYSZTOF flare - Currently on MTX 20 mg weekly and folic acid daily, prednisone 2 mg daily, - Lab reviewed   # Afib - Switched to Eliquis, managed by Cardiology - c/w Cardiology f/u  # osteoporosis DEXA in 2/22: spine -3.3, femoral neck -2.6 on alendronate per PMD next dexa in 2-2024 minimize steroids.  # Macrocytic anemia likely due to MAHA  - Hg 10.8 stable, hapto <20  # S/p aortic and mitral valve replacement  # Hx of Vtach, s/p ICD placement  - TTE in 11/23 : LVH, grade 3 DD,   Plan: 1. C/w Actemra monthly, MTX 20 mg weekly, calcium vitamin D daily  2. Decrease prednisone to 2mg/1mg then decrease to 1 mg daily next month.  3. Order DEXA scan  4. Order osmar, labs for 5/24 5. Increase folic acid 2 tablets daily for macrocytic anemia   RTC 3 months  case d/w Dr. Carlos Manuel Vera MD Rheumatology Fellow PGY-4

## 2024-03-19 DIAGNOSIS — M81.0 AGE-RELATED OSTEOPOROSIS WITHOUT CURRENT PATHOLOGICAL FRACTURE: ICD-10-CM

## 2024-03-19 DIAGNOSIS — I42.2 OTHER HYPERTROPHIC CARDIOMYOPATHY: ICD-10-CM

## 2024-03-19 DIAGNOSIS — I48.91 UNSPECIFIED ATRIAL FIBRILLATION: ICD-10-CM

## 2024-03-19 DIAGNOSIS — M31.4 AORTIC ARCH SYNDROME [TAKAYASU]: ICD-10-CM

## 2024-03-25 ENCOUNTER — NON-APPOINTMENT (OUTPATIENT)
Age: 54
End: 2024-03-25

## 2024-03-25 ENCOUNTER — APPOINTMENT (OUTPATIENT)
Dept: CARDIOLOGY | Facility: CLINIC | Age: 54
End: 2024-03-25
Payer: MEDICAID

## 2024-03-25 VITALS
HEART RATE: 76 BPM | WEIGHT: 112 LBS | RESPIRATION RATE: 16 BRPM | BODY MASS INDEX: 21.14 KG/M2 | OXYGEN SATURATION: 100 % | DIASTOLIC BLOOD PRESSURE: 68 MMHG | SYSTOLIC BLOOD PRESSURE: 136 MMHG | HEIGHT: 61 IN

## 2024-03-25 DIAGNOSIS — Z87.898 PERSONAL HISTORY OF OTHER SPECIFIED CONDITIONS: ICD-10-CM

## 2024-03-25 PROCEDURE — 99214 OFFICE O/P EST MOD 30 MIN: CPT | Mod: 25

## 2024-03-25 PROCEDURE — 93000 ELECTROCARDIOGRAM COMPLETE: CPT

## 2024-03-25 PROCEDURE — G2211 COMPLEX E/M VISIT ADD ON: CPT | Mod: NC,1L

## 2024-03-25 NOTE — HISTORY OF PRESENT ILLNESS
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   ROS + for recent self resolving RLE numbness and headache. No cardiopulm sx.

## 2024-03-25 NOTE — DISCUSSION/SUMMARY
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   Assessment and Plan: 1) Takayasu arteritis, s/p Bentall procedure w/ CABG x 1 (SVG-LAD) and bioprosthetic aortic and mitral valve replacement. - Management as per rheumatology  2) Aflutter, Afib, and AVNRT s/p ablation 10/04/23 - EKG today NSR w/ RBBB - C/w BB and Eliquis at current dose  3) HFpEF - Euvolemic on exam, c/w torsemide PO at current dose  4) VT s/p ICD - C/w BB - ICD being checked routinely w/ EP  5) CAD - CCTA and LHC w/ SVG-LAD occlusion. Not a candidate for intervention per Dr. Gibson. Native vessels w/o significant stenosis. - C/w ASA and high intensity Lipitor - No chest pain today  6) Headache and RLE numbness that self resolved - Obtain CTH to r/o CVA from Afib/flutter  RTC in 3 months  During non face-to-face time, I reviewed relevant portions of the patient's medical record. During face-to-face time, I took a relevant history and examined the patient. I also explained differential diagnoses, relevant cardiac diagnoses, workup, and management plan, which required a moderate level of medical decision making. I answered all questions related to the patient's medical conditions.   Kimberlyn Xavier M.D. Attending Cardiologist  [EKG obtained to assist in diagnosis and management of assessed problem(s)] : EKG obtained to assist in diagnosis and management of assessed problem(s)

## 2024-04-04 ENCOUNTER — APPOINTMENT (OUTPATIENT)
Dept: RHEUMATOLOGY | Facility: CLINIC | Age: 54
End: 2024-04-04

## 2024-04-10 NOTE — DISCUSSION/SUMMARY
An instant wave free ratio (iFR) was measured in the left anterior descending artery using a (SOFTWARE [employer] FRACTIONAL FLOW RSRV ACT CORONEL FFRANGIO MED) pressure wire. iFR measurement: 0.91. [FreeTextEntry1] : 47F , hx as above. presents for f/u. \par \par HFpEF  , s/p MVR. \par - her continued abdminal fullness is concerning, as she still requires 80mg lasix daily to remove the edema.  She was off her actemra due to logistical issues. I spoke to the supplyer and she will get a shipment this friday. My hope is that if her prednisone can be decreased, perhaps her fluid retention will also decrease. She is also now off diltiazem, which will hopefully help her cardiac function improve. \par - she appears to be underdosing her meroprolol (I prescribed 200 BID but she is taking 100 BID). We discussed this and she is resistant to change. She will discuss this further at next visit. \par  - if her edema and BP remain elevated, would favor being more aggressive with afterload reduction, perhaps with spironolactone ( this would be a good choice given her lack of insurance, as well as some suggestion of benefit in the TOPCAT trial.)\par - TTE was ok. \par - she is also self pay, she pays $5 for her visit here, and also for device check. Given this, will try to set up remote checks at next visit, to minimize her financial burder\par \par rtc august

## 2024-04-11 ENCOUNTER — LABORATORY RESULT (OUTPATIENT)
Age: 54
End: 2024-04-11

## 2024-04-11 ENCOUNTER — MED ADMIN CHARGE (OUTPATIENT)
Age: 54
End: 2024-04-11

## 2024-04-11 ENCOUNTER — APPOINTMENT (OUTPATIENT)
Dept: RHEUMATOLOGY | Facility: CLINIC | Age: 54
End: 2024-04-11
Payer: COMMERCIAL

## 2024-04-11 VITALS
OXYGEN SATURATION: 98 % | HEART RATE: 63 BPM | DIASTOLIC BLOOD PRESSURE: 57 MMHG | SYSTOLIC BLOOD PRESSURE: 158 MMHG | TEMPERATURE: 96.6 F | RESPIRATION RATE: 16 BRPM

## 2024-04-11 VITALS
OXYGEN SATURATION: 97 % | DIASTOLIC BLOOD PRESSURE: 47 MMHG | SYSTOLIC BLOOD PRESSURE: 116 MMHG | HEART RATE: 99 BPM | RESPIRATION RATE: 16 BRPM

## 2024-04-11 PROCEDURE — 96374 THER/PROPH/DIAG INJ IV PUSH: CPT | Mod: 59

## 2024-04-11 PROCEDURE — 96413 CHEMO IV INFUSION 1 HR: CPT

## 2024-04-11 PROCEDURE — 36415 COLL VENOUS BLD VENIPUNCTURE: CPT

## 2024-04-11 RX ORDER — TOCILIZUMAB 20 MG/ML
400 INJECTION, SOLUTION, CONCENTRATE INTRAVENOUS
Qty: 1 | Refills: 0 | Status: COMPLETED
Start: 2023-11-27

## 2024-04-11 RX ORDER — CETIRIZINE HYDROCHLORIDE 10 MG/1
10 CAPSULE, LIQUID FILLED ORAL
Qty: 0 | Refills: 0 | Status: COMPLETED
Start: 2023-11-27

## 2024-04-11 RX ORDER — HYDROCORTISONE SODIUM SUCCINATE 100 MG/2ML
100 INJECTION, POWDER, FOR SOLUTION INTRAMUSCULAR; INTRAVENOUS
Qty: 0 | Refills: 0 | Status: COMPLETED
Start: 2023-11-27

## 2024-04-11 NOTE — HISTORY OF PRESENT ILLNESS
[Denies] : Denies [No] : No [Yes] : Yes [Declined] : Declined [Left upper extremity] : Left upper extremity [Start Time: ___] : Medication Start Time: [unfilled] [End Time: ___] : Medication End Time: [unfilled] [Medication Name: ___] : Medication Name: [unfilled] [Total Amount Administered: ___] : Total Amount Administered: [unfilled] [IV discontinued. Intact. No signs or symptoms of IV complications noted. Time: ___] : IV discontinued. Intact. No signs or symptoms of IV complications noted. Time: [unfilled] [Patient  instructed to seek medical attention with signs and symptoms of adverse effects] : Patient  instructed to seek medical attention with signs and symptoms of adverse effects [Patient left unit in no acute distress] : Patient left unit in no acute distress [Medications administered as ordered and tolerated well.] : Medications administered as ordered and tolerated well. [Blood drawn at time of visit] : Blood drawn at time of visit [Outside pharmacy] : Outside pharmacy [24g] : 24g [de-identified] : left AC [de-identified] : [employer]  [id number] used ([place]) [de-identified] : Patient presents for Actemra infusion. Patient denies any recent infections, hospitalizations or ABX use.  Patient denies any pain or stiffness.  Patient pre-medicated as per order. Infusion tolerated well and patient left unit ambulatory in [facility].

## 2024-04-24 ENCOUNTER — RX RENEWAL (OUTPATIENT)
Age: 54
End: 2024-04-24

## 2024-04-24 RX ORDER — METOPROLOL TARTRATE 25 MG/1
25 TABLET, FILM COATED ORAL TWICE DAILY
Qty: 60 | Refills: 5 | Status: ACTIVE | COMMUNITY
Start: 2019-02-26 | End: 1900-01-01

## 2024-05-02 ENCOUNTER — APPOINTMENT (OUTPATIENT)
Dept: RHEUMATOLOGY | Facility: CLINIC | Age: 54
End: 2024-05-02

## 2024-05-07 DIAGNOSIS — M31.6 OTHER GIANT CELL ARTERITIS: ICD-10-CM

## 2024-05-09 ENCOUNTER — LABORATORY RESULT (OUTPATIENT)
Age: 54
End: 2024-05-09

## 2024-05-09 ENCOUNTER — APPOINTMENT (OUTPATIENT)
Dept: RHEUMATOLOGY | Facility: CLINIC | Age: 54
End: 2024-05-09
Payer: COMMERCIAL

## 2024-05-09 ENCOUNTER — MED ADMIN CHARGE (OUTPATIENT)
Age: 54
End: 2024-05-09

## 2024-05-09 ENCOUNTER — APPOINTMENT (OUTPATIENT)
Dept: ELECTROPHYSIOLOGY | Facility: CLINIC | Age: 54
End: 2024-05-09

## 2024-05-09 ENCOUNTER — NON-APPOINTMENT (OUTPATIENT)
Age: 54
End: 2024-05-09

## 2024-05-09 ENCOUNTER — APPOINTMENT (OUTPATIENT)
Dept: ELECTROPHYSIOLOGY | Facility: CLINIC | Age: 54
End: 2024-05-09
Payer: COMMERCIAL

## 2024-05-09 VITALS
RESPIRATION RATE: 16 BRPM | OXYGEN SATURATION: 99 % | SYSTOLIC BLOOD PRESSURE: 134 MMHG | HEART RATE: 50 BPM | DIASTOLIC BLOOD PRESSURE: 59 MMHG

## 2024-05-09 VITALS
HEART RATE: 50 BPM | DIASTOLIC BLOOD PRESSURE: 66 MMHG | RESPIRATION RATE: 16 BRPM | TEMPERATURE: 97.4 F | SYSTOLIC BLOOD PRESSURE: 151 MMHG | OXYGEN SATURATION: 97 %

## 2024-05-09 LAB
BASOPHILS # BLD AUTO: 0.04 K/UL
BASOPHILS NFR BLD AUTO: 0.9 %
EOSINOPHIL # BLD AUTO: 0.15 K/UL
EOSINOPHIL NFR BLD AUTO: 3.5 %
HCT VFR BLD CALC: 34.6 %
HGB BLD-MCNC: 11.6 G/DL
IMM GRANULOCYTES NFR BLD AUTO: 0.2 %
LYMPHOCYTES # BLD AUTO: 1.12 K/UL
LYMPHOCYTES NFR BLD AUTO: 26.5 %
MAN DIFF?: NORMAL
MCHC RBC-ENTMCNC: 33.5 GM/DL
MCHC RBC-ENTMCNC: 34.7 PG
MCV RBC AUTO: 103.6 FL
MONOCYTES # BLD AUTO: 0.56 K/UL
MONOCYTES NFR BLD AUTO: 13.2 %
NEUTROPHILS # BLD AUTO: 2.35 K/UL
NEUTROPHILS NFR BLD AUTO: 55.7 %
PLATELET # BLD AUTO: 165 K/UL
RBC # BLD: 3.34 M/UL
RBC # FLD: 15 %
WBC # FLD AUTO: 4.23 K/UL

## 2024-05-09 PROCEDURE — 93295 DEV INTERROG REMOTE 1/2/MLT: CPT

## 2024-05-09 PROCEDURE — 96374 THER/PROPH/DIAG INJ IV PUSH: CPT | Mod: 59

## 2024-05-09 PROCEDURE — 96413 CHEMO IV INFUSION 1 HR: CPT

## 2024-05-09 PROCEDURE — 36415 COLL VENOUS BLD VENIPUNCTURE: CPT

## 2024-05-09 PROCEDURE — 93296 REM INTERROG EVL PM/IDS: CPT

## 2024-05-09 RX ORDER — CETIRIZINE HYDROCHLORIDE 10 MG/1
10 CAPSULE, LIQUID FILLED ORAL
Qty: 0 | Refills: 0 | Status: COMPLETED
Start: 2023-11-27

## 2024-05-09 RX ORDER — HYDROCORTISONE SODIUM SUCCINATE 100 MG/2ML
100 INJECTION, POWDER, FOR SOLUTION INTRAMUSCULAR; INTRAVENOUS
Qty: 0 | Refills: 0 | Status: COMPLETED
Start: 2023-11-27

## 2024-05-09 RX ORDER — TOCILIZUMAB 20 MG/ML
400 INJECTION, SOLUTION, CONCENTRATE INTRAVENOUS
Qty: 1 | Refills: 0 | Status: COMPLETED
Start: 2023-11-27

## 2024-05-09 NOTE — HISTORY OF PRESENT ILLNESS
[Denies] : Denies [No] : No [Yes] : Yes [Declined] : Declined [Informed consent documented in EHR.] : Informed consent documented in EHR. [24g] : 24g [Start Time: ___] : Medication Start Time: [unfilled] [End Time: ___] : Medication End Time: [unfilled] [Medication Name: ___] : Medication Name: [unfilled] [Total Amount Administered: ___] : Total Amount Administered: [unfilled] [IV discontinued. Intact. No signs or symptoms of IV complications noted. Time: ___] : IV discontinued. Intact. No signs or symptoms of IV complications noted. Time: [unfilled] [Patient  instructed to seek medical attention with signs and symptoms of adverse effects] : Patient  instructed to seek medical attention with signs and symptoms of adverse effects [Patient left unit in no acute distress] : Patient left unit in no acute distress [Medications administered as ordered and tolerated well.] : Medications administered as ordered and tolerated well. [Blood drawn at time of visit] : Blood drawn at time of visit [Outside pharmacy] : Outside pharmacy [de-identified] : left arm median cubital vein  [de-identified] : labs drawn as prescribed [de-identified] : Patient presents for scheduled Actemra infusion, ambulatory in [facility]. Patient Maltese speaking pacific  used [employer] #[id number]. Today, patient reports overall to be doing well and denies any pain, swelling or stiffness. Denies SOB, CP, headaches and no dizziness. No other concerns verbalized. Patient pre-medicated as prescribed and infusion tolerated well.

## 2024-05-24 ENCOUNTER — RX RENEWAL (OUTPATIENT)
Age: 54
End: 2024-05-24

## 2024-05-24 RX ORDER — POTASSIUM CHLORIDE 1500 MG/1
20 TABLET, EXTENDED RELEASE ORAL
Qty: 90 | Refills: 0 | Status: ACTIVE | COMMUNITY
Start: 2023-11-10 | End: 1900-01-01

## 2024-05-24 RX ORDER — ERGOCALCIFEROL 1.25 MG/1
1.25 MG CAPSULE, LIQUID FILLED ORAL
Qty: 90 | Refills: 0 | Status: ACTIVE | COMMUNITY
Start: 2022-01-28 | End: 1900-01-01

## 2024-06-10 ENCOUNTER — APPOINTMENT (OUTPATIENT)
Dept: RHEUMATOLOGY | Facility: CLINIC | Age: 54
End: 2024-06-10

## 2024-07-22 ENCOUNTER — APPOINTMENT (OUTPATIENT)
Dept: CARDIOLOGY | Facility: CLINIC | Age: 54
End: 2024-07-22
Payer: COMMERCIAL

## 2024-07-22 VITALS
RESPIRATION RATE: 16 BRPM | WEIGHT: 110 LBS | BODY MASS INDEX: 20.77 KG/M2 | DIASTOLIC BLOOD PRESSURE: 62 MMHG | SYSTOLIC BLOOD PRESSURE: 112 MMHG | OXYGEN SATURATION: 99 % | HEART RATE: 57 BPM | HEIGHT: 61 IN

## 2024-07-22 DIAGNOSIS — E83.42 HYPOMAGNESEMIA: ICD-10-CM

## 2024-07-22 PROCEDURE — G2211 COMPLEX E/M VISIT ADD ON: CPT | Mod: NC

## 2024-07-22 PROCEDURE — 99215 OFFICE O/P EST HI 40 MIN: CPT | Mod: 25

## 2024-07-22 PROCEDURE — 93000 ELECTROCARDIOGRAM COMPLETE: CPT

## 2024-07-22 NOTE — DISCUSSION/SUMMARY
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   EKG 7/22/24 sinus елена, RBBB  Assessment and Plan: 1) Takayasu arteritis, s/p Bentall procedure w/ CABG x 1 (SVG-LAD) and bioprosthetic aortic and mitral valve replacement. - Management as per rheumatology -> will refer to Dr. 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 Corral 5) CAD - CCTA and LHC w/ SVG-LAD occlusion. Not a candidate for intervention per Dr. Gibson. Native vessels w/o significant stenosis. - C/w high intensity Lipitor - No chest pain today - Check lipid profile today 6. LE pain - CHECK LE venous duplex - Check Mg, given Mg supplement for possible LE cramp  During non face-to-face time, I reviewed relevant portions of the patient's medical record. During face-to-face time, I took a relevant history and examined the patient. I also explained differential diagnoses, relevant cardiac diagnoses, workup, and management plan. Total time spent 40 minutes.  Kimberlyn Xavier M.D. Attending Cardiologist

## 2024-07-22 NOTE — HISTORY OF PRESENT ILLNESS
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   ROS negative for any dyspnea or LE edema. No cardiac sx. ROS + LE cramp and heaviness.

## 2024-07-24 DIAGNOSIS — R05.9 COUGH, UNSPECIFIED: ICD-10-CM

## 2024-07-24 RX ORDER — GUAIFENESIN AND DEXTROMETHORPHAN 10; 100 MG/5ML; MG/5ML
10-100 SYRUP ORAL
Qty: 1 | Refills: 0 | Status: ACTIVE | COMMUNITY
Start: 2024-07-24 | End: 1900-01-01

## 2024-07-26 LAB
ALBUMIN SERPL ELPH-MCNC: 4.4 G/DL
ALP BLD-CCNC: 93 U/L
ALT SERPL-CCNC: 15 U/L
ANION GAP SERPL CALC-SCNC: 12 MMOL/L
AST SERPL-CCNC: 32 U/L
BILIRUB SERPL-MCNC: 1.3 MG/DL
BUN SERPL-MCNC: 21 MG/DL
CALCIUM SERPL-MCNC: 9 MG/DL
CHLORIDE SERPL-SCNC: 102 MMOL/L
CHOLEST SERPL-MCNC: 205 MG/DL
CO2 SERPL-SCNC: 27 MMOL/L
CREAT SERPL-MCNC: 0.91 MG/DL
CRP SERPL-MCNC: 4 MG/L
EGFR: 75 ML/MIN/1.73M2
ERYTHROCYTE [SEDIMENTATION RATE] IN BLOOD BY WESTERGREN METHOD: 33 MM/HR
GLUCOSE SERPL-MCNC: 102 MG/DL
HDLC SERPL-MCNC: 79 MG/DL
LDLC SERPL CALC-MCNC: 107 MG/DL
MAGNESIUM SERPL-MCNC: 2.3 MG/DL
NONHDLC SERPL-MCNC: 125 MG/DL
POTASSIUM SERPL-SCNC: 3.8 MMOL/L
PROT SERPL-MCNC: 7.2 G/DL
SODIUM SERPL-SCNC: 141 MMOL/L
TRIGL SERPL-MCNC: 105 MG/DL

## 2024-08-27 ENCOUNTER — RX RENEWAL (OUTPATIENT)
Age: 54
End: 2024-08-27

## 2024-09-09 ENCOUNTER — NON-APPOINTMENT (OUTPATIENT)
Age: 54
End: 2024-09-09

## 2024-09-10 ENCOUNTER — APPOINTMENT (OUTPATIENT)
Dept: ELECTROPHYSIOLOGY | Facility: CLINIC | Age: 54
End: 2024-09-10
Payer: SELF-PAY

## 2024-09-10 PROCEDURE — 93296 REM INTERROG EVL PM/IDS: CPT

## 2024-09-10 PROCEDURE — 93295 DEV INTERROG REMOTE 1/2/MLT: CPT

## 2024-10-14 DIAGNOSIS — R60.0 LOCALIZED EDEMA: ICD-10-CM

## 2024-10-21 ENCOUNTER — APPOINTMENT (OUTPATIENT)
Dept: CARDIOLOGY | Facility: CLINIC | Age: 54
End: 2024-10-21

## 2024-10-21 ENCOUNTER — APPOINTMENT (OUTPATIENT)
Dept: CARDIOLOGY | Facility: CLINIC | Age: 54
End: 2024-10-21
Payer: COMMERCIAL

## 2024-10-21 VITALS
HEART RATE: 52 BPM | OXYGEN SATURATION: 97 % | SYSTOLIC BLOOD PRESSURE: 126 MMHG | DIASTOLIC BLOOD PRESSURE: 53 MMHG | RESPIRATION RATE: 18 BRPM

## 2024-10-21 DIAGNOSIS — Z23 ENCOUNTER FOR IMMUNIZATION: ICD-10-CM

## 2024-10-21 PROCEDURE — G2211 COMPLEX E/M VISIT ADD ON: CPT | Mod: NC

## 2024-10-21 PROCEDURE — 93306 TTE W/DOPPLER COMPLETE: CPT

## 2024-10-21 PROCEDURE — 93970 EXTREMITY STUDY: CPT

## 2024-10-21 PROCEDURE — 99215 OFFICE O/P EST HI 40 MIN: CPT

## 2024-10-21 RX ORDER — APIXABAN 2.5 MG/1
2.5 TABLET, FILM COATED ORAL
Qty: 180 | Refills: 3 | Status: ACTIVE | COMMUNITY
Start: 2024-10-21 | End: 1900-01-01

## 2024-10-24 LAB
ALBUMIN SERPL ELPH-MCNC: 4.2 G/DL
ALP BLD-CCNC: 89 U/L
ALT SERPL-CCNC: 16 U/L
ANION GAP SERPL CALC-SCNC: 12 MMOL/L
AST SERPL-CCNC: 39 U/L
BILIRUB SERPL-MCNC: 0.9 MG/DL
BUN SERPL-MCNC: 19 MG/DL
CALCIUM SERPL-MCNC: 9.3 MG/DL
CHLORIDE SERPL-SCNC: 101 MMOL/L
CO2 SERPL-SCNC: 28 MMOL/L
CREAT SERPL-MCNC: 1.08 MG/DL
CRP SERPL-MCNC: 6 MG/L
EGFR: 61 ML/MIN/1.73M2
ERYTHROCYTE [SEDIMENTATION RATE] IN BLOOD BY WESTERGREN METHOD: 42 MM/HR
GLUCOSE SERPL-MCNC: 78 MG/DL
POTASSIUM SERPL-SCNC: 4.7 MMOL/L
PROT SERPL-MCNC: 7.3 G/DL
SODIUM SERPL-SCNC: 141 MMOL/L

## 2024-11-12 ENCOUNTER — RX RENEWAL (OUTPATIENT)
Age: 54
End: 2024-11-12